# Patient Record
Sex: FEMALE | Race: WHITE | NOT HISPANIC OR LATINO | Employment: FULL TIME | URBAN - METROPOLITAN AREA
[De-identification: names, ages, dates, MRNs, and addresses within clinical notes are randomized per-mention and may not be internally consistent; named-entity substitution may affect disease eponyms.]

---

## 2017-01-16 ENCOUNTER — ALLSCRIPTS OFFICE VISIT (OUTPATIENT)
Dept: OTHER | Facility: OTHER | Age: 52
End: 2017-01-16

## 2017-01-16 DIAGNOSIS — E55.9 VITAMIN D DEFICIENCY: ICD-10-CM

## 2017-01-16 DIAGNOSIS — E03.9 HYPOTHYROIDISM: ICD-10-CM

## 2017-01-16 DIAGNOSIS — R73.03 PREDIABETES: ICD-10-CM

## 2017-05-16 ENCOUNTER — GENERIC CONVERSION - ENCOUNTER (OUTPATIENT)
Dept: OTHER | Facility: OTHER | Age: 52
End: 2017-05-16

## 2017-05-17 ENCOUNTER — TRANSCRIBE ORDERS (OUTPATIENT)
Dept: ADMINISTRATIVE | Facility: HOSPITAL | Age: 52
End: 2017-05-17

## 2017-05-17 DIAGNOSIS — M79.605 LEFT LEG PAIN: ICD-10-CM

## 2017-05-17 DIAGNOSIS — R09.89 OTHER DYSPNEA AND RESPIRATORY ABNORMALITY: Primary | ICD-10-CM

## 2017-05-17 DIAGNOSIS — M79.604 RIGHT LEG PAIN: ICD-10-CM

## 2017-05-17 DIAGNOSIS — R06.09 OTHER DYSPNEA AND RESPIRATORY ABNORMALITY: Primary | ICD-10-CM

## 2017-05-22 ENCOUNTER — GENERIC CONVERSION - ENCOUNTER (OUTPATIENT)
Dept: OTHER | Facility: OTHER | Age: 52
End: 2017-05-22

## 2017-07-06 ENCOUNTER — ALLSCRIPTS OFFICE VISIT (OUTPATIENT)
Dept: OTHER | Facility: OTHER | Age: 52
End: 2017-07-06

## 2017-07-06 ENCOUNTER — TRANSCRIBE ORDERS (OUTPATIENT)
Dept: ADMINISTRATIVE | Facility: HOSPITAL | Age: 52
End: 2017-07-06

## 2017-07-06 DIAGNOSIS — R53.83 FATIGUE, UNSPECIFIED TYPE: ICD-10-CM

## 2017-07-06 DIAGNOSIS — R06.02 SHORTNESS OF BREATH: Primary | ICD-10-CM

## 2017-07-18 ENCOUNTER — ALLSCRIPTS OFFICE VISIT (OUTPATIENT)
Dept: OTHER | Facility: OTHER | Age: 52
End: 2017-07-18

## 2017-07-19 ENCOUNTER — HOSPITAL ENCOUNTER (OUTPATIENT)
Dept: NON INVASIVE DIAGNOSTICS | Facility: CLINIC | Age: 52
Discharge: HOME | End: 2017-07-19
Payer: COMMERCIAL

## 2017-07-19 DIAGNOSIS — E03.9 HYPOTHYROIDISM: ICD-10-CM

## 2017-07-19 DIAGNOSIS — R53.83 FATIGUE, UNSPECIFIED TYPE: ICD-10-CM

## 2017-07-19 DIAGNOSIS — R06.02 SHORTNESS OF BREATH: ICD-10-CM

## 2017-07-19 PROCEDURE — 93350 STRESS TTE ONLY: CPT

## 2017-08-05 ENCOUNTER — APPOINTMENT (OUTPATIENT)
Dept: LAB | Facility: MEDICAL CENTER | Age: 52
End: 2017-08-05
Payer: COMMERCIAL

## 2017-08-05 ENCOUNTER — TRANSCRIBE ORDERS (OUTPATIENT)
Dept: ADMINISTRATIVE | Facility: HOSPITAL | Age: 52
End: 2017-08-05

## 2017-08-05 DIAGNOSIS — Z00.8 HEALTH EXAMINATION IN POPULATION SURVEY: Primary | ICD-10-CM

## 2017-08-05 DIAGNOSIS — Z00.8 HEALTH EXAMINATION IN POPULATION SURVEY: ICD-10-CM

## 2017-08-05 LAB
CHOLEST SERPL-MCNC: 207 MG/DL (ref 50–200)
EST. AVERAGE GLUCOSE BLD GHB EST-MCNC: 111 MG/DL
HBA1C MFR BLD: 5.5 % (ref 4.2–6.3)
HDLC SERPL-MCNC: 57 MG/DL (ref 40–60)
LDLC SERPL CALC-MCNC: 116 MG/DL (ref 0–100)
TRIGL SERPL-MCNC: 172 MG/DL

## 2017-08-05 PROCEDURE — 83036 HEMOGLOBIN GLYCOSYLATED A1C: CPT

## 2017-08-05 PROCEDURE — 80061 LIPID PANEL: CPT

## 2017-08-05 PROCEDURE — 36415 COLL VENOUS BLD VENIPUNCTURE: CPT

## 2017-12-13 PROCEDURE — G0145 SCR C/V CYTO,THINLAYER,RESCR: HCPCS | Performed by: OBSTETRICS & GYNECOLOGY

## 2017-12-13 PROCEDURE — 87624 HPV HI-RISK TYP POOLED RSLT: CPT | Performed by: OBSTETRICS & GYNECOLOGY

## 2017-12-18 ENCOUNTER — LAB REQUISITION (OUTPATIENT)
Dept: LAB | Facility: HOSPITAL | Age: 52
End: 2017-12-18
Payer: COMMERCIAL

## 2017-12-18 DIAGNOSIS — Z01.419 ENCOUNTER FOR GYNECOLOGICAL EXAMINATION WITHOUT ABNORMAL FINDING: ICD-10-CM

## 2017-12-19 LAB — HPV RRNA GENITAL QL NAA+PROBE: NORMAL

## 2017-12-21 LAB
LAB AP GYN PRIMARY INTERPRETATION: NORMAL
Lab: NORMAL

## 2018-01-13 VITALS
DIASTOLIC BLOOD PRESSURE: 62 MMHG | WEIGHT: 142.44 LBS | SYSTOLIC BLOOD PRESSURE: 100 MMHG | HEIGHT: 65 IN | BODY MASS INDEX: 23.73 KG/M2 | HEART RATE: 74 BPM

## 2018-01-14 VITALS
SYSTOLIC BLOOD PRESSURE: 118 MMHG | BODY MASS INDEX: 24.74 KG/M2 | WEIGHT: 148.5 LBS | HEART RATE: 66 BPM | DIASTOLIC BLOOD PRESSURE: 78 MMHG | HEIGHT: 65 IN

## 2018-01-14 VITALS
HEART RATE: 80 BPM | SYSTOLIC BLOOD PRESSURE: 100 MMHG | BODY MASS INDEX: 24.55 KG/M2 | WEIGHT: 147.38 LBS | HEIGHT: 65 IN | DIASTOLIC BLOOD PRESSURE: 78 MMHG

## 2018-01-18 NOTE — MISCELLANEOUS
Message  Return to work or school:   Roland Gerber is under my professional care   She was seen in my office on 2/2/16   She is able to return to work on  24/16            Signatures   Electronically signed by : Priyanka Rhodes, [facility]; Feb 2 2016 10:35AM EST                       (Author)

## 2018-01-18 NOTE — MISCELLANEOUS
Message  Return to work or school:   Raquel Olivier is under my professional care  She was seen in my office on 02/14/2016       Please excuse illness          Signatures   Electronically signed by : Pamela Bo [facility]; Feb 14 2016  4:37PM EST                       (Author)    Electronically signed by : Pamela Bo [facility]; Feb 15 2016 10:45AM EST

## 2018-03-23 DIAGNOSIS — E03.9 HYPOTHYROIDISM, UNSPECIFIED TYPE: Primary | ICD-10-CM

## 2018-03-23 RX ORDER — LEVOTHYROXINE SODIUM 0.03 MG/1
TABLET ORAL
Qty: 120 TABLET | Refills: 3 | Status: SHIPPED | OUTPATIENT
Start: 2018-03-23 | End: 2018-07-24 | Stop reason: SDUPTHER

## 2018-03-30 ENCOUNTER — OFFICE VISIT (OUTPATIENT)
Dept: OBGYN CLINIC | Facility: HOSPITAL | Age: 53
End: 2018-03-30
Payer: COMMERCIAL

## 2018-03-30 ENCOUNTER — HOSPITAL ENCOUNTER (OUTPATIENT)
Dept: RADIOLOGY | Facility: HOSPITAL | Age: 53
Discharge: HOME | End: 2018-03-30
Attending: ORTHOPAEDIC SURGERY
Payer: COMMERCIAL

## 2018-03-30 VITALS
SYSTOLIC BLOOD PRESSURE: 111 MMHG | HEART RATE: 80 BPM | BODY MASS INDEX: 26.33 KG/M2 | WEIGHT: 148.6 LBS | DIASTOLIC BLOOD PRESSURE: 75 MMHG | HEIGHT: 63 IN

## 2018-03-30 DIAGNOSIS — G56.03 CARPAL TUNNEL SYNDROME, BILATERAL: Primary | ICD-10-CM

## 2018-03-30 DIAGNOSIS — G56.03 CARPAL TUNNEL SYNDROME, BILATERAL: ICD-10-CM

## 2018-03-30 PROCEDURE — 73100 X-RAY EXAM OF WRIST: CPT

## 2018-03-30 PROCEDURE — 99204 OFFICE O/P NEW MOD 45 MIN: CPT | Performed by: ORTHOPAEDIC SURGERY

## 2018-03-30 PROCEDURE — 20526 THER INJECTION CARP TUNNEL: CPT | Performed by: ORTHOPAEDIC SURGERY

## 2018-03-30 RX ORDER — PSEUDOEPHEDRINE HCL 120 MG/1
120 TABLET, FILM COATED, EXTENDED RELEASE ORAL AS NEEDED
COMMUNITY
Start: 2013-04-11

## 2018-03-30 RX ORDER — BETAMETHASONE SODIUM PHOSPHATE AND BETAMETHASONE ACETATE 3; 3 MG/ML; MG/ML
6 INJECTION, SUSPENSION INTRA-ARTICULAR; INTRALESIONAL; INTRAMUSCULAR; SOFT TISSUE
Status: COMPLETED | OUTPATIENT
Start: 2018-03-30 | End: 2018-03-30

## 2018-03-30 RX ORDER — FLUTICASONE PROPIONATE 50 MCG
SPRAY, SUSPENSION (ML) NASAL AS NEEDED
COMMUNITY

## 2018-03-30 RX ORDER — LIDOCAINE HYDROCHLORIDE 10 MG/ML
0.5 INJECTION, SOLUTION INFILTRATION; PERINEURAL
Status: COMPLETED | OUTPATIENT
Start: 2018-03-30 | End: 2018-03-30

## 2018-03-30 RX ORDER — MULTIVIT WITH MINERALS/LUTEIN
1000 TABLET ORAL
COMMUNITY
Start: 2013-04-17

## 2018-03-30 RX ORDER — BUPIVACAINE HYDROCHLORIDE 2.5 MG/ML
1 INJECTION, SOLUTION INFILTRATION; PERINEURAL
Status: COMPLETED | OUTPATIENT
Start: 2018-03-30 | End: 2018-03-30

## 2018-03-30 RX ORDER — LEVOTHYROXINE SODIUM 0.03 MG/1
25 TABLET ORAL
COMMUNITY
End: 2018-05-22 | Stop reason: SDUPTHER

## 2018-03-30 RX ORDER — NORETHINDRONE ACETATE AND ETHINYL ESTRADIOL .5; 2.5 MG/1; UG/1
1 TABLET ORAL
Refills: 2 | COMMUNITY
Start: 2018-02-03 | End: 2019-07-17 | Stop reason: SDUPTHER

## 2018-03-30 RX ORDER — NIACIN 100 MG
100 TABLET ORAL
COMMUNITY

## 2018-03-30 RX ORDER — BUPROPION HYDROCHLORIDE 300 MG/1
300 TABLET ORAL
COMMUNITY
End: 2018-06-06

## 2018-03-30 RX ORDER — GUAIFENESIN 600 MG
600 TABLET, EXTENDED RELEASE 12 HR ORAL AS NEEDED
COMMUNITY
Start: 2013-04-11 | End: 2019-09-25 | Stop reason: ALTCHOICE

## 2018-03-30 RX ORDER — BUPROPION HYDROCHLORIDE 450 MG/1
450 TABLET, FILM COATED, EXTENDED RELEASE ORAL EVERY MORNING
COMMUNITY
Start: 2015-09-14 | End: 2018-07-13 | Stop reason: SDUPTHER

## 2018-03-30 RX ORDER — CETIRIZINE HYDROCHLORIDE 10 MG/1
10 TABLET ORAL AS NEEDED
COMMUNITY
Start: 2013-04-11

## 2018-03-30 RX ORDER — EPINEPHRINE 0.3 MG/.3ML
INJECTION INTRAMUSCULAR AS NEEDED
COMMUNITY
Start: 2015-07-02 | End: 2019-07-31 | Stop reason: ALTCHOICE

## 2018-03-30 RX ADMIN — BETAMETHASONE SODIUM PHOSPHATE AND BETAMETHASONE ACETATE 6 MG: 3; 3 INJECTION, SUSPENSION INTRA-ARTICULAR; INTRALESIONAL; INTRAMUSCULAR; SOFT TISSUE at 13:28

## 2018-03-30 RX ADMIN — LIDOCAINE HYDROCHLORIDE 0.5 ML: 10 INJECTION, SOLUTION INFILTRATION; PERINEURAL at 13:28

## 2018-03-30 RX ADMIN — BUPIVACAINE HYDROCHLORIDE 1 ML: 2.5 INJECTION, SOLUTION INFILTRATION; PERINEURAL at 13:28

## 2018-03-30 NOTE — PROGRESS NOTES
Assessment:  1  Carpal tunnel syndrome, bilateral  EMG 2 Limb Upper Extremity     Patient Active Problem List   Diagnosis    Carpal tunnel syndrome, bilateral           Plan       cortisone injections today  Nighttime splints   Vitamin-B complex   EMG is will be ordered            Subjective:     Patient ID:    Chief Complaint:Richa Medina 45 y o  female      HPI    Patient comes in today with regards to Bilateral hand but mostly on the right  The patient reports that the pain is in the  Volar aspect affecting the thumb index and middle finger and has been going on for  Approximately a year but has been getting worse  Patient is right-hand  The pain is rated at 0 at its best and 8 at its worst   The pain is described as  Burning numbness and tingling  It is worsened with  lifting, and is made better with  rest   The patient has taken  Tylenol wrist  brace for treatment  No injury or surgeries for these wrists      The following portions of the patient's history were reviewed and updated as appropriate: allergies, current medications, past family history, past social history, past surgical history and problem list         Social History     Social History    Marital status: /Civil Union     Spouse name: N/A    Number of children: N/A    Years of education: N/A     Occupational History    Not on file       Social History Main Topics    Smoking status: Never Smoker    Smokeless tobacco: Never Used    Alcohol use Yes    Drug use: No    Sexual activity: Not on file     Other Topics Concern    Not on file     Social History Narrative    No narrative on file     Past Medical History:   Diagnosis Date    Depression     Disease of thyroid gland      Past Surgical History:   Procedure Laterality Date    SEPTOPLASTY       Allergies   Allergen Reactions    Other      Current Outpatient Prescriptions on File Prior to Visit   Medication Sig Dispense Refill    levothyroxine 25 mcg tablet TAKE 1 TABLET ON MONDAY THROUGH FRIDAY AND 2 TABLETS ON SATURDAY AND SUNDAY 120 tablet 3     No current facility-administered medications on file prior to visit  Objective:    Review of Systems   Constitutional: Negative  HENT: Positive for hearing loss  Eyes: Negative  Respiratory: Negative  Cardiovascular: Negative  Gastrointestinal: Negative  Endocrine: Negative  Genitourinary: Negative  Musculoskeletal:        See HPI   Skin: Negative  Allergic/Immunologic: Negative  Neurological: Positive for numbness  Hematological: Negative  Psychiatric/Behavioral: Negative  Right Hand Exam     Tenderness   The patient is experiencing no tenderness  Range of Motion   The patient has normal right wrist ROM  Muscle Strength   Wrist Extension: 5/5   Wrist Flexion: 5/5   : 5/5     Tests   Phalens Sign: positive  Tinels Sign (Medial Nerve): negative  Finkelstein: negative    Other   Erythema: absent  Scars: absent  Sensation: normal  Pulse: present    Comments:   Dworkins test is negative on both hands      Left Hand Exam     Tenderness   The patient is experiencing no tenderness  Range of Motion   The patient has normal left wrist ROM  Muscle Strength   Wrist Extension: 5/5   Wrist Flexion: 5/5   :  5/5     Tests   Phalens Sign: positive  Tinels Sign (Medial Nerve): negative    Other   Erythema: absent  Scars: absent  Sensation: normal  Pulse: present            Physical Exam   Constitutional: She is oriented to person, place, and time  She appears well-developed  HENT:   Head: Normocephalic  Eyes: Pupils are equal, round, and reactive to light  Neck: Normal range of motion  Cardiovascular: Normal rate  Pulmonary/Chest: Effort normal    Abdominal: She exhibits no distension  Neurological: She is alert and oriented to person, place, and time  Skin: Skin is warm  Psychiatric: She has a normal mood and affect     Nursing note and vitals reviewed  Hand/upper extremity injection  Date/Time: 3/30/2018 1:28 PM  Consent given by: patient  Supporting Documentation  Indications: pain   Procedure Details  Condition:carpal tunnel syndrome Site: L carpal tunnel   Needle size: 25 G  Approach: volar  Medications administered: 1 mL bupivacaine 0 25 %; 0 5 mL lidocaine 1 %; 6 mg betamethasone acetate-betamethasone sodium phosphate 6 (3-3) mg/mL  Patient tolerance: patient tolerated the procedure well with no immediate complications       Hand/upper extremity injection  Date/Time: 3/30/2018 1:28 PM  Consent given by: patient  Supporting Documentation  Indications: pain   Procedure Details  Condition:carpal tunnel syndrome Site: R carpal tunnel   Needle size: 25 G  Medications administered: 1 mL bupivacaine 0 25 %; 0 5 mL lidocaine 1 %; 6 mg betamethasone acetate-betamethasone sodium phosphate 6 (3-3) mg/mL  Patient tolerance: patient tolerated the procedure well with no immediate complications                I have personally reviewed pertinent films in PACS  Portions of the record may have been created with voice recognition software   Occasional wrong word or "sound a like" substitutions may have occurred due to the inherent limitations of voice recognition software   Read the chart carefully and recognize, using context, where substitutions have occurred

## 2018-05-22 DIAGNOSIS — E03.9 HYPOTHYROIDISM, UNSPECIFIED TYPE: Primary | ICD-10-CM

## 2018-05-22 RX ORDER — LEVOTHYROXINE SODIUM 0.03 MG/1
TABLET ORAL
Qty: 120 TABLET | Refills: 2 | Status: SHIPPED | OUTPATIENT
Start: 2018-05-22 | End: 2018-06-06

## 2018-05-23 ENCOUNTER — HOSPITAL ENCOUNTER (OUTPATIENT)
Dept: NEUROLOGY | Facility: CLINIC | Age: 53
Discharge: HOME | End: 2018-05-23
Payer: COMMERCIAL

## 2018-05-23 DIAGNOSIS — G56.03 CARPAL TUNNEL SYNDROME, BILATERAL: ICD-10-CM

## 2018-05-23 PROCEDURE — 95912 NRV CNDJ TEST 11-12 STUDIES: CPT | Performed by: PSYCHIATRY & NEUROLOGY

## 2018-05-23 PROCEDURE — 95886 MUSC TEST DONE W/N TEST COMP: CPT | Performed by: PSYCHIATRY & NEUROLOGY

## 2018-06-01 ENCOUNTER — OFFICE VISIT (OUTPATIENT)
Dept: OBGYN CLINIC | Facility: HOSPITAL | Age: 53
End: 2018-06-01
Payer: COMMERCIAL

## 2018-06-01 VITALS
BODY MASS INDEX: 25.47 KG/M2 | WEIGHT: 143.8 LBS | HEART RATE: 76 BPM | DIASTOLIC BLOOD PRESSURE: 73 MMHG | SYSTOLIC BLOOD PRESSURE: 114 MMHG

## 2018-06-01 DIAGNOSIS — G56.01 CARPAL TUNNEL SYNDROME ON RIGHT: Primary | ICD-10-CM

## 2018-06-01 PROCEDURE — 99213 OFFICE O/P EST LOW 20 MIN: CPT | Performed by: ORTHOPAEDIC SURGERY

## 2018-06-01 NOTE — PROGRESS NOTES
Assessment:  1  Carpal tunnel syndrome on right  Case request operating room: [address]    Ambulatory referral to Family Practice    Case request operating room: [address]     Patient Active Problem List   Diagnosis    Carpal tunnel syndrome, bilateral           Plan       patient would like to proceed with carpal tunnel release            Subjective:     Patient ID:    Chief Complaint:Richa Medina 52 y o  female      HPI     patient comes in today with regards to bilateral wrists  We have treated her with cortisone injections she came back and was still having pain we sent her for an EMG she comes back to review those EMG is  The bilateral wrists have been bothering her significantly recently  Conservative care including vitamin-B as well as injections have not been to what she would have liked      The following portions of the patient's history were reviewed and updated as appropriate: allergies, current medications, past family history, past social history, past surgical history and problem list     All organ systems normal    Social History     Social History    Marital status: /Civil Union     Spouse name: N/A    Number of children: N/A    Years of education: N/A     Occupational History    Not on file       Social History Main Topics    Smoking status: Never Smoker    Smokeless tobacco: Never Used    Alcohol use Yes    Drug use: No    Sexual activity: Not on file     Other Topics Concern    Not on file     Social History Narrative    No narrative on file     Past Medical History:   Diagnosis Date    Depression     Disease of thyroid gland      Past Surgical History:   Procedure Laterality Date    SEPTOPLASTY       Allergies   Allergen Reactions    Other      Current Outpatient Prescriptions on File Prior to Visit   Medication Sig Dispense Refill    Ascorbic Acid (VITAMIN C) 1000 MG tablet Take 1,000 mg by mouth      aspirin 81 MG tablet Take 81 mg by mouth  buPROPion (WELLBUTRIN XL) 150 mg 24 hr tablet Take 450 mg by mouth      buPROPion (WELLBUTRIN XL) 300 mg 24 hr tablet Take 300 mg by mouth      Calcium 250 MG CAPS Take by mouth      cetirizine (ZyrTEC) 10 mg tablet Take 10 mg by mouth      EPINEPHrine (EPIPEN 2-JASON) 0 3 mg/0 3 mL SOAJ       fluticasone (FLONASE ALLERGY RELIEF) 50 mcg/act nasal spray into each nostril      guaiFENesin (MUCINEX) 600 mg 12 hr tablet Take 600 mg by mouth      levothyroxine 25 mcg tablet TAKE 1 TABLET ON MONDAY THROUGH FRIDAY AND 2 TABLETS ON SATURDAY AND SUNDAY 120 tablet 3    levothyroxine 25 mcg tablet TAKE 1 TABLET ON MONDAY THROUGH FRIDAY AND 2 TABLETS ON SATURDAY AND SUNDAY 120 tablet 2    MAGNESIUM PO Take 200 mg by mouth      MULTIPLE VITAMINS-CALCIUM PO Take by mouth      niacin 100 mg tablet Take by mouth      norethindrone-ethinyl estradiol (FEMHRT LOW DOSE) 0 5-2 5 MG-MCG per tablet Take 1 tablet by mouth daily  2    PARoxetine (PAXIL) 10 mg tablet TAKE 1 TABLET BY MOUTH DAILY      pseudoephedrine (SUDAFED) 120 MG 12 hr tablet Take 120 mg by mouth       No current facility-administered medications on file prior to visit  Objective:        Ortho Exam     see previous exam for further details    I have personally reviewed pertinent films in PACS and my interpretation is EMG show bilateral carpal tunnel mild on the left but moderate on the right       Portions of the record may have been created with voice recognition software   Occasional wrong word or "sound a like" substitutions may have occurred due to the inherent limitations of voice recognition software   Read the chart carefully and recognize, using context, where substitutions have occurred

## 2018-06-01 NOTE — H&P
Assessment:  1  Carpal tunnel syndrome, bilateral  EMG 2 Limb Upper Extremity          Patient Active Problem List   Diagnosis    Carpal tunnel syndrome, bilateral               Plan       cortisone injections today  Nighttime splints   Vitamin-B complex   EMG is will be ordered               Subjective:      Patient ID:     Chief Complaint:Richa Medina 45 y o  female        HPI     Patient comes in today with regards to Bilateral hand but mostly on the right  The patient reports that the pain is in the  Volar aspect affecting the thumb index and middle finger and has been going on for  Approximately a year but has been getting worse  Patient is right-hand  The pain is rated at 0 at its best and 8 at its worst   The pain is described as  Burning numbness and tingling  It is worsened with  lifting, and is made better with  rest   The patient has taken  Tylenol wrist  brace for treatment   No injury or surgeries for these wrists        The following portions of the patient's history were reviewed and updated as appropriate: allergies, current medications, past family history, past social history, past surgical history and problem list            Social History            Social History    Marital status: /Civil Union       Spouse name: N/A    Number of children: N/A    Years of education: N/A          Occupational History    Not on file            Social History Main Topics    Smoking status: Never Smoker    Smokeless tobacco: Never Used    Alcohol use Yes    Drug use: No    Sexual activity: Not on file      Other Topics Concern    Not on file          Social History Narrative    No narrative on file           Past Medical History:   Diagnosis Date    Depression      Disease of thyroid gland              Past Surgical History:   Procedure Laterality Date    SEPTOPLASTY               Allergies   Allergen Reactions    Other               Current Outpatient Prescriptions on File Prior to Visit   Medication Sig Dispense Refill    levothyroxine 25 mcg tablet TAKE 1 TABLET ON MONDAY THROUGH FRIDAY AND 2 TABLETS ON SATURDAY AND SUNDAY 120 tablet 3      No current facility-administered medications on file prior to visit                  Objective:     Review of Systems   Constitutional: Negative  HENT: Positive for hearing loss  Eyes: Negative  Respiratory: Negative  Cardiovascular: Negative  Gastrointestinal: Negative  Endocrine: Negative  Genitourinary: Negative  Musculoskeletal:        See HPI   Skin: Negative  Allergic/Immunologic: Negative  Neurological: Positive for numbness  Hematological: Negative  Psychiatric/Behavioral: Negative           Right Hand Exam      Tenderness   The patient is experiencing no tenderness             Range of Motion   The patient has normal right wrist ROM     Muscle Strength   Wrist Extension: 5/5   Wrist Flexion: 5/5   : 5/5      Tests   Phalens Sign: positive  Tinels Sign (Medial Nerve): negative  Finkelstein: negative     Other   Erythema: absent  Scars: absent  Sensation: normal  Pulse: present     Comments:   Dworkins test is negative on both hands        Left Hand Exam      Tenderness   The patient is experiencing no tenderness             Range of Motion   The patient has normal left wrist ROM     Muscle Strength   Wrist Extension: 5/5   Wrist Flexion: 5/5   :  5/5      Tests   Phalens Sign: positive  Tinels Sign (Medial Nerve): negative     Other   Erythema: absent  Scars: absent  Sensation: normal  Pulse: present              Physical Exam   Constitutional: She is oriented to person, place, and time  She appears well-developed  HENT:   Head: Normocephalic  Eyes: Pupils are equal, round, and reactive to light  Neck: Normal range of motion  Cardiovascular: Normal rate  Pulmonary/Chest: Effort normal    Abdominal: She exhibits no distension     Neurological: She is alert and oriented to person, place, and time  Skin: Skin is warm  Psychiatric: She has a normal mood and affect  Nursing note and vitals reviewed      EMG shows that there is moderate carpal tunnel on the right side  It shows that the motor is 4 48 milliseconds and the sensory is 4 27 milliseconds the left shows normal motor and a mild delay in sensory at 3 54

## 2018-06-06 NOTE — PRE-PROCEDURE INSTRUCTIONS
Pre-Surgery Instructions:   Medication Instructions    Ascorbic Acid (VITAMIN C) 1000 MG tablet Instructed patient per Anesthesia Guidelines   aspirin 81 MG tablet Instructed patient per Anesthesia Guidelines   buPROPion (FORFIVO XL) 450 MG 24 hr tablet Instructed patient per Anesthesia Guidelines   Calcium 250 MG CAPS Instructed patient per Anesthesia Guidelines   cetirizine (ZyrTEC) 10 mg tablet Instructed patient per Anesthesia Guidelines   EPINEPHrine (EPIPEN 2-JASON) 0 3 mg/0 3 mL SOAJ Instructed patient per Anesthesia Guidelines   fluticasone (FLONASE ALLERGY RELIEF) 50 mcg/act nasal spray Instructed patient per Anesthesia Guidelines   guaiFENesin (MUCINEX) 600 mg 12 hr tablet Instructed patient per Anesthesia Guidelines   levothyroxine 25 mcg tablet Instructed patient per Anesthesia Guidelines   MAGNESIUM PO Instructed patient per Anesthesia Guidelines   MULTIPLE VITAMINS-CALCIUM PO Instructed patient per Anesthesia Guidelines   niacin 100 mg tablet Instructed patient per Anesthesia Guidelines   norethindrone-ethinyl estradiol (FEMHRT LOW DOSE) 0 5-2 5 MG-MCG per tablet Instructed patient per Anesthesia Guidelines   PAROXETINE HCL PO Instructed patient per Anesthesia Guidelines   pseudoephedrine (SUDAFED) 120 MG 12 hr tablet Instructed patient per Anesthesia Guidelines      Pre op instructions reviewed-showering instructions reviewed-patient has hibiclens

## 2018-07-01 DIAGNOSIS — R73.03 PREDIABETES: ICD-10-CM

## 2018-07-01 DIAGNOSIS — E03.9 HYPOTHYROIDISM: ICD-10-CM

## 2018-07-02 ENCOUNTER — OFFICE VISIT (OUTPATIENT)
Dept: FAMILY MEDICINE CLINIC | Facility: CLINIC | Age: 53
End: 2018-07-02
Payer: COMMERCIAL

## 2018-07-02 VITALS
HEIGHT: 63 IN | BODY MASS INDEX: 25.52 KG/M2 | SYSTOLIC BLOOD PRESSURE: 112 MMHG | HEART RATE: 84 BPM | RESPIRATION RATE: 18 BRPM | WEIGHT: 144 LBS | DIASTOLIC BLOOD PRESSURE: 76 MMHG | OXYGEN SATURATION: 96 %

## 2018-07-02 DIAGNOSIS — E78.00 PRIMARY HYPERCHOLESTEROLEMIA: ICD-10-CM

## 2018-07-02 DIAGNOSIS — E03.9 HYPOTHYROIDISM, UNSPECIFIED TYPE: ICD-10-CM

## 2018-07-02 DIAGNOSIS — F32.A DEPRESSION, UNSPECIFIED DEPRESSION TYPE: ICD-10-CM

## 2018-07-02 DIAGNOSIS — Z11.59 NEED FOR HEPATITIS C SCREENING TEST: ICD-10-CM

## 2018-07-02 DIAGNOSIS — Z01.818 PREOP EXAMINATION: Primary | ICD-10-CM

## 2018-07-02 DIAGNOSIS — G56.01 CARPAL TUNNEL SYNDROME ON RIGHT: ICD-10-CM

## 2018-07-02 PROCEDURE — 99215 OFFICE O/P EST HI 40 MIN: CPT | Performed by: FAMILY MEDICINE

## 2018-07-02 NOTE — ASSESSMENT & PLAN NOTE
Depression symptoms are stable on paroxetine and Wellbutrin  She had been seeing Dr Elton Anderson for Psychiatry, but since her insurance changed, she will not be returning  She will ask him for records

## 2018-07-02 NOTE — ASSESSMENT & PLAN NOTE
Symptoms have been stable on current dose of Synthroid  She will be returning to Endocrine this summer  She would like to follow here after that

## 2018-07-02 NOTE — PROGRESS NOTES
Assessment/Plan:    I have given her clearance for her right sided carpal tunnel surgery next week barring any new symptoms between now and date of surgery  She had a completely negative cardiac workup 1 year ago when she was having some fatigue and weakness symptoms  Thankfully the symptoms are better now that she has been treating with a chiropractor  She is aware that she must stop over-the-counter meds and aspirin 1 week before surgery  Depression    Depression symptoms are stable on paroxetine and Wellbutrin  She had been seeing Dr Valdemar Cruz for Psychiatry, but since her insurance changed, she will not be returning  She will ask him for records  Hypothyroidism    Symptoms have been stable on current dose of Synthroid  She will be returning to Endocrine this summer  She would like to follow here after that  Primary hypercholesterolemia    Lipids have been stable on daily niacin  I have given her lab slip for fasting lipid profile later this summer  Diagnoses and all orders for this visit:    Preop examination    Carpal tunnel syndrome on right  -     Ambulatory referral to Family Practice    Need for hepatitis C screening test  -     Hepatitis C antibody; Future    Hypothyroidism, unspecified type    Primary hypercholesterolemia    Depression, unspecified depression type    Other orders  -      Mammography          Subjective:      Patient ID: Vicky Anand is a 52 y o  female  [address] is here to establish care and get preop clearance prior to having her right carpal tunnel repaired  This is scheduled for next week  She had been dealing with carpal tunnel symptoms for quite a while  She works in [employer] at [email or web address], and the wrist symptoms were exacerbated by lifting  This symptoms have been a little bit better over the summer   When she is not working  Her past medical history is positive for hypothyroidism, depression, and allergic rhinitis    Thyroid has been under excellent control with levothyroxine 25 mcg for 5 days of the week and 50 mcg for 2 days during the week  She has been getting allergy injections for the past 5 years  The depression has been under excellent control on paroxetine 50 mg daily  She has had a mild elevated cholesterol which is stable on niacin  She had normal hemoglobin A1c and other labs done August 2017  The only other surgical procedure with septoplasty approximately 2012  She denies any anesthesia problems  She also had Lasik surgery yrs ago    She denies any history of heart problems  She had a full workup about 1 year ago because she was having fatigue and leg weakness  Her cardiac workup was negative  Recently she has been going for chiropractic care in the symptoms are much better  She denies shortness of breath currently  She takes baby aspirin on a daily basis because she takes niacin and this deals with the hot flashes  She knows to stop these medications 1 week preop  The following portions of the patient's history were reviewed and updated as appropriate: allergies, current medications, past family history, past medical history, past social history, past surgical history and problem list     Review of Systems   Constitutional: Negative for activity change, chills, fatigue and fever  HENT: Negative for congestion, ear pain, sinus pressure and sore throat  Eyes: Negative for pain and visual disturbance  Respiratory: Negative for cough, chest tightness, shortness of breath and wheezing  Cardiovascular: Negative for chest pain, palpitations and leg swelling  Gastrointestinal: Negative for abdominal pain, blood in stool, constipation, diarrhea, nausea and vomiting  Endocrine: Negative for polydipsia and polyuria  Genitourinary: Negative for difficulty urinating, dysuria, frequency and urgency  Leakage with exercise or sneezing   Musculoskeletal: Positive for arthralgias   Negative for joint swelling and myalgias  Skin: Negative for rash  Neurological: Negative for dizziness, weakness, numbness and headaches  Hematological: Negative for adenopathy  Does not bruise/bleed easily  Psychiatric/Behavioral: Negative for dysphoric mood  The patient is not nervous/anxious  Objective:      /76   Pulse 84   Resp 18   Ht 5' 3" (1 6 m)   Wt 65 3 kg (144 lb)   SpO2 96%   BMI 25 51 kg/m²          Physical Exam   Constitutional: She is oriented to person, place, and time  She appears well-developed and well-nourished  No distress  HENT:   Head: Normocephalic and atraumatic  Right Ear: External ear normal    Left Ear: External ear normal    Nose: Nose normal    Mouth/Throat: Oropharynx is clear and moist    Eyes: Conjunctivae and EOM are normal  Pupils are equal, round, and reactive to light  No scleral icterus  Neck: Normal range of motion  Neck supple  No thyromegaly present  Carotid pulses 2+ bilaterally without bruit   Cardiovascular: Normal rate, regular rhythm and normal heart sounds  No murmur heard  Pulmonary/Chest: Effort normal and breath sounds normal  She has no wheezes  She has no rales  Abdominal: Soft  Bowel sounds are normal  She exhibits no mass  There is no tenderness  Musculoskeletal: She exhibits no tenderness or deformity  Lymphadenopathy:     She has no cervical adenopathy  Neurological: She is alert and oriented to person, place, and time  She has normal reflexes  No cranial nerve deficit  Skin: Skin is warm and dry  No rash noted  No erythema  Psychiatric: She has a normal mood and affect  Her behavior is normal    Nursing note and vitals reviewed

## 2018-07-02 NOTE — ASSESSMENT & PLAN NOTE
Lipids have been stable on daily niacin  I have given her lab slip for fasting lipid profile later this summer

## 2018-07-03 ENCOUNTER — APPOINTMENT (OUTPATIENT)
Dept: LAB | Facility: MEDICAL CENTER | Age: 53
End: 2018-07-03
Payer: COMMERCIAL

## 2018-07-03 DIAGNOSIS — R73.03 PREDIABETES: ICD-10-CM

## 2018-07-03 DIAGNOSIS — E03.9 HYPOTHYROIDISM: ICD-10-CM

## 2018-07-03 DIAGNOSIS — E78.00 PRIMARY HYPERCHOLESTEROLEMIA: ICD-10-CM

## 2018-07-03 DIAGNOSIS — Z11.59 NEED FOR HEPATITIS C SCREENING TEST: ICD-10-CM

## 2018-07-03 LAB
ALBUMIN SERPL BCP-MCNC: 3.9 G/DL (ref 3.5–5)
ALP SERPL-CCNC: 54 U/L (ref 46–116)
ALT SERPL W P-5'-P-CCNC: 24 U/L (ref 12–78)
ANION GAP SERPL CALCULATED.3IONS-SCNC: 7 MMOL/L (ref 4–13)
AST SERPL W P-5'-P-CCNC: 14 U/L (ref 5–45)
BILIRUB SERPL-MCNC: 0.45 MG/DL (ref 0.2–1)
BUN SERPL-MCNC: 8 MG/DL (ref 5–25)
CALCIUM SERPL-MCNC: 9.4 MG/DL (ref 8.3–10.1)
CHLORIDE SERPL-SCNC: 104 MMOL/L (ref 100–108)
CHOLEST SERPL-MCNC: 176 MG/DL (ref 50–200)
CO2 SERPL-SCNC: 25 MMOL/L (ref 21–32)
CREAT SERPL-MCNC: 0.87 MG/DL (ref 0.6–1.3)
EST. AVERAGE GLUCOSE BLD GHB EST-MCNC: 114 MG/DL
GFR SERPL CREATININE-BSD FRML MDRD: 75 ML/MIN/1.73SQ M
GLUCOSE P FAST SERPL-MCNC: 78 MG/DL (ref 65–99)
HBA1C MFR BLD: 5.6 % (ref 4.2–6.3)
HCV AB SER QL: NORMAL
HDLC SERPL-MCNC: 55 MG/DL (ref 40–60)
LDLC SERPL CALC-MCNC: 94 MG/DL (ref 0–100)
NONHDLC SERPL-MCNC: 121 MG/DL
POTASSIUM SERPL-SCNC: 3.9 MMOL/L (ref 3.5–5.3)
PROT SERPL-MCNC: 8 G/DL (ref 6.4–8.2)
SODIUM SERPL-SCNC: 136 MMOL/L (ref 136–145)
T4 FREE SERPL-MCNC: 0.88 NG/DL (ref 0.76–1.46)
TRIGL SERPL-MCNC: 137 MG/DL
TSH SERPL DL<=0.05 MIU/L-ACNC: 2.02 UIU/ML (ref 0.36–3.74)

## 2018-07-03 PROCEDURE — 84443 ASSAY THYROID STIM HORMONE: CPT

## 2018-07-03 PROCEDURE — 36415 COLL VENOUS BLD VENIPUNCTURE: CPT

## 2018-07-03 PROCEDURE — 80053 COMPREHEN METABOLIC PANEL: CPT

## 2018-07-03 PROCEDURE — 80061 LIPID PANEL: CPT

## 2018-07-03 PROCEDURE — 84439 ASSAY OF FREE THYROXINE: CPT

## 2018-07-03 PROCEDURE — 86803 HEPATITIS C AB TEST: CPT

## 2018-07-03 PROCEDURE — 83036 HEMOGLOBIN GLYCOSYLATED A1C: CPT

## 2018-07-10 ENCOUNTER — ANESTHESIA EVENT (OUTPATIENT)
Dept: PERIOP | Facility: HOSPITAL | Age: 53
End: 2018-07-10
Payer: COMMERCIAL

## 2018-07-10 ENCOUNTER — ANESTHESIA (OUTPATIENT)
Dept: PERIOP | Facility: HOSPITAL | Age: 53
End: 2018-07-10
Payer: COMMERCIAL

## 2018-07-10 ENCOUNTER — HOSPITAL ENCOUNTER (OUTPATIENT)
Facility: HOSPITAL | Age: 53
Setting detail: OUTPATIENT SURGERY
Discharge: HOME | End: 2018-07-10
Attending: ORTHOPAEDIC SURGERY | Admitting: ORTHOPAEDIC SURGERY
Payer: COMMERCIAL

## 2018-07-10 VITALS
SYSTOLIC BLOOD PRESSURE: 129 MMHG | BODY MASS INDEX: 25.34 KG/M2 | RESPIRATION RATE: 18 BRPM | HEART RATE: 62 BPM | DIASTOLIC BLOOD PRESSURE: 65 MMHG | WEIGHT: 143 LBS | TEMPERATURE: 97.6 F | OXYGEN SATURATION: 100 % | HEIGHT: 63 IN

## 2018-07-10 DIAGNOSIS — G56.03 CARPAL TUNNEL SYNDROME, BILATERAL: ICD-10-CM

## 2018-07-10 DIAGNOSIS — G56.01 CARPAL TUNNEL SYNDROME ON RIGHT: Primary | ICD-10-CM

## 2018-07-10 PROCEDURE — 64721 CARPAL TUNNEL SURGERY: CPT | Performed by: ORTHOPAEDIC SURGERY

## 2018-07-10 RX ORDER — OXYCODONE HYDROCHLORIDE 5 MG/1
10 TABLET ORAL EVERY 4 HOURS PRN
Status: DISCONTINUED | OUTPATIENT
Start: 2018-07-10 | End: 2018-07-10 | Stop reason: HOSPADM

## 2018-07-10 RX ORDER — OXYCODONE HYDROCHLORIDE AND ACETAMINOPHEN 5; 325 MG/1; MG/1
1 TABLET ORAL EVERY 6 HOURS PRN
Qty: 5 TABLET | Refills: 0 | Status: SHIPPED | OUTPATIENT
Start: 2018-07-10 | End: 2018-10-04

## 2018-07-10 RX ORDER — FENTANYL CITRATE 50 UG/ML
INJECTION, SOLUTION INTRAMUSCULAR; INTRAVENOUS AS NEEDED
Status: DISCONTINUED | OUTPATIENT
Start: 2018-07-10 | End: 2018-07-10 | Stop reason: SURG

## 2018-07-10 RX ORDER — BUPIVACAINE HYDROCHLORIDE 2.5 MG/ML
INJECTION, SOLUTION INFILTRATION; PERINEURAL AS NEEDED
Status: DISCONTINUED | OUTPATIENT
Start: 2018-07-10 | End: 2018-07-10 | Stop reason: HOSPADM

## 2018-07-10 RX ORDER — MIDAZOLAM HYDROCHLORIDE 1 MG/ML
INJECTION INTRAMUSCULAR; INTRAVENOUS AS NEEDED
Status: DISCONTINUED | OUTPATIENT
Start: 2018-07-10 | End: 2018-07-10 | Stop reason: SURG

## 2018-07-10 RX ORDER — SODIUM CHLORIDE 9 MG/ML
INJECTION, SOLUTION INTRAVENOUS CONTINUOUS PRN
Status: DISCONTINUED | OUTPATIENT
Start: 2018-07-10 | End: 2018-07-10 | Stop reason: SURG

## 2018-07-10 RX ORDER — OXYCODONE HYDROCHLORIDE 5 MG/1
5 TABLET ORAL EVERY 4 HOURS PRN
Status: DISCONTINUED | OUTPATIENT
Start: 2018-07-10 | End: 2018-07-10 | Stop reason: HOSPADM

## 2018-07-10 RX ORDER — SODIUM CHLORIDE 9 MG/ML
100 INJECTION, SOLUTION INTRAVENOUS CONTINUOUS
Status: DISCONTINUED | OUTPATIENT
Start: 2018-07-10 | End: 2018-07-10 | Stop reason: HOSPADM

## 2018-07-10 RX ORDER — ONDANSETRON 2 MG/ML
4 INJECTION INTRAMUSCULAR; INTRAVENOUS EVERY 6 HOURS PRN
Status: DISCONTINUED | OUTPATIENT
Start: 2018-07-10 | End: 2018-07-10 | Stop reason: HOSPADM

## 2018-07-10 RX ORDER — MORPHINE SULFATE 4 MG/ML
2 INJECTION, SOLUTION INTRAMUSCULAR; INTRAVENOUS EVERY 4 HOURS PRN
Status: DISCONTINUED | OUTPATIENT
Start: 2018-07-10 | End: 2018-07-10 | Stop reason: HOSPADM

## 2018-07-10 RX ORDER — LIDOCAINE HYDROCHLORIDE 10 MG/ML
INJECTION, SOLUTION INFILTRATION; PERINEURAL AS NEEDED
Status: DISCONTINUED | OUTPATIENT
Start: 2018-07-10 | End: 2018-07-10 | Stop reason: SURG

## 2018-07-10 RX ORDER — PROPOFOL 10 MG/ML
INJECTION, EMULSION INTRAVENOUS AS NEEDED
Status: DISCONTINUED | OUTPATIENT
Start: 2018-07-10 | End: 2018-07-10 | Stop reason: SURG

## 2018-07-10 RX ORDER — PROPOFOL 10 MG/ML
INJECTION, EMULSION INTRAVENOUS CONTINUOUS PRN
Status: DISCONTINUED | OUTPATIENT
Start: 2018-07-10 | End: 2018-07-10 | Stop reason: SURG

## 2018-07-10 RX ORDER — MAGNESIUM HYDROXIDE 1200 MG/15ML
LIQUID ORAL AS NEEDED
Status: DISCONTINUED | OUTPATIENT
Start: 2018-07-10 | End: 2018-07-10 | Stop reason: HOSPADM

## 2018-07-10 RX ORDER — ONDANSETRON 2 MG/ML
INJECTION INTRAMUSCULAR; INTRAVENOUS AS NEEDED
Status: DISCONTINUED | OUTPATIENT
Start: 2018-07-10 | End: 2018-07-10 | Stop reason: SURG

## 2018-07-10 RX ADMIN — PROPOFOL 50 MG: 10 INJECTION, EMULSION INTRAVENOUS at 08:08

## 2018-07-10 RX ADMIN — ONDANSETRON 4 MG: 2 INJECTION INTRAMUSCULAR; INTRAVENOUS at 08:08

## 2018-07-10 RX ADMIN — CEFAZOLIN SODIUM 1000 MG: 1 SOLUTION INTRAVENOUS at 08:04

## 2018-07-10 RX ADMIN — FENTANYL CITRATE 50 MCG: 50 INJECTION INTRAMUSCULAR; INTRAVENOUS at 08:08

## 2018-07-10 RX ADMIN — SODIUM CHLORIDE: 0.9 INJECTION, SOLUTION INTRAVENOUS at 07:21

## 2018-07-10 RX ADMIN — LIDOCAINE HYDROCHLORIDE 30 MG: 10 INJECTION, SOLUTION INFILTRATION; PERINEURAL at 08:08

## 2018-07-10 RX ADMIN — MIDAZOLAM HYDROCHLORIDE 2 MG: 1 INJECTION, SOLUTION INTRAMUSCULAR; INTRAVENOUS at 08:03

## 2018-07-10 RX ADMIN — PROPOFOL 100 MCG/KG/MIN: 10 INJECTION, EMULSION INTRAVENOUS at 08:08

## 2018-07-10 NOTE — ANESTHESIA PREPROCEDURE EVALUATION
Review of Systems/Medical History  Patient summary reviewed  Chart reviewed  No history of anesthetic complications     Cardiovascular  Hyperlipidemia,    Pulmonary       GI/Hepatic  Negative GI/hepatic ROS               Endo/Other  History of thyroid disease , hypothyroidism,      GYN  Negative gynecology ROS          Hematology  Negative hematology ROS      Musculoskeletal  Negative musculoskeletal ROS        Neurology      Comment: Right carpal tunnel syndrome Psychology   Depression ,              Physical Exam    Airway    Mallampati score: II  TM Distance: >3 FB  Neck ROM: full     Dental   No notable dental hx     Cardiovascular  Rhythm: regular, Rate: normal, Cardiovascular exam normal    Pulmonary  Pulmonary exam normal Breath sounds clear to auscultation,     Other Findings        Anesthesia Plan  ASA Score- 2     Anesthesia Type- IV sedation with anesthesia with ASA Monitors  Additional Monitors:   Airway Plan:         Plan Factors-    Induction- intravenous  Postoperative Plan- Plan for postoperative opioid use  Informed Consent- Anesthetic plan and risks discussed with patient and spouse  I personally reviewed this patient with the CRNA  Discussed and agreed on the Anesthesia Plan with the CRNA  Jp Schafer Recent labs personally reviewed:  No results found for: WBC, HGB, PLT  Lab Results   Component Value Date     07/03/2018    K 3 9 07/03/2018    BUN 8 07/03/2018    CREATININE 0 87 07/03/2018     Lab Results   Component Value Date    HGBA1C 5 6 07/03/2018       I, Radha Randle MD, have personally seen and evaluated the patient prior to anesthetic care  I have reviewed the pre-anesthetic record, and other medical records if appropriate to the anesthetic care  If a CRNA is involved in the case, I have reviewed the CRNA assessment, if present, and agree   Risks/benefits and alternatives discussed with patient including possible PONV, sore throat, and possibility of rare anesthetic and surgical emergencies

## 2018-07-10 NOTE — H&P (VIEW-ONLY)
Assessment/Plan:    I have given her clearance for her right sided carpal tunnel surgery next week barring any new symptoms between now and date of surgery  She had a completely negative cardiac workup 1 year ago when she was having some fatigue and weakness symptoms  Thankfully the symptoms are better now that she has been treating with a chiropractor  She is aware that she must stop over-the-counter meds and aspirin 1 week before surgery  Depression    Depression symptoms are stable on paroxetine and Wellbutrin  She had been seeing Dr Rodney Farrar for Psychiatry, but since her insurance changed, she will not be returning  She will ask him for records  Hypothyroidism    Symptoms have been stable on current dose of Synthroid  She will be returning to Endocrine this summer  She would like to follow here after that  Primary hypercholesterolemia    Lipids have been stable on daily niacin  I have given her lab slip for fasting lipid profile later this summer  Diagnoses and all orders for this visit:    Preop examination    Carpal tunnel syndrome on right  -     Ambulatory referral to Family Practice    Need for hepatitis C screening test  -     Hepatitis C antibody; Future    Hypothyroidism, unspecified type    Primary hypercholesterolemia    Depression, unspecified depression type    Other orders  -      Mammography          Subjective:      Patient ID: Jennifer Porter is a 52 y o  female  Ember Velazquez is here to establish care and get preop clearance prior to having her right carpal tunnel repaired  This is scheduled for next week  She had been dealing with carpal tunnel symptoms for quite a while  She works in [employer] at [employer], and the wrist symptoms were exacerbated by lifting  This symptoms have been a little bit better over the summer   When she is not working  Her past medical history is positive for hypothyroidism, depression, and allergic rhinitis    Thyroid has been under excellent control with levothyroxine 25 mcg for 5 days of the week and 50 mcg for 2 days during the week  She has been getting allergy injections for the past 5 years  The depression has been under excellent control on paroxetine 50 mg daily  She has had a mild elevated cholesterol which is stable on niacin  She had normal hemoglobin A1c and other labs done August 2017  The only other surgical procedure with septoplasty approximately 2012  She denies any anesthesia problems  She also had Lasik surgery yrs ago    She denies any history of heart problems  She had a full workup about 1 year ago because she was having fatigue and leg weakness  Her cardiac workup was negative  Recently she has been going for chiropractic care in the symptoms are much better  She denies shortness of breath currently  She takes baby aspirin on a daily basis because she takes niacin and this deals with the hot flashes  She knows to stop these medications 1 week preop  The following portions of the patient's history were reviewed and updated as appropriate: allergies, current medications, past family history, past medical history, past social history, past surgical history and problem list     Review of Systems   Constitutional: Negative for activity change, chills, fatigue and fever  HENT: Negative for congestion, ear pain, sinus pressure and sore throat  Eyes: Negative for pain and visual disturbance  Respiratory: Negative for cough, chest tightness, shortness of breath and wheezing  Cardiovascular: Negative for chest pain, palpitations and leg swelling  Gastrointestinal: Negative for abdominal pain, blood in stool, constipation, diarrhea, nausea and vomiting  Endocrine: Negative for polydipsia and polyuria  Genitourinary: Negative for difficulty urinating, dysuria, frequency and urgency  Leakage with exercise or sneezing   Musculoskeletal: Positive for arthralgias   Negative for joint swelling and myalgias  Skin: Negative for rash  Neurological: Negative for dizziness, weakness, numbness and headaches  Hematological: Negative for adenopathy  Does not bruise/bleed easily  Psychiatric/Behavioral: Negative for dysphoric mood  The patient is not nervous/anxious  Objective:      /76   Pulse 84   Resp 18   Ht 5' 3" (1 6 m)   Wt 65 3 kg (144 lb)   SpO2 96%   BMI 25 51 kg/m²          Physical Exam   Constitutional: She is oriented to person, place, and time  She appears well-developed and well-nourished  No distress  HENT:   Head: Normocephalic and atraumatic  Right Ear: External ear normal    Left Ear: External ear normal    Nose: Nose normal    Mouth/Throat: Oropharynx is clear and moist    Eyes: Conjunctivae and EOM are normal  Pupils are equal, round, and reactive to light  No scleral icterus  Neck: Normal range of motion  Neck supple  No thyromegaly present  Carotid pulses 2+ bilaterally without bruit   Cardiovascular: Normal rate, regular rhythm and normal heart sounds  No murmur heard  Pulmonary/Chest: Effort normal and breath sounds normal  She has no wheezes  She has no rales  Abdominal: Soft  Bowel sounds are normal  She exhibits no mass  There is no tenderness  Musculoskeletal: She exhibits no tenderness or deformity  Lymphadenopathy:     She has no cervical adenopathy  Neurological: She is alert and oriented to person, place, and time  She has normal reflexes  No cranial nerve deficit  Skin: Skin is warm and dry  No rash noted  No erythema  Psychiatric: She has a normal mood and affect  Her behavior is normal    Nursing note and vitals reviewed

## 2018-07-10 NOTE — ANESTHESIA POSTPROCEDURE EVALUATION
Post-Op Assessment Note      CV Status:  Stable    Mental Status:  Alert and awake    Hydration Status:  Euvolemic    PONV Controlled:  Controlled    Airway Patency:  Patent    Post Op Vitals Reviewed: Yes          Staff: CRNA       Comments: vss report rn          BP      Temp     Pulse     Resp      SpO2

## 2018-07-10 NOTE — OP NOTE
OPERATIVE REPORT  PATIENT NAME: Manuel Gonzalez    :  1965  MRN: [id number]  Pt Location: [facility] 01    SURGERY DATE: 7/10/2018    Surgeon(s) and Role:     DO Urszula Chin Primary    Preop Diagnosis:  Carpal tunnel syndrome on right [G56 01]    Post-Op Diagnosis Codes:     * Carpal tunnel syndrome on right [G56 01]    Procedure(s) (LRB):  CARPAL TUNNEL RELEASE (Right)    Specimen(s):  * No specimens in log *    Estimated Blood Loss:   Minimal    Drains:       Anesthesia Type:   IV Sedation with Anesthesia    Operative Indications:  Carpal tunnel syndrome on right [G56 ]      Operative Findings: Thickened Transverse carpal ligament    Complications:   None    Procedure and Technique:  Patient was seen and examined in the preoperative area  The right extremity was marked, the consent an H&P had been reviewed  The patient was brought back to the operative suite  The patient was intubated sedated  Tourniquet was applied to right upper extremity  The right upper extremity was prepped and draped in a sterile fashion  After proper timeout commenced and identified the right upper extremity as the operative site  Esmarch was utilized to exsanguinate the extremity, the tourniquet was turned up to 250 mmHg  Tissue was made in the Stover's cardinal lines  Bovie was used to electrocauterize any bleeding  We used Metzenbaum scissors to dissect down to the carpal tunnel ligament  We used a 10 blade to incise into the carpal tunnel ligament and used a [place] elevator to protect the median nerve while dissecting proximally and distally the carpal tunnel ligament, while releasing the carpal tunnel ligament  We confirmed proper release  We irrigated wound the tourniquet was taken down after four minutes  Bleeding was cauterized incision was closed with  3 O nylon      Xeroform was applied to the skin a cut 4 x 4 and a Tegaderm were applied to the hand the patient was taken back to PACU after anesthesia was reversed     I was present for the entire procedure     I was present for the entire procedure and A qualified resident physician was not available    Patient Disposition:  PACU     SIGNATURE: Berna Weller DO  DATE: July 10, 2018  TIME: 7:29 AM

## 2018-07-10 NOTE — DISCHARGE INSTRUCTIONS
Discharge Instructions:    Weight bearing:  Do not put direct pressure over the incision  Do not lift with this hand until postoperative appointment  Dressings:  Keep the dressings clean, dry, and intact for 3 days  May remove dressings after 3 days and shower  Please clean the incision with alcohol after showers until postoperative appointment  May apply band-aids as needed  DO NOT SOAK THE INCISION                              Pain:  You have been prescribed a narcotic  Please take this sparingly and only AS NEEDED for pain  Appt Instructions: If you do not have your appointment, please call the clinic at [phone] to f/u with Dr Alanis Castrejon in 2 weeks from date of surgery

## 2018-07-13 DIAGNOSIS — F32.A DEPRESSION, UNSPECIFIED DEPRESSION TYPE: Primary | ICD-10-CM

## 2018-07-13 RX ORDER — PAROXETINE HYDROCHLORIDE 12.5 MG/1
TABLET, FILM COATED, EXTENDED RELEASE ORAL
Qty: 360 TABLET | Refills: 1 | Status: SHIPPED | OUTPATIENT
Start: 2018-07-13 | End: 2019-01-21 | Stop reason: SDUPTHER

## 2018-07-13 RX ORDER — BUPROPION HYDROCHLORIDE 150 MG/1
TABLET ORAL
Qty: 270 TABLET | Refills: 1 | Status: SHIPPED | OUTPATIENT
Start: 2018-07-13 | End: 2019-01-21 | Stop reason: SDUPTHER

## 2018-07-13 NOTE — Clinical Note
I sent 90 day scripts for paroxetine and bupropion to [facility] mail order as she requested

## 2018-07-13 NOTE — PROGRESS NOTES
I sent 90 day scripts for paroxetine and bupropion to [facility] mail order as she requested

## 2018-07-20 ENCOUNTER — OFFICE VISIT (OUTPATIENT)
Dept: OBGYN CLINIC | Facility: HOSPITAL | Age: 53
End: 2018-07-20

## 2018-07-20 VITALS
HEART RATE: 81 BPM | WEIGHT: 146.6 LBS | SYSTOLIC BLOOD PRESSURE: 108 MMHG | BODY MASS INDEX: 25.97 KG/M2 | DIASTOLIC BLOOD PRESSURE: 70 MMHG

## 2018-07-20 DIAGNOSIS — G56.01 CARPAL TUNNEL SYNDROME ON RIGHT: Primary | ICD-10-CM

## 2018-07-20 PROCEDURE — 99024 POSTOP FOLLOW-UP VISIT: CPT | Performed by: ORTHOPAEDIC SURGERY

## 2018-07-20 NOTE — PROGRESS NOTES
Assessment:  No diagnosis found  Patient Active Problem List   Diagnosis    Carpal tunnel syndrome, bilateral    Carpal tunnel syndrome on right    Bunion    Depression    Hypothyroidism    Primary hypercholesterolemia           Plan      Follow-up in 2 weeks for postop follow-up            Subjective:     Patient ID:    Chief Complaint:Richa Medina 52 y o  female      HPI     patient comes in with regards to her right wrist   Patient reports that the symptoms have significantly improved she has some numbness at the tips the finger but the pain has subsided  Social History     Social History    Marital status: /Civil Union     Spouse name: N/A    Number of children: N/A    Years of education: N/A     Occupational History    Not on file       Social History Main Topics    Smoking status: Never Smoker    Smokeless tobacco: Never Used    Alcohol use Yes      Comment: occas    Drug use: No    Sexual activity: Not on file     Other Topics Concern    Not on file     Social History Narrative    No narrative on file     Past Medical History:   Diagnosis Date    Asthma     Depression     Disease of thyroid gland     Pneumonia     2017     Past Surgical History:   Procedure Laterality Date    LASIK      [place] REVISE MEDIAN N/CARPAL TUNNEL SURG Right 7/10/2018    Procedure: CARPAL TUNNEL RELEASE;  Surgeon: Denilson Curran DO;  [facility];  Service: Orthopedics    SEPTOPLASTY      SINUS SURGERY       No Known Allergies  Current Outpatient Prescriptions on File Prior to Visit   Medication Sig Dispense Refill    Ascorbic Acid (VITAMIN C) 1000 MG tablet Take 1,000 mg by mouth daily in the early morning        aspirin 81 MG tablet Take 81 mg by mouth daily in the early morning        buPROPion (WELLBUTRIN XL) 150 mg 24 hr tablet Take 3 tablets po daily 270 tablet 1    Calcium 250 MG CAPS Take by mouth daily with dinner        cetirizine (ZyrTEC) 10 mg tablet Take 10 mg by mouth as needed        EPINEPHrine (EPIPEN 2-JASON) 0 3 mg/0 3 mL SOAJ as needed        fluticasone (FLONASE ALLERGY RELIEF) 50 mcg/act nasal spray into each nostril as needed        guaiFENesin (MUCINEX) 600 mg 12 hr tablet Take 600 mg by mouth as needed        levothyroxine 25 mcg tablet TAKE 1 TABLET ON MONDAY THROUGH FRIDAY AND 2 TABLETS ON SATURDAY AND SUNDAY 120 tablet 3    MAGNESIUM PO Take 200 mg by mouth daily with dinner        MULTIPLE VITAMINS-CALCIUM PO Take 1 capsule by mouth daily in the early morning        niacin 100 mg tablet Take 100 mg by mouth daily with breakfast        norethindrone-ethinyl estradiol (FEMHRT LOW DOSE) 0 5-2 5 MG-MCG per tablet Take 1 tablet by mouth daily with dinner    2    oxyCODONE-acetaminophen (PERCOCET) 5-325 mg per tablet Take 1 tablet by mouth every 6 (six) hours as needed for severe pain for up to 10 doses Max Daily Amount: 4 tablets 5 tablet 0    PARoxetine (PAXIL-CR) 12 5 mg 24 hr tablet Take 4 tablets po daily 360 tablet 1    pseudoephedrine (SUDAFED) 120 MG 12 hr tablet Take 120 mg by mouth as needed         No current facility-administered medications on file prior to visit  Objective:    Review of Systems    Ortho Exam   sensation to light touch is intact in all fingers incision is clean dry intact and healing nicely    Physical Exam

## 2018-07-24 ENCOUNTER — OFFICE VISIT (OUTPATIENT)
Dept: ENDOCRINOLOGY | Facility: CLINIC | Age: 53
End: 2018-07-24
Payer: COMMERCIAL

## 2018-07-24 VITALS
SYSTOLIC BLOOD PRESSURE: 116 MMHG | HEIGHT: 60 IN | DIASTOLIC BLOOD PRESSURE: 80 MMHG | HEART RATE: 70 BPM | BODY MASS INDEX: 28.07 KG/M2 | WEIGHT: 143 LBS

## 2018-07-24 DIAGNOSIS — R73.03 PREDIABETES: ICD-10-CM

## 2018-07-24 DIAGNOSIS — E55.9 VITAMIN D DEFICIENCY: ICD-10-CM

## 2018-07-24 DIAGNOSIS — E03.9 HYPOTHYROIDISM, UNSPECIFIED TYPE: Primary | ICD-10-CM

## 2018-07-24 DIAGNOSIS — E78.00 PRIMARY HYPERCHOLESTEROLEMIA: ICD-10-CM

## 2018-07-24 PROCEDURE — 99214 OFFICE O/P EST MOD 30 MIN: CPT | Performed by: INTERNAL MEDICINE

## 2018-07-24 RX ORDER — LEVOTHYROXINE SODIUM 0.03 MG/1
TABLET ORAL
Qty: 120 TABLET | Refills: 3 | Status: SHIPPED | OUTPATIENT
Start: 2018-07-24 | End: 2019-01-21 | Stop reason: SDUPTHER

## 2018-07-24 NOTE — ASSESSMENT & PLAN NOTE
TSH at goal-continue levothyroxine at current dose-thyroid function test to be monitored every 6 months to a year

## 2018-07-24 NOTE — PROGRESS NOTES
Shabnam Kelley Stubits 52 y o  female MRN: [id number]    Encounter: [id number]      Assessment/Plan     Problem List Items Addressed This Visit     Hypothyroidism - Primary     TSH at goal-continue levothyroxine at current dose-thyroid function test to be monitored every 6 months to a year         Relevant Medications    levothyroxine 25 mcg tablet    Primary hypercholesterolemia     Improved-continue dietary and lifestyle modification         Prediabetes     Improved-continue to focus on dietary and lifestyle modifications and weight loss         Vitamin D deficiency     Continue supplementations-check vitamin-D 25 hydroxy next year             CC: pre Diabetes, hypothyroidism and vitamin-D deficiency    History of Present Illness     HPI: 53 y/o woman with history of pre diabetes, hypothyroidism and vitamin-D deficiency here for follow-up  For hypothyroidism she is currently on LT4 25 mcg  1 tab daily mon-Friday and 2 on sat and Sunday - takes regularly and properly   Takes MV/CALCIUM,iron later in the day   Generally feels well except for some fatigue  Weight fairly stable  Denies any constipation, cold intolerance or sleep disturbances     For histor of vitamin-D deficiency yvitamin D  -she is currently taking a MVI     Review of Systems   Constitutional: Positive for fatigue  Negative for unexpected weight change  Respiratory: Negative for shortness of breath  Cardiovascular: Negative for palpitations and leg swelling  Gastrointestinal: Negative for constipation, diarrhea, nausea and vomiting  Musculoskeletal: Negative for arthralgias, gait problem and myalgias  Psychiatric/Behavioral: Negative for sleep disturbance  All other systems reviewed and are negative        Historical Information   Past Medical History:   Diagnosis Date    Asthma     Depression     Disease of thyroid gland     Pneumonia     2017     Past Surgical History:   Procedure Laterality Date    LASIK      [place] REVISE MEDIAN N/CARPAL TUNNEL SURG Right 7/10/2018    Procedure: CARPAL TUNNEL RELEASE;  Surgeon: Guilford Nyhan, DO;  [facility];  Service: Orthopedics    SEPTOPLASTY      SINUS SURGERY       Social History   History   Alcohol Use    Yes     Comment: occas     History   Drug Use No     History   Smoking Status    Never Smoker   Smokeless Tobacco    Never Used     Family History:   Family History   Problem Relation Age of Onset    Hashimoto's thyroiditis Mother     Hashimoto's thyroiditis Sister     Diabetes Other     Asthma Family         with acute exacerbation, unspecified asthma severity       Meds/Allergies   Current Outpatient Prescriptions   Medication Sig Dispense Refill    Ascorbic Acid (VITAMIN C) 1000 MG tablet Take 1,000 mg by mouth daily in the early morning        aspirin 81 MG tablet Take 81 mg by mouth daily in the early morning        buPROPion (WELLBUTRIN XL) 150 mg 24 hr tablet Take 3 tablets po daily 270 tablet 1    Calcium 250 MG CAPS Take by mouth daily with dinner        cetirizine (ZyrTEC) 10 mg tablet Take 10 mg by mouth as needed        EPINEPHrine (EPIPEN 2-JASON) 0 3 mg/0 3 mL SOAJ as needed        fluticasone (FLONASE ALLERGY RELIEF) 50 mcg/act nasal spray into each nostril as needed        guaiFENesin (MUCINEX) 600 mg 12 hr tablet Take 600 mg by mouth as needed        levothyroxine 25 mcg tablet 1 tab mon-Friday and 2 on sat and Sunday 120 tablet 3    MAGNESIUM PO Take 200 mg by mouth daily with dinner        MULTIPLE VITAMINS-CALCIUM PO Take 1 capsule by mouth daily in the early morning        niacin 100 mg tablet Take 100 mg by mouth daily with breakfast        norethindrone-ethinyl estradiol (FEMHRT LOW DOSE) 0 5-2 5 MG-MCG per tablet Take 1 tablet by mouth daily with dinner    2    oxyCODONE-acetaminophen (PERCOCET) 5-325 mg per tablet Take 1 tablet by mouth every 6 (six) hours as needed for severe pain for up to 10 doses Max Daily Amount: 4 tablets 5 tablet 0    PARoxetine (PAXIL-CR) 12 5 mg 24 hr tablet Take 4 tablets po daily 360 tablet 1    pseudoephedrine (SUDAFED) 120 MG 12 hr tablet Take 120 mg by mouth as needed         No current facility-administered medications for this visit  No Known Allergies    Objective   Vitals: Blood pressure 116/80, pulse 70, height 5' (1 524 m), weight 64 9 kg (143 lb)  Physical Exam   Constitutional: She is oriented to person, place, and time  She appears well-developed and well-nourished  No distress  HENT:   Head: Normocephalic and atraumatic  Mouth/Throat: No oropharyngeal exudate  Eyes: Conjunctivae and EOM are normal  No scleral icterus  Neck: Normal range of motion  Neck supple  No thyromegaly present  Cardiovascular: Normal rate, regular rhythm and normal heart sounds  No murmur heard  Pulmonary/Chest: Effort normal and breath sounds normal  No respiratory distress  She has no wheezes  She has no rales  Abdominal: Soft  Bowel sounds are normal  She exhibits no distension  There is no tenderness  There is no rebound  Musculoskeletal: Normal range of motion  She exhibits no edema or deformity  Lymphadenopathy:     She has no cervical adenopathy  Neurological: She is alert and oriented to person, place, and time  Skin: Skin is warm and dry  No rash noted  No erythema  No pallor  Psychiatric: She has a normal mood and affect  Her behavior is normal  Judgment and thought content normal        The history was obtained from the review of the chart, patient      Lab Results:   Lab Results   Component Value Date/Time    Hemoglobin A1C 5 6 07/03/2018 09:34 AM    Hemoglobin A1C 5 5 08/05/2017 09:03 AM    BUN 8 07/03/2018 09:34 AM    Sodium 136 07/03/2018 09:34 AM    Potassium 3 9 07/03/2018 09:34 AM    Chloride 104 07/03/2018 09:34 AM    CO2 25 07/03/2018 09:34 AM    Creatinine 0 87 07/03/2018 09:34 AM    AST 14 07/03/2018 09:34 AM    ALT 24 07/03/2018 09:34 AM    Albumin 3 9 07/03/2018 09:34 AM Cholesterol 176 07/03/2018 09:34 AM    Cholesterol 207 (H) 08/05/2017 09:03 AM    HDL, Direct 55 07/03/2018 09:34 AM    HDL, Direct 57 08/05/2017 09:03 AM    Triglycerides 137 07/03/2018 09:34 AM    Triglycerides 172 (H) 08/05/2017 09:03 AM               Portions of the record may have been created with voice recognition software  Occasional wrong word or "sound a like" substitutions may have occurred due to the inherent limitations of voice recognition software  Read the chart carefully and recognize, using context, where substitutions have occurred

## 2018-08-03 ENCOUNTER — OFFICE VISIT (OUTPATIENT)
Dept: OBGYN CLINIC | Facility: HOSPITAL | Age: 53
End: 2018-08-03

## 2018-08-03 VITALS
DIASTOLIC BLOOD PRESSURE: 67 MMHG | HEART RATE: 88 BPM | SYSTOLIC BLOOD PRESSURE: 101 MMHG | WEIGHT: 143 LBS | HEIGHT: 60 IN | BODY MASS INDEX: 28.07 KG/M2

## 2018-08-03 DIAGNOSIS — G56.01 CARPAL TUNNEL SYNDROME ON RIGHT: Primary | ICD-10-CM

## 2018-08-03 DIAGNOSIS — Z47.89 AFTERCARE FOLLOWING SURGERY OF THE MUSCULOSKELETAL SYSTEM: ICD-10-CM

## 2018-08-03 PROCEDURE — 99024 POSTOP FOLLOW-UP VISIT: CPT | Performed by: ORTHOPAEDIC SURGERY

## 2018-08-03 NOTE — PROGRESS NOTES
Assessment:   Status post right open carpal tunnel release on 7/10/18, doing well, symptoms fully resolved    Plan:   Resume activities as tolerated and scar tissue massage    Follow Up:  PRN      CHIEF COMPLAINT:  Chief Complaint   Patient presents with    Right Knee - Follow-up         SUBJECTIVE:  Shanna Rubin is a 52y o  year old female who presents for follow up after Open Carpal Tunnel Release  right  Today patient has No Complaints  Has minimal tenderness in the right palm with a activity and  Weight-bearing, otherwise she reports that all her numbness and tingling has resolved        PHYSICAL EXAMINATION:  General: well developed and well nourished, alert, oriented times 3 and appears comfortable  Psychiatric: Normal    MUSCULOSKELETAL EXAMINATION:  Incision: healed  Range of Motion: full composite fist possible  Neurovascular status: Neuro intact, good cap refill  Activity Restrictions: No restrictions         STUDIES REVIEWED:  No Studies to review      PROCEDURES PERFORMED:  Procedures  No Procedures performed today

## 2018-08-07 ENCOUNTER — TRANSCRIBE ORDERS (OUTPATIENT)
Dept: ADMINISTRATIVE | Facility: HOSPITAL | Age: 53
End: 2018-08-07

## 2018-08-07 ENCOUNTER — APPOINTMENT (OUTPATIENT)
Dept: LAB | Facility: MEDICAL CENTER | Age: 53
End: 2018-08-07

## 2018-08-07 ENCOUNTER — TELEPHONE (OUTPATIENT)
Dept: FAMILY MEDICINE CLINIC | Facility: CLINIC | Age: 53
End: 2018-08-07

## 2018-08-07 DIAGNOSIS — Z00.8 HEALTH EXAMINATION IN POPULATION SURVEY: ICD-10-CM

## 2018-08-07 DIAGNOSIS — Z00.8 HEALTH EXAMINATION IN POPULATION SURVEY: Primary | ICD-10-CM

## 2018-08-07 LAB
CHOLEST SERPL-MCNC: 188 MG/DL (ref 50–200)
EST. AVERAGE GLUCOSE BLD GHB EST-MCNC: 108 MG/DL
HBA1C MFR BLD: 5.4 % (ref 4.2–6.3)
HDLC SERPL-MCNC: 57 MG/DL (ref 40–60)
LDLC SERPL CALC-MCNC: 85 MG/DL (ref 0–100)
NONHDLC SERPL-MCNC: 131 MG/DL
TRIGL SERPL-MCNC: 231 MG/DL

## 2018-08-07 PROCEDURE — 36415 COLL VENOUS BLD VENIPUNCTURE: CPT

## 2018-08-07 PROCEDURE — 80061 LIPID PANEL: CPT

## 2018-08-07 PROCEDURE — 83036 HEMOGLOBIN GLYCOSYLATED A1C: CPT

## 2018-10-04 ENCOUNTER — OFFICE VISIT (OUTPATIENT)
Dept: FAMILY MEDICINE CLINIC | Facility: CLINIC | Age: 53
End: 2018-10-04
Payer: COMMERCIAL

## 2018-10-04 VITALS
BODY MASS INDEX: 26.17 KG/M2 | DIASTOLIC BLOOD PRESSURE: 80 MMHG | WEIGHT: 142.2 LBS | SYSTOLIC BLOOD PRESSURE: 116 MMHG | HEART RATE: 82 BPM | OXYGEN SATURATION: 97 % | HEIGHT: 62 IN

## 2018-10-04 DIAGNOSIS — F32.A DEPRESSION, UNSPECIFIED DEPRESSION TYPE: Primary | ICD-10-CM

## 2018-10-04 DIAGNOSIS — E03.9 HYPOTHYROIDISM, UNSPECIFIED TYPE: ICD-10-CM

## 2018-10-04 DIAGNOSIS — D18.01 CHERRY HEMANGIOMA: ICD-10-CM

## 2018-10-04 PROBLEM — R73.03 PREDIABETES: Status: RESOLVED | Noted: 2018-07-24 | Resolved: 2018-10-04

## 2018-10-04 PROBLEM — G56.01 CARPAL TUNNEL SYNDROME ON RIGHT: Status: RESOLVED | Noted: 2018-06-01 | Resolved: 2018-10-04

## 2018-10-04 PROBLEM — G56.03 CARPAL TUNNEL SYNDROME, BILATERAL: Status: RESOLVED | Noted: 2018-03-30 | Resolved: 2018-10-04

## 2018-10-04 PROCEDURE — 99214 OFFICE O/P EST MOD 30 MIN: CPT | Performed by: FAMILY MEDICINE

## 2018-10-04 NOTE — ASSESSMENT & PLAN NOTE
Thyroid has been stable on 25 mcg of levothyroxine daily  She had recent TSH which was normal   We will maintain this same dose

## 2018-10-04 NOTE — PROGRESS NOTES
Assessment/Plan:    Depression    Symptoms are very well controlled on Paxil and Wellbutrin  She will continue with this regimen  Hypothyroidism    Thyroid has been stable on 25 mcg of levothyroxine daily  She had recent TSH which was normal   We will maintain this same dose  Diagnoses and all orders for this visit:    Depression, unspecified depression type    Hypothyroidism, unspecified type    Cherry hemangioma  Comments:    She has small skin lesion left forehead in is interested in removal   I will give her Dermatology referrals  Subjective:      Patient ID: Levar Guerrero is a 52 y o  female  She is here for follow-up  Fortunately her carpal tunnel surgery went very well  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  She denies any chest pain, shortness of breath or fatigue  Her depression symptoms have been very well controlled on current medications  She use to go to Dr Albert Cervantes until his practice closed  He had recently adjusted her medications and she feels like this is a good combination  The following portions of the patient's history were reviewed and updated as appropriate: allergies, current medications, past family history, past medical history, past social history, past surgical history and problem list     Review of Systems   Constitutional: Negative for activity change, chills, fatigue and fever  HENT: Negative for congestion, ear pain, sinus pressure and sore throat  Eyes: Negative for pain and visual disturbance  Respiratory: Negative for cough, chest tightness, shortness of breath and wheezing  Cardiovascular: Negative for chest pain, palpitations and leg swelling  Gastrointestinal: Negative for abdominal pain, blood in stool, constipation, diarrhea, nausea and vomiting  Endocrine: Negative for polydipsia and polyuria  Genitourinary: Negative for difficulty urinating, dysuria, frequency and urgency     Musculoskeletal: Negative for arthralgias, joint swelling and myalgias  Skin: Negative for rash  Neurological: Negative for dizziness, weakness, numbness and headaches  Hematological: Negative for adenopathy  Does not bruise/bleed easily  Psychiatric/Behavioral: Negative for dysphoric mood  The patient is not nervous/anxious  Objective:      /80   Pulse 82   Ht 5' 2" (1 575 m)   Wt 64 5 kg (142 lb 3 2 oz)   SpO2 97%   BMI 26 01 kg/m²          Physical Exam   Constitutional: She appears well-developed and well-nourished  Cardiovascular: Normal rate, regular rhythm and normal heart sounds  Pulmonary/Chest: Effort normal and breath sounds normal    Skin: Skin is warm and dry  Small cherry angioma left forehead  Psychiatric: She has a normal mood and affect  Her behavior is normal    Nursing note and vitals reviewed

## 2019-01-21 DIAGNOSIS — E03.9 HYPOTHYROIDISM, UNSPECIFIED TYPE: ICD-10-CM

## 2019-01-21 DIAGNOSIS — F32.A DEPRESSION, UNSPECIFIED DEPRESSION TYPE: ICD-10-CM

## 2019-01-22 RX ORDER — BUPROPION HYDROCHLORIDE 150 MG/1
TABLET ORAL
Qty: 270 TABLET | Refills: 0 | Status: SHIPPED | OUTPATIENT
Start: 2019-01-22 | End: 2019-04-29 | Stop reason: SDUPTHER

## 2019-01-22 RX ORDER — PAROXETINE HYDROCHLORIDE 12.5 MG/1
TABLET, FILM COATED, EXTENDED RELEASE ORAL
Qty: 360 TABLET | Refills: 0 | Status: SHIPPED | OUTPATIENT
Start: 2019-01-22 | End: 2019-04-29 | Stop reason: SDUPTHER

## 2019-01-22 RX ORDER — LEVOTHYROXINE SODIUM 0.03 MG/1
TABLET ORAL
Qty: 114 TABLET | Refills: 0 | Status: SHIPPED | OUTPATIENT
Start: 2019-01-22 | End: 2019-04-30 | Stop reason: SDUPTHER

## 2019-01-22 NOTE — TELEPHONE ENCOUNTER
From: [employer]  Sent: 1/21/2019 6:57 PM EST  Subject: Medication Renewal Request    Richa Medina would like a refill of the following medications:     levothyroxine 25 mcg tablet Patricia Marroquin MD]   Patient Comment: please refill    Preferred pharmacy: [id number]        Medication renewals requested in this message routed separately:     PARoxetine (PAXIL-CR) 12 5 mg 24 hr tablet Bola Walker MD]   Patient Comment: please refill     buPROPion (WELLBUTRIN XL) 150 mg 24 hr tablet Bola Walker MD]   Patient Comment: please refill

## 2019-03-27 ENCOUNTER — APPOINTMENT (OUTPATIENT)
Dept: LAB | Facility: HOSPITAL | Age: 54
End: 2019-03-27
Attending: SURGERY
Payer: COMMERCIAL

## 2019-03-27 ENCOUNTER — OFFICE VISIT (OUTPATIENT)
Dept: OBGYN CLINIC | Facility: HOSPITAL | Age: 54
End: 2019-03-27
Payer: COMMERCIAL

## 2019-03-27 VITALS
WEIGHT: 146 LBS | SYSTOLIC BLOOD PRESSURE: 119 MMHG | BODY MASS INDEX: 26.87 KG/M2 | DIASTOLIC BLOOD PRESSURE: 71 MMHG | HEART RATE: 98 BPM | HEIGHT: 62 IN

## 2019-03-27 DIAGNOSIS — G56.02 CARPAL TUNNEL SYNDROME ON LEFT: ICD-10-CM

## 2019-03-27 DIAGNOSIS — G56.02 CARPAL TUNNEL SYNDROME ON LEFT: Primary | ICD-10-CM

## 2019-03-27 LAB
ANION GAP SERPL CALCULATED.3IONS-SCNC: 5 MMOL/L (ref 4–13)
BUN SERPL-MCNC: 10 MG/DL (ref 5–25)
CALCIUM SERPL-MCNC: 9.2 MG/DL (ref 8.3–10.1)
CHLORIDE SERPL-SCNC: 105 MMOL/L (ref 100–108)
CO2 SERPL-SCNC: 27 MMOL/L (ref 21–32)
CREAT SERPL-MCNC: 0.95 MG/DL (ref 0.6–1.3)
GFR SERPL CREATININE-BSD FRML MDRD: 68 ML/MIN/1.73SQ M
GLUCOSE SERPL-MCNC: 106 MG/DL (ref 65–140)
POTASSIUM SERPL-SCNC: 3.7 MMOL/L (ref 3.5–5.3)
SODIUM SERPL-SCNC: 137 MMOL/L (ref 136–145)

## 2019-03-27 PROCEDURE — 99213 OFFICE O/P EST LOW 20 MIN: CPT | Performed by: SURGERY

## 2019-03-27 PROCEDURE — 80048 BASIC METABOLIC PNL TOTAL CA: CPT

## 2019-03-27 PROCEDURE — 36415 COLL VENOUS BLD VENIPUNCTURE: CPT

## 2019-03-27 NOTE — H&P
Wilfrid ROSAS  Attending, Orthopaedic Surgery  Hand, Wrist, and Elbow Surgery  [facility]      ORTHOPAEDIC HAND, WRIST, AND ELBOW OFFICE  VISIT       ASSESSMENT/PLAN:    Left carpal tunnel syndrome     Due to failed conservative treatments I feel a surgical carpal tunnel release is warranted  The patient has elected to proceed with surgery  She understands she will be out of work for 2 weeks with potentially weight restrictions after that time  A BMPwill be needed prior to surgery  If she is unable to take off work she may call to reschedule surgery  The patient verbalized understanding of exam findings and treatment plan  We engaged in the shared decision-making process and treatment options were discussed at length with the patient  Surgical and conservative management discussed today along with risks and benefits  There are no diagnoses linked to this encounter  Follow Up:  No follow-ups on file  To Do Next Visit:  Re-evaluation of current issue and Sutures out      General Discussions:  Carpal Tunnel Syndrome: The anatomy and physiology of carpal tunnel syndrome was discussed with the patient today  Increase pressure localized under the transverse carpal ligament can cause pain, numbness, tingling, or dysesthesias within the median nerve distribution as well as feelings of fatigue, clumsiness, or awkwardness  These symptoms typically occur at night and worse in the morning upon waking  Eventually, untreated carpal tunnel syndrome can result in weakness and permanent loss of muscle within the thenar compartment of the hand  Treatment options were discussed with the patient  Conservative treatment includes nocturnal resting splints to keep the nerve in a neutral position, ergonomic changes within the work or home environment, activity modification, and tendon gliding exercises  Vitamin B6 one tablet daily over the counter may helpful to reduce symptoms     Steroid injections within the carpal canal can help a majority of patients, however this is often self-limited in a majority of patients  Surgical intervention to divide the transverse carpal ligament typically results in a long-lasting relief of the patient's complaints, with the recurrence rate of less than 1%  Operative Discussions:  Open Carpal Tunnel Release: The anatomy and physiology of carpal tunnel syndrome was discussed with the patient today  Increase pressure localized under the transverse carpal ligament can cause pain, numbness, tingling, or dysesthesias within the median nerve distribution as well as feelings of fatigue, clumsiness, or awkwardness  These symptoms typically occur at night and worse in the morning upon waking  Eventually, untreated carpal tunnel syndrome can result in weakness and permanent loss of muscle within the thenar compartment of the hand  Treatment options were discussed with the patient  Conservative treatment includes nocturnal resting splints to keep the nerve in a neutral position, ergonomic changes within the work or home environment, activity modification, and tendon gliding exercises  Vitamin B6 one tablet daily over the counter may helpful to reduce symptoms  Steroid injections within the carpal canal can help a majority of patients, however this is often self-limited in a majority of patients  Surgical intervention to divide the transverse carpal ligament typically results in a long-lasting relief of the patient's complaints, with the recurrence rate of less than 1%  The patient has elected to undergo an open carpal tunnel release  The palmar incision technique was discussed in the office with the patient today     In the postoperative period, light activities are allowed immediately, driving is allowed when narcotic medication has stopped, and the bandages may be removed and incision may get wet after 2 days  Heavy activities (lifting more than approximately 10 pounds) will be allowed after follow up appointment in 1-2 weeks  While night symptoms (waking from sleep, pain, and discomfort in the hands) generally improves rapidly, the numbness and tingling as well as the strength will slowly improve over weeks to months depending on the chronicity and severity of the carpal tunnel syndrome  Pillar pain and scar discomfort were discussed with the patient which are self-limiting conditions  The risks of bleeding and infection from the surgery are less than 1%  Risk of recurrence is approximately 0 5%  The risks of nerve injury or nerve damage or damage to the blood vessels is approximately 1 in 1200  The patient has an understanding of the above mentioned discussion  The risks and benefits of the procedure were explained to the patient, which include, but are not limited to: Bleeding, infection, recurrence, pain, scar, damage to tendons, damage to nerves, and damage to blood vessels, failure to give desired results and complications related to anesthesia  These risks, along with alternative conservative treatment options, and postoperative protocols were voiced back and understood by the patient  All questions were answered to the patient's satisfaction  The patient agrees to comply with a standard postoperative protocol, and is willing to proceed  Education was provided via written and auditory forms  There were no barriers to learning  Written handouts regarding wound care, incision and scar care, and general preoperative information was provided to the patient  Prior to surgery, the patient may be requested to stop all anti-inflammatory medications  Prophylactic aspirin, Plavix, and Coumadin may be allowed to be continued    Medications including vitamin E , ginkgo, and fish oil are requested to be stopped approximately one week prior to surgery  Hypertensive medications and beta blockers, if taken, s      ____________________________________________________________________________________________________________________________________________      CHIEF COMPLAINT:  Chief Complaint   Patient presents with    Left Wrist - Pain       SUBJECTIVE:  Julius Foster is a 52y o  year old RHD female who presents with left hand pain and numbness and tingling  She reports she previously saw Dr Juliana Cantrell last year for bilateral numbness and tingling  She was diagnosed at that time with carpal tunnel syndrome  An EMG was ordered to confirm this  In July she did have a carpal tunnel release which was a great success  Due to location of offices she would like to be treated here for her left CTS  She reports this numbness and tingling has increased in the last few months  It does wake her from sleep  She has tried a steroid injection in the past which failed  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 asap or after the school year       Has had failed non operative treatment in the past       Pain/symptom timing:  Worse during the day when active  Pain/symptom context:  Worse with activites and work  Pain/symptom modifying factors:  Rest makes better, activities make worse  Pain/symptom associated signs/symptoms: none    Prior treatment   · NSAIDsNo   · Injections Yes   · Bracing/Orthotics No    Physical Therapy No     I have personally reviewed all the relevant PMH, PSH, SH, FH, Medications and allergies      PAST MEDICAL HISTORY:  Past Medical History:   Diagnosis Date    Asthma     Depression     Disease of thyroid gland     Osteopenia     Pneumonia     2017       PAST SURGICAL HISTORY:  Past Surgical History:   Procedure Laterality Date    LASIK      DC REVISE MEDIAN N/CARPAL TUNNEL SURG Right 7/10/2018    Procedure: CARPAL TUNNEL RELEASE;  Surgeon: Marine Adams DO;  Location: [facility];  Service: Orthopedics    SEPTOPLASTY      SINUS SURGERY         FAMILY HISTORY:  Family History   Problem Relation Age of Onset   Junior Hashimoto's thyroiditis Mother     Hashimoto's thyroiditis Sister     Diabetes Other     Asthma Family         with acute exacerbation, unspecified asthma severity       SOCIAL HISTORY:  Social History     Tobacco Use    Smoking status: Never Smoker    Smokeless tobacco: Never Used   Substance Use Topics    Alcohol use: Yes     Comment: occasionally drinks 6 times per year    Drug use: No       MEDICATIONS:    Current Outpatient Medications:     Ascorbic Acid (VITAMIN C) 1000 MG tablet, Take 1,000 mg by mouth daily in the early morning  , Disp: , Rfl:     aspirin 81 MG tablet, Take 81 mg by mouth daily in the early morning  , Disp: , Rfl:     buPROPion (WELLBUTRIN XL) 150 mg 24 hr tablet, Take 3 tablets po daily, Disp: 270 tablet, Rfl: 0    Calcium 250 MG CAPS, Take by mouth daily with dinner  , Disp: , Rfl:     cetirizine (ZyrTEC) 10 mg tablet, Take 10 mg by mouth as needed  , Disp: , Rfl:     EPINEPHrine (EPIPEN 2-JASON) 0 3 mg/0 3 mL SOAJ, as needed  , Disp: , Rfl:     fluticasone (FLONASE ALLERGY RELIEF) 50 mcg/act nasal spray, into each nostril as needed  , Disp: , Rfl:     guaiFENesin (MUCINEX) 600 mg 12 hr tablet, Take 600 mg by mouth as needed  , Disp: , Rfl:     levothyroxine 25 mcg tablet, 1 tab mon-Friday and 2 on sat and Sunday, Disp: 114 tablet, Rfl: 0    MAGNESIUM PO, Take 200 mg by mouth daily with dinner  , Disp: , Rfl:     MULTIPLE VITAMINS-CALCIUM PO, Take 1 capsule by mouth daily in the early morning  , Disp: , Rfl:     niacin 100 mg tablet, Take 100 mg by mouth daily with breakfast  , Disp: , Rfl:     norethindrone-ethinyl estradiol (FEMHRT LOW DOSE) 0 5-2 5 MG-MCG per tablet, Take 1 tablet by mouth daily with dinner  , Disp: , Rfl: 2    PARoxetine (PAXIL-CR) 12 5 mg 24 hr tablet, Take 4 tablets po daily, Disp: 360 tablet, Rfl: 0   pseudoephedrine (SUDAFED) 120 MG 12 hr tablet, Take 120 mg by mouth as needed  , Disp: , Rfl:     ALLERGIES:  No Known Allergies        REVIEW OF SYSTEMS:  Review of Systems   Constitutional: Negative for chills, fever and unexpected weight change  HENT: Negative for hearing loss, nosebleeds and sore throat  Eyes: Negative for pain, redness and visual disturbance  Respiratory: Negative for cough, shortness of breath and wheezing  Cardiovascular: Negative for chest pain, palpitations and leg swelling  Gastrointestinal: Negative for abdominal pain, nausea and vomiting  Endocrine: Negative for polydipsia and polyuria  Genitourinary: Negative for dyspareunia and hematuria  Musculoskeletal: Negative for arthralgias, joint swelling and myalgias  Skin: Negative for rash and wound  Neurological: Positive for numbness  Negative for dizziness and headaches  Psychiatric/Behavioral: Negative for decreased concentration and suicidal ideas  The patient is not nervous/anxious  VITALS:  Vitals:    03/27/19 1641   BP: 119/71   Pulse: 98       LABS:  HgA1c:   Lab Results   Component Value Date    HGBA1C 5 4 08/07/2018     BMP:   Lab Results   Component Value Date    CALCIUM 9 4 07/03/2018    K 3 9 07/03/2018    CO2 25 07/03/2018     07/03/2018    BUN 8 07/03/2018    CREATININE 0 87 07/03/2018       _____________________________________________________  PHYSICAL EXAMINATION:  General: well developed and well nourished, alert, oriented times 3 and appears comfortable  Psychiatric: Normal  HEENT: Normocephalic, Atraumatic Trachea Midline, No torticollis  Pulmonary: No audible wheezing or respiratory distress   Cardiovascular: No pitting edema, 2+ radial pulse   Skin: No masses, erythema, lacerations, fluctation, ulcerations  Neurovascular: Motor Intact to the Median, Ulnar, Radial Nerve and Pulses Intact  Musculoskeletal: Normal, except as noted in detailed exam and in HPI        MUSCULOSKELETAL EXAMINATION:    Left Carpal Tunnel Exam:  Negative thenar atrophy  Negative phalen's test  Positive carpal tunnel compression  Positive tinels over median nerve at the wrist   Opposition strength 5/5    Abduction strength 5/5           ___________________________________________________  STUDIES REVIEWED:  No new x-rays   2 upper extremity EMG :   Left with decreased as N/C potentials latency normal          PROCEDURES PERFORMED:  Procedures  No Procedures performed today    _____________________________________________________      Scribe Attestation    I,:   Leroy Jara MA am acting as a scribe while in the presence of the attending physician :        I,:   Leah Ortiz MD personally performed the services described in this documentation    as scribed in my presence :

## 2019-03-27 NOTE — PROGRESS NOTES
Marti ROSAS  Attending, Orthopaedic Surgery  Hand, Wrist, and Elbow Surgery  [facility]      ORTHOPAEDIC HAND, WRIST, AND ELBOW OFFICE  VISIT       ASSESSMENT/PLAN:    Left carpal tunnel syndrome     Due to failed conservative treatments I feel a surgical carpal tunnel release is warranted  The patient has elected to proceed with surgery  She understands she will be out of work for 2 weeks with potentially weight restrictions after that time  A BMPwill be needed prior to surgery  If she is unable to take off work she may call to reschedule surgery  The patient verbalized understanding of exam findings and treatment plan  We engaged in the shared decision-making process and treatment options were discussed at length with the patient  Surgical and conservative management discussed today along with risks and benefits  There are no diagnoses linked to this encounter  Follow Up:  No follow-ups on file  To Do Next Visit:  Re-evaluation of current issue and Sutures out      General Discussions:  Carpal Tunnel Syndrome: The anatomy and physiology of carpal tunnel syndrome was discussed with the patient today  Increase pressure localized under the transverse carpal ligament can cause pain, numbness, tingling, or dysesthesias within the median nerve distribution as well as feelings of fatigue, clumsiness, or awkwardness  These symptoms typically occur at night and worse in the morning upon waking  Eventually, untreated carpal tunnel syndrome can result in weakness and permanent loss of muscle within the thenar compartment of the hand  Treatment options were discussed with the patient  Conservative treatment includes nocturnal resting splints to keep the nerve in a neutral position, ergonomic changes within the work or home environment, activity modification, and tendon gliding exercises  Vitamin B6 one tablet daily over the counter may helpful to reduce symptoms     Steroid injections within the carpal canal can help a majority of patients, however this is often self-limited in a majority of patients  Surgical intervention to divide the transverse carpal ligament typically results in a long-lasting relief of the patient's complaints, with the recurrence rate of less than 1%  Operative Discussions:  Open Carpal Tunnel Release: The anatomy and physiology of carpal tunnel syndrome was discussed with the patient today  Increase pressure localized under the transverse carpal ligament can cause pain, numbness, tingling, or dysesthesias within the median nerve distribution as well as feelings of fatigue, clumsiness, or awkwardness  These symptoms typically occur at night and worse in the morning upon waking  Eventually, untreated carpal tunnel syndrome can result in weakness and permanent loss of muscle within the thenar compartment of the hand  Treatment options were discussed with the patient  Conservative treatment includes nocturnal resting splints to keep the nerve in a neutral position, ergonomic changes within the work or home environment, activity modification, and tendon gliding exercises  Vitamin B6 one tablet daily over the counter may helpful to reduce symptoms  Steroid injections within the carpal canal can help a majority of patients, however this is often self-limited in a majority of patients  Surgical intervention to divide the transverse carpal ligament typically results in a long-lasting relief of the patient's complaints, with the recurrence rate of less than 1%  The patient has elected to undergo an open carpal tunnel release  The palmar incision technique was discussed in the office with the patient today     In the postoperative period, light activities are allowed immediately, driving is allowed when narcotic medication has stopped, and the bandages may be removed and incision may get wet after 2 days  Heavy activities (lifting more than approximately 10 pounds) will be allowed after follow up appointment in 1-2 weeks  While night symptoms (waking from sleep, pain, and discomfort in the hands) generally improves rapidly, the numbness and tingling as well as the strength will slowly improve over weeks to months depending on the chronicity and severity of the carpal tunnel syndrome  Pillar pain and scar discomfort were discussed with the patient which are self-limiting conditions  The risks of bleeding and infection from the surgery are less than 1%  Risk of recurrence is approximately 0 5%  The risks of nerve injury or nerve damage or damage to the blood vessels is approximately 1 in 1200  The patient has an understanding of the above mentioned discussion  The risks and benefits of the procedure were explained to the patient, which include, but are not limited to: Bleeding, infection, recurrence, pain, scar, damage to tendons, damage to nerves, and damage to blood vessels, failure to give desired results and complications related to anesthesia  These risks, along with alternative conservative treatment options, and postoperative protocols were voiced back and understood by the patient  All questions were answered to the patient's satisfaction  The patient agrees to comply with a standard postoperative protocol, and is willing to proceed  Education was provided via written and auditory forms  There were no barriers to learning  Written handouts regarding wound care, incision and scar care, and general preoperative information was provided to the patient  Prior to surgery, the patient may be requested to stop all anti-inflammatory medications  Prophylactic aspirin, Plavix, and Coumadin may be allowed to be continued    Medications including vitamin E , ginkgo, and fish oil are requested to be stopped approximately one week prior to surgery  Hypertensive medications and beta blockers, if taken, s      ____________________________________________________________________________________________________________________________________________      CHIEF COMPLAINT:  Chief Complaint   Patient presents with    Left Wrist - Pain       SUBJECTIVE:  Carl Garcia is a 52y o  year old RHD female who presents with left hand pain and numbness and tingling  She reports she previously saw Dr Rama Mcginnis last year for bilateral numbness and tingling  She was diagnosed at that time with carpal tunnel syndrome  An EMG was ordered to confirm this  In July she did have a carpal tunnel release which was a great success  Due to location of offices she would like to be treated here for her left CTS  She reports this numbness and tingling has increased in the last few months  It does wake her from sleep  She has tried a steroid injection in the past which failed  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 asap or after the school year       Has had failed non operative treatment in the past       Pain/symptom timing:  Worse during the day when active  Pain/symptom context:  Worse with activites and work  Pain/symptom modifying factors:  Rest makes better, activities make worse  Pain/symptom associated signs/symptoms: none    Prior treatment   · NSAIDsNo   · Injections Yes   · Bracing/Orthotics No    Physical Therapy No     I have personally reviewed all the relevant PMH, PSH, SH, FH, Medications and allergies      PAST MEDICAL HISTORY:  Past Medical History:   Diagnosis Date    Asthma     Depression     Disease of thyroid gland     Osteopenia     Pneumonia     2017       PAST SURGICAL HISTORY:  Past Surgical History:   Procedure Laterality Date    LASIK      OR REVISE MEDIAN N/CARPAL TUNNEL SURG Right 7/10/2018    Procedure: CARPAL TUNNEL RELEASE;  Surgeon: Lex Herron DO;  Location: [facility];  Service: Orthopedics    SEPTOPLASTY      SINUS SURGERY         FAMILY HISTORY:  Family History   Problem Relation Age of Onset   Marlena Fox Hashimoto's thyroiditis Mother     Hashimoto's thyroiditis Sister     Diabetes Other     Asthma Family         with acute exacerbation, unspecified asthma severity       SOCIAL HISTORY:  Social History     Tobacco Use    Smoking status: Never Smoker    Smokeless tobacco: Never Used   Substance Use Topics    Alcohol use: Yes     Comment: occasionally drinks 6 times per year    Drug use: No       MEDICATIONS:    Current Outpatient Medications:     Ascorbic Acid (VITAMIN C) 1000 MG tablet, Take 1,000 mg by mouth daily in the early morning  , Disp: , Rfl:     aspirin 81 MG tablet, Take 81 mg by mouth daily in the early morning  , Disp: , Rfl:     buPROPion (WELLBUTRIN XL) 150 mg 24 hr tablet, Take 3 tablets po daily, Disp: 270 tablet, Rfl: 0    Calcium 250 MG CAPS, Take by mouth daily with dinner  , Disp: , Rfl:     cetirizine (ZyrTEC) 10 mg tablet, Take 10 mg by mouth as needed  , Disp: , Rfl:     EPINEPHrine (EPIPEN 2-JASON) 0 3 mg/0 3 mL SOAJ, as needed  , Disp: , Rfl:     fluticasone (FLONASE ALLERGY RELIEF) 50 mcg/act nasal spray, into each nostril as needed  , Disp: , Rfl:     guaiFENesin (MUCINEX) 600 mg 12 hr tablet, Take 600 mg by mouth as needed  , Disp: , Rfl:     levothyroxine 25 mcg tablet, 1 tab mon-Friday and 2 on sat and Sunday, Disp: 114 tablet, Rfl: 0    MAGNESIUM PO, Take 200 mg by mouth daily with dinner  , Disp: , Rfl:     MULTIPLE VITAMINS-CALCIUM PO, Take 1 capsule by mouth daily in the early morning  , Disp: , Rfl:     niacin 100 mg tablet, Take 100 mg by mouth daily with breakfast  , Disp: , Rfl:     norethindrone-ethinyl estradiol (FEMHRT LOW DOSE) 0 5-2 5 MG-MCG per tablet, Take 1 tablet by mouth daily with dinner  , Disp: , Rfl: 2    PARoxetine (PAXIL-CR) 12 5 mg 24 hr tablet, Take 4 tablets po daily, Disp: 360 tablet, Rfl: 0   pseudoephedrine (SUDAFED) 120 MG 12 hr tablet, Take 120 mg by mouth as needed  , Disp: , Rfl:     ALLERGIES:  No Known Allergies        REVIEW OF SYSTEMS:  Review of Systems   Constitutional: Negative for chills, fever and unexpected weight change  HENT: Negative for hearing loss, nosebleeds and sore throat  Eyes: Negative for pain, redness and visual disturbance  Respiratory: Negative for cough, shortness of breath and wheezing  Cardiovascular: Negative for chest pain, palpitations and leg swelling  Gastrointestinal: Negative for abdominal pain, nausea and vomiting  Endocrine: Negative for polydipsia and polyuria  Genitourinary: Negative for dyspareunia and hematuria  Musculoskeletal: Negative for arthralgias, joint swelling and myalgias  Skin: Negative for rash and wound  Neurological: Positive for numbness  Negative for dizziness and headaches  Psychiatric/Behavioral: Negative for decreased concentration and suicidal ideas  The patient is not nervous/anxious  VITALS:  Vitals:    03/27/19 1641   BP: 119/71   Pulse: 98       LABS:  HgA1c:   Lab Results   Component Value Date    HGBA1C 5 4 08/07/2018     BMP:   Lab Results   Component Value Date    CALCIUM 9 4 07/03/2018    K 3 9 07/03/2018    CO2 25 07/03/2018     07/03/2018    BUN 8 07/03/2018    CREATININE 0 87 07/03/2018       _____________________________________________________  PHYSICAL EXAMINATION:  General: well developed and well nourished, alert, oriented times 3 and appears comfortable  Psychiatric: Normal  HEENT: Normocephalic, Atraumatic Trachea Midline, No torticollis  Pulmonary: No audible wheezing or respiratory distress   Cardiovascular: No pitting edema, 2+ radial pulse   Skin: No masses, erythema, lacerations, fluctation, ulcerations  Neurovascular: Motor Intact to the Median, Ulnar, Radial Nerve and Pulses Intact  Musculoskeletal: Normal, except as noted in detailed exam and in HPI        MUSCULOSKELETAL EXAMINATION:    Left Carpal Tunnel Exam:  Negative thenar atrophy  Negative phalen's test  Positive carpal tunnel compression  Positive tinels over median nerve at the wrist   Opposition strength 5/5    Abduction strength 5/5           ___________________________________________________  STUDIES REVIEWED:  No new x-rays   2 upper extremity EMG :   Left with decreased as N/C potentials latency normal          PROCEDURES PERFORMED:  Procedures  No Procedures performed today    _____________________________________________________      Scribe Attestation    I,:   Sallie Montalvo MA am acting as a scribe while in the presence of the attending physician :        I,:   Rebeca Quintana MD personally performed the services described in this documentation    as scribed in my presence :

## 2019-04-18 DIAGNOSIS — G56.02 CARPAL TUNNEL SYNDROME ON LEFT: Primary | ICD-10-CM

## 2019-04-18 RX ORDER — HYDROCODONE BITARTRATE AND ACETAMINOPHEN 5; 325 MG/1; MG/1
1 TABLET ORAL EVERY 6 HOURS PRN
Qty: 5 TABLET | Refills: 0 | Status: SHIPPED | OUTPATIENT
Start: 2019-04-18 | End: 2019-05-01

## 2019-04-19 ENCOUNTER — HOSPITAL ENCOUNTER (OUTPATIENT)
Facility: HOSPITAL | Age: 54
Setting detail: OUTPATIENT SURGERY
Discharge: HOME | End: 2019-04-19
Attending: SURGERY | Admitting: SURGERY
Payer: COMMERCIAL

## 2019-04-19 ENCOUNTER — ANESTHESIA EVENT (OUTPATIENT)
Dept: PERIOP | Facility: HOSPITAL | Age: 54
End: 2019-04-19
Payer: COMMERCIAL

## 2019-04-19 ENCOUNTER — ANESTHESIA (OUTPATIENT)
Dept: PERIOP | Facility: HOSPITAL | Age: 54
End: 2019-04-19
Payer: COMMERCIAL

## 2019-04-19 VITALS
DIASTOLIC BLOOD PRESSURE: 54 MMHG | BODY MASS INDEX: 25.34 KG/M2 | OXYGEN SATURATION: 99 % | HEIGHT: 63 IN | WEIGHT: 143 LBS | TEMPERATURE: 98.2 F | HEART RATE: 72 BPM | SYSTOLIC BLOOD PRESSURE: 100 MMHG | RESPIRATION RATE: 18 BRPM

## 2019-04-19 LAB — EXT PREGNANCY TEST URINE: NEGATIVE

## 2019-04-19 PROCEDURE — 64721 CARPAL TUNNEL SURGERY: CPT | Performed by: SURGERY

## 2019-04-19 PROCEDURE — 64721 CARPAL TUNNEL SURGERY: CPT | Performed by: PHYSICIAN ASSISTANT

## 2019-04-19 PROCEDURE — 81025 URINE PREGNANCY TEST: CPT | Performed by: SURGERY

## 2019-04-19 RX ORDER — LIDOCAINE HYDROCHLORIDE 10 MG/ML
0.5 INJECTION, SOLUTION EPIDURAL; INFILTRATION; INTRACAUDAL; PERINEURAL ONCE AS NEEDED
Status: COMPLETED | OUTPATIENT
Start: 2019-04-19 | End: 2019-04-19

## 2019-04-19 RX ORDER — MAGNESIUM HYDROXIDE 1200 MG/15ML
LIQUID ORAL AS NEEDED
Status: DISCONTINUED | OUTPATIENT
Start: 2019-04-19 | End: 2019-04-19 | Stop reason: HOSPADM

## 2019-04-19 RX ORDER — PROPOFOL 10 MG/ML
INJECTION, EMULSION INTRAVENOUS CONTINUOUS PRN
Status: DISCONTINUED | OUTPATIENT
Start: 2019-04-19 | End: 2019-04-19 | Stop reason: SURG

## 2019-04-19 RX ORDER — SODIUM CHLORIDE, SODIUM LACTATE, POTASSIUM CHLORIDE, CALCIUM CHLORIDE 600; 310; 30; 20 MG/100ML; MG/100ML; MG/100ML; MG/100ML
20 INJECTION, SOLUTION INTRAVENOUS CONTINUOUS
Status: DISCONTINUED | OUTPATIENT
Start: 2019-04-19 | End: 2019-04-19 | Stop reason: HOSPADM

## 2019-04-19 RX ORDER — FENTANYL CITRATE 50 UG/ML
INJECTION, SOLUTION INTRAMUSCULAR; INTRAVENOUS AS NEEDED
Status: DISCONTINUED | OUTPATIENT
Start: 2019-04-19 | End: 2019-04-19 | Stop reason: SURG

## 2019-04-19 RX ORDER — ONDANSETRON 2 MG/ML
INJECTION INTRAMUSCULAR; INTRAVENOUS AS NEEDED
Status: DISCONTINUED | OUTPATIENT
Start: 2019-04-19 | End: 2019-04-19 | Stop reason: SURG

## 2019-04-19 RX ORDER — MIDAZOLAM HYDROCHLORIDE 1 MG/ML
INJECTION INTRAMUSCULAR; INTRAVENOUS AS NEEDED
Status: DISCONTINUED | OUTPATIENT
Start: 2019-04-19 | End: 2019-04-19 | Stop reason: SURG

## 2019-04-19 RX ORDER — PROPOFOL 10 MG/ML
INJECTION, EMULSION INTRAVENOUS AS NEEDED
Status: DISCONTINUED | OUTPATIENT
Start: 2019-04-19 | End: 2019-04-19 | Stop reason: SURG

## 2019-04-19 RX ADMIN — FENTANYL CITRATE 50 MCG: 50 INJECTION, SOLUTION INTRAMUSCULAR; INTRAVENOUS at 08:46

## 2019-04-19 RX ADMIN — ONDANSETRON 4 MG: 2 INJECTION INTRAMUSCULAR; INTRAVENOUS at 08:58

## 2019-04-19 RX ADMIN — MIDAZOLAM 2 MG: 1 INJECTION INTRAMUSCULAR; INTRAVENOUS at 08:44

## 2019-04-19 RX ADMIN — LIDOCAINE HYDROCHLORIDE 0.5 ML: 10 INJECTION, SOLUTION EPIDURAL; INFILTRATION; INTRACAUDAL; PERINEURAL at 08:28

## 2019-04-19 RX ADMIN — PROPOFOL 50 MG: 10 INJECTION, EMULSION INTRAVENOUS at 08:46

## 2019-04-19 RX ADMIN — PROPOFOL 50 MCG/KG/MIN: 10 INJECTION, EMULSION INTRAVENOUS at 08:46

## 2019-04-19 RX ADMIN — SODIUM CHLORIDE, SODIUM LACTATE, POTASSIUM CHLORIDE, AND CALCIUM CHLORIDE 20 ML/HR: .6; .31; .03; .02 INJECTION, SOLUTION INTRAVENOUS at 08:28

## 2019-04-24 ENCOUNTER — OFFICE VISIT (OUTPATIENT)
Dept: OBGYN CLINIC | Facility: HOSPITAL | Age: 54
End: 2019-04-24

## 2019-04-24 VITALS
HEIGHT: 63 IN | SYSTOLIC BLOOD PRESSURE: 112 MMHG | DIASTOLIC BLOOD PRESSURE: 77 MMHG | WEIGHT: 140 LBS | HEART RATE: 75 BPM | BODY MASS INDEX: 24.8 KG/M2

## 2019-04-24 DIAGNOSIS — G56.02 CARPAL TUNNEL SYNDROME ON LEFT: Primary | ICD-10-CM

## 2019-04-24 PROCEDURE — 99024 POSTOP FOLLOW-UP VISIT: CPT | Performed by: SURGERY

## 2019-04-29 DIAGNOSIS — E03.9 HYPOTHYROIDISM, UNSPECIFIED TYPE: ICD-10-CM

## 2019-04-29 DIAGNOSIS — F32.A DEPRESSION, UNSPECIFIED DEPRESSION TYPE: ICD-10-CM

## 2019-04-29 RX ORDER — LEVOTHYROXINE SODIUM 0.03 MG/1
TABLET ORAL
Qty: 114 TABLET | Refills: 0 | Status: CANCELLED | OUTPATIENT
Start: 2019-04-29

## 2019-04-29 RX ORDER — PAROXETINE HYDROCHLORIDE 12.5 MG/1
TABLET, FILM COATED, EXTENDED RELEASE ORAL
Qty: 120 TABLET | Refills: 3 | Status: SHIPPED | OUTPATIENT
Start: 2019-04-29 | End: 2019-07-17 | Stop reason: SDUPTHER

## 2019-04-29 RX ORDER — BUPROPION HYDROCHLORIDE 150 MG/1
TABLET ORAL
Qty: 270 TABLET | Refills: 0 | Status: SHIPPED | OUTPATIENT
Start: 2019-04-29 | End: 2019-07-17 | Stop reason: SDUPTHER

## 2019-04-30 DIAGNOSIS — E03.9 HYPOTHYROIDISM, UNSPECIFIED TYPE: ICD-10-CM

## 2019-04-30 RX ORDER — LEVOTHYROXINE SODIUM 0.03 MG/1
TABLET ORAL
Qty: 114 TABLET | Refills: 0 | Status: SHIPPED | OUTPATIENT
Start: 2019-04-30 | End: 2019-07-17 | Stop reason: SDUPTHER

## 2019-05-01 ENCOUNTER — OFFICE VISIT (OUTPATIENT)
Dept: OBGYN CLINIC | Facility: HOSPITAL | Age: 54
End: 2019-05-01

## 2019-05-01 VITALS
DIASTOLIC BLOOD PRESSURE: 68 MMHG | HEIGHT: 63 IN | SYSTOLIC BLOOD PRESSURE: 106 MMHG | WEIGHT: 140 LBS | HEART RATE: 78 BPM | BODY MASS INDEX: 24.8 KG/M2

## 2019-05-01 DIAGNOSIS — Z98.890 S/P CARPAL TUNNEL RELEASE: Primary | ICD-10-CM

## 2019-05-01 PROCEDURE — 99024 POSTOP FOLLOW-UP VISIT: CPT | Performed by: SURGERY

## 2019-07-17 DIAGNOSIS — F32.A DEPRESSION, UNSPECIFIED DEPRESSION TYPE: ICD-10-CM

## 2019-07-17 DIAGNOSIS — E03.9 HYPOTHYROIDISM, UNSPECIFIED TYPE: ICD-10-CM

## 2019-07-17 DIAGNOSIS — N95.1 MENOPAUSAL SYMPTOMS: Primary | ICD-10-CM

## 2019-07-17 RX ORDER — LEVOTHYROXINE SODIUM 0.03 MG/1
TABLET ORAL
Qty: 114 TABLET | Refills: 0 | Status: SHIPPED | OUTPATIENT
Start: 2019-07-17 | End: 2019-12-16 | Stop reason: SDUPTHER

## 2019-07-17 RX ORDER — NORETHINDRONE ACETATE AND ETHINYL ESTRADIOL .5; 2.5 MG/1; UG/1
1 TABLET ORAL
Qty: 84 TABLET | Refills: 0 | Status: SHIPPED | OUTPATIENT
Start: 2019-07-17 | End: 2019-07-31 | Stop reason: SDUPTHER

## 2019-07-17 RX ORDER — BUPROPION HYDROCHLORIDE 150 MG/1
TABLET ORAL
Qty: 270 TABLET | Refills: 0 | Status: SHIPPED | OUTPATIENT
Start: 2019-07-17 | End: 2019-11-04 | Stop reason: SDUPTHER

## 2019-07-17 RX ORDER — PAROXETINE HYDROCHLORIDE 12.5 MG/1
TABLET, FILM COATED, EXTENDED RELEASE ORAL
Qty: 360 TABLET | Refills: 0 | Status: SHIPPED | OUTPATIENT
Start: 2019-07-17 | End: 2019-11-04 | Stop reason: SDUPTHER

## 2019-07-17 NOTE — TELEPHONE ENCOUNTER
Notify pt that her prescription was sent to [facility], but absolutely no more refills will be authorized until she is seen in the office    She has not been here since 2017

## 2019-07-23 ENCOUNTER — TELEPHONE (OUTPATIENT)
Dept: GYNECOLOGY | Facility: CLINIC | Age: 54
End: 2019-07-23

## 2019-07-23 NOTE — TELEPHONE ENCOUNTER
lmom for pt that her mediction has been called into the pharmacy she will need to schedule appt for another refill

## 2019-07-31 ENCOUNTER — ANNUAL EXAM (OUTPATIENT)
Dept: GYNECOLOGY | Facility: CLINIC | Age: 54
End: 2019-07-31
Payer: COMMERCIAL

## 2019-07-31 VITALS
TEMPERATURE: 98.7 F | SYSTOLIC BLOOD PRESSURE: 104 MMHG | HEIGHT: 63 IN | HEART RATE: 80 BPM | RESPIRATION RATE: 17 BRPM | BODY MASS INDEX: 25.16 KG/M2 | DIASTOLIC BLOOD PRESSURE: 62 MMHG | WEIGHT: 142 LBS

## 2019-07-31 DIAGNOSIS — N95.1 MENOPAUSAL SYMPTOMS: ICD-10-CM

## 2019-07-31 DIAGNOSIS — Z01.419 ENCOUNTER FOR GYNECOLOGICAL EXAMINATION (GENERAL) (ROUTINE) WITHOUT ABNORMAL FINDINGS: Primary | ICD-10-CM

## 2019-07-31 DIAGNOSIS — Z12.39 BREAST CANCER SCREENING: ICD-10-CM

## 2019-07-31 DIAGNOSIS — N39.3 SUI (STRESS URINARY INCONTINENCE, FEMALE): ICD-10-CM

## 2019-07-31 DIAGNOSIS — Z12.11 SCREENING FOR COLON CANCER: ICD-10-CM

## 2019-07-31 PROCEDURE — 99396 PREV VISIT EST AGE 40-64: CPT | Performed by: OBSTETRICS & GYNECOLOGY

## 2019-07-31 RX ORDER — NORETHINDRONE ACETATE AND ETHINYL ESTRADIOL .5; 2.5 MG/1; UG/1
1 TABLET ORAL DAILY
Qty: 84 TABLET | Refills: 4 | Status: SHIPPED | OUTPATIENT
Start: 2019-07-31 | End: 2020-09-10 | Stop reason: SDUPTHER

## 2019-07-31 RX ORDER — NORETHINDRONE ACETATE AND ETHINYL ESTRADIOL .5; 2.5 MG/1; UG/1
1 TABLET ORAL DAILY
Qty: 84 TABLET | Refills: 4 | Status: SHIPPED | OUTPATIENT
Start: 2019-07-31 | End: 2019-07-31 | Stop reason: SDUPTHER

## 2019-07-31 NOTE — PROGRESS NOTES
Assessment/Plan:       Diagnoses and all orders for this visit:    Encounter for gynecological examination (general) (routine) without abnormal findings    Menopausal symptoms  -     Discontinue: norethindrone-ethinyl estradiol (FEMHRT LOW DOSE) 0 5-2 5 MG-MCG per tablet; Take 1 tablet by mouth daily  -     norethindrone-ethinyl estradiol (FEMHRT LOW DOSE) 0 5-2 5 MG-MCG per tablet; Take 1 tablet by mouth daily    Breast cancer screening  -     Mammo screening bilateral w 3d & cad; Future    ALDAIR (stress urinary incontinence, female)          Subjective:      Patient ID: Anay Cardona is a 62 y o  female  The patient is a 59-year-old who presents for an annual gyn exam   She has been taking hormone replacement therapy and would like to continue  The patient denies any vaginal bleeding, breast problems or dyspareunia  Patient states that she has had a cough for 2 weeks  She is experiencing stress urinary incontinence with this coughing  We discussed that she will need to see her primary care physician for evaluation and treatment of the cough  If the stress urinary incontinence persists, she would need to make an appointment with a urogynecologist   We did discuss pessaries, but she is not interested  The patient had a normal Pap in HPV in December of 2017  She will be due for a follow-up Pap no earlier than December 2021  The patient had heterogeneous densities on her last mammography  She is aware she will need a 3D study this year  The following portions of the patient's history were reviewed and updated as appropriate: allergies, current medications, past family history, past medical history, past social history, past surgical history and problem list     Review of Systems   Constitutional: Negative  Respiratory: Negative for shortness of breath  Cardiovascular: Negative for chest pain  Gastrointestinal: Negative  Genitourinary: Negative  Skin: Negative      Psychiatric/Behavioral: Negative for agitation, behavioral problems and confusion  Objective:      /62 (BP Location: Left arm, Patient Position: Sitting, Cuff Size: Standard)   Pulse 80   Temp 98 7 °F (37 1 °C) (Tympanic)   Resp 17   Ht 5' 3 25" (1 607 m)   Wt 64 4 kg (142 lb)   BMI 24 96 kg/m²          Physical Exam   Constitutional: She appears well-developed and well-nourished  No distress  HENT:   Head: Normocephalic  Pulmonary/Chest: Effort normal  No respiratory distress  Right breast exhibits no inverted nipple, no mass, no nipple discharge, no skin change and no tenderness  Left breast exhibits no inverted nipple, no mass, no nipple discharge, no skin change and no tenderness  Breasts are symmetrical    Abdominal: She exhibits no distension and no mass  There is no tenderness  There is no rebound and no guarding  Genitourinary: Rectum normal and uterus normal  Pelvic exam was performed with patient supine  No labial fusion  There is no rash, tenderness, lesion or injury on the right labia  There is no rash, tenderness, lesion or injury on the left labia  Uterus is not deviated, not enlarged, not fixed and not tender  Cervix exhibits no motion tenderness, no discharge and no friability  Right adnexum displays no mass, no tenderness and no fullness  Left adnexum displays no mass, no tenderness and no fullness  No erythema, tenderness or bleeding in the vagina  No foreign body in the vagina  No signs of injury around the vagina  No vaginal discharge found  Lymphadenopathy:        Right axillary: No pectoral and no lateral adenopathy present  Left axillary: No pectoral and no lateral adenopathy present  Skin: Skin is warm, dry and intact  She is not diaphoretic  No cyanosis  Nails show no clubbing  Psychiatric: She has a normal mood and affect   Her speech is normal and behavior is normal

## 2019-08-01 ENCOUNTER — OFFICE VISIT (OUTPATIENT)
Dept: FAMILY MEDICINE CLINIC | Facility: CLINIC | Age: 54
End: 2019-08-01
Payer: COMMERCIAL

## 2019-08-01 VITALS
WEIGHT: 141 LBS | TEMPERATURE: 98.4 F | BODY MASS INDEX: 24.07 KG/M2 | DIASTOLIC BLOOD PRESSURE: 60 MMHG | OXYGEN SATURATION: 98 % | HEART RATE: 100 BPM | HEIGHT: 64 IN | SYSTOLIC BLOOD PRESSURE: 100 MMHG

## 2019-08-01 DIAGNOSIS — E78.00 PRIMARY HYPERCHOLESTEROLEMIA: ICD-10-CM

## 2019-08-01 DIAGNOSIS — F32.A DEPRESSION, UNSPECIFIED DEPRESSION TYPE: ICD-10-CM

## 2019-08-01 DIAGNOSIS — J06.9 VIRAL UPPER RESPIRATORY TRACT INFECTION: ICD-10-CM

## 2019-08-01 DIAGNOSIS — E03.9 HYPOTHYROIDISM, UNSPECIFIED TYPE: ICD-10-CM

## 2019-08-01 DIAGNOSIS — Z12.11 SCREENING FOR COLON CANCER: ICD-10-CM

## 2019-08-01 DIAGNOSIS — Z00.00 ANNUAL PHYSICAL EXAM: ICD-10-CM

## 2019-08-01 DIAGNOSIS — Z00.00 ENCOUNTER FOR HEALTH MAINTENANCE EXAMINATION IN ADULT: Primary | ICD-10-CM

## 2019-08-01 PROCEDURE — 99396 PREV VISIT EST AGE 40-64: CPT | Performed by: FAMILY MEDICINE

## 2019-08-01 RX ORDER — BENZONATATE 200 MG/1
200 CAPSULE ORAL 3 TIMES DAILY PRN
Qty: 30 CAPSULE | Refills: 0 | Status: SHIPPED | OUTPATIENT
Start: 2019-08-01 | End: 2019-09-25 | Stop reason: ALTCHOICE

## 2019-08-01 NOTE — PROGRESS NOTES
ADULT ANNUAL PHYSICAL  [facility] PRIMARY CARE    NAME: Ed Medina  AGE: 62 y o  SEX: female  : 1965     DATE: 2019     Assessment and Plan:     Problem List Items Addressed This Visit        Endocrine    Hypothyroidism     She has felt stable from a thyroid standpoint  Will recheck TSH with free T4 reflex  Relevant Orders    TSH, 3rd generation with Free T4 reflex       Other    Depression     She has been asymptomatic on her paroxetine and bupropion  Primary hypercholesterolemia     She has hx of mild elevation , managed with niacin  Will recheck profile         Relevant Orders    Lipid panel    Comprehensive metabolic panel      Other Visit Diagnoses     Encounter for health maintenance examination in adult    -  Primary    Viral upper respiratory tract infection        Relevant Medications    benzonatate (TESSALON) 200 MG capsule    Screening for colon cancer        Relevant Orders    Occult Blood, Fecal Immunochemical          Immunizations and preventive care screenings were discussed with patient today  Appropriate education was printed on patient's after visit summary  Counseling:  · BMI Counseling: Body mass index is 23 9 kg/m²  Discussed the patient's BMI with her  The BMI is normal    Return in about 6 months (around 2020) for Annual physical, Next scheduled follow up  Chief Complaint:     Chief Complaint   Patient presents with    Annual Exam      History of Present Illness:     Adult Annual Physical   Patient here for a comprehensive physical exam  The patient reports problems - cough for past week  Started with a cold which got better but cough persists  Urinary incont with cough  Productive of  clear mucus  Diet and Physical Activity  · Diet/Nutrition: well balanced diet, limited junk food, adequate fiber intake and adequate whole grain intake  · Exercise: walking        Depression Screening  PHQ-9 Depression Screening    PHQ-9:    Frequency of the following problems over the past two weeks:       Little interest or pleasure in doing things:  0 - not at all  Feeling down, depressed, or hopeless:  0 - not at all  PHQ-2 Score:  0       General Health  · Sleep: sleeps well and gets 7-8 hours of sleep on average  · Hearing: decreased - bilateral   · Vision: no vision problems and goes for regular eye exams  · Dental: regular dental visits and brushes teeth twice daily      /GYN Health  · Patient is: postmenopausal  · Last menstrual period: unknown  · Contraceptive method: none  Review of Systems:     Review of Systems   Constitutional: Positive for fatigue  Negative for activity change, chills and fever  HENT: Positive for congestion  Negative for ear pain, sinus pressure and sore throat  Eyes: Negative for pain and visual disturbance  Respiratory: Positive for cough  Negative for chest tightness, shortness of breath and wheezing  Cardiovascular: Negative for chest pain, palpitations and leg swelling  Gastrointestinal: Negative for abdominal pain, blood in stool, constipation, diarrhea, nausea and vomiting  Endocrine: Negative for polydipsia and polyuria  Genitourinary: Negative for difficulty urinating, dysuria, frequency and urgency  Stress incontinence with cough   Musculoskeletal: Negative for arthralgias, joint swelling and myalgias  Skin: Negative for rash  Neurological: Negative for dizziness, weakness, numbness and headaches  Hematological: Negative for adenopathy  Does not bruise/bleed easily  Psychiatric/Behavioral: Negative for dysphoric mood  The patient is not nervous/anxious         Past Medical History:     Past Medical History:   Diagnosis Date    Asthma     Depression     Disease of thyroid gland     Osteopenia     Pneumonia     2017      Past Surgical History:     Past Surgical History:   Procedure Laterality Date    LASIK      MT REVISE MEDIAN N/CARPAL TUNNEL SURG Right 7/10/2018    Procedure: CARPAL TUNNEL RELEASE;  Surgeon: Maeve Hutchison DO;  [facility];  Service: Orthopedics    NV REVISE MEDIAN N/CARPAL TUNNEL SURG Left 4/19/2019    Procedure: RELEASE CARPAL TUNNEL;  Surgeon: Rosa Reyes MD;  [facility];  Service: Orthopedics    SEPTOPLASTY      SINUS SURGERY        Social History:     Social History     Socioeconomic History    Marital status: /Civil Union     Spouse name: None    Number of children: None    Years of education: None    Highest education level: None   Occupational History    None   Social Needs    Financial resource strain: None    Food insecurity:     Worry: None     Inability: None    Transportation needs:     Medical: None     Non-medical: None   Tobacco Use    Smoking status: Never Smoker    Smokeless tobacco: Never Used   Substance and Sexual Activity    Alcohol use: Yes     Frequency: Monthly or less     Comment: occasionally drinks 6 times per year    Drug use: No    Sexual activity: Yes   Lifestyle    Physical activity:     Days per week: None     Minutes per session: None    Stress: None   Relationships    Social connections:     Talks on phone: None     Gets together: None     Attends Muslim service: None     Active member of club or organization: None     Attends meetings of clubs or organizations: None     Relationship status: None    Intimate partner violence:     Fear of current or ex partner: None     Emotionally abused: None     Physically abused: None     Forced sexual activity: None   Other Topics Concern    None   Social History Narrative    None      Family History:     Family History   Problem Relation Age of Onset    Hashimoto's thyroiditis Mother     Hashimoto's thyroiditis Sister     Diabetes Other     Depression Maternal Grandmother       Current Medications:     Current Outpatient Medications   Medication Sig Dispense Refill    Ascorbic Acid (VITAMIN C) 1000 MG tablet Take 1,000 mg by mouth daily in the early morning        aspirin 81 MG tablet Take 81 mg by mouth daily in the early morning        buPROPion (WELLBUTRIN XL) 150 mg 24 hr tablet TAKE 3 TABLETS BY MOUTH EVERY  tablet 0    Calcium 250 MG CAPS Take by mouth daily with dinner        cetirizine (ZyrTEC) 10 mg tablet Take 10 mg by mouth as needed        Cholecalciferol (VITAMIN D3 PO) Take by mouth      fluticasone (FLONASE ALLERGY RELIEF) 50 mcg/act nasal spray into each nostril as needed        guaiFENesin (MUCINEX) 600 mg 12 hr tablet Take 600 mg by mouth as needed        levothyroxine 25 mcg tablet TAKE ONE TABLET BY MOUTH EVERY DAY ON MONDAY-FRIDAY AND 2 TABLETS BY MOUTH EVERY DAY ON SAT/SUN  114 tablet 0    MAGNESIUM PO Take 200 mg by mouth daily with dinner        MULTIPLE VITAMINS-CALCIUM PO Take 1 capsule by mouth daily in the early morning        niacin 100 mg tablet Take 100 mg by mouth daily with breakfast        norethindrone-ethinyl estradiol (FEMHRT LOW DOSE) 0 5-2 5 MG-MCG per tablet Take 1 tablet by mouth daily 84 tablet 4    PARoxetine (PAXIL-CR) 12 5 mg 24 hr tablet TAKE 4 TABLETS BY MOUTH EVERY  tablet 0    pseudoephedrine (SUDAFED) 120 MG 12 hr tablet Take 120 mg by mouth as needed        benzonatate (TESSALON) 200 MG capsule Take 1 capsule (200 mg total) by mouth 3 (three) times a day as needed for cough 30 capsule 0     No current facility-administered medications for this visit  Allergies:     No Known Allergies   Physical Exam:     /60 (BP Location: Left arm, Patient Position: Sitting, Cuff Size: Standard)   Pulse 100   Temp 98 4 °F (36 9 °C) (Tympanic)   Ht 5' 4 4" (1 636 m)   Wt 64 kg (141 lb)   SpO2 98%   BMI 23 90 kg/m²     Physical Exam   Constitutional: She is oriented to person, place, and time  She appears well-developed and well-nourished  No distress  HENT:   Head: Normocephalic and atraumatic     Right Ear: External ear normal    Left Ear: External ear normal    Nose: Nose normal    Mouth/Throat: Oropharynx is clear and moist    Eyes: Pupils are equal, round, and reactive to light  Conjunctivae and EOM are normal  No scleral icterus  Neck: Normal range of motion  Neck supple  No thyromegaly present  Cardiovascular: Normal rate, regular rhythm, normal heart sounds and intact distal pulses  No murmur heard  Pulmonary/Chest: Effort normal and breath sounds normal  She has no wheezes  She has no rales  Occasional cough   Abdominal: Soft  Bowel sounds are normal  She exhibits no mass  There is no tenderness  Musculoskeletal: She exhibits no tenderness or deformity  Lymphadenopathy:     She has no cervical adenopathy  Neurological: She is alert and oriented to person, place, and time  She has normal reflexes  No cranial nerve deficit  Skin: Skin is warm and dry  Capillary refill takes less than 2 seconds  No rash noted  No erythema  Psychiatric: She has a normal mood and affect  Her behavior is normal    Nursing note and vitals reviewed        MD Magali Harris [id number]

## 2019-08-01 NOTE — PATIENT INSTRUCTIONS

## 2019-08-07 ENCOUNTER — APPOINTMENT (OUTPATIENT)
Dept: LAB | Facility: MEDICAL CENTER | Age: 54
End: 2019-08-07
Attending: FAMILY MEDICINE
Payer: COMMERCIAL

## 2019-08-07 DIAGNOSIS — E78.00 PRIMARY HYPERCHOLESTEROLEMIA: ICD-10-CM

## 2019-08-07 DIAGNOSIS — Z12.11 SCREENING FOR COLON CANCER: ICD-10-CM

## 2019-08-07 DIAGNOSIS — E03.9 HYPOTHYROIDISM, UNSPECIFIED TYPE: ICD-10-CM

## 2019-08-07 LAB
ALBUMIN SERPL BCP-MCNC: 3.8 G/DL (ref 3.5–5)
ALP SERPL-CCNC: 90 U/L (ref 46–116)
ALT SERPL W P-5'-P-CCNC: 32 U/L (ref 12–78)
ANION GAP SERPL CALCULATED.3IONS-SCNC: 4 MMOL/L (ref 4–13)
AST SERPL W P-5'-P-CCNC: 17 U/L (ref 5–45)
BILIRUB SERPL-MCNC: 0.33 MG/DL (ref 0.2–1)
BUN SERPL-MCNC: 7 MG/DL (ref 5–25)
CALCIUM SERPL-MCNC: 9.2 MG/DL (ref 8.3–10.1)
CHLORIDE SERPL-SCNC: 102 MMOL/L (ref 100–108)
CHOLEST SERPL-MCNC: 167 MG/DL (ref 50–200)
CO2 SERPL-SCNC: 29 MMOL/L (ref 21–32)
CREAT SERPL-MCNC: 0.88 MG/DL (ref 0.6–1.3)
GFR SERPL CREATININE-BSD FRML MDRD: 74 ML/MIN/1.73SQ M
GLUCOSE P FAST SERPL-MCNC: 81 MG/DL (ref 65–99)
HDLC SERPL-MCNC: 52 MG/DL (ref 40–60)
HEMOCCULT STL QL IA: NEGATIVE
LDLC SERPL CALC-MCNC: 87 MG/DL (ref 0–100)
NONHDLC SERPL-MCNC: 115 MG/DL
POTASSIUM SERPL-SCNC: 3.8 MMOL/L (ref 3.5–5.3)
PROT SERPL-MCNC: 8.3 G/DL (ref 6.4–8.2)
SODIUM SERPL-SCNC: 135 MMOL/L (ref 136–145)
TRIGL SERPL-MCNC: 138 MG/DL
TSH SERPL DL<=0.05 MIU/L-ACNC: 2.56 UIU/ML (ref 0.36–3.74)

## 2019-08-07 PROCEDURE — G0328 FECAL BLOOD SCRN IMMUNOASSAY: HCPCS

## 2019-08-07 PROCEDURE — 36415 COLL VENOUS BLD VENIPUNCTURE: CPT

## 2019-08-07 PROCEDURE — 84443 ASSAY THYROID STIM HORMONE: CPT

## 2019-08-07 PROCEDURE — 80053 COMPREHEN METABOLIC PANEL: CPT

## 2019-08-07 PROCEDURE — 80061 LIPID PANEL: CPT

## 2019-09-24 ENCOUNTER — HOSPITAL ENCOUNTER (OUTPATIENT)
Dept: MAMMOGRAPHY | Facility: MEDICAL CENTER | Age: 54
Discharge: HOME | End: 2019-09-24
Payer: COMMERCIAL

## 2019-09-24 VITALS — WEIGHT: 140 LBS | BODY MASS INDEX: 22.5 KG/M2 | HEIGHT: 66 IN

## 2019-09-24 DIAGNOSIS — Z12.39 BREAST CANCER SCREENING: ICD-10-CM

## 2019-09-24 PROCEDURE — 77063 BREAST TOMOSYNTHESIS BI: CPT

## 2019-09-24 PROCEDURE — 77067 SCR MAMMO BI INCL CAD: CPT

## 2019-09-25 ENCOUNTER — OFFICE VISIT (OUTPATIENT)
Dept: FAMILY MEDICINE CLINIC | Facility: CLINIC | Age: 54
End: 2019-09-25
Payer: COMMERCIAL

## 2019-09-25 VITALS
OXYGEN SATURATION: 97 % | BODY MASS INDEX: 22.89 KG/M2 | WEIGHT: 141.8 LBS | TEMPERATURE: 97.8 F | SYSTOLIC BLOOD PRESSURE: 108 MMHG | HEART RATE: 87 BPM | DIASTOLIC BLOOD PRESSURE: 70 MMHG

## 2019-09-25 DIAGNOSIS — L71.0 PERIORAL DERMATITIS: Primary | ICD-10-CM

## 2019-09-25 PROCEDURE — 99213 OFFICE O/P EST LOW 20 MIN: CPT | Performed by: FAMILY MEDICINE

## 2019-09-25 RX ORDER — DOXYCYCLINE HYCLATE 100 MG/1
100 CAPSULE ORAL EVERY 12 HOURS SCHEDULED
Qty: 28 CAPSULE | Refills: 0 | Status: SHIPPED | OUTPATIENT
Start: 2019-09-25 | End: 2019-10-09 | Stop reason: ALTCHOICE

## 2019-09-25 NOTE — PROGRESS NOTES
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  I have given her prescription for doxycycline 100 mg to take b i d  For the next 2 weeks  I also gave her Dermatology referral   Would consider recheck in a couple weeks     Diagnoses and all orders for this visit:    Perioral dermatitis  -     doxycycline hyclate (VIBRAMYCIN) 100 mg capsule; Take 1 capsule (100 mg total) by mouth every 12 (twelve) hours for 14 days  -     Ambulatory referral to Dermatology; Future          Subjective:      Patient ID: Glen Hernandez is a 62 y o  female  She is here with facial rash which has been intermittent over the past 3 weeks or so  She denies any change in medications or lifestyle  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  She thought at 1st the rash was stress related because of going back to work  She works in [employer] but does not work around   or dish   She denies fever or chills  She denies respiratory illness recently  No seasonal allergy symptoms as she was desensitized with allergy injections for several years  She tried hydrocortisone 1% cream without any relief  Rash is itchy but she tries not to scratch  The following portions of the patient's history were reviewed and updated as appropriate: allergies, current medications, past family history, past medical history, past social history, past surgical history and problem list     Review of Systems   Constitutional: Negative for activity change, appetite change, chills and fever  HENT: Negative for congestion  Respiratory: Negative for cough and chest tightness  Cardiovascular: Negative for chest pain and palpitations  Gastrointestinal: Negative for abdominal pain, diarrhea and nausea  Skin: Positive for rash     Neurological: Negative for dizziness and headaches  Objective:      /70 (BP Location: Left arm, Patient Position: Sitting, Cuff Size: Standard)   Pulse 87   Temp 97 8 °F (36 6 °C) (Tympanic)   Wt 64 3 kg (141 lb 12 8 oz)   SpO2 97%   BMI 22 89 kg/m²          Physical Exam   Constitutional: She appears well-developed and well-nourished  Neck: Normal range of motion  Neck supple  Cardiovascular: Normal rate and regular rhythm  Skin: Skin is warm and dry  There is erythematous maculopapular eruption around the mouth and extending up the nasal labial folds  Nursing note and vitals reviewed

## 2019-10-09 ENCOUNTER — OFFICE VISIT (OUTPATIENT)
Dept: FAMILY MEDICINE CLINIC | Facility: CLINIC | Age: 54
End: 2019-10-09
Payer: COMMERCIAL

## 2019-10-09 VITALS
OXYGEN SATURATION: 98 % | TEMPERATURE: 98.7 F | WEIGHT: 142 LBS | BODY MASS INDEX: 22.92 KG/M2 | SYSTOLIC BLOOD PRESSURE: 120 MMHG | DIASTOLIC BLOOD PRESSURE: 80 MMHG | HEART RATE: 84 BPM

## 2019-10-09 DIAGNOSIS — E03.9 HYPOTHYROIDISM, UNSPECIFIED TYPE: ICD-10-CM

## 2019-10-09 DIAGNOSIS — L30.9 DERMATITIS: Primary | ICD-10-CM

## 2019-10-09 PROCEDURE — 99213 OFFICE O/P EST LOW 20 MIN: CPT | Performed by: FAMILY MEDICINE

## 2019-10-09 NOTE — ASSESSMENT & PLAN NOTE
Pao-oral dermatitis essentially resolved on the doxycycline  I recommended low-dose OTC hydrocortisone cream 1% to be applied as needed for itching  She has recently developed itching and dry skin of her arms which she thinks may be due to under active thyroid  We will be rechecking her thyroid labs  I recommended daily moisturizer such as Lubriderm, Brittney lotion or Aveeno  She should apply immediately after bathing  Also urged not to use Super hot water and not to do a lot of tell drying  Also recommended use of ice packs as needed for itching

## 2019-10-09 NOTE — ASSESSMENT & PLAN NOTE
She had normal TSH August 7th, but she is concerned that she needs more thyroid medication because of her itchy skin symptoms  I have given her a lab slip to check TSH and free T4 in November

## 2019-10-09 NOTE — PROGRESS NOTES
Assessment/Plan:    Hypothyroidism  She had normal TSH August 7th, but she is concerned that she needs more thyroid medication because of her itchy skin symptoms  I have given her a lab slip to check TSH and free T4 in November  Dermatitis  Pao-oral dermatitis essentially resolved on the doxycycline  I recommended low-dose OTC hydrocortisone cream 1% to be applied as needed for itching  She has recently developed itching and dry skin of her arms which she thinks may be due to under active thyroid  We will be rechecking her thyroid labs  I recommended daily moisturizer such as Lubriderm, Brittney lotion or Aveeno  She should apply immediately after bathing  Also urged not to use Super hot water and not to do a lot of tell drying  Also recommended use of ice packs as needed for itching  Diagnoses and all orders for this visit:    Dermatitis    Hypothyroidism, unspecified type  -     TSH, 3rd generation; Future  -     T4, free; Future          Subjective:      Patient ID: Ana Meek is a 62 y o  female  She is here for follow-up of perioral dermatitis  She just finished a 2 week course of doxycycline and rash has improved a lot  Rash and itching have nearly subsided  The following portions of the patient's history were reviewed and updated as appropriate: allergies, current medications, past family history, past medical history, past social history, past surgical history and problem list     Review of Systems   Constitutional: Negative for chills and fever  Skin: Positive for rash  Objective:      /80 (BP Location: Left arm, Patient Position: Sitting, Cuff Size: Standard)   Pulse 84   Temp 98 7 °F (37 1 °C) (Tympanic)   Wt 64 4 kg (142 lb)   SpO2 98%   BMI 22 92 kg/m²          Physical Exam   Constitutional: She appears well-developed and well-nourished  Skin: Skin is warm and dry  Very faint erythema noted around the mouth and below the nares    There is erythema over both forearms and marks of excoriation  Nursing note and vitals reviewed

## 2019-11-04 DIAGNOSIS — F32.A DEPRESSION, UNSPECIFIED DEPRESSION TYPE: ICD-10-CM

## 2019-11-04 RX ORDER — BUPROPION HYDROCHLORIDE 150 MG/1
TABLET ORAL
Qty: 270 TABLET | Refills: 0 | Status: SHIPPED | OUTPATIENT
Start: 2019-11-04 | End: 2020-02-03

## 2019-11-04 RX ORDER — PAROXETINE HYDROCHLORIDE 12.5 MG/1
TABLET, FILM COATED, EXTENDED RELEASE ORAL
Qty: 360 TABLET | Refills: 0 | Status: SHIPPED | OUTPATIENT
Start: 2019-11-04 | End: 2020-01-06 | Stop reason: SDUPTHER

## 2019-11-11 ENCOUNTER — APPOINTMENT (OUTPATIENT)
Dept: LAB | Facility: MEDICAL CENTER | Age: 54
End: 2019-11-11
Attending: FAMILY MEDICINE
Payer: COMMERCIAL

## 2019-11-11 DIAGNOSIS — E03.9 HYPOTHYROIDISM, UNSPECIFIED TYPE: ICD-10-CM

## 2019-11-11 LAB
T4 FREE SERPL-MCNC: 0.88 NG/DL (ref 0.76–1.46)
TSH SERPL DL<=0.05 MIU/L-ACNC: 1.71 UIU/ML (ref 0.36–3.74)

## 2019-11-11 PROCEDURE — 36415 COLL VENOUS BLD VENIPUNCTURE: CPT

## 2019-11-11 PROCEDURE — 84439 ASSAY OF FREE THYROXINE: CPT

## 2019-11-11 PROCEDURE — 84443 ASSAY THYROID STIM HORMONE: CPT

## 2019-12-09 DIAGNOSIS — E03.9 HYPOTHYROIDISM, UNSPECIFIED TYPE: ICD-10-CM

## 2019-12-10 RX ORDER — LEVOTHYROXINE SODIUM 0.03 MG/1
TABLET ORAL
Qty: 114 TABLET | Refills: 0 | OUTPATIENT
Start: 2019-12-10

## 2019-12-16 DIAGNOSIS — E03.9 HYPOTHYROIDISM, UNSPECIFIED TYPE: ICD-10-CM

## 2019-12-16 RX ORDER — LEVOTHYROXINE SODIUM 0.03 MG/1
TABLET ORAL
Qty: 114 TABLET | Refills: 0 | Status: SHIPPED | OUTPATIENT
Start: 2019-12-16 | End: 2020-02-20

## 2019-12-16 NOTE — TELEPHONE ENCOUNTER
----- Message from [facility]  Stubits sent at 12/15/2019  5:58 PM EST -----  Regarding: Prescription Question  Contact: [phone]  [facility] a request for rx refill on Levothyrixine?   ALISON Medina

## 2020-01-05 DIAGNOSIS — F32.A DEPRESSION, UNSPECIFIED DEPRESSION TYPE: ICD-10-CM

## 2020-01-06 RX ORDER — PAROXETINE HYDROCHLORIDE 12.5 MG/1
TABLET, FILM COATED, EXTENDED RELEASE ORAL
Qty: 360 TABLET | Refills: 0 | Status: SHIPPED | OUTPATIENT
Start: 2020-01-06 | End: 2020-04-03

## 2020-02-02 DIAGNOSIS — F32.A DEPRESSION, UNSPECIFIED DEPRESSION TYPE: ICD-10-CM

## 2020-02-03 RX ORDER — BUPROPION HYDROCHLORIDE 150 MG/1
TABLET ORAL
Qty: 270 TABLET | Refills: 0 | Status: SHIPPED | OUTPATIENT
Start: 2020-02-03 | End: 2020-05-11

## 2020-02-20 DIAGNOSIS — E03.9 HYPOTHYROIDISM, UNSPECIFIED TYPE: ICD-10-CM

## 2020-02-20 RX ORDER — LEVOTHYROXINE SODIUM 0.03 MG/1
TABLET ORAL
Qty: 114 TABLET | Refills: 0 | Status: SHIPPED | OUTPATIENT
Start: 2020-02-20 | End: 2020-05-19

## 2020-03-20 ENCOUNTER — PATIENT MESSAGE (OUTPATIENT)
Dept: FAMILY MEDICINE CLINIC | Facility: CLINIC | Age: 55
End: 2020-03-20

## 2020-04-03 DIAGNOSIS — F32.A DEPRESSION, UNSPECIFIED DEPRESSION TYPE: ICD-10-CM

## 2020-04-03 RX ORDER — PAROXETINE HYDROCHLORIDE 12.5 MG/1
TABLET, FILM COATED, EXTENDED RELEASE ORAL
Qty: 360 TABLET | Refills: 0 | Status: SHIPPED | OUTPATIENT
Start: 2020-04-03 | End: 2020-07-13

## 2020-05-10 DIAGNOSIS — F32.A DEPRESSION, UNSPECIFIED DEPRESSION TYPE: ICD-10-CM

## 2020-05-11 RX ORDER — BUPROPION HYDROCHLORIDE 150 MG/1
TABLET ORAL
Qty: 270 TABLET | Refills: 0 | Status: SHIPPED | OUTPATIENT
Start: 2020-05-11 | End: 2020-05-26

## 2020-05-18 DIAGNOSIS — E03.9 HYPOTHYROIDISM, UNSPECIFIED TYPE: ICD-10-CM

## 2020-05-19 RX ORDER — LEVOTHYROXINE SODIUM 0.03 MG/1
TABLET ORAL
Qty: 114 TABLET | Refills: 0 | Status: SHIPPED | OUTPATIENT
Start: 2020-05-19 | End: 2020-05-26

## 2020-05-24 DIAGNOSIS — E03.9 HYPOTHYROIDISM, UNSPECIFIED TYPE: ICD-10-CM

## 2020-05-24 DIAGNOSIS — F32.A DEPRESSION, UNSPECIFIED DEPRESSION TYPE: ICD-10-CM

## 2020-05-26 RX ORDER — BUPROPION HYDROCHLORIDE 150 MG/1
TABLET ORAL
Qty: 270 TABLET | Refills: 0 | Status: SHIPPED | OUTPATIENT
Start: 2020-05-26 | End: 2020-08-11 | Stop reason: SDUPTHER

## 2020-05-26 RX ORDER — LEVOTHYROXINE SODIUM 0.03 MG/1
TABLET ORAL
Qty: 114 TABLET | Refills: 0 | Status: SHIPPED | OUTPATIENT
Start: 2020-05-26 | End: 2020-10-22

## 2020-07-12 DIAGNOSIS — F32.A DEPRESSION, UNSPECIFIED DEPRESSION TYPE: ICD-10-CM

## 2020-07-13 RX ORDER — PAROXETINE HYDROCHLORIDE 12.5 MG/1
TABLET, FILM COATED, EXTENDED RELEASE ORAL
Qty: 360 TABLET | Refills: 0 | Status: SHIPPED | OUTPATIENT
Start: 2020-07-13 | End: 2020-10-22

## 2020-07-31 NOTE — PROGRESS NOTES
Assessment/Plan:    Benign findings on routine gyn exam  Recommended monthly SBE, annual CBE and annual screening mammo  ASCCP guidelines reviewed and pap with cotesting collected today  With family hx, patient is aware that colonoscopy is recommended but patient refuses  Script completed for Cologuard and explained  The patient denies STI risk factors and declines testing at this time  Reviewed diet/activity recommendations Calcium 6590-4394 mg and Vit D 600-1000 IU daily  Discussed postmenopausal considerations and symptoms to report  Kegel exercises as instructed  RTO in one year for routine annual gyn exam or sooner PRN  Diagnoses and all orders for this visit:    Encounter for gynecological examination (general) (routine) without abnormal findings    Screening mammogram, encounter for  -     Mammo screening bilateral w 3d & cad; Future        Subjective:      Patient ID: Pedro Ayala is a 60 y o  female  This patient presents for routine annual gyn exam  Former patient of Dr Cherylene Fish, last seen 7/31/19  She is on HRT with [facility]  She denies acute gyn complaints  She denies  bleeding or spotting, VM sx, pelvic pain, dyspareunia, breast concerns, abnormal discharge, bowel/bladder dysfunction, depression/anx  , sexually active and is monogamous  Denies STI concerns  No hx of STIs  Pap/HPV up to date and normal, 2017, due today  Mammography up to date and eugenie, 9/24/19  Colonoscopy not done to date and patient refuses, but is agreeable to Cologuard although she has a maternal uncle with a hx of colon cancer  The following portions of the patient's history were reviewed and updated as appropriate: allergies, current medications, past family history, past medical history, past social history, past surgical history and problem list     Review of Systems   Constitutional: Negative  Respiratory: Negative  Cardiovascular: Negative  Gastrointestinal: Negative  Endocrine: Negative  Genitourinary: Negative for dyspareunia, dysuria, frequency, pelvic pain, urgency, vaginal bleeding, vaginal discharge and vaginal pain  Musculoskeletal: Negative  Skin: Negative  Neurological: Negative  Psychiatric/Behavioral: Negative  Objective:      /58 (BP Location: Left arm)   Pulse 90   Ht 5' 4"   Wt 65 4 kg (144 lb 3 2 oz)   BMI 24 75 kg/m²          Physical Exam   Constitutional: She is oriented to person, place, and time  She appears well-developed  She is cooperative  HENT:   Head: Normocephalic and atraumatic  Neck: Normal range of motion  Neck supple  No thyroid mass and no thyromegaly present  Cardiovascular: Normal rate, regular rhythm and normal heart sounds  Pulmonary/Chest: Effort normal and breath sounds normal  Right breast exhibits no inverted nipple, no mass, no nipple discharge, no skin change and no tenderness  Left breast exhibits no inverted nipple, no mass, no nipple discharge, no skin change and no tenderness  Breasts are symmetrical    Abdominal: Soft  Normal appearance and bowel sounds are normal  There is no abdominal tenderness  Hernia confirmed negative in the left inguinal area and confirmed negative in the right inguinal area  Genitourinary:    Vulva and vagina normal    Rectum:      No external hemorrhoid  Pelvic exam was performed with patient supine  There is no rash, tenderness, lesion or injury on the right labia  There is no rash, tenderness, lesion or injury on the left labia  Uterus is not deviated, not enlarged, not fixed and not tender  Cervix exhibits no motion tenderness, no lesion, no discharge and no friability  Right adnexum displays no mass, no tenderness and no fullness  Left adnexum displays no mass, no tenderness and no fullness  No vaginal discharge, erythema, tenderness or bleeding  No erythema, tenderness or bleeding in the vagina  No foreign body in the vagina        No signs of injury or lesions in the vagina  Genitourinary Comments: Urethra normal without lesions  No bladder tenderness     Musculoskeletal: Normal range of motion  Lymphadenopathy: No inguinal adenopathy noted on the right or left side  Neurological: She is alert and oriented to person, place, and time  Skin: Skin is warm and dry  Psychiatric: Her speech is normal and behavior is normal    Nursing note and vitals reviewed

## 2020-08-03 ENCOUNTER — ANNUAL EXAM (OUTPATIENT)
Dept: GYNECOLOGY | Facility: CLINIC | Age: 55
End: 2020-08-03
Payer: COMMERCIAL

## 2020-08-03 VITALS
DIASTOLIC BLOOD PRESSURE: 58 MMHG | SYSTOLIC BLOOD PRESSURE: 108 MMHG | HEART RATE: 90 BPM | WEIGHT: 144.2 LBS | HEIGHT: 64 IN | BODY MASS INDEX: 24.62 KG/M2

## 2020-08-03 DIAGNOSIS — Z12.31 SCREENING MAMMOGRAM, ENCOUNTER FOR: ICD-10-CM

## 2020-08-03 DIAGNOSIS — Z12.4 ENCOUNTER FOR PAPANICOLAOU SMEAR FOR CERVICAL CANCER SCREENING: ICD-10-CM

## 2020-08-03 DIAGNOSIS — Z01.419 ENCOUNTER FOR GYNECOLOGICAL EXAMINATION (GENERAL) (ROUTINE) WITHOUT ABNORMAL FINDINGS: Primary | ICD-10-CM

## 2020-08-03 PROCEDURE — 87624 HPV HI-RISK TYP POOLED RSLT: CPT | Performed by: OBSTETRICS & GYNECOLOGY

## 2020-08-03 PROCEDURE — G0145 SCR C/V CYTO,THINLAYER,RESCR: HCPCS | Performed by: OBSTETRICS & GYNECOLOGY

## 2020-08-03 PROCEDURE — 99396 PREV VISIT EST AGE 40-64: CPT | Performed by: OBSTETRICS & GYNECOLOGY

## 2020-08-05 LAB
HPV HR 12 DNA CVX QL NAA+PROBE: NEGATIVE
HPV16 DNA CVX QL NAA+PROBE: NEGATIVE
HPV18 DNA CVX QL NAA+PROBE: NEGATIVE

## 2020-08-06 ENCOUNTER — OFFICE VISIT (OUTPATIENT)
Dept: FAMILY MEDICINE CLINIC | Facility: CLINIC | Age: 55
End: 2020-08-06
Payer: COMMERCIAL

## 2020-08-06 VITALS
BODY MASS INDEX: 24.59 KG/M2 | RESPIRATION RATE: 16 BRPM | OXYGEN SATURATION: 98 % | TEMPERATURE: 72.3 F | DIASTOLIC BLOOD PRESSURE: 82 MMHG | SYSTOLIC BLOOD PRESSURE: 124 MMHG | WEIGHT: 144 LBS | HEART RATE: 94 BPM | HEIGHT: 64 IN

## 2020-08-06 DIAGNOSIS — E03.9 HYPOTHYROIDISM, UNSPECIFIED TYPE: ICD-10-CM

## 2020-08-06 DIAGNOSIS — F32.A DEPRESSION, UNSPECIFIED DEPRESSION TYPE: ICD-10-CM

## 2020-08-06 DIAGNOSIS — Z00.00 ANNUAL PHYSICAL EXAM: Primary | ICD-10-CM

## 2020-08-06 DIAGNOSIS — E78.00 PRIMARY HYPERCHOLESTEROLEMIA: ICD-10-CM

## 2020-08-06 DIAGNOSIS — D22.9 NEVUS: ICD-10-CM

## 2020-08-06 DIAGNOSIS — Z12.11 COLON CANCER SCREENING: ICD-10-CM

## 2020-08-06 DIAGNOSIS — R23.8 EASY BRUISING: ICD-10-CM

## 2020-08-06 DIAGNOSIS — M25.511 RIGHT SHOULDER PAIN, UNSPECIFIED CHRONICITY: ICD-10-CM

## 2020-08-06 PROBLEM — R23.3 EASY BRUISING: Status: ACTIVE | Noted: 2020-08-06

## 2020-08-06 PROCEDURE — 99396 PREV VISIT EST AGE 40-64: CPT | Performed by: FAMILY MEDICINE

## 2020-08-06 NOTE — ASSESSMENT & PLAN NOTE
She notes easy bleeding and easy bruising ever since she started Niacin and ASA  I recommended discontinuing both as she likely does not need the low-dose niacin for her cholesterol  Will check lipid profile in the near future as well as CBC

## 2020-08-06 NOTE — ASSESSMENT & PLAN NOTE
She has nevus left elbow region which seems to be stable over the past couple years  I reassured her it appears benign  She may have Derm referral as needed

## 2020-08-06 NOTE — PROGRESS NOTES
ADULT ANNUAL [facility]    NAME: Ivory Koch  AGE: 60 y o  SEX: female  : 1965     DATE: 2020     Assessment and Plan:     Problem List Items Addressed This Visit        Endocrine    Hypothyroidism       She has been stable on levothyroxine 25 mcg daily  Will recheck thyroid functions with next labs  Other    Depression     She has been stable on Wellbutrin  She will continue current regimen  Nevus       She has nevus left elbow region which seems to be stable over the past couple years  I reassured her it appears benign  She may have Derm referral as needed  Right shoulder pain       Gave PT referral            Other Visit Diagnoses     Annual physical exam    -  Primary    Colon cancer screening        Relevant Orders    Cologuard          Immunizations and preventive care screenings were discussed with patient today  Appropriate education was printed on patient's after visit summary  Counseling:  · Exercise: the importance of regular exercise/physical activity was discussed  Recommend exercise 3-5 times per week for at least 30 minutes  Return in about 1 year (around 2021) for Annual physical      Chief Complaint:     Chief Complaint   Patient presents with    Annual Exam      History of Present Illness:     Adult Annual Physical   Patient here for a comprehensive physical exam  The patient reports problems - right shoulder pain past 4 months, brown skin lesion left elbow  Diet and Physical Activity  · Diet/Nutrition: well balanced diet, limited junk food and consuming 3-5 servings of fruits/vegetables daily  · Exercise: walking and 5-7 times a week on average        Depression Screening  PHQ-9 Depression Screening    PHQ-9:    Frequency of the following problems over the past two weeks:       Little interest or pleasure in doing things:  0 - not at all  Feeling down, depressed, or hopeless:  0 - not at all  PHQ-2 Score:  0       General Health  · Sleep: sleeps well and gets 7-8 hours of sleep on average  · Hearing: decreased - bilateral   · Vision: no vision problems, most recent eye exam >1 year ago and wears glasses just for driving  · Dental: regular dental visits, brushes teeth twice daily and flosses teeth occasionally  /GYN Health  · Patient is: postmenopausal  · Last menstrual period: 2015  · Contraceptive method: none  Review of Systems:     Review of Systems   Constitutional: Negative for activity change, chills, fatigue and fever  HENT: Negative for congestion, ear pain, sinus pressure and sore throat  Eyes: Negative for pain and visual disturbance  Respiratory: Negative for cough, chest tightness, shortness of breath and wheezing  Cardiovascular: Negative for chest pain, palpitations and leg swelling  Gastrointestinal: Negative for abdominal pain, blood in stool, constipation, diarrhea, nausea and vomiting  Endocrine: Negative for polydipsia and polyuria  Genitourinary: Negative for difficulty urinating, dysuria, frequency and urgency  Musculoskeletal: Negative for arthralgias, joint swelling and myalgias  Skin: Negative for rash  Neurological: Positive for headaches  Negative for dizziness, weakness and numbness  Hematological: Negative for adenopathy  Bruises/bleeds easily  Due to aspirin which she takes with niacin   Psychiatric/Behavioral: Negative for dysphoric mood  The patient is not nervous/anxious         Past Medical History:     Past Medical History:   Diagnosis Date    Asthma     Depression     Disease of thyroid gland     Osteopenia     Pneumonia     2017      Past Surgical History:     Past Surgical History:   Procedure Laterality Date    LASIK      RI REVISE MEDIAN N/CARPAL TUNNEL SURG Right 7/10/2018    Procedure: CARPAL TUNNEL RELEASE;  Surgeon: Inna Avalos DO;  [facility];  Service: Orthopedics    [place] REVISE MEDIAN N/CARPAL TUNNEL SURG Left 4/19/2019    Procedure: RELEASE CARPAL TUNNEL;  Surgeon: Donald Reyes MD;  [facility];  Service: Orthopedics    SEPTOPLASTY      SINUS SURGERY        Social History:     E-Cigarette/Vaping    E-Cigarette Use Never User      E-Cigarette/Vaping Substances    Nicotine No     THC No     CBD No     Flavoring No     Other No     Unknown No      Social History     Socioeconomic History    Marital status: /Civil Union     Spouse name: None    Number of children: None    Years of education: None    Highest education level: None   Occupational History    None   Social Needs    Financial resource strain: None    Food insecurity     Worry: None     Inability: None    Transportation needs     Medical: None     Non-medical: None   Tobacco Use    Smoking status: Never Smoker    Smokeless tobacco: Never Used   Substance and Sexual Activity    Alcohol use: Yes     Frequency: Monthly or less     Comment: occasionally drinks 6 times per year    Drug use: No    Sexual activity: Yes     Birth control/protection: OCP   Lifestyle    Physical activity     Days per week: None     Minutes per session: None    Stress: None   Relationships    Social connections     Talks on phone: None     Gets together: None     Attends [employer] service: None     Active member of club or organization: None     Attends meetings of clubs or organizations: None     Relationship status: None    Intimate partner violence     Fear of current or ex partner: None     Emotionally abused: None     Physically abused: None     Forced sexual activity: None   Other Topics Concern    None   Social History Narrative    None      Family History:     Family History   Problem Relation Age of Onset    Hashimoto's thyroiditis Mother     Hashimoto's thyroiditis Sister     Depression Maternal Grandmother     Diabetes Maternal Grandmother     No Known Problems Father  No Known Problems Maternal Grandfather     No Known Problems Paternal Grandmother     No Known Problems Paternal Grandfather     No Known Problems Sister     No Known Problems Sister       Current Medications:     Current Outpatient Medications   Medication Sig Dispense Refill    Ascorbic Acid (VITAMIN C) 1000 MG tablet Take 1,000 mg by mouth daily in the early morning        aspirin 81 MG tablet Take 81 mg by mouth daily in the early morning        buPROPion (WELLBUTRIN XL) 150 mg 24 hr tablet TAKE 3 TABLETS BY MOUTH EVERY  tablet 0    Calcium 250 MG CAPS Take by mouth daily with dinner        cetirizine (ZyrTEC) 10 mg tablet Take 10 mg by mouth as needed        Cholecalciferol (VITAMIN D3 PO) Take by mouth      fluticasone (FLONASE ALLERGY RELIEF) 50 mcg/act nasal spray into each nostril as needed        levothyroxine 25 mcg tablet TAKE ONE TABLET BY MOUTH EVERY DAY ON MONDAY-FRIDAY AND 2 TABLETS BY MOUTH EVERY DAY ON SAT/SUN  114 tablet 0    MAGNESIUM PO Take 200 mg by mouth daily with dinner        MULTIPLE VITAMINS-CALCIUM PO Take 1 capsule by mouth daily in the early morning        niacin 100 mg tablet Take 100 mg by mouth daily with breakfast        norethindrone-ethinyl estradiol (FEMHRT LOW DOSE) 0 5-2 5 MG-MCG per tablet Take 1 tablet by mouth daily 84 tablet 4    PARoxetine (PAXIL-CR) 12 5 mg 24 hr tablet TAKE 4 TABLETS BY MOUTH EVERY  tablet 0    pseudoephedrine (SUDAFED) 120 MG 12 hr tablet Take 120 mg by mouth as needed         No current facility-administered medications for this visit  Allergies:      Allergies   Allergen Reactions    Pollen Extract       Physical Exam:     /82 (BP Location: Left arm, Patient Position: Sitting, Cuff Size: Adult)   Pulse 94   Temp (!) 72 3 °F (22 4 °C) (Temporal)   Resp 16   Ht 5' 4" (1 626 m)   Wt 65 3 kg (144 lb)   SpO2 98%   Breastfeeding No   BMI 24 72 kg/m²     Physical Exam  Vitals signs and nursing note reviewed  Constitutional:       Appearance: Normal appearance  She is well-developed  HENT:      Head: Normocephalic and atraumatic  Right Ear: Tympanic membrane and external ear normal       Left Ear: Tympanic membrane and external ear normal       Mouth/Throat:      Mouth: Mucous membranes are moist    Eyes:      Extraocular Movements: Extraocular movements intact  Pupils: Pupils are equal, round, and reactive to light  Neck:      Musculoskeletal: Normal range of motion and neck supple  Thyroid: No thyromegaly  Cardiovascular:      Rate and Rhythm: Normal rate and regular rhythm  Heart sounds: Normal heart sounds  No murmur  Pulmonary:      Effort: Pulmonary effort is normal  No respiratory distress  Breath sounds: Normal breath sounds  Abdominal:      General: Bowel sounds are normal  There is no distension  Palpations: Abdomen is soft  There is no mass  Musculoskeletal: Normal range of motion  Lymphadenopathy:      Cervical: No cervical adenopathy  Skin:     General: Skin is warm and dry  Findings: No erythema or rash  Comments: Nevus left elbow   Neurological:      Mental Status: She is alert and oriented to person, place, and time  Cranial Nerves: No cranial nerve deficit  Sensory: No sensory deficit        Coordination: Coordination normal       Deep Tendon Reflexes: Reflexes normal    Psychiatric:         Behavior: Behavior normal           MD Mag[facility] Hough [id number]

## 2020-08-09 ENCOUNTER — PATIENT MESSAGE (OUTPATIENT)
Dept: FAMILY MEDICINE CLINIC | Facility: CLINIC | Age: 55
End: 2020-08-09

## 2020-08-09 DIAGNOSIS — F32.A DEPRESSION, UNSPECIFIED DEPRESSION TYPE: ICD-10-CM

## 2020-08-10 DIAGNOSIS — F32.A DEPRESSION, UNSPECIFIED DEPRESSION TYPE: ICD-10-CM

## 2020-08-10 LAB
LAB AP GYN PRIMARY INTERPRETATION: NORMAL
Lab: NORMAL

## 2020-08-10 NOTE — TELEPHONE ENCOUNTER
From: Jessica Montelongo  To: Paul Srinivasan MD  Sent: 8/9/2020 7:07 PM EDT  Subject: Non-Urgent Medical Question    I had an appt with Dr Diogo Nesbitt last Thurs at 1pm  I talked to the nurse about an rx that needed to be refilled but she didn't see it in my info  I checked my containers and found it:  I need a refill on bupropion because my last order was for 270 pills but the pharmacy crossed that off and wrote 113   I am short some pills, the rx # is [id number]  Thanks, Babita Dowling

## 2020-08-11 RX ORDER — BUPROPION HYDROCHLORIDE 150 MG/1
450 TABLET ORAL DAILY
Qty: 270 TABLET | Refills: 3 | Status: SHIPPED | OUTPATIENT
Start: 2020-08-11 | End: 2021-04-12 | Stop reason: SDUPTHER

## 2020-09-08 ENCOUNTER — TELEPHONE (OUTPATIENT)
Dept: FAMILY MEDICINE CLINIC | Facility: CLINIC | Age: 55
End: 2020-09-08

## 2020-09-10 DIAGNOSIS — N95.1 MENOPAUSAL SYMPTOMS: ICD-10-CM

## 2020-09-10 RX ORDER — NORETHINDRONE ACETATE AND ETHINYL ESTRADIOL .5; 2.5 MG/1; UG/1
1 TABLET ORAL DAILY
Qty: 84 TABLET | Refills: 4 | Status: SHIPPED | OUTPATIENT
Start: 2020-09-10 | End: 2021-08-06 | Stop reason: SDUPTHER

## 2020-10-20 DIAGNOSIS — F32.A DEPRESSION, UNSPECIFIED DEPRESSION TYPE: ICD-10-CM

## 2020-10-20 DIAGNOSIS — E03.9 HYPOTHYROIDISM, UNSPECIFIED TYPE: ICD-10-CM

## 2020-10-22 RX ORDER — LEVOTHYROXINE SODIUM 0.03 MG/1
TABLET ORAL
Qty: 114 TABLET | Refills: 1 | Status: SHIPPED | OUTPATIENT
Start: 2020-10-22 | End: 2021-04-12

## 2020-10-22 RX ORDER — PAROXETINE HYDROCHLORIDE 12.5 MG/1
TABLET, FILM COATED, EXTENDED RELEASE ORAL
Qty: 360 TABLET | Refills: 1 | Status: SHIPPED | OUTPATIENT
Start: 2020-10-22 | End: 2021-04-12

## 2020-11-30 ENCOUNTER — LAB (OUTPATIENT)
Dept: LAB | Facility: CLINIC | Age: 55
End: 2020-11-30
Payer: COMMERCIAL

## 2020-11-30 DIAGNOSIS — E03.9 HYPOTHYROIDISM, UNSPECIFIED TYPE: ICD-10-CM

## 2020-11-30 DIAGNOSIS — D72.19 EOSINOPHILIC LEUKOCYTOSIS, UNSPECIFIED TYPE: Primary | ICD-10-CM

## 2020-11-30 DIAGNOSIS — E78.00 PRIMARY HYPERCHOLESTEROLEMIA: ICD-10-CM

## 2020-11-30 DIAGNOSIS — R23.8 EASY BRUISING: ICD-10-CM

## 2020-11-30 LAB
ALBUMIN SERPL BCP-MCNC: 3.8 G/DL (ref 3.5–5)
ALP SERPL-CCNC: 63 U/L (ref 46–116)
ALT SERPL W P-5'-P-CCNC: 29 U/L (ref 12–78)
ANION GAP SERPL CALCULATED.3IONS-SCNC: 7 MMOL/L (ref 4–13)
AST SERPL W P-5'-P-CCNC: 15 U/L (ref 5–45)
BASOPHILS # BLD AUTO: 0.03 THOUSANDS/ΜL (ref 0–0.1)
BASOPHILS NFR BLD AUTO: 1 % (ref 0–1)
BILIRUB SERPL-MCNC: 0.47 MG/DL (ref 0.2–1)
BUN SERPL-MCNC: 10 MG/DL (ref 5–25)
CALCIUM SERPL-MCNC: 9.6 MG/DL (ref 8.3–10.1)
CHLORIDE SERPL-SCNC: 105 MMOL/L (ref 100–108)
CHOLEST SERPL-MCNC: 195 MG/DL (ref 50–200)
CO2 SERPL-SCNC: 25 MMOL/L (ref 21–32)
CREAT SERPL-MCNC: 0.87 MG/DL (ref 0.6–1.3)
EOSINOPHIL # BLD AUTO: 0.48 THOUSAND/ΜL (ref 0–0.61)
EOSINOPHIL NFR BLD AUTO: 10 % (ref 0–6)
ERYTHROCYTE [DISTWIDTH] IN BLOOD BY AUTOMATED COUNT: 12.7 % (ref 11.6–15.1)
GFR SERPL CREATININE-BSD FRML MDRD: 74 ML/MIN/1.73SQ M
GLUCOSE P FAST SERPL-MCNC: 82 MG/DL (ref 65–99)
HCT VFR BLD AUTO: 40.4 % (ref 34.8–46.1)
HDLC SERPL-MCNC: 56 MG/DL
HGB BLD-MCNC: 13 G/DL (ref 11.5–15.4)
IMM GRANULOCYTES # BLD AUTO: 0.01 THOUSAND/UL (ref 0–0.2)
IMM GRANULOCYTES NFR BLD AUTO: 0 % (ref 0–2)
LDLC SERPL CALC-MCNC: 99 MG/DL (ref 0–100)
LYMPHOCYTES # BLD AUTO: 1.53 THOUSANDS/ΜL (ref 0.6–4.47)
LYMPHOCYTES NFR BLD AUTO: 30 % (ref 14–44)
MCH RBC QN AUTO: 29.3 PG (ref 26.8–34.3)
MCHC RBC AUTO-ENTMCNC: 32.2 G/DL (ref 31.4–37.4)
MCV RBC AUTO: 91 FL (ref 82–98)
MONOCYTES # BLD AUTO: 0.42 THOUSAND/ΜL (ref 0.17–1.22)
MONOCYTES NFR BLD AUTO: 8 % (ref 4–12)
NEUTROPHILS # BLD AUTO: 2.59 THOUSANDS/ΜL (ref 1.85–7.62)
NEUTS SEG NFR BLD AUTO: 51 % (ref 43–75)
NONHDLC SERPL-MCNC: 139 MG/DL
NRBC BLD AUTO-RTO: 0 /100 WBCS
PLATELET # BLD AUTO: 323 THOUSANDS/UL (ref 149–390)
PMV BLD AUTO: 9.5 FL (ref 8.9–12.7)
POTASSIUM SERPL-SCNC: 4 MMOL/L (ref 3.5–5.3)
PROT SERPL-MCNC: 7.7 G/DL (ref 6.4–8.2)
RBC # BLD AUTO: 4.44 MILLION/UL (ref 3.81–5.12)
SODIUM SERPL-SCNC: 137 MMOL/L (ref 136–145)
TRIGL SERPL-MCNC: 200 MG/DL
TSH SERPL DL<=0.05 MIU/L-ACNC: 2.24 UIU/ML (ref 0.36–3.74)
WBC # BLD AUTO: 5.06 THOUSAND/UL (ref 4.31–10.16)

## 2020-11-30 PROCEDURE — 80053 COMPREHEN METABOLIC PANEL: CPT

## 2020-11-30 PROCEDURE — 80061 LIPID PANEL: CPT

## 2020-11-30 PROCEDURE — 36415 COLL VENOUS BLD VENIPUNCTURE: CPT

## 2020-11-30 PROCEDURE — 84443 ASSAY THYROID STIM HORMONE: CPT

## 2020-11-30 PROCEDURE — 85025 COMPLETE CBC W/AUTO DIFF WBC: CPT

## 2021-02-09 ENCOUNTER — TELEMEDICINE (OUTPATIENT)
Dept: FAMILY MEDICINE CLINIC | Facility: CLINIC | Age: 56
End: 2021-02-09
Payer: COMMERCIAL

## 2021-02-09 VITALS — BODY MASS INDEX: 23.9 KG/M2 | HEIGHT: 64 IN | WEIGHT: 140 LBS

## 2021-02-09 DIAGNOSIS — L71.9 ROSACEA: Primary | ICD-10-CM

## 2021-02-09 PROCEDURE — 99213 OFFICE O/P EST LOW 20 MIN: CPT | Performed by: FAMILY MEDICINE

## 2021-02-09 RX ORDER — MOMETASONE FUROATE 1 MG/G
CREAM TOPICAL DAILY
Qty: 45 G | Refills: 0 | Status: SHIPPED | OUTPATIENT
Start: 2021-02-09 | End: 2022-08-08 | Stop reason: ALTCHOICE

## 2021-02-09 RX ORDER — DOXYCYCLINE HYCLATE 100 MG/1
100 CAPSULE ORAL EVERY 12 HOURS SCHEDULED
Qty: 20 CAPSULE | Refills: 0 | Status: SHIPPED | OUTPATIENT
Start: 2021-02-09 | End: 2021-02-19

## 2021-02-09 NOTE — ASSESSMENT & PLAN NOTE
It appears that she is having rosacea verses allergic dermatitis  I have given her prescription for doxycycline to take 100 mg b i d  for 10 days along with application of Elocon cream once per day  Also rec cool compresses  She should contact me if symptoms are not improving

## 2021-02-09 NOTE — PROGRESS NOTES
Virtual Regular Visit      Assessment/Plan:    Problem List Items Addressed This Visit        Musculoskeletal and Integument    Rosacea - Primary     It appears that she is having rosacea verses allergic dermatitis  I have given her prescription for doxycycline to take 100 mg b i d  for 10 days along with application of Elocon cream once per day  Also rec cool compresses  She should contact me if symptoms are not improving  Reason for visit is   Chief Complaint   Patient presents with    Face rednesses     X 1 month , use Hydrocortisone , do not help ,start with he reyes  Encounter provider Nguyễn Jung MD    Provider located at [address] [place] [address] Dr Leon [id number]      Recent Visits  No visits were found meeting these conditions  Showing recent visits within past 7 days and meeting all other requirements     Today's Visits  Date Type Provider Dept   02/09/21 Owen Begum MD [place] [id number] [facility]   Showing today's visits and meeting all other requirements     Future Appointments  No visits were found meeting these conditions  Showing future appointments within next 150 days and meeting all other requirements        The patient was identified by name and date of birth  Rodri Hooks was informed that this is a telemedicine visit and that the visit is being conducted through [employer] and patient was informed that this is not a secure, HIPAA-compliant platform  She agrees to proceed     My office door was closed  No one else was in the room  She acknowledged consent and understanding of privacy and security of the video platform  The patient has agreed to participate and understands they can discontinue the visit at any time  Patient is aware this is a billable service  Subjective  Rodri Hooks is a 60 y o  female    She is having virtual visit to discuss facial rash and itching  It started up around eyes and is spreading down face and into neck  Very itchy and swollen beneath her eyes  She is using OTC cortisone cream and aloe without help  Sx for about 1 month    She had perioral derm fall 2019 which responded to doxycycline       Past Medical History:   Diagnosis Date    Asthma     Depression     Disease of thyroid gland     Osteopenia     Pneumonia     2017       Past Surgical History:   Procedure Laterality Date    LASIK      MA REVISE MEDIAN N/CARPAL TUNNEL SURG Right 7/10/2018    Procedure: CARPAL TUNNEL RELEASE;  Surgeon: Shemar Suarez DO;  [facility];  Service: Orthopedics    MA REVISE MEDIAN N/CARPAL TUNNEL SURG Left 4/19/2019    Procedure: RELEASE CARPAL TUNNEL;  Surgeon: Lorenza Roberts MD;  [facility];  Service: Orthopedics    SEPTOPLASTY      SINUS SURGERY         Current Outpatient Medications   Medication Sig Dispense Refill    aspirin 81 MG tablet Take 81 mg by mouth daily in the early morning        buPROPion (WELLBUTRIN XL) 150 mg 24 hr tablet Take 3 tablets (450 mg total) by mouth daily 270 tablet 3    Calcium 250 MG CAPS Take by mouth daily with dinner        Cholecalciferol (VITAMIN D3 PO) Take by mouth      levothyroxine 25 mcg tablet TAKE ONE TABLET BY MOUTH EVERY DAY MONDAY THRU FRIDAY AND TAKE 2 TABLETS EVERY DAY ON SAT & SUN   114 tablet 1    MAGNESIUM PO Take 200 mg by mouth daily with dinner        MULTIPLE VITAMINS-CALCIUM PO Take 1 capsule by mouth daily in the early morning        niacin 100 mg tablet Take 100 mg by mouth daily with breakfast        norethindrone-ethinyl estradiol (FEMHRT LOW DOSE) 0 5-2 5 MG-MCG per tablet Take 1 tablet by mouth daily 84 tablet 4    PARoxetine (PAXIL-CR) 12 5 mg 24 hr tablet TAKE 4 TABLETS BY MOUTH EVERY  tablet 1    Ascorbic Acid (VITAMIN C) 1000 MG tablet Take 1,000 mg by mouth daily in the early morning        cetirizine (ZyrTEC) 10 mg tablet Take 10 mg by mouth as needed        fluticasone (FLONASE ALLERGY RELIEF) 50 mcg/act nasal spray into each nostril as needed        pseudoephedrine (SUDAFED) 120 MG 12 hr tablet Take 120 mg by mouth as needed         No current facility-administered medications for this visit  Allergies   Allergen Reactions    Pollen Extract        Review of Systems   Constitutional: Negative for chills, fatigue and fever  HENT: Negative for congestion  Respiratory: Negative for cough  Skin: Positive for rash  Video Exam    Vitals:    02/09/21 1645   Weight: 63 5 kg (140 lb)   Height: 5' 4" (1 626 m)       Physical Exam  Constitutional:       Appearance: Normal appearance  HENT:      Head: Normocephalic and atraumatic  Skin:     Findings: Erythema and rash present  Comments: There is erythematous swollen appearance beneath the eyes and around the mouth  Neurological:      Mental Status: She is alert  Psychiatric:         Mood and Affect: Mood normal          Behavior: Behavior normal           I spent 14 minutes directly with the patient during this visit      VIRTUAL VISIT DISCLAIMER    Richa Medina acknowledges that she has consented to an online visit or consultation  She understands that the online visit is based solely on information provided by her, and that, in the absence of a face-to-face physical evaluation by the physician, the diagnosis she receives is both limited and provisional in terms of accuracy and completeness  This is not intended to replace a full medical face-to-face evaluation by the physician  Richa Medina understands and accepts these terms

## 2021-04-11 DIAGNOSIS — F32.A DEPRESSION, UNSPECIFIED DEPRESSION TYPE: ICD-10-CM

## 2021-04-11 DIAGNOSIS — E03.9 HYPOTHYROIDISM, UNSPECIFIED TYPE: ICD-10-CM

## 2021-04-12 DIAGNOSIS — E03.9 HYPOTHYROIDISM, UNSPECIFIED TYPE: ICD-10-CM

## 2021-04-12 DIAGNOSIS — F32.A DEPRESSION, UNSPECIFIED DEPRESSION TYPE: ICD-10-CM

## 2021-04-12 RX ORDER — PAROXETINE HYDROCHLORIDE 12.5 MG/1
50 TABLET, FILM COATED, EXTENDED RELEASE ORAL DAILY
Qty: 360 TABLET | Refills: 1 | Status: SHIPPED | OUTPATIENT
Start: 2021-04-12 | End: 2021-08-10 | Stop reason: DRUGHIGH

## 2021-04-12 RX ORDER — LEVOTHYROXINE SODIUM 0.03 MG/1
TABLET ORAL
Qty: 114 TABLET | Refills: 0 | Status: SHIPPED | OUTPATIENT
Start: 2021-04-12 | End: 2021-08-10 | Stop reason: SDUPTHER

## 2021-04-12 RX ORDER — LEVOTHYROXINE SODIUM 0.03 MG/1
TABLET ORAL
Qty: 114 TABLET | Refills: 0 | OUTPATIENT
Start: 2021-04-12

## 2021-04-12 RX ORDER — LEVOTHYROXINE SODIUM 0.03 MG/1
TABLET ORAL
Qty: 114 TABLET | Refills: 1 | Status: SHIPPED | OUTPATIENT
Start: 2021-04-12 | End: 2021-08-10 | Stop reason: SDUPTHER

## 2021-04-12 RX ORDER — BUPROPION HYDROCHLORIDE 150 MG/1
450 TABLET ORAL DAILY
Qty: 270 TABLET | Refills: 3 | Status: SHIPPED | OUTPATIENT
Start: 2021-04-12 | End: 2021-08-10 | Stop reason: SDUPTHER

## 2021-04-12 RX ORDER — PAROXETINE HYDROCHLORIDE 12.5 MG/1
TABLET, FILM COATED, EXTENDED RELEASE ORAL
Qty: 360 TABLET | Refills: 0 | Status: SHIPPED | OUTPATIENT
Start: 2021-04-12 | End: 2021-08-10 | Stop reason: SDUPTHER

## 2021-08-06 ENCOUNTER — ANNUAL EXAM (OUTPATIENT)
Dept: GYNECOLOGY | Facility: CLINIC | Age: 56
End: 2021-08-06
Payer: COMMERCIAL

## 2021-08-06 VITALS
SYSTOLIC BLOOD PRESSURE: 108 MMHG | WEIGHT: 140 LBS | DIASTOLIC BLOOD PRESSURE: 74 MMHG | HEIGHT: 64 IN | BODY MASS INDEX: 23.9 KG/M2 | HEART RATE: 85 BPM

## 2021-08-06 DIAGNOSIS — Z12.31 SCREENING MAMMOGRAM, ENCOUNTER FOR: ICD-10-CM

## 2021-08-06 DIAGNOSIS — Z01.411 ENCOUNTER FOR GYNECOLOGICAL EXAMINATION (GENERAL) (ROUTINE) WITH ABNORMAL FINDINGS: Primary | ICD-10-CM

## 2021-08-06 DIAGNOSIS — B37.9 YEAST INFECTION: ICD-10-CM

## 2021-08-06 DIAGNOSIS — N95.1 MENOPAUSAL SYMPTOMS: ICD-10-CM

## 2021-08-06 PROCEDURE — 99396 PREV VISIT EST AGE 40-64: CPT | Performed by: OBSTETRICS & GYNECOLOGY

## 2021-08-06 RX ORDER — NORETHINDRONE ACETATE AND ETHINYL ESTRADIOL .5; 2.5 MG/1; UG/1
1 TABLET ORAL DAILY
Qty: 84 TABLET | Refills: 4 | Status: SHIPPED | OUTPATIENT
Start: 2021-08-06 | End: 2022-08-08 | Stop reason: ALTCHOICE

## 2021-08-06 RX ORDER — FLUCONAZOLE 150 MG/1
150 TABLET ORAL ONCE
Qty: 1 TABLET | Refills: 0 | Status: SHIPPED | OUTPATIENT
Start: 2021-08-06 | End: 2021-08-06

## 2021-08-06 NOTE — PROGRESS NOTES
Assessment/Plan:    [facility] research discussed at length  Patient will continue [place]  Recommended monthly SBE, annual CBE and annual screening mammo  ASCCP guidelines reviewed and pap with cotesting noted to be up to date; this low risk patient was advised she meets criteria to d/c pap screening at age 70  No pap collected today  Colon screening noted to be up to date  The patient denies STI risk factors and declines testing at this time  Reviewed diet/activity recommendations Calcium 4022-1174 mg and Vit D 600-1000 IU daily  Discussed postmenopausal considerations and symptoms to report  Kegel exercises as instructed  RTO in one year for routine annual gyn exam or sooner PRN  Diagnoses and all orders for this visit:    Encounter for gynecological examination (general) (routine) with abnormal findings    Menopausal symptoms  -     norethindrone-ethinyl estradiol (FEMHRT LOW DOSE) 0 5-2 5 MG-MCG per tablet; Take 1 tablet by mouth daily    Screening mammogram, encounter for  -     Mammo screening bilateral w 3d & cad; Future    Yeast infection  -     fluconazole (DIFLUCAN) 150 mg tablet; Take 1 tablet (150 mg total) by mouth once for 1 dose        Subjective:      Patient ID: Fatemeh Oseguera is a 60 y o  female  This patient presents for routine annual gyn exam   She is on [place] and is doing well with PM sx well controlled  She denies  bleeding or spotting, VM sx, pelvic pain, dyspareunia, breast concerns, abnormal discharge, bowel/bladder dysfunction, depression/anx  , sexually active and is monogamous  Denies STI concerns  No hx of STIs  Pap/HPV up to date and normal, 8/3/20  Mammography normal, 2019  Cologuard negative, 9/10/20  Has a maternal uncle with colon cancer, declined colonoscopy          The following portions of the patient's history were reviewed and updated as appropriate: allergies, current medications, past family history, past medical history, past social history, past surgical history and problem list     Review of Systems   Constitutional: Negative  Respiratory: Negative  Cardiovascular: Negative  Gastrointestinal: Negative  Endocrine: Negative  Genitourinary: Negative for dyspareunia, dysuria, frequency, pelvic pain, urgency, vaginal bleeding, vaginal discharge and vaginal pain  Musculoskeletal: Negative  Skin: Negative  Neurological: Negative  Psychiatric/Behavioral: Negative  Objective:      /74   Pulse 85   Ht 5' 3 5" (1 613 m)   Wt 63 5 kg (140 lb)   BMI 24 41 kg/m²          Physical Exam  Vitals and nursing note reviewed  Exam conducted with a chaperone present  Constitutional:       Appearance: Normal appearance  She is well-developed  HENT:      Head: Normocephalic and atraumatic  Neck:      Thyroid: No thyroid mass or thyromegaly  Cardiovascular:      Rate and Rhythm: Normal rate and regular rhythm  Heart sounds: Normal heart sounds  Pulmonary:      Effort: Pulmonary effort is normal       Breath sounds: Normal breath sounds  Chest:      Breasts: Breasts are symmetrical          Right: No inverted nipple, mass, nipple discharge, skin change or tenderness  Left: No inverted nipple, mass, nipple discharge, skin change or tenderness  Abdominal:      General: Bowel sounds are normal       Palpations: Abdomen is soft  Tenderness: There is no abdominal tenderness  Hernia: There is no hernia in the left inguinal area or right inguinal area  Genitourinary:     General: Normal vulva  Exam position: Supine  Pubic Area: No rash  Labia:         Right: No rash, tenderness, lesion or injury  Left: No rash, tenderness, lesion or injury  Urethra: No prolapse, urethral pain, urethral swelling or urethral lesion  Vagina: No signs of injury and foreign body  Vaginal discharge (thick white curd like discharge, asymptomatic) present   No erythema, tenderness, bleeding, lesions or prolapsed vaginal walls  Cervix: No cervical motion tenderness, discharge, friability, lesion, erythema, cervical bleeding or eversion  Uterus: Not deviated, not enlarged, not fixed, not tender and no uterine prolapse  Adnexa:         Right: No mass, tenderness or fullness  Left: No mass, tenderness or fullness  Rectum: No external hemorrhoid  Comments: Urethra normal without lesions  No bladder tenderness  Musculoskeletal:         General: Normal range of motion  Cervical back: Normal range of motion and neck supple  Lymphadenopathy:      Lower Body: No right inguinal adenopathy  No left inguinal adenopathy  Skin:     General: Skin is warm and dry  Neurological:      Mental Status: She is alert and oriented to person, place, and time  Psychiatric:         Speech: Speech normal          Behavior: Behavior normal  Behavior is cooperative

## 2021-08-10 ENCOUNTER — OFFICE VISIT (OUTPATIENT)
Dept: FAMILY MEDICINE CLINIC | Facility: CLINIC | Age: 56
End: 2021-08-10
Payer: COMMERCIAL

## 2021-08-10 VITALS
HEIGHT: 64 IN | RESPIRATION RATE: 12 BRPM | HEART RATE: 86 BPM | DIASTOLIC BLOOD PRESSURE: 86 MMHG | BODY MASS INDEX: 24.34 KG/M2 | OXYGEN SATURATION: 99 % | SYSTOLIC BLOOD PRESSURE: 124 MMHG | WEIGHT: 142.6 LBS

## 2021-08-10 DIAGNOSIS — E03.9 HYPOTHYROIDISM, UNSPECIFIED TYPE: ICD-10-CM

## 2021-08-10 DIAGNOSIS — F32.A DEPRESSION, UNSPECIFIED DEPRESSION TYPE: ICD-10-CM

## 2021-08-10 DIAGNOSIS — E55.9 VITAMIN D DEFICIENCY: ICD-10-CM

## 2021-08-10 DIAGNOSIS — E78.00 PRIMARY HYPERCHOLESTEROLEMIA: ICD-10-CM

## 2021-08-10 DIAGNOSIS — Z00.00 ANNUAL PHYSICAL EXAM: Primary | ICD-10-CM

## 2021-08-10 PROBLEM — G56.02 CARPAL TUNNEL SYNDROME ON LEFT: Status: RESOLVED | Noted: 2019-03-27 | Resolved: 2021-08-10

## 2021-08-10 PROCEDURE — 99396 PREV VISIT EST AGE 40-64: CPT | Performed by: FAMILY MEDICINE

## 2021-08-10 RX ORDER — BUPROPION HYDROCHLORIDE 150 MG/1
450 TABLET ORAL DAILY
Qty: 270 TABLET | Refills: 3 | Status: SHIPPED | OUTPATIENT
Start: 2021-08-10

## 2021-08-10 RX ORDER — PAROXETINE HYDROCHLORIDE 25 MG/1
TABLET, FILM COATED, EXTENDED RELEASE ORAL
Qty: 180 TABLET | Refills: 3 | Status: SHIPPED | OUTPATIENT
Start: 2021-08-10

## 2021-08-10 RX ORDER — LEVOTHYROXINE SODIUM 0.03 MG/1
TABLET ORAL
Qty: 114 TABLET | Refills: 1 | Status: SHIPPED | OUTPATIENT
Start: 2021-08-10 | End: 2022-07-19 | Stop reason: SDUPTHER

## 2021-08-10 NOTE — PATIENT INSTRUCTIONS

## 2021-08-10 NOTE — PROGRESS NOTES
ADULT ANNUAL [facility]    NAME: Latha Dunbar  AGE: 60 y o  SEX: female  : 1965     DATE: 8/10/2021     She refuses all vaccines in spite of discussion on vaccine benefits     Assessment and Plan:     Problem List Items Addressed This Visit        Endocrine    Hypothyroidism     She has been stable on levothyroxine 25 mcg daily except for 50 mcg 2 days per week  She will be due for thyroid function testing in November  Other    Depression     She is under excellent control  She will continue paroxetine and bupropion  Other Visit Diagnoses     Annual physical exam    -  Primary          Immunizations and preventive care screenings were discussed with patient today  Appropriate education was printed on patient's after visit summary  Counseling:  Dental Health: discussed importance of regular tooth brushing, flossing, and dental visits  · Exercise: the importance of regular exercise/physical activity was discussed  Recommend exercise 3-5 times per week for at least 30 minutes  No follow-ups on file  Chief Complaint:     Chief Complaint   Patient presents with    Physical Exam      History of Present Illness:     Adult Annual Physical   Patient here for a comprehensive physical exam  The patient reports no problems  Diet and Physical Activity  · Diet/Nutrition: well balanced diet, consuming 3-5 servings of fruits/vegetables daily and adequate fiber intake  · Exercise: walking, 5-7 times a week on average and 30-60 minutes on average        Depression Screening  PHQ-9 Depression Screening    PHQ-9:   Frequency of the following problems over the past two weeks:      Little interest or pleasure in doing things: 0 - not at all  Feeling down, depressed, or hopeless: 0 - not at all  Trouble falling or staying asleep, or sleeping too much: 0 - not at all  Feeling tired or having little energy: 0 - not at all  Poor appetite or overeatin - not at all  Feeling bad about yourself - or that you are a failure or have let yourself or your family down: 0 - not at all  Trouble concentrating on things, such as reading the newspaper or watching television: 0 - not at all  Moving or speaking so slowly that other people could have noticed  Or the opposite - being so fidgety or restless that you have been moving around a lot more than usual: 0 - not at all  Thoughts that you would be better off dead, or of hurting yourself in some way: 0 - not at all  PHQ-2 Score: 0  PHQ-9 Score: 0       General Health  · Sleep: sleeps well and gets more than 8 hours of sleep on average  · Hearing: requires use of hearing aids  · Vision: no vision problems and most recent eye exam >1 year ago  · Dental: regular dental visits, brushes teeth twice daily and flosses regularly  /GYN Health  · Patient is: perimenopausal  · Last menstrual period:5 yrs ago  · Contraceptive method: BCPs for hormones  Review of Systems:     Review of Systems   Constitutional: Negative for activity change, chills, fatigue and fever  HENT: Negative for congestion, ear pain, sinus pressure and sore throat  Eyes: Negative for pain and visual disturbance  Respiratory: Negative for cough, chest tightness, shortness of breath and wheezing  Cardiovascular: Negative for chest pain, palpitations and leg swelling  Gastrointestinal: Negative for abdominal pain, blood in stool, constipation, diarrhea, nausea and vomiting  Endocrine: Negative for polydipsia and polyuria  Genitourinary: Negative for difficulty urinating, dysuria, frequency and urgency  Musculoskeletal: Negative for arthralgias, joint swelling and myalgias  Skin: Negative for rash  Neurological: Negative for dizziness, weakness, numbness and headaches  Hematological: Negative for adenopathy  Does not bruise/bleed easily     Psychiatric/Behavioral: Negative for dysphoric mood  The patient is not nervous/anxious  Past Medical History:     Past Medical History:   Diagnosis Date    Asthma     Depression     Disease of thyroid gland     Osteopenia     Pneumonia     2017      Past Surgical History:     Past Surgical History:   Procedure Laterality Date    LASIK      [place] REVISE MEDIAN N/CARPAL TUNNEL SURG Right 7/10/2018    Procedure: CARPAL TUNNEL RELEASE;  Surgeon: Radha Norris DO;  [facility];  Service: Orthopedics    [place] REVISE MEDIAN N/CARPAL TUNNEL SURG Left 4/19/2019    Procedure: RELEASE CARPAL TUNNEL;  Surgeon: Bobbi Neal MD;  [facility];  Service: Orthopedics    SEPTOPLASTY      SINUS SURGERY        Social History:     Social History     Socioeconomic History    Marital status: /Civil Union     Spouse name: Not on file    Number of children: Not on file    Years of education: Not on file    Highest education level: Not on file   Occupational History    Not on file   Tobacco Use    Smoking status: Never Smoker    Smokeless tobacco: Never Used   Vaping Use    Vaping Use: Never used   Substance and Sexual Activity    Alcohol use: Yes     Comment: occasionally drinks 6 times per year    Drug use: No    Sexual activity: Yes     Birth control/protection: OCP   Other Topics Concern    Not on file   Social History Narrative    Not on file     Social Determinants of Health     Financial Resource Strain:     Difficulty of Paying Living Expenses:    Food Insecurity:     Worried About Running Out of Food in the Last Year:     Ran Out of Food in the Last Year:    Transportation Needs:     Lack of Transportation (Medical):      Lack of Transportation (Non-Medical):    Physical Activity:     Days of Exercise per Week:     Minutes of Exercise per Session:    Stress:     Feeling of Stress :    Social Connections:     Frequency of Communication with Friends and Family:     Frequency of Social Gatherings with Friends and Family:     Attends Buddhism Services:     Active Member of Clubs or Organizations:     Attends Club or Organization Meetings:     Marital Status:    Intimate Partner Violence:     Fear of Current or Ex-Partner:     Emotionally Abused:     Physically Abused:     Sexually Abused:       Family History:     Family History   Problem Relation Age of Onset    Hashimoto's thyroiditis Mother     Hashimoto's thyroiditis Sister     Depression Maternal Grandmother     Diabetes Maternal Grandmother     No Known Problems Father     No Known Problems Maternal Grandfather     No Known Problems Paternal Grandmother     No Known Problems Paternal Grandfather     No Known Problems Sister     No Known Problems Sister       Current Medications:     Current Outpatient Medications   Medication Sig Dispense Refill    Ascorbic Acid (VITAMIN C) 1000 MG tablet Take 1,000 mg by mouth daily in the early morning        aspirin 81 MG tablet Take 81 mg by mouth daily in the early morning        buPROPion (WELLBUTRIN XL) 150 mg 24 hr tablet Take 3 tablets (450 mg total) by mouth daily 270 tablet 3    Calcium 250 MG CAPS Take by mouth daily with dinner        cetirizine (ZyrTEC) 10 mg tablet Take 10 mg by mouth as needed        Cholecalciferol (VITAMIN D3 PO) Take by mouth      fluticasone (FLONASE ALLERGY RELIEF) 50 mcg/act nasal spray into each nostril as needed        levothyroxine 25 mcg tablet TAKE ONE TABLET BY MOUTH EVERY DAY MONDAY THRU FRIDAY AND TAKE 2 TABLETS EVERY DAY ON SAT & SUN   114 tablet 1    MAGNESIUM PO Take 200 mg by mouth daily with dinner        mometasone (ELOCON) 0 1 % cream Apply topically daily 45 g 0    MULTIPLE VITAMINS-CALCIUM PO Take 1 capsule by mouth daily in the early morning        niacin 100 mg tablet Take 100 mg by mouth daily with breakfast        norethindrone-ethinyl estradiol (FEMHRT LOW DOSE) 0 5-2 5 MG-MCG per tablet Take 1 tablet by mouth daily 84 tablet 4    pseudoephedrine (SUDAFED) 120 MG 12 hr tablet Take 120 mg by mouth as needed         No current facility-administered medications for this visit  Allergies: Allergies   Allergen Reactions    Pollen Extract       Physical Exam:     /86 (BP Location: Right arm, Patient Position: Sitting, Cuff Size: Standard)   Pulse 86   Resp 12   Ht 5' 3 5" (1 613 m)   Wt 64 7 kg (142 lb 9 6 oz)   SpO2 99%   BMI 24 86 kg/m²     Physical Exam  Vitals and nursing note reviewed  Constitutional:       Appearance: Normal appearance  She is well-developed  HENT:      Head: Normocephalic and atraumatic  Right Ear: Tympanic membrane, ear canal and external ear normal       Left Ear: Tympanic membrane, ear canal and external ear normal       Mouth/Throat:      Mouth: Mucous membranes are moist    Eyes:      Pupils: Pupils are equal, round, and reactive to light  Neck:      Thyroid: No thyromegaly  Cardiovascular:      Rate and Rhythm: Normal rate and regular rhythm  Heart sounds: Normal heart sounds  No murmur heard  Pulmonary:      Effort: Pulmonary effort is normal  No respiratory distress  Breath sounds: Normal breath sounds  Abdominal:      General: Bowel sounds are normal  There is no distension  Palpations: Abdomen is soft  There is no mass  Tenderness: There is no right CVA tenderness or left CVA tenderness  Musculoskeletal:         General: Normal range of motion  Cervical back: Normal range of motion and neck supple  Lymphadenopathy:      Cervical: No cervical adenopathy  Skin:     General: Skin is warm and dry  Findings: No erythema or rash  Neurological:      General: No focal deficit present  Mental Status: She is alert and oriented to person, place, and time  Cranial Nerves: No cranial nerve deficit        Deep Tendon Reflexes: Reflexes normal    Psychiatric:         Mood and Affect: Mood normal          Behavior: Behavior normal  MD Magali Cabrales [id number]

## 2021-08-10 NOTE — ASSESSMENT & PLAN NOTE
She has been stable on levothyroxine 25 mcg daily except for 50 mcg 2 days per week  She will be due for thyroid function testing in November

## 2021-08-19 ENCOUNTER — DOCUMENTATION (OUTPATIENT)
Dept: GYNECOLOGY | Facility: CLINIC | Age: 56
End: 2021-08-19

## 2021-08-19 NOTE — PROGRESS NOTES
authorization started for her FEMhRT  Did not have any records of prior meds but tried it anyway   Faxed   Codi Turcios

## 2021-09-01 ENCOUNTER — DOCUMENTATION (OUTPATIENT)
Dept: GYNECOLOGY | Facility: CLINIC | Age: 56
End: 2021-09-01

## 2021-09-01 NOTE — PROGRESS NOTES
[facility] they filled her FEMHRT as generic ot oicked up 3 months supply so no auth was done

## 2021-09-13 ENCOUNTER — APPOINTMENT (OUTPATIENT)
Dept: LAB | Facility: CLINIC | Age: 56
End: 2021-09-13

## 2021-09-13 DIAGNOSIS — Z00.8 HEALTH EXAMINATION IN POPULATION SURVEY: ICD-10-CM

## 2021-09-13 LAB
CHOLEST SERPL-MCNC: 198 MG/DL (ref 50–200)
EST. AVERAGE GLUCOSE BLD GHB EST-MCNC: 108 MG/DL
HBA1C MFR BLD: 5.4 %
HDLC SERPL-MCNC: 65 MG/DL
LDLC SERPL CALC-MCNC: 118 MG/DL (ref 0–100)
NONHDLC SERPL-MCNC: 133 MG/DL
TRIGL SERPL-MCNC: 77 MG/DL

## 2021-09-13 PROCEDURE — 83036 HEMOGLOBIN GLYCOSYLATED A1C: CPT

## 2021-09-13 PROCEDURE — 80061 LIPID PANEL: CPT

## 2021-09-13 PROCEDURE — 36415 COLL VENOUS BLD VENIPUNCTURE: CPT

## 2021-11-16 ENCOUNTER — APPOINTMENT (OUTPATIENT)
Dept: LAB | Facility: CLINIC | Age: 56
End: 2021-11-16
Payer: COMMERCIAL

## 2021-11-16 DIAGNOSIS — E03.9 HYPOTHYROIDISM, UNSPECIFIED TYPE: ICD-10-CM

## 2021-11-16 DIAGNOSIS — E55.9 VITAMIN D DEFICIENCY: ICD-10-CM

## 2021-11-16 DIAGNOSIS — E78.00 PRIMARY HYPERCHOLESTEROLEMIA: ICD-10-CM

## 2021-11-16 DIAGNOSIS — F32.A DEPRESSION, UNSPECIFIED DEPRESSION TYPE: ICD-10-CM

## 2021-11-16 LAB
25(OH)D3 SERPL-MCNC: 81 NG/ML (ref 30–100)
ALBUMIN SERPL BCP-MCNC: 3.5 G/DL (ref 3.5–5)
ALP SERPL-CCNC: 86 U/L (ref 46–116)
ALT SERPL W P-5'-P-CCNC: 34 U/L (ref 12–78)
ANION GAP SERPL CALCULATED.3IONS-SCNC: 6 MMOL/L (ref 4–13)
AST SERPL W P-5'-P-CCNC: 25 U/L (ref 5–45)
BASOPHILS # BLD AUTO: 0.02 THOUSANDS/ΜL (ref 0–0.1)
BASOPHILS NFR BLD AUTO: 0 % (ref 0–1)
BILIRUB SERPL-MCNC: 0.27 MG/DL (ref 0.2–1)
BUN SERPL-MCNC: 10 MG/DL (ref 5–25)
CALCIUM SERPL-MCNC: 9.3 MG/DL (ref 8.3–10.1)
CHLORIDE SERPL-SCNC: 105 MMOL/L (ref 100–108)
CHOLEST SERPL-MCNC: 179 MG/DL (ref 50–200)
CO2 SERPL-SCNC: 24 MMOL/L (ref 21–32)
CREAT SERPL-MCNC: 0.9 MG/DL (ref 0.6–1.3)
EOSINOPHIL # BLD AUTO: 0.57 THOUSAND/ΜL (ref 0–0.61)
EOSINOPHIL NFR BLD AUTO: 8 % (ref 0–6)
ERYTHROCYTE [DISTWIDTH] IN BLOOD BY AUTOMATED COUNT: 12.6 % (ref 11.6–15.1)
GFR SERPL CREATININE-BSD FRML MDRD: 71 ML/MIN/1.73SQ M
GLUCOSE P FAST SERPL-MCNC: 77 MG/DL (ref 65–99)
HCT VFR BLD AUTO: 37.1 % (ref 34.8–46.1)
HDLC SERPL-MCNC: 56 MG/DL
HGB BLD-MCNC: 12.1 G/DL (ref 11.5–15.4)
IMM GRANULOCYTES # BLD AUTO: 0.03 THOUSAND/UL (ref 0–0.2)
IMM GRANULOCYTES NFR BLD AUTO: 0 % (ref 0–2)
LDLC SERPL CALC-MCNC: 105 MG/DL (ref 0–100)
LYMPHOCYTES # BLD AUTO: 1.95 THOUSANDS/ΜL (ref 0.6–4.47)
LYMPHOCYTES NFR BLD AUTO: 27 % (ref 14–44)
MCH RBC QN AUTO: 29.2 PG (ref 26.8–34.3)
MCHC RBC AUTO-ENTMCNC: 32.6 G/DL (ref 31.4–37.4)
MCV RBC AUTO: 89 FL (ref 82–98)
MONOCYTES # BLD AUTO: 0.55 THOUSAND/ΜL (ref 0.17–1.22)
MONOCYTES NFR BLD AUTO: 8 % (ref 4–12)
NEUTROPHILS # BLD AUTO: 4.05 THOUSANDS/ΜL (ref 1.85–7.62)
NEUTS SEG NFR BLD AUTO: 57 % (ref 43–75)
NONHDLC SERPL-MCNC: 123 MG/DL
NRBC BLD AUTO-RTO: 0 /100 WBCS
PLATELET # BLD AUTO: 329 THOUSANDS/UL (ref 149–390)
PMV BLD AUTO: 9.4 FL (ref 8.9–12.7)
POTASSIUM SERPL-SCNC: 4.1 MMOL/L (ref 3.5–5.3)
PROT SERPL-MCNC: 8 G/DL (ref 6.4–8.2)
RBC # BLD AUTO: 4.15 MILLION/UL (ref 3.81–5.12)
SODIUM SERPL-SCNC: 135 MMOL/L (ref 136–145)
TRIGL SERPL-MCNC: 91 MG/DL
TSH SERPL DL<=0.05 MIU/L-ACNC: 2.16 UIU/ML (ref 0.36–3.74)
WBC # BLD AUTO: 7.17 THOUSAND/UL (ref 4.31–10.16)

## 2021-11-16 PROCEDURE — 82306 VITAMIN D 25 HYDROXY: CPT

## 2021-11-16 PROCEDURE — 80061 LIPID PANEL: CPT

## 2021-11-16 PROCEDURE — 80053 COMPREHEN METABOLIC PANEL: CPT

## 2021-11-16 PROCEDURE — 84443 ASSAY THYROID STIM HORMONE: CPT

## 2021-11-16 PROCEDURE — 36415 COLL VENOUS BLD VENIPUNCTURE: CPT

## 2021-11-16 PROCEDURE — 85025 COMPLETE CBC W/AUTO DIFF WBC: CPT

## 2022-01-24 ENCOUNTER — OFFICE VISIT (OUTPATIENT)
Dept: URGENT CARE | Facility: CLINIC | Age: 57
End: 2022-01-24

## 2022-01-24 VITALS
RESPIRATION RATE: 18 BRPM | DIASTOLIC BLOOD PRESSURE: 69 MMHG | TEMPERATURE: 97.9 F | OXYGEN SATURATION: 99 % | SYSTOLIC BLOOD PRESSURE: 124 MMHG | HEART RATE: 94 BPM
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
## 2022-01-24 PROCEDURE — G0382 LEV 3 HOSP TYPE B ED VISIT: HCPCS | Performed by: NURSE PRACTITIONER

## 2022-01-24 PROCEDURE — S9083 URGENT CARE CENTER GLOBAL: HCPCS | Performed by: NURSE PRACTITIONER

## 2022-01-24 RX ORDER — AMOXICILLIN AND CLAVULANATE POTASSIUM 875; 125 MG/1; MG/1
1 TABLET, FILM COATED ORAL EVERY 12 HOURS SCHEDULED
Qty: 14 TABLET | Refills: 0 | Status: SHIPPED | OUTPATIENT
Start: 2022-01-24 | End: 2022-01-31

## 2022-01-24 NOTE — PATIENT INSTRUCTIONS
Animal Bite   WHAT YOU NEED TO KNOW:   Animal bite injuries range from shallow cuts to deep, life-threatening wounds  An animal can cut or puncture the skin when it bites  Your skin may be torn from your body  Your skin may swell or bruise even if the bite does not break the skin  Animal bites occur more often on the hands, arms, legs, and face  Bites from dogs and cats are the most common injuries  DISCHARGE INSTRUCTIONS:   Return to the emergency department if:   · You have a fever  · Your wound is red, swollen, and draining pus  · You see red streaks on the skin around the wound  · You can no longer move the bitten area  · Your heartbeat and breathing are much faster than usual     · You feel dizzy and confused  Contact your healthcare provider if:   · Your pain does not get better, even after you take pain medicine  · You have nightmares or flashbacks about the animal bite  · You have questions or concerns about your condition or care  Medicines: You may need any of the following:  · Antibiotics  prevent or treat a bacterial infection  · Prescription pain medicine  may be given  Ask how to take this medicine safely  · A tetanus vaccine  may be needed to prevent tetanus  Tetanus is a life-threatening bacterial infection that affects the nerves and muscles  The bacteria can be spread through animal bites  · A rabies vaccine  may be needed to prevent rabies  Rabies is a life-threatening viral infection  The virus can be spread through animal bites  · Take your medicine as directed  Contact your healthcare provider if you think your medicine is not helping or if you have side effects  Tell him of her if you are allergic to any medicine  Keep a list of the medicines, vitamins, and herbs you take  Include the amounts, and when and why you take them  Bring the list or the pill bottles to follow-up visits  Carry your medicine list with you in case of an emergency      Follow up with your healthcare provider in 1 to 2 days: You may need to return to have your stitches removed  Write down your questions so you remember to ask them during your visits  Self-care:   · Apply antibiotic ointment as directed  This helps prevent infection in minor skin wounds  It is available without a doctor's order  · Keep the wound clean and covered  Wash the wound every day with soap and water or germ-killing cleanser  Ask your healthcare provider about the kinds of bandages to use  · Apply ice on your wound  Ice helps decrease swelling and pain  Ice may also help prevent tissue damage  Use an ice pack, or put crushed ice in a plastic bag  Cover it with a towel and place it on your wound for 15 to 20 minutes every hour or as directed  · Elevate the wound area  Raise your wound above the level of your heart as often as you can  This will help decrease swelling and pain  Prop your wound on pillows or blankets to keep it elevated comfortably  Prevent another animal bite:   · Learn to recognize the signs of a scared or angry pet  Avoid quick, sudden movements  · Do not step between animals that are fighting  · Do not leave a pet alone with a young child  · Do not disturb an animal while it eats, sleeps, or cares for its young  · Do not approach an animal you do not know, especially one that is tied up or caged  · Stay away from animals that seem sick or act strangely  · Do not feed or capture wild animals  © Copyright [employer] 2021 Information is for End User's use only and may not be sold, redistributed or otherwise used for commercial purposes  All illustrations and images included in CareNotes® are the copyrighted property of [employer]  or [facility] Martha Ramirez   The above information is an  only  It is not intended as medical advice for individual conditions or treatments   Talk to your doctor, nurse or pharmacist before following any medical regimen to see if it is safe and effective for you

## 2022-01-24 NOTE — PROGRESS NOTES
[address] [facility]        NAME: Misty Montes is a 60 y o  female  : 1965    MRN: [id number]  DATE: 2022  TIME: 4:06 PM    Assessment and Plan   Cat bite, initial encounter Tatiana Augustine  1  Cat bite, initial encounter  amoxicillin-clavulanate (AUGMENTIN) 875-125 mg per tablet     [facility] paperwork completed  Start course of augmentin  F/u with pcp in 3-5 days  Patient Instructions     Follow up with PCP in 3-5 days  Proceed to  ER if symptoms worsen  Chief Complaint     Chief Complaint   Patient presents with   [address] [place] [address]     Her cat bit her right wrist this am   Area is painful and swollen         History of Present Illness   Richa Medina presents to the clinic c/o    Animal Bite (Her cat bit her right wrist this am   Area is painful and swollen)  Was petting her cat and may have petted her too long due to her biting  Has done this before  Cleaned area well - went to work - increase swelling and pain  Cat is utd with vaccines   She is utd with td      Review of Systems   Review of Systems   All other systems reviewed and are negative  Current Medications     Long-Term Medications   Medication Sig Dispense Refill    Ascorbic Acid (VITAMIN C) 1000 MG tablet Take 1,000 mg by mouth daily in the early morning        aspirin 81 MG tablet Take 81 mg by mouth daily in the early morning        buPROPion (WELLBUTRIN XL) 150 mg 24 hr tablet Take 3 tablets (450 mg total) by mouth daily 270 tablet 3    Calcium 250 MG CAPS Take by mouth daily with dinner        cetirizine (ZyrTEC) 10 mg tablet Take 10 mg by mouth as needed        fluticasone (FLONASE ALLERGY RELIEF) 50 mcg/act nasal spray into each nostril as needed        levothyroxine 25 mcg tablet TAKE ONE TABLET BY MOUTH EVERY DAY MONDAY THRU FRIDAY AND TAKE 2 TABLETS EVERY DAY ON SAT & SUN   114 tablet 1    mometasone (ELOCON) 0 1 % cream Apply topically daily 45 g 0    niacin 100 mg tablet Take 100 mg by mouth daily with breakfast        norethindrone-ethinyl estradiol (FEMHRT LOW DOSE) 0 5-2 5 MG-MCG per tablet Take 1 tablet by mouth daily 84 tablet 4    PARoxetine (PAXIL-CR) 25 mg 24 hr tablet Take 2 po daily 180 tablet 3    pseudoephedrine (SUDAFED) 120 MG 12 hr tablet Take 120 mg by mouth as needed           Current Allergies     Allergies as of 01/24/2022 - Reviewed 08/10/2021   Allergen Reaction Noted    Pollen extract  03/03/2020            The following portions of the patient's history were reviewed and updated as appropriate: allergies, current medications, past family history, past medical history, past social history, past surgical history and problem list     Objective   /69   Pulse 94   Temp 97 9 °F (36 6 °C) (Tympanic)   Resp 18   SpO2 99%        Physical Exam     Physical Exam  Vitals and nursing note reviewed  Constitutional:       Appearance: She is well-developed  HENT:      Head: Normocephalic and atraumatic  Eyes:      General: Lids are normal       Conjunctiva/sclera: Conjunctivae normal    Cardiovascular:      Rate and Rhythm: Normal rate and regular rhythm  Heart sounds: Normal heart sounds, S1 normal and S2 normal    Pulmonary:      Effort: Pulmonary effort is normal       Breath sounds: Normal breath sounds  Skin:     General: Skin is warm and dry  Findings: Signs of injury present  Neurological:      Mental Status: She is alert and oriented to person, place, and time  Psychiatric:         Speech: Speech normal          Behavior: Behavior normal  Behavior is cooperative  Thought Content:  Thought content normal          Judgment: Judgment normal

## 2022-04-12 ENCOUNTER — OFFICE VISIT (OUTPATIENT)
Dept: FAMILY MEDICINE CLINIC | Facility: CLINIC | Age: 57
End: 2022-04-12
Payer: COMMERCIAL

## 2022-04-12 VITALS
OXYGEN SATURATION: 97 % | DIASTOLIC BLOOD PRESSURE: 80 MMHG | RESPIRATION RATE: 12 BRPM | SYSTOLIC BLOOD PRESSURE: 130 MMHG | HEIGHT: 64 IN | HEART RATE: 88 BPM | BODY MASS INDEX: 23.97 KG/M2 | WEIGHT: 140.4 LBS

## 2022-04-12 DIAGNOSIS — F32.A DEPRESSION, UNSPECIFIED DEPRESSION TYPE: ICD-10-CM

## 2022-04-12 DIAGNOSIS — E03.9 HYPOTHYROIDISM, UNSPECIFIED TYPE: ICD-10-CM

## 2022-04-12 DIAGNOSIS — M25.562 LEFT KNEE PAIN, UNSPECIFIED CHRONICITY: ICD-10-CM

## 2022-04-12 DIAGNOSIS — M77.01 MEDIAL EPICONDYLITIS OF ELBOW, RIGHT: Primary | ICD-10-CM

## 2022-04-12 PROCEDURE — 99214 OFFICE O/P EST MOD 30 MIN: CPT | Performed by: FAMILY MEDICINE

## 2022-04-12 NOTE — ASSESSMENT & PLAN NOTE
Gave reassurance that this is tendinitis not arthritis  Even though her problem is "golfer's elbow" not "tennis elbow" rec trial of tennis elbow band  Also recommended ice packs and avoidance of aggravating activities

## 2022-04-12 NOTE — ASSESSMENT & PLAN NOTE
She is very stable on current doses levothyroxine  She will be due for repeat thyroid function tests in the fall

## 2022-04-12 NOTE — PROGRESS NOTES
Assessment/Plan:    Medial epicondylitis of elbow, right    Gave reassurance that this is tendinitis not arthritis  Even though her problem is "golfer's elbow" not "tennis elbow" rec trial of tennis elbow band  Also recommended ice packs and avoidance of aggravating activities  Left knee pain    She has medial left knee pain after she sits cross-legged or after kneeling on the floor playing with her grandchildren  I recommended avoidance of these positions as much as possible  Also recommended doing stretching exercises for the knees and quad sets for strengthening  Hypothyroidism    She is very stable on current doses levothyroxine  She will be due for repeat thyroid function tests in the fall  Depression    She is very stable on the paroxetine and bupropion  She will continue current regimen  Diagnoses and all orders for this visit:    Medial epicondylitis of elbow, right  -     Tennis elbow strap    Left knee pain, unspecified chronicity    Hypothyroidism, unspecified type    Depression, unspecified depression type          Subjective:      Patient ID: Анна Neff is a 59 y o  female  She is here for f/u  She feels stable from depression standpoint  She has been stable on the paroxetine 50 mg daily along with bupropion 450 mg daily  She also feels stable on her current dose of thyroid medication which is 25 mcg 5 days per week and 50 mcg on the weekend  She notes pain inner aspect of left knee  Which has been going on for the past 6 months  The pain always occurs after she gets up from being seated either with crossed legs or kneeling on the floor  She will have pain after she stands up and she needs to limp for several minutes before the pain goes away  She also has pain medial right elbow  She works in  and does a lot of lifting  She is right-hand dominant      She does note improvement in the symptoms after the weekend when she has been off of work       The following portions of the patient's history were reviewed and updated as appropriate: allergies, current medications, past family history, past medical history, past social history, past surgical history and problem list     Review of Systems   Constitutional: Negative for activity change, chills, fatigue and fever  HENT: Negative for congestion, ear pain, sinus pressure and sore throat  Eyes: Negative for pain and visual disturbance  Respiratory: Negative for cough, chest tightness, shortness of breath and wheezing  Cardiovascular: Negative for chest pain, palpitations and leg swelling  Gastrointestinal: Negative for abdominal pain, blood in stool, constipation, diarrhea, nausea and vomiting  Endocrine: Negative for polydipsia and polyuria  Genitourinary: Negative for difficulty urinating, dysuria, frequency and urgency  Musculoskeletal: Positive for arthralgias  Negative for joint swelling and myalgias  Skin: Negative for rash  Neurological: Negative for dizziness, weakness, numbness and headaches  Hematological: Negative for adenopathy  Does not bruise/bleed easily  Psychiatric/Behavioral: Negative for dysphoric mood  The patient is not nervous/anxious  Objective:      /80 (BP Location: Left arm, Patient Position: Sitting, Cuff Size: Standard)   Pulse 88   Resp 12   Ht 5' 3 5" (1 613 m)   Wt 63 7 kg (140 lb 6 4 oz)   SpO2 97%   BMI 24 48 kg/m²          Physical Exam  Vitals and nursing note reviewed  Constitutional:       Appearance: Normal appearance  HENT:      Head: Normocephalic and atraumatic  Mouth/Throat:      Mouth: Mucous membranes are moist    Cardiovascular:      Pulses: Normal pulses  Heart sounds: Normal heart sounds  Pulmonary:      Effort: Pulmonary effort is normal       Breath sounds: Normal breath sounds  Musculoskeletal:      Right lower leg: No edema  Left lower leg: No edema        Comments: Left elbow is without swelling or deformity  There is mild tenderness over the medial epicondyle  There is full range of motion without pain  Left knee is without tenderness or swelling  There is full range of motion without pain  There is no ligament laxity  Lymphadenopathy:      Cervical: No cervical adenopathy  Neurological:      Mental Status: She is alert

## 2022-04-12 NOTE — ASSESSMENT & PLAN NOTE
She has medial left knee pain after she sits cross-legged or after kneeling on the floor playing with her grandchildren  I recommended avoidance of these positions as much as possible  Also recommended doing stretching exercises for the knees and quad sets for strengthening

## 2022-07-19 DIAGNOSIS — E03.9 HYPOTHYROIDISM, UNSPECIFIED TYPE: ICD-10-CM

## 2022-07-21 RX ORDER — LEVOTHYROXINE SODIUM 0.03 MG/1
TABLET ORAL
Qty: 114 TABLET | Refills: 1 | Status: SHIPPED | OUTPATIENT
Start: 2022-07-21

## 2022-08-05 NOTE — PROGRESS NOTES
Assessment/Plan:    Recommended monthly SBE, annual CBE and the importance of annual screening mammo to avoid an undiagnosed breast cancer reviewed  ASCCP guidelines reviewed and pap with cotesting noted to be up to date; this low risk patient was advised she meets criteria to d/c pap screening at age 70  No pap done  Colon screening noted to be up to date  The patient denies STI risk factors and declines testing at this time  Reviewed diet/activity recommendations Calcium 8917-9035 mg and Vit D 600-1000 IU daily  Discussed postmenopausal considerations and symptoms to report  Kegel exercises as instructed  RTO in one year for routine annual gyn exam or sooner PRN  Diagnoses and all orders for this visit:    Encounter for gynecological examination (general) (routine) without abnormal findings    Screening mammogram for breast cancer  -     Mammo screening bilateral w 3d & cad; Future        Subjective:      Patient ID: Demetra Johnson is a 59 y o  female  This patient presents for routine annual gyn exam   She stopped [facility] and is doing ok  She denies  bleeding or spotting, pelvic pain, dyspareunia, breast concerns, abnormal discharge, bowel/bladder dysfunction, depression/anx  , sexually active and is monogamous  Denies STI concerns  No hx of STIs  Pap/HPV up to date and normal, 8/3/20  Mammography normal, 2019  Cologuard negative, 9/10/20  Has a maternal uncle with colon cancer, declined colonoscopy  The following portions of the patient's history were reviewed and updated as appropriate: allergies, current medications, past family history, past medical history, past social history, past surgical history and problem list     Review of Systems   Constitutional: Negative  Respiratory: Negative  Cardiovascular: Negative  Gastrointestinal: Negative  Endocrine: Negative      Genitourinary: Negative for dyspareunia, dysuria, frequency, pelvic pain, urgency, vaginal bleeding, vaginal discharge and vaginal pain  Musculoskeletal: Negative  Skin: Negative  Neurological: Negative  Psychiatric/Behavioral: Negative  Objective:      /60   Pulse 89   Ht 5' 3 5" (1 613 m)   Wt 62 1 kg (137 lb)   BMI 23 89 kg/m²          Physical Exam  Vitals and nursing note reviewed  Exam conducted with a chaperone present  Constitutional:       Appearance: Normal appearance  She is well-developed  HENT:      Head: Normocephalic and atraumatic  Neck:      Thyroid: No thyroid mass or thyromegaly  Cardiovascular:      Rate and Rhythm: Normal rate and regular rhythm  Heart sounds: Normal heart sounds  Pulmonary:      Effort: Pulmonary effort is normal       Breath sounds: Normal breath sounds  Chest:   Breasts: Breasts are symmetrical       Right: No inverted nipple, mass, nipple discharge, skin change or tenderness  Left: No inverted nipple, mass, nipple discharge, skin change or tenderness  Abdominal:      General: Bowel sounds are normal       Palpations: Abdomen is soft  Tenderness: There is no abdominal tenderness  Hernia: There is no hernia in the left inguinal area or right inguinal area  Genitourinary:     General: Normal vulva  Exam position: Supine  Pubic Area: No rash  Labia:         Right: No rash, tenderness, lesion or injury  Left: No rash, tenderness, lesion or injury  Urethra: No prolapse, urethral pain, urethral swelling or urethral lesion  Vagina: Normal  No signs of injury and foreign body  No vaginal discharge, erythema, tenderness, bleeding, lesions or prolapsed vaginal walls  Cervix: No cervical motion tenderness, discharge, friability, lesion, erythema, cervical bleeding or eversion  Uterus: Not deviated, not enlarged, not fixed, not tender and no uterine prolapse  Adnexa:         Right: No mass, tenderness or fullness            Left: No mass, tenderness or fullness  Rectum: No external hemorrhoid  Comments: Urethra normal without lesions  No bladder tenderness  Musculoskeletal:         General: Normal range of motion  Cervical back: Normal range of motion and neck supple  Lymphadenopathy:      Lower Body: No right inguinal adenopathy  No left inguinal adenopathy  Skin:     General: Skin is warm and dry  Neurological:      Mental Status: She is alert and oriented to person, place, and time  Psychiatric:         Speech: Speech normal          Behavior: Behavior normal  Behavior is cooperative

## 2022-08-08 ENCOUNTER — ANNUAL EXAM (OUTPATIENT)
Dept: GYNECOLOGY | Facility: CLINIC | Age: 57
End: 2022-08-08
Payer: COMMERCIAL

## 2022-08-08 VITALS
WEIGHT: 137 LBS | HEIGHT: 64 IN | SYSTOLIC BLOOD PRESSURE: 116 MMHG | BODY MASS INDEX: 23.39 KG/M2 | DIASTOLIC BLOOD PRESSURE: 60 MMHG | HEART RATE: 89 BPM

## 2022-08-08 DIAGNOSIS — Z01.419 ENCOUNTER FOR GYNECOLOGICAL EXAMINATION (GENERAL) (ROUTINE) WITHOUT ABNORMAL FINDINGS: Primary | ICD-10-CM

## 2022-08-08 DIAGNOSIS — Z12.31 SCREENING MAMMOGRAM FOR BREAST CANCER: ICD-10-CM

## 2022-08-08 PROCEDURE — S0612 ANNUAL GYNECOLOGICAL EXAMINA: HCPCS | Performed by: OBSTETRICS & GYNECOLOGY

## 2022-08-12 ENCOUNTER — OFFICE VISIT (OUTPATIENT)
Dept: FAMILY MEDICINE CLINIC | Facility: CLINIC | Age: 57
End: 2022-08-12
Payer: COMMERCIAL

## 2022-08-12 VITALS
SYSTOLIC BLOOD PRESSURE: 116 MMHG | WEIGHT: 136.6 LBS | DIASTOLIC BLOOD PRESSURE: 60 MMHG | OXYGEN SATURATION: 100 % | BODY MASS INDEX: 24.2 KG/M2 | HEIGHT: 63 IN | HEART RATE: 79 BPM | RESPIRATION RATE: 12 BRPM

## 2022-08-12 DIAGNOSIS — E78.00 PRIMARY HYPERCHOLESTEROLEMIA: ICD-10-CM

## 2022-08-12 DIAGNOSIS — Z13.1 DIABETES MELLITUS SCREENING: ICD-10-CM

## 2022-08-12 DIAGNOSIS — Z00.00 ANNUAL PHYSICAL EXAM: Primary | ICD-10-CM

## 2022-08-12 DIAGNOSIS — E55.9 VITAMIN D DEFICIENCY: ICD-10-CM

## 2022-08-12 DIAGNOSIS — Z12.83 SKIN CANCER SCREENING: ICD-10-CM

## 2022-08-12 DIAGNOSIS — F32.A DEPRESSION, UNSPECIFIED DEPRESSION TYPE: ICD-10-CM

## 2022-08-12 DIAGNOSIS — E03.9 HYPOTHYROIDISM, UNSPECIFIED TYPE: ICD-10-CM

## 2022-08-12 PROCEDURE — 99396 PREV VISIT EST AGE 40-64: CPT | Performed by: PHYSICIAN ASSISTANT

## 2022-08-12 RX ORDER — PAROXETINE HYDROCHLORIDE HEMIHYDRATE 25 MG/1
TABLET, FILM COATED, EXTENDED RELEASE ORAL
Qty: 180 TABLET | Refills: 3 | Status: SHIPPED | OUTPATIENT
Start: 2022-08-12

## 2022-08-12 RX ORDER — BUPROPION HYDROCHLORIDE 150 MG/1
450 TABLET ORAL DAILY
Qty: 270 TABLET | Refills: 3 | Status: SHIPPED | OUTPATIENT
Start: 2022-08-12

## 2022-08-12 NOTE — PROGRESS NOTES
ADULT ANNUAL [facility]    NAME: Jazmin Mays  AGE: 59 y o  SEX: female  : 1965     DATE: 2022     Assessment and Plan:     Problem List Items Addressed This Visit        Endocrine    Hypothyroidism     Currently on levothyroxine 25 mcg Monday through Friday and 50 mcg on weekends  Recheck TSH in November  Relevant Orders    TSH, 3rd generation with Free T4 reflex (Completed)       Other    Depression     Stable/well controlled  Continue Paxil CR 50 mg daily and Wellbutrin  mg daily  Will continue to monitor  Depression Screening Follow-up Plan: Patient's depression screening was positive with a PHQ-2 score of   Their PHQ-9 score was 0  Patient assessed for underlying major depression  They have no active suicidal ideations  Brief counseling provided and recommend additional follow-up/re-evaluation next office visit  Relevant Medications    buPROPion (WELLBUTRIN XL) 150 mg 24 hr tablet    PARoxetine (PAXIL-CR) 25 mg 24 hr tablet    Other Relevant Orders    CBC and differential (Completed)    TSH, 3rd generation with Free T4 reflex (Completed)    Primary hypercholesterolemia     Currently on niacin  Recheck lipid panel in November  Relevant Orders    Lipid Panel with Direct LDL reflex    Vitamin D deficiency     Currently on vitamin-D 4000 units daily  Recheck level in November           Relevant Orders    Vitamin D 25 hydroxy (Completed)      Other Visit Diagnoses     Annual physical exam    -  Primary    Relevant Orders    CBC and differential (Completed)    Comprehensive metabolic panel (Completed)    Hemoglobin A1C    Lipid Panel with Direct LDL reflex    Vitamin D 25 hydroxy (Completed)    TSH, 3rd generation with Free T4 reflex (Completed)    Skin cancer screening        Relevant Orders    Ambulatory Referral to Dermatology    Diabetes mellitus screening        Relevant Orders Comprehensive metabolic panel (Completed)    Hemoglobin A1C          Immunizations and preventive care screenings were discussed with patient today  Appropriate education was printed on patient's after visit summary  Counseling:  · Exercise: the importance of regular exercise/physical activity was discussed  Recommend exercise 3-5 times per week for at least 30 minutes  Return in about 1 year (around 2023) for Annual physical      Chief Complaint:     Chief Complaint   Patient presents with    Follow-up      History of Present Illness:     Adult Annual Physical   Patient here for a comprehensive physical exam  The patient reports problems - As below  The patient continues with levothyroxine  25 mcg M-F and 50 mcg Sat/Sun and feels that thyroid control has been stable  Last TSH was 2 160 on 2021  The patient reports that recently anxiety/depression level has been stable  Daily medication includes Paxil CR 50 mg daily and Wellbutrin  mg daily     The patient denies SI/HI  Today's PHQ9 score was 0  The patient has a history of vitamin-D deficiency and is currently taking 4000 units  Last vitamin-D level was 81 in 2021  The patient continues with niacin daily  Last LDL was 105 in 2021  Depression Screening  PHQ-2/9 Depression Screening    Little interest or pleasure in doing things: 0 - not at all  Feeling down, depressed, or hopeless: 0 - not at all  Trouble falling or staying asleep, or sleeping too much: 0 - not at all  Feeling tired or having little energy: 0 - not at all  Poor appetite or overeatin - not at all  Feeling bad about yourself - or that you are a failure or have let yourself or your family down: 0 - not at all  Trouble concentrating on things, such as reading the newspaper or watching television: 0 - not at all  Moving or speaking so slowly that other people could have noticed   Or the opposite - being so fidgety or restless that you have been moving around a lot more than usual: 0 - not at all  Thoughts that you would be better off dead, or of hurting yourself in some way: 0 - not at all  PHQ-9 Score: 0   PHQ-9 Interpretation: No or Minimal depression        General Health  · Sleep: sleeps well and averages 8-10 hours a night  · Hearing: requires use of hearing aids  · Vision: goes for regular eye exams and wears glasses  · Dental: regular dental visits  /GYN Health  · Patient is: postmenopausal       Review of Systems:     Review of Systems   Constitutional: Negative for chills and fever  HENT: Negative for rhinorrhea and sore throat  Eyes: Negative for visual disturbance  Respiratory: Negative for cough, shortness of breath and wheezing  Cardiovascular: Negative for chest pain, palpitations and leg swelling  Gastrointestinal: Negative for abdominal pain, blood in stool, constipation, diarrhea, nausea and vomiting  Endocrine: Negative for polydipsia and polyuria  Genitourinary: Negative for dysuria and frequency  Musculoskeletal: Negative for arthralgias and myalgias  Skin: Negative for rash  Neurological: Negative for dizziness, syncope and headaches  Hematological: Does not bruise/bleed easily  Psychiatric/Behavioral: Negative for dysphoric mood  The patient is not nervous/anxious         Past Medical History:     Past Medical History:   Diagnosis Date    Asthma     Depression     Disease of thyroid gland     Osteopenia     Pneumonia     2017      Past Surgical History:     Past Surgical History:   Procedure Laterality Date    LASIK      MI REVISE MEDIAN N/CARPAL TUNNEL SURG Right 7/10/2018    Procedure: CARPAL TUNNEL RELEASE;  Surgeon: Ivania Andres DO;  [facility];  Service: Orthopedics    MI REVISE MEDIAN N/CARPAL TUNNEL SURG Left 4/19/2019    Procedure: RELEASE CARPAL TUNNEL;  Surgeon: Jovanna Estrada MD;  [facility];  Service: Orthopedics    SEPTOPLASTY      SINUS SURGERY Social History:     Social History     Socioeconomic History    Marital status: /Civil Union     Spouse name: None    Number of children: None    Years of education: None    Highest education level: None   Occupational History    None   Tobacco Use    Smoking status: Never Smoker    Smokeless tobacco: Never Used   Vaping Use    Vaping Use: Never used   Substance and Sexual Activity    Alcohol use: Yes     Comment: occasionally drinks 6 times per year    Drug use: Never    Sexual activity: Yes     Birth control/protection: OCP   Other Topics Concern    None   Social History Narrative    None     Social Determinants of Health     Financial Resource Strain: Not on file   Food Insecurity: Not on file   Transportation Needs: Not on file   Physical Activity: Not on file   Stress: Not on file   Social Connections: Not on file   Intimate Partner Violence: Not on file   Housing Stability: Not on file      Family History:     Family History   Problem Relation Age of Onset    Hashimoto's thyroiditis Mother     Crohn's disease Father     Hashimoto's thyroiditis Sister     No Known Problems Sister     No Known Problems Sister     Depression Maternal Grandmother     Diabetes Maternal Grandmother     No Known Problems Maternal Grandfather     No Known Problems Paternal Grandmother     No Known Problems Paternal Grandfather       Current Medications:     Current Outpatient Medications   Medication Sig Dispense Refill    buPROPion (WELLBUTRIN XL) 150 mg 24 hr tablet Take 3 tablets (450 mg total) by mouth daily 270 tablet 3    Calcium 250 MG CAPS Take by mouth daily with dinner        cetirizine (ZyrTEC) 10 mg tablet Take 10 mg by mouth as needed        Cholecalciferol (VITAMIN D3 PO) Take by mouth      fluticasone (FLONASE) 50 mcg/act nasal spray into each nostril as needed        levothyroxine 25 mcg tablet TAKE ONE TABLET BY MOUTH EVERY DAY MONDAY THRU FRIDAY AND TAKE 2 TABLETS EVERY DAY ON SAT & SUN  114 tablet 1    MAGNESIUM PO Take 200 mg by mouth daily with dinner        MULTIPLE VITAMINS-CALCIUM PO Take 1 capsule by mouth daily in the early morning        niacin 100 mg tablet Take 100 mg by mouth daily with breakfast        PARoxetine (PAXIL-CR) 25 mg 24 hr tablet Take 2 po daily 180 tablet 3    pseudoephedrine (SUDAFED) 120 MG 12 hr tablet Take 120 mg by mouth as needed         No current facility-administered medications for this visit  Allergies: Allergies   Allergen Reactions    Pollen Extract       Physical Exam:     /60 (BP Location: Left arm, Patient Position: Sitting, Cuff Size: Standard)   Pulse 79   Resp 12   Ht 5' 3" (1 6 m)   Wt 62 kg (136 lb 9 6 oz)   SpO2 100%   BMI 24 20 kg/m²     Physical Exam  Vitals reviewed  Constitutional:       General: She is not in acute distress  Appearance: Normal appearance  She is well-developed and normal weight  She is not ill-appearing  HENT:      Head: Normocephalic and atraumatic  Right Ear: External ear normal       Left Ear: External ear normal       Nose: Nose normal       Mouth/Throat:      Pharynx: No oropharyngeal exudate  Eyes:      Conjunctiva/sclera: Conjunctivae normal       Pupils: Pupils are equal, round, and reactive to light  Neck:      Thyroid: No thyromegaly  Cardiovascular:      Rate and Rhythm: Normal rate and regular rhythm  Pulses: Normal pulses  Heart sounds: Normal heart sounds  No murmur heard  Pulmonary:      Effort: Pulmonary effort is normal       Breath sounds: Normal breath sounds  No wheezing, rhonchi or rales  Abdominal:      General: Bowel sounds are normal  There is no distension  Palpations: Abdomen is soft  There is no mass  Tenderness: There is no abdominal tenderness  Musculoskeletal:      Cervical back: Normal range of motion and neck supple  Right lower leg: No edema  Left lower leg: No edema     Lymphadenopathy:      Cervical: No cervical adenopathy  Skin:     General: Skin is warm and dry  Findings: No rash  Neurological:      Mental Status: She is alert  Sensory: No sensory deficit  Comments: 5/5 strength in UE and LE   Psychiatric:         Mood and Affect: Mood normal          Behavior: Behavior normal          Thought Content:  Thought content normal          Judgment: Judgment normal           Michael Nicole PA-C  [facility]

## 2022-08-12 NOTE — PATIENT INSTRUCTIONS

## 2022-08-13 ENCOUNTER — APPOINTMENT (OUTPATIENT)
Dept: LAB | Facility: CLINIC | Age: 57
End: 2022-08-13

## 2022-08-13 DIAGNOSIS — Z00.8 HEALTH EXAMINATION IN POPULATION SURVEY: ICD-10-CM

## 2022-08-13 DIAGNOSIS — Z13.1 DIABETES MELLITUS SCREENING: ICD-10-CM

## 2022-08-13 DIAGNOSIS — Z00.00 ANNUAL PHYSICAL EXAM: ICD-10-CM

## 2022-08-13 DIAGNOSIS — E55.9 VITAMIN D DEFICIENCY: ICD-10-CM

## 2022-08-13 DIAGNOSIS — F32.A DEPRESSION, UNSPECIFIED DEPRESSION TYPE: ICD-10-CM

## 2022-08-13 DIAGNOSIS — E03.9 HYPOTHYROIDISM, UNSPECIFIED TYPE: ICD-10-CM

## 2022-08-13 LAB
25(OH)D3 SERPL-MCNC: 53.6 NG/ML (ref 30–100)
ALBUMIN SERPL BCP-MCNC: 3.9 G/DL (ref 3.5–5)
ALP SERPL-CCNC: 68 U/L (ref 46–116)
ALT SERPL W P-5'-P-CCNC: 40 U/L (ref 12–78)
ANION GAP SERPL CALCULATED.3IONS-SCNC: 4 MMOL/L (ref 4–13)
AST SERPL W P-5'-P-CCNC: 24 U/L (ref 5–45)
BASOPHILS # BLD AUTO: 0.02 THOUSANDS/ΜL (ref 0–0.1)
BASOPHILS NFR BLD AUTO: 1 % (ref 0–1)
BILIRUB SERPL-MCNC: 0.45 MG/DL (ref 0.2–1)
BUN SERPL-MCNC: 9 MG/DL (ref 5–25)
CALCIUM SERPL-MCNC: 9.1 MG/DL (ref 8.3–10.1)
CHLORIDE SERPL-SCNC: 108 MMOL/L (ref 96–108)
CHOLEST SERPL-MCNC: 205 MG/DL
CO2 SERPL-SCNC: 26 MMOL/L (ref 21–32)
CREAT SERPL-MCNC: 0.88 MG/DL (ref 0.6–1.3)
EOSINOPHIL # BLD AUTO: 0.51 THOUSAND/ΜL (ref 0–0.61)
EOSINOPHIL NFR BLD AUTO: 12 % (ref 0–6)
ERYTHROCYTE [DISTWIDTH] IN BLOOD BY AUTOMATED COUNT: 13 % (ref 11.6–15.1)
EST. AVERAGE GLUCOSE BLD GHB EST-MCNC: 114 MG/DL
GFR SERPL CREATININE-BSD FRML MDRD: 72 ML/MIN/1.73SQ M
GLUCOSE P FAST SERPL-MCNC: 95 MG/DL (ref 65–99)
HBA1C MFR BLD: 5.6 %
HCT VFR BLD AUTO: 39.9 % (ref 34.8–46.1)
HDLC SERPL-MCNC: 63 MG/DL
HGB BLD-MCNC: 12.9 G/DL (ref 11.5–15.4)
IMM GRANULOCYTES # BLD AUTO: 0.01 THOUSAND/UL (ref 0–0.2)
IMM GRANULOCYTES NFR BLD AUTO: 0 % (ref 0–2)
LDLC SERPL CALC-MCNC: 107 MG/DL (ref 0–100)
LYMPHOCYTES # BLD AUTO: 1.49 THOUSANDS/ΜL (ref 0.6–4.47)
LYMPHOCYTES NFR BLD AUTO: 36 % (ref 14–44)
MCH RBC QN AUTO: 29.5 PG (ref 26.8–34.3)
MCHC RBC AUTO-ENTMCNC: 32.3 G/DL (ref 31.4–37.4)
MCV RBC AUTO: 91 FL (ref 82–98)
MONOCYTES # BLD AUTO: 0.39 THOUSAND/ΜL (ref 0.17–1.22)
MONOCYTES NFR BLD AUTO: 9 % (ref 4–12)
NEUTROPHILS # BLD AUTO: 1.71 THOUSANDS/ΜL (ref 1.85–7.62)
NEUTS SEG NFR BLD AUTO: 42 % (ref 43–75)
NONHDLC SERPL-MCNC: 142 MG/DL
NRBC BLD AUTO-RTO: 0 /100 WBCS
PLATELET # BLD AUTO: 273 THOUSANDS/UL (ref 149–390)
PMV BLD AUTO: 10.4 FL (ref 8.9–12.7)
POTASSIUM SERPL-SCNC: 4.2 MMOL/L (ref 3.5–5.3)
PROT SERPL-MCNC: 7.6 G/DL (ref 6.4–8.4)
RBC # BLD AUTO: 4.37 MILLION/UL (ref 3.81–5.12)
SODIUM SERPL-SCNC: 138 MMOL/L (ref 135–147)
TRIGL SERPL-MCNC: 176 MG/DL
TSH SERPL DL<=0.05 MIU/L-ACNC: 1.7 UIU/ML (ref 0.45–4.5)
WBC # BLD AUTO: 4.13 THOUSAND/UL (ref 4.31–10.16)

## 2022-08-13 PROCEDURE — 83036 HEMOGLOBIN GLYCOSYLATED A1C: CPT

## 2022-08-13 PROCEDURE — 85025 COMPLETE CBC W/AUTO DIFF WBC: CPT

## 2022-08-13 PROCEDURE — 84443 ASSAY THYROID STIM HORMONE: CPT

## 2022-08-13 PROCEDURE — 80061 LIPID PANEL: CPT

## 2022-08-13 PROCEDURE — 82306 VITAMIN D 25 HYDROXY: CPT

## 2022-08-13 PROCEDURE — 36415 COLL VENOUS BLD VENIPUNCTURE: CPT

## 2022-08-13 PROCEDURE — 80053 COMPREHEN METABOLIC PANEL: CPT

## 2022-08-14 NOTE — ASSESSMENT & PLAN NOTE
Stable/well controlled  Continue Paxil CR 50 mg daily and Wellbutrin  mg daily  Will continue to monitor  Depression Screening Follow-up Plan: Patient's depression screening was positive with a PHQ-2 score of   Their PHQ-9 score was 0  Patient assessed for underlying major depression  They have no active suicidal ideations  Brief counseling provided and recommend additional follow-up/re-evaluation next office visit

## 2022-08-14 NOTE — ASSESSMENT & PLAN NOTE
Currently on levothyroxine 25 mcg Monday through Friday and 50 mcg on weekends  Recheck TSH in November

## 2022-08-24 ENCOUNTER — DOCUMENTATION (OUTPATIENT)
Dept: GYNECOLOGY | Facility: CLINIC | Age: 57
End: 2022-08-24

## 2022-09-17 ENCOUNTER — AMB VIDEO VISIT (OUTPATIENT)
Dept: OTHER | Facility: HOSPITAL | Age: 57
End: 2022-09-17
Payer: COMMERCIAL

## 2022-09-17 DIAGNOSIS — S61.451A CAT BITE OF RIGHT HAND, INITIAL ENCOUNTER: Primary | ICD-10-CM

## 2022-09-17 DIAGNOSIS — L03.113 CELLULITIS OF RIGHT HAND EXCLUDING FINGERS AND THUMB: ICD-10-CM
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
## 2022-09-17 PROBLEM — E55.9 VITAMIN D DEFICIENCY: Status: RESOLVED | Noted: 2018-07-24 | Resolved: 2022-09-17

## 2022-09-17 PROBLEM — L30.9 DERMATITIS: Status: RESOLVED | Noted: 2019-10-09 | Resolved: 2022-09-17

## 2022-09-17 PROBLEM — M25.511 RIGHT SHOULDER PAIN: Status: RESOLVED | Noted: 2020-08-06 | Resolved: 2022-09-17

## 2022-09-17 PROBLEM — L71.9 ROSACEA: Status: RESOLVED | Noted: 2021-02-09 | Resolved: 2022-09-17

## 2022-09-17 PROCEDURE — 99212 OFFICE O/P EST SF 10 MIN: CPT | Performed by: PHYSICIAN ASSISTANT

## 2022-09-17 RX ORDER — AMOXICILLIN AND CLAVULANATE POTASSIUM 875; 125 MG/1; MG/1
1 TABLET, FILM COATED ORAL 2 TIMES DAILY
Qty: 20 TABLET | Refills: 0 | Status: SHIPPED | OUTPATIENT
Start: 2022-09-17 | End: 2022-09-27

## 2022-09-17 NOTE — CARE ANYWHERE EVISITS
Visit Summary for Halie Medrano - Gender: Female - Date of Birth: [id number]  Date: [id number] - Duration: 10 minutes  Patient: Halie Medrano  Provider: Tory Kamara PA-C    Patient Contact Information  Address  [address]; [address]  [phone]    Visit Topics    Triage Questions   What is your current physical address in the event of a medical emergency? Answer []  Are you allergic to any medications? Answer []  Are you now or could you be pregnant? Answer []  Do you have any immune system compromise or chronic lung   disease? Answer []  Do you have any vulnerable family members in the home (infant, pregnant, cancer, elderly)? Answer []     Conversation Transcripts  [0A][0A] [Notification] You are connected with Tory Kamara PA-C, Urgent [facility] is located in [place]  [0A][Notification] CHARLIE Mata has shared health history  [facility]  [0A]    Diagnosis  Open bite of right hand, initial encounter  Cellulitis of right upper limb    Procedures  Value: [id number] Code: CPT-4 UNLISTED E&M SERVICE    Medications Prescribed    No prescriptions ordered    Electronically signed by: Aurora Ram(NPI [id number])

## 2022-09-17 NOTE — PATIENT INSTRUCTIONS
Warm soaks 3 times daily  Take antibiotics until completed  If redness spreading after 2 days, fevers, red streaking or increased pain or decreased range of motion go to ER for evaluation  Schedule a follow-up appointment with your primary care physician for recheck in 2-3 days  If you cannot see your PCP, you can schedule a follow up appointment at a [address] [facility]   Go to the emergency department if you develop any new or worsening symptoms including redness spreading after 2 days, fevers, red streaking or increased pain or decreased range of motion, or anything else that is concerning     [phone] ([phone])  Schedule or Reschedule Outpatient Testing - Option 2  Billing - Option 3  General Info - Option 4  [address]
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
## 2022-10-06 PROCEDURE — 88305 TISSUE EXAM BY PATHOLOGIST: CPT | Performed by: PATHOLOGY

## 2022-10-07 ENCOUNTER — LAB REQUISITION (OUTPATIENT)
Dept: LAB | Facility: HOSPITAL | Age: 57
End: 2022-10-07
Payer: COMMERCIAL

## 2022-10-07 DIAGNOSIS — D48.5 NEOPLASM OF UNCERTAIN BEHAVIOR OF SKIN: ICD-10-CM

## 2022-10-21 PROCEDURE — 88305 TISSUE EXAM BY PATHOLOGIST: CPT | Performed by: PATHOLOGY

## 2022-11-27 DIAGNOSIS — E03.9 HYPOTHYROIDISM, UNSPECIFIED TYPE: ICD-10-CM

## 2022-11-27 RX ORDER — LEVOTHYROXINE SODIUM 0.03 MG/1
TABLET ORAL
Qty: 114 TABLET | Refills: 0 | Status: SHIPPED | OUTPATIENT
Start: 2022-11-27

## 2022-11-29 DIAGNOSIS — E03.9 HYPOTHYROIDISM, UNSPECIFIED TYPE: ICD-10-CM

## 2022-11-29 RX ORDER — LEVOTHYROXINE SODIUM 0.03 MG/1
TABLET ORAL
Qty: 114 TABLET | Refills: 0 | OUTPATIENT
Start: 2022-11-29

## 2023-03-01 DIAGNOSIS — E03.9 HYPOTHYROIDISM, UNSPECIFIED TYPE: ICD-10-CM

## 2023-03-01 RX ORDER — LEVOTHYROXINE SODIUM 0.03 MG/1
TABLET ORAL
Qty: 114 TABLET | Refills: 0 | Status: SHIPPED | OUTPATIENT
Start: 2023-03-01

## 2023-03-15 DIAGNOSIS — L71.9 ROSACEA: Primary | ICD-10-CM

## 2023-03-16 DIAGNOSIS — L71.9 ROSACEA: ICD-10-CM

## 2023-04-24 NOTE — ASSESSMENT & PLAN NOTE
Improved-continue to focus on dietary and lifestyle modifications and weight loss Bilobed Flap Text: The defect edges were debeveled with a #15 scalpel blade.  Given the location of the defect and the proximity to free margins a bilobe flap was deemed most appropriate.  Using a sterile surgical marker, an appropriate bilobe flap drawn around the defect.    The area thus outlined was incised deep to adipose tissue with a #15 scalpel blade.  The skin margins were undermined to an appropriate distance in all directions utilizing iris scissors.

## 2023-06-04 DIAGNOSIS — E03.9 HYPOTHYROIDISM, UNSPECIFIED TYPE: ICD-10-CM

## 2023-06-04 RX ORDER — LEVOTHYROXINE SODIUM 0.03 MG/1
TABLET ORAL
Qty: 114 TABLET | Refills: 0 | Status: SHIPPED | OUTPATIENT
Start: 2023-06-04

## 2023-08-04 ENCOUNTER — HOSPITAL ENCOUNTER (OUTPATIENT)
Dept: MAMMOGRAPHY | Facility: MEDICAL CENTER | Age: 58
Discharge: HOME | End: 2023-08-04
Payer: COMMERCIAL

## 2023-08-04 VITALS — WEIGHT: 136 LBS | BODY MASS INDEX: 24.1 KG/M2 | HEIGHT: 63 IN

## 2023-08-04 DIAGNOSIS — Z12.31 SCREENING MAMMOGRAM FOR BREAST CANCER: ICD-10-CM

## 2023-08-04 PROCEDURE — 77067 SCR MAMMO BI INCL CAD: CPT

## 2023-08-04 PROCEDURE — 77063 BREAST TOMOSYNTHESIS BI: CPT

## 2023-08-11 NOTE — PROGRESS NOTES
Assessment/Plan:    Has diagnostic imaging scheduled. ASCCP guidelines reviewed and pap with cotesting noted to be up to date; this low risk patient was advised she meets criteria to d/c pap screening at age 70. Pap done. DEXA script given with instruction to check ins co for coverage and cologuard ordered. Reviewed diet/activity recommendations Calcium 1200 mg and Vit D 600-1000 IU daily. Discussed postmenopausal considerations and symptoms to report. Kegel exercises as instructed. RTO in one year for routine annual gyn exam or sooner PRN. Diagnoses and all orders for this visit:    Encounter for gynecological examination (general) (routine) without abnormal findings    Colon cancer screening  -     Cologuard    Menopause  -     DXA bone density spine hip and pelvis; Future    Osteoporosis screening  -     DXA bone density spine hip and pelvis; Future        Subjective:      Patient ID: Jonathon Christianson is a 59 y.o. female. This patient presents for routine annual gyn exam.  She denies  bleeding or spotting, pelvic pain, dyspareunia, breast concerns, abnormal discharge, bowel/bladder dysfunction, depression/anx. , sexually active and is monogamous. Pap/HPV up to date and normal, 8/3/20. Mammography 8/4/23, right asymmetry. Diagnostic scheduled. Cologuard negative, 9/10/20. Has a maternal uncle with colon cancer, declined colonoscopy. The following portions of the patient's history were reviewed and updated as appropriate: allergies, current medications, past family history, past medical history, past social history, past surgical history and problem list.    Review of Systems   Constitutional: Negative. Respiratory: Negative. Cardiovascular: Negative. Gastrointestinal: Negative. Endocrine: Negative. Genitourinary: Negative for dyspareunia, dysuria, frequency, pelvic pain, urgency, vaginal bleeding, vaginal discharge and vaginal pain. Musculoskeletal: Negative. Skin: Negative. Neurological: Negative. Psychiatric/Behavioral: Negative. Objective:      /64   Pulse 71   Ht 5' 3.25" (1.607 m)   Wt 62.1 kg (136 lb 12.8 oz)   BMI 24.04 kg/m²          Physical Exam  Vitals and nursing note reviewed. Exam conducted with a chaperone present. Constitutional:       Appearance: Normal appearance. She is well-developed. HENT:      Head: Normocephalic and atraumatic. Neck:      Thyroid: No thyroid mass or thyromegaly. Cardiovascular:      Rate and Rhythm: Normal rate and regular rhythm. Heart sounds: Normal heart sounds. Pulmonary:      Effort: Pulmonary effort is normal.      Breath sounds: Normal breath sounds. Chest:   Breasts:     Breasts are symmetrical.      Right: No inverted nipple, mass, nipple discharge, skin change or tenderness. Left: No inverted nipple, mass, nipple discharge, skin change or tenderness. Abdominal:      General: Bowel sounds are normal.      Palpations: Abdomen is soft. Tenderness: There is no abdominal tenderness. Hernia: There is no hernia in the left inguinal area or right inguinal area. Genitourinary:     General: Normal vulva. Exam position: Supine. Pubic Area: No rash. Labia:         Right: No rash, tenderness, lesion or injury. Left: No rash, tenderness, lesion or injury. Urethra: No prolapse, urethral pain, urethral swelling or urethral lesion. Vagina: Normal. No signs of injury and foreign body. No vaginal discharge, erythema, tenderness, bleeding, lesions or prolapsed vaginal walls. Cervix: No cervical motion tenderness, discharge, friability, lesion, erythema, cervical bleeding or eversion. Uterus: Not deviated, not enlarged, not fixed, not tender and no uterine prolapse. Adnexa:         Right: No mass, tenderness or fullness. Left: No mass, tenderness or fullness. Rectum: No external hemorrhoid.       Comments: Urethra normal without lesions  No bladder tenderness  Musculoskeletal:         General: Normal range of motion. Cervical back: Normal range of motion and neck supple. Lymphadenopathy:      Lower Body: No right inguinal adenopathy. No left inguinal adenopathy. Skin:     General: Skin is warm and dry. Neurological:      Mental Status: She is alert and oriented to person, place, and time. Psychiatric:         Speech: Speech normal.         Behavior: Behavior normal. Behavior is cooperative.

## 2023-08-14 ENCOUNTER — ANNUAL EXAM (OUTPATIENT)
Dept: GYNECOLOGY | Facility: CLINIC | Age: 58
End: 2023-08-14
Payer: COMMERCIAL

## 2023-08-14 ENCOUNTER — APPOINTMENT (OUTPATIENT)
Dept: LAB | Facility: MEDICAL CENTER | Age: 58
End: 2023-08-14

## 2023-08-14 VITALS
DIASTOLIC BLOOD PRESSURE: 64 MMHG | HEIGHT: 63 IN | WEIGHT: 136.8 LBS | BODY MASS INDEX: 24.24 KG/M2 | SYSTOLIC BLOOD PRESSURE: 104 MMHG | HEART RATE: 71 BPM

## 2023-08-14 DIAGNOSIS — Z00.8 HEALTH EXAMINATION IN POPULATION SURVEYS: ICD-10-CM

## 2023-08-14 DIAGNOSIS — Z13.820 OSTEOPOROSIS SCREENING: ICD-10-CM

## 2023-08-14 DIAGNOSIS — Z78.0 MENOPAUSE: ICD-10-CM

## 2023-08-14 DIAGNOSIS — Z01.419 ENCOUNTER FOR GYNECOLOGICAL EXAMINATION (GENERAL) (ROUTINE) WITHOUT ABNORMAL FINDINGS: Primary | ICD-10-CM

## 2023-08-14 DIAGNOSIS — Z12.11 COLON CANCER SCREENING: ICD-10-CM

## 2023-08-14 LAB
CHOLEST SERPL-MCNC: 189 MG/DL
EST. AVERAGE GLUCOSE BLD GHB EST-MCNC: 126 MG/DL
HBA1C MFR BLD: 6 %
HDLC SERPL-MCNC: 62 MG/DL
LDLC SERPL CALC-MCNC: 77 MG/DL (ref 0–100)
NONHDLC SERPL-MCNC: 127 MG/DL
TRIGL SERPL-MCNC: 249 MG/DL

## 2023-08-14 PROCEDURE — 80061 LIPID PANEL: CPT

## 2023-08-14 PROCEDURE — G0476 HPV COMBO ASSAY CA SCREEN: HCPCS | Performed by: OBSTETRICS & GYNECOLOGY

## 2023-08-14 PROCEDURE — S0612 ANNUAL GYNECOLOGICAL EXAMINA: HCPCS | Performed by: OBSTETRICS & GYNECOLOGY

## 2023-08-14 PROCEDURE — G0145 SCR C/V CYTO,THINLAYER,RESCR: HCPCS | Performed by: OBSTETRICS & GYNECOLOGY

## 2023-08-14 PROCEDURE — 36415 COLL VENOUS BLD VENIPUNCTURE: CPT

## 2023-08-14 PROCEDURE — 83036 HEMOGLOBIN GLYCOSYLATED A1C: CPT

## 2023-08-16 ENCOUNTER — OFFICE VISIT (OUTPATIENT)
Dept: URGENT CARE | Facility: CLINIC | Age: 58
End: 2023-08-16
Payer: COMMERCIAL

## 2023-08-16 VITALS
HEART RATE: 78 BPM | SYSTOLIC BLOOD PRESSURE: 114 MMHG | OXYGEN SATURATION: 98 % | RESPIRATION RATE: 20 BRPM | TEMPERATURE: 96.8 F | DIASTOLIC BLOOD PRESSURE: 68 MMHG
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
## 2023-08-16 PROCEDURE — 99213 OFFICE O/P EST LOW 20 MIN: CPT

## 2023-08-16 RX ORDER — CIPROFLOXACIN 500 MG/1
500 TABLET, FILM COATED ORAL EVERY 12 HOURS SCHEDULED
Qty: 20 TABLET | Refills: 0 | Status: SHIPPED | OUTPATIENT
Start: 2023-08-16 | End: 2023-08-26

## 2023-08-16 NOTE — PROGRESS NOTES
[facility]        NAME: Sunni Yo is a 59 y.o. female  : 1965    MRN: [id number]  DATE: 2023  TIME: 7:49 PM    Assessment and Plan   Cat bite, initial encounter Anson Parikh  1. Cat bite, initial encounter  ciprofloxacin (CIPRO) 500 mg tablet        Right hand cat bite. TDAP UTD. No fevers. Discomfort at puncture site. No bleeding, drainage. Rabies UTD- family cat    Patient Instructions       Follow up with PCP as needed    Chief Complaint     Chief Complaint   Patient presents with   • Animal Bite     Patient's cat bit her right hand. Tdap          History of Present Illness       Right hand cat bite. TDAP UTD. No fevers. Discomfort at puncture site. No bleeding, drainage. Rabies UTD- family cat      Review of Systems   Review of Systems   Constitutional: Negative for activity change and appetite change. Musculoskeletal: Positive for myalgias. Skin: Positive for wound.          Current Medications       Current Outpatient Medications:   •  ciprofloxacin (CIPRO) 500 mg tablet, Take 1 tablet (500 mg total) by mouth every 12 (twelve) hours for 10 days, Disp: 20 tablet, Rfl: 0  •  metroNIDAZOLE (METROCREAM) 0.75 % cream, Apply topically 2 (two) times a day, Disp: 45 g, Rfl: 0  •  buPROPion (WELLBUTRIN XL) 150 mg 24 hr tablet, Take 3 tablets (450 mg total) by mouth daily, Disp: 270 tablet, Rfl: 3  •  Calcium 250 MG CAPS, Take by mouth daily with dinner  , Disp: , Rfl:   •  cetirizine (ZyrTEC) 10 mg tablet, Take 10 mg by mouth as needed   (Patient not taking: Reported on 2023), Disp: , Rfl:   •  Cholecalciferol (VITAMIN D3 PO), Take by mouth, Disp: , Rfl:   •  fluticasone (FLONASE) 50 mcg/act nasal spray, into each nostril as needed  , Disp: , Rfl:   •  levothyroxine 25 mcg tablet, TAKE ONE TABLET BY MOUTH EVERY DAY MONDAY THRU FRIDAY AND TAKE 2 TABLETS EVERY DAY ON SAT & SUN., Disp: 114 tablet, Rfl: 0  •  MAGNESIUM PO, Take 200 mg by mouth daily with dinner  , Disp: , Rfl: •  MULTIPLE VITAMINS-CALCIUM PO, Take 1 capsule by mouth daily in the early morning  , Disp: , Rfl:   •  PARoxetine (PAXIL-CR) 25 mg 24 hr tablet, Take 2 po daily, Disp: 180 tablet, Rfl: 3  •  pseudoephedrine (SUDAFED) 120 MG 12 hr tablet, Take 120 mg by mouth as needed   (Patient not taking: Reported on 8/14/2023), Disp: , Rfl:     Current Allergies     Allergies as of 08/16/2023 - Reviewed 08/16/2023   Allergen Reaction Noted   • Pollen extract  03/03/2020            The following portions of the patient's history were reviewed and updated as appropriate: allergies, current medications, past family history, past medical history, past social history, past surgical history and problem list.     Past Medical History:   Diagnosis Date   • Asthma    • Depression    • Disease of thyroid gland    • Osteopenia    • Pneumonia     2017   • Rosacea 2/9/2021   • Vitamin D deficiency 7/24/2018       Past Surgical History:   Procedure Laterality Date   • LASIK     • [place] NEUROPLASTY &/TRANSPOS MEDIAN NRV CARPAL Winda Lava Right 7/10/2018    Procedure: CARPAL TUNNEL RELEASE;  Surgeon: Libra Monaco DO;  [facility];  Service: Orthopedics   • [place] NEUROPLASTY &/TRANSPOS MEDIAN NRV CARPAL Winda Lava Left 4/19/2019    Procedure: RELEASE CARPAL TUNNEL;  Surgeon: Padmini Khan MD;  [facility];  Service: Orthopedics   • SEPTOPLASTY     • SINUS SURGERY         Family History   Problem Relation Age of Onset   • Hashimoto's thyroiditis Mother    • Cancer Mother         melanoma   • Thyroid disease Mother    • Crohn's disease Father    • Cancer Father         melanoma   • Hashimoto's thyroiditis Sister    • No Known Problems Sister    • No Known Problems Sister    • Depression Maternal Grandmother    • Diabetes Maternal Grandmother    • No Known Problems Maternal Grandfather    • No Known Problems Paternal Grandmother    • No Known Problems Paternal Grandfather          Medications have been verified.         Objective   BP 114/68   Pulse 78   Temp (!) 96.8 °F (36 °C) (Tympanic)   Resp 20   SpO2 98%   No LMP recorded. Patient is postmenopausal.       Physical Exam     Physical Exam  Vitals reviewed. Constitutional:       Appearance: Normal appearance. Musculoskeletal:         General: Swelling, tenderness and signs of injury present. No deformity. Skin:     Findings: Erythema present. Neurological:      Mental Status: She is alert.

## 2023-08-18 ENCOUNTER — OFFICE VISIT (OUTPATIENT)
Dept: FAMILY MEDICINE CLINIC | Facility: CLINIC | Age: 58
End: 2023-08-18
Payer: COMMERCIAL

## 2023-08-18 VITALS
HEART RATE: 82 BPM | OXYGEN SATURATION: 98 % | WEIGHT: 137.6 LBS | TEMPERATURE: 97.8 F | BODY MASS INDEX: 23.49 KG/M2 | HEIGHT: 64 IN | SYSTOLIC BLOOD PRESSURE: 120 MMHG | DIASTOLIC BLOOD PRESSURE: 64 MMHG | RESPIRATION RATE: 12 BRPM

## 2023-08-18 DIAGNOSIS — Z12.12 SCREENING FOR COLORECTAL CANCER: ICD-10-CM

## 2023-08-18 DIAGNOSIS — Z00.00 ANNUAL PHYSICAL EXAM: Primary | ICD-10-CM

## 2023-08-18 DIAGNOSIS — R73.03 PREDIABETES: ICD-10-CM

## 2023-08-18 DIAGNOSIS — E55.9 VITAMIN D DEFICIENCY: ICD-10-CM

## 2023-08-18 DIAGNOSIS — F32.A DEPRESSION, UNSPECIFIED DEPRESSION TYPE: ICD-10-CM

## 2023-08-18 DIAGNOSIS — E03.9 HYPOTHYROIDISM, UNSPECIFIED TYPE: ICD-10-CM

## 2023-08-18 DIAGNOSIS — Z12.11 SCREENING FOR COLORECTAL CANCER: ICD-10-CM

## 2023-08-18 PROBLEM — M25.562 LEFT KNEE PAIN: Status: RESOLVED | Noted: 2022-04-12 | Resolved: 2023-08-18

## 2023-08-18 PROBLEM — D22.9 NEVUS: Status: RESOLVED | Noted: 2020-08-06 | Resolved: 2023-08-18

## 2023-08-18 LAB
LAB AP GYN PRIMARY INTERPRETATION: NORMAL
Lab: NORMAL

## 2023-08-18 PROCEDURE — 99396 PREV VISIT EST AGE 40-64: CPT | Performed by: PHYSICIAN ASSISTANT

## 2023-08-18 RX ORDER — PAROXETINE HYDROCHLORIDE HEMIHYDRATE 25 MG/1
TABLET, FILM COATED, EXTENDED RELEASE ORAL
Qty: 180 TABLET | Refills: 3 | Status: SHIPPED | OUTPATIENT
Start: 2023-08-18

## 2023-08-18 RX ORDER — NIACIN 100 MG
100 TABLET ORAL
COMMUNITY

## 2023-08-18 NOTE — ASSESSMENT & PLAN NOTE
Controlled. Continue on Paxil CR 50 mg daily (without Wellbutrin). Will continue to monitor. Depression Screening Follow-up Plan: Patient's depression screening was positive with a PHQ-2 score of . Their PHQ-9 score was 0. Patient assessed for underlying major depression. They have no active suicidal ideations. Brief counseling provided and recommend additional follow-up/re-evaluation next office visit.

## 2023-08-18 NOTE — PROGRESS NOTES
ADULT ANNUAL [address] [facility]IMARY [facility]ARE    NAME: Yann Cowart  AGE: 59 y.o. SEX: female  : 1965     DATE: 2023     Assessment and Plan:     Problem List Items Addressed This Visit        Endocrine    Hypothyroidism     Currently on levothyroxine 25 mcg Monday through Friday and 50 mcg on weekend day. Recheck with labs as ordered today. Relevant Orders    CBC and differential    TSH, 3rd generation with Free T4 reflex       Other    Depression     Controlled. Continue on Paxil CR 50 mg daily (without Wellbutrin). Will continue to monitor. Depression Screening Follow-up Plan: Patient's depression screening was positive with a PHQ-2 score of . Their PHQ-9 score was 0. Patient assessed for underlying major depression. They have no active suicidal ideations. Brief counseling provided and recommend additional follow-up/re-evaluation next office visit. Relevant Medications    PARoxetine (PAXIL-CR) 25 mg 24 hr tablet    Other Relevant Orders    CBC and differential    Prediabetes     Reviewed recent labs showed A1c of 6.0%. She was encouraged to limit carb intake and recheck A1c in 6 months. Relevant Orders    CBC and differential    Comprehensive metabolic panel    Hemoglobin A1C    Vitamin D deficiency     Currently on 4000 units daily. Recheck with labs as ordered. Relevant Orders    Vitamin D 25 hydroxy   Other Visit Diagnoses     Annual physical exam    -  Primary    Screening for colorectal cancer        Relevant Orders    Cologuard          Immunizations and preventive care screenings were discussed with patient today. Appropriate education was printed on patient's after visit summary. Counseling:  · Exercise: the importance of regular exercise/physical activity was discussed. Recommend exercise 3-5 times per week for at least 30 minutes.           Return in about 1 year (around 2024) for Annual physical.     Chief Complaint:     Chief Complaint   Patient presents with   • Physical Exam      History of Present Illness:     Adult Annual Physical   Patient here for a comprehensive physical exam. The patient reports problems - as below. Hypothyroidism-on levothyroxine 25 mcg Monday through Friday and 50 mcg on weekend days-last TSH was 1.700 a year ago     Depression-on Paxil CR 50 mg daily but has been off Wellbutrin  mg daily for 4-5 months without trouble    Hypercholesterolemia - last LDL was 77 a few days ago    Vitamin D deficiency-on 4000 units daily - level last year was 53.6    Prediabetes-recent labs a few days ago showed an elevated A1c of 6.0%       Diet and Physical Activity  · Diet/Nutrition: well balanced diet. 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 Depression Screening  PHQ-2/9 Depression Screening    Little interest or pleasure in doing things: 0 - not at all  Feeling down, depressed, or hopeless: 0 - not at all  Trouble falling or staying asleep, or sleeping too much: 0 - not at all  Feeling tired or having little energy: 0 - not at all  Poor appetite or overeatin - not at all  Feeling bad about yourself - or that you are a failure or have let yourself or your family down: 0 - not at all  Trouble concentrating on things, such as reading the newspaper or watching television: 0 - not at all  Moving or speaking so slowly that other people could have noticed. Or the opposite - being so fidgety or restless that you have been moving around a lot more than usual: 0 - not at all  Thoughts that you would be better off dead, or of hurting yourself in some way: 0 - not at all  PHQ-9 Score: 0   PHQ-9 Interpretation: No or Minimal depression        General Health  · Sleep: sleeps well. · Hearing: requires use of hearing aids. · Vision: goes for regular eye exams and wears glasses. · Dental: regular dental visits.        /GYN Health  · Patient is: postmenopausal       Review of Systems:     Review of Systems   Constitutional: Negative for chills and fever. HENT: Positive for congestion and postnasal drip. Negative for rhinorrhea and sore throat. Eyes: Negative for visual disturbance. Respiratory: Negative for cough, shortness of breath and wheezing. Cardiovascular: Negative for chest pain, palpitations and leg swelling. Gastrointestinal: Negative for abdominal pain, constipation, diarrhea, nausea and vomiting. Endocrine: Negative for polydipsia and polyuria. Genitourinary: Negative for dysuria and frequency. Musculoskeletal: Negative for arthralgias and myalgias. Skin: Negative for rash. Neurological: Negative for dizziness, syncope and headaches. Hematological: Bruises/bleeds easily. Psychiatric/Behavioral: Negative for dysphoric mood. The patient is not nervous/anxious.        Past Medical History:     Past Medical History:   Diagnosis Date   • Asthma    • Depression    • Disease of thyroid gland    • Left knee pain 4/12/2022   • Osteopenia    • Pneumonia     2017   • Rosacea 2/9/2021   • Vitamin D deficiency 7/24/2018      Past Surgical History:     Past Surgical History:   Procedure Laterality Date   • LASIK     • [place] NEUROPLASTY &/TRANSPOS MEDIAN NRV CARPAL NALLELY Right 7/10/2018    Procedure: CARPAL TUNNEL RELEASE;  Surgeon: Jimy Calix DO;  [facility];  Service: Orthopedics   • [place] NEUROPLASTY &/TRANSPOS MEDIAN NRV CARPAL Rashad Keen Left 4/19/2019    Procedure: RELEASE CARPAL TUNNEL;  Surgeon: Sloan Hollis MD;  [facility];  Service: Orthopedics   • SEPTOPLASTY     • SINUS SURGERY        Social History:     Social History     Socioeconomic History   • Marital status: /Civil Union     Spouse name: None   • Number of children: None   • Years of education: None   • Highest education level: None   Occupational History   • None   Tobacco Use   • Smoking status: Never   • Smokeless tobacco: Never   Vaping Use   • Vaping Use: Never used   Substance and Sexual Activity   • Alcohol use: Yes     Comment: rarely   • Drug use: Never   • Sexual activity: Yes     Partners: Male     Birth control/protection: Male Sterilization, Post-menopausal   Other Topics Concern   • None   Social History Narrative   • None     Social Determinants of Health     Financial Resource Strain: Not on file   Food Insecurity: Not on file   Transportation Needs: Not on file   Physical Activity: Not on file   Stress: Not on file   Social Connections: Not on file   Intimate Partner Violence: Not on file   Housing Stability: Not on file      Family History:     Family History   Problem Relation Age of Onset   • Hashimoto's thyroiditis Mother    • Cancer Mother         melanoma   • Thyroid disease Mother    • Crohn's disease Father    • Cancer Father         melanoma   • Hashimoto's thyroiditis Sister    • No Known Problems Sister    • No Known Problems Sister    • Depression Maternal Grandmother    • Diabetes Maternal Grandmother    • No Known Problems Maternal Grandfather    • No Known Problems Paternal Grandmother    • No Known Problems Paternal Grandfather       Current Medications:     Current Outpatient Medications   Medication Sig Dispense Refill   • Calcium 250 MG CAPS Take by mouth daily with dinner       • cetirizine (ZyrTEC) 10 mg tablet Take 10 mg by mouth as needed     • Cholecalciferol (VITAMIN D3 PO) Take by mouth     • ciprofloxacin (CIPRO) 500 mg tablet Take 1 tablet (500 mg total) by mouth every 12 (twelve) hours for 10 days 20 tablet 0   • levothyroxine 25 mcg tablet TAKE ONE TABLET BY MOUTH EVERY DAY MONDAY THRU FRIDAY AND TAKE 2 TABLETS EVERY DAY ON SAT & SUN.  114 tablet 0   • MAGNESIUM PO Take 200 mg by mouth daily with dinner       • Medium Chain Triglycerides (MCT OIL PO) Take by mouth     • MULTIPLE VITAMINS-CALCIUM PO Take 1 capsule by mouth daily in the early morning       • niacin 100 mg tablet Take 100 mg by mouth daily with breakfast     • PARoxetine (PAXIL-CR) 25 mg 24 hr tablet Take 2 po daily 180 tablet 3   • pseudoephedrine (SUDAFED) 120 MG 12 hr tablet Take 120 mg by mouth as needed       No current facility-administered medications for this visit. Allergies: Allergies   Allergen Reactions   • Pollen Extract       Physical Exam:     /64 (BP Location: Left arm, Patient Position: Sitting, Cuff Size: Standard)   Pulse 82   Temp 97.8 °F (36.6 °C) (Temporal)   Resp 12   Ht 5' 3.75" (1.619 m)   Wt 62.4 kg (137 lb 9.6 oz)   SpO2 98%   BMI 23.80 kg/m²     Physical Exam  Vitals reviewed. Constitutional:       General: She is not in acute distress. Appearance: Normal appearance. She is well-developed and normal weight. She is not ill-appearing. HENT:      Head: Normocephalic and atraumatic. Right Ear: Tympanic membrane, ear canal and external ear normal. There is no impacted cerumen. Left Ear: Tympanic membrane, ear canal and external ear normal. There is no impacted cerumen. Mouth/Throat:      Mouth: Mucous membranes are moist.      Pharynx: No oropharyngeal exudate. Eyes:      Conjunctiva/sclera: Conjunctivae normal.      Pupils: Pupils are equal, round, and reactive to light. Neck:      Thyroid: No thyromegaly. Vascular: No carotid bruit. Cardiovascular:      Rate and Rhythm: Normal rate and regular rhythm. Pulses: Normal pulses. Heart sounds: Normal heart sounds. No murmur heard. Pulmonary:      Effort: Pulmonary effort is normal.      Breath sounds: Normal breath sounds. No wheezing, rhonchi or rales. Abdominal:      General: Bowel sounds are normal. There is no distension. Palpations: Abdomen is soft. There is no mass. Tenderness: There is no abdominal tenderness. Musculoskeletal:      Cervical back: Normal range of motion and neck supple. Right lower leg: No edema. Left lower leg: No edema.    Lymphadenopathy: Cervical: No cervical adenopathy. Skin:     General: Skin is warm and dry. Findings: No rash. Neurological:      Mental Status: She is alert. Sensory: No sensory deficit. Motor: No weakness. Comments: 5/5 strength in UE and LE   Psychiatric:         Mood and Affect: Mood normal.         Behavior: Behavior normal.         Thought Content:  Thought content normal.         Judgment: Judgment normal.          Severa Schultz, PA-C  [facility] PRIMARY [facility]

## 2023-08-18 NOTE — ASSESSMENT & PLAN NOTE
Reviewed recent labs showed A1c of 6.0%. She was encouraged to limit carb intake and recheck A1c in 6 months.

## 2023-08-18 NOTE — ASSESSMENT & PLAN NOTE
Currently on levothyroxine 25 mcg Monday through Friday and 50 mcg on weekend day. Recheck with labs as ordered today.

## 2023-08-28 ENCOUNTER — TELEPHONE (OUTPATIENT)
Dept: GYNECOLOGY | Facility: CLINIC | Age: 58
End: 2023-08-28

## 2023-09-24 DIAGNOSIS — E03.9 HYPOTHYROIDISM, UNSPECIFIED TYPE: ICD-10-CM

## 2023-09-25 NOTE — TELEPHONE ENCOUNTER
Please remind patient to update her labs as previously ordered. Her last thyroid test was over a year ago.

## 2023-09-26 RX ORDER — LEVOTHYROXINE SODIUM 0.03 MG/1
TABLET ORAL
Qty: 114 TABLET | Refills: 0 | Status: SHIPPED | OUTPATIENT
Start: 2023-09-26

## 2023-09-28 ENCOUNTER — APPOINTMENT (OUTPATIENT)
Dept: LAB | Facility: MEDICAL CENTER | Age: 58
End: 2023-09-28
Payer: COMMERCIAL

## 2023-09-28 DIAGNOSIS — E03.9 MYXEDEMA HEART DISEASE: ICD-10-CM

## 2023-09-28 DIAGNOSIS — I51.9 MYXEDEMA HEART DISEASE: ICD-10-CM

## 2023-09-28 DIAGNOSIS — F32.5 MAJOR DEPRESSIVE DISORDER WITH SINGLE EPISODE, IN REMISSION (HCC): ICD-10-CM

## 2023-09-28 DIAGNOSIS — E78.00 PURE HYPERCHOLESTEROLEMIA: ICD-10-CM

## 2023-09-28 DIAGNOSIS — E55.9 AVITAMINOSIS D: ICD-10-CM

## 2023-09-28 LAB
25(OH)D3 SERPL-MCNC: 61.2 NG/ML (ref 30–100)
ALBUMIN SERPL BCP-MCNC: 4.7 G/DL (ref 3.5–5)
ALP SERPL-CCNC: 69 U/L (ref 34–104)
ALT SERPL W P-5'-P-CCNC: 21 U/L (ref 7–52)
ANION GAP SERPL CALCULATED.3IONS-SCNC: 7 MMOL/L
AST SERPL W P-5'-P-CCNC: 22 U/L (ref 13–39)
BASOPHILS # BLD AUTO: 0.01 THOUSANDS/ÂΜL (ref 0–0.1)
BASOPHILS NFR BLD AUTO: 0 % (ref 0–1)
BILIRUB SERPL-MCNC: 0.55 MG/DL (ref 0.2–1)
BUN SERPL-MCNC: 11 MG/DL (ref 5–25)
CALCIUM SERPL-MCNC: 9.9 MG/DL (ref 8.4–10.2)
CHLORIDE SERPL-SCNC: 102 MMOL/L (ref 96–108)
CHOLEST SERPL-MCNC: 200 MG/DL
CO2 SERPL-SCNC: 27 MMOL/L (ref 21–32)
CREAT SERPL-MCNC: 0.65 MG/DL (ref 0.6–1.3)
EOSINOPHIL # BLD AUTO: 0.46 THOUSAND/ÂΜL (ref 0–0.61)
EOSINOPHIL NFR BLD AUTO: 9 % (ref 0–6)
ERYTHROCYTE [DISTWIDTH] IN BLOOD BY AUTOMATED COUNT: 12.5 % (ref 11.6–15.1)
GFR SERPL CREATININE-BSD FRML MDRD: 96 ML/MIN/1.73SQ M
GLUCOSE P FAST SERPL-MCNC: 86 MG/DL (ref 65–99)
HCT VFR BLD AUTO: 39.5 % (ref 34.8–46.1)
HDLC SERPL-MCNC: 64 MG/DL
HGB BLD-MCNC: 13.4 G/DL (ref 11.5–15.4)
IMM GRANULOCYTES # BLD AUTO: 0.01 THOUSAND/UL (ref 0–0.2)
IMM GRANULOCYTES NFR BLD AUTO: 0 % (ref 0–2)
LDLC SERPL CALC-MCNC: 117 MG/DL (ref 0–100)
LYMPHOCYTES # BLD AUTO: 1.87 THOUSANDS/ÂΜL (ref 0.6–4.47)
LYMPHOCYTES NFR BLD AUTO: 37 % (ref 14–44)
MCH RBC QN AUTO: 30 PG (ref 26.8–34.3)
MCHC RBC AUTO-ENTMCNC: 33.9 G/DL (ref 31.4–37.4)
MCV RBC AUTO: 89 FL (ref 82–98)
MONOCYTES # BLD AUTO: 0.43 THOUSAND/ÂΜL (ref 0.17–1.22)
MONOCYTES NFR BLD AUTO: 9 % (ref 4–12)
NEUTROPHILS # BLD AUTO: 2.28 THOUSANDS/ÂΜL (ref 1.85–7.62)
NEUTS SEG NFR BLD AUTO: 45 % (ref 43–75)
NONHDLC SERPL-MCNC: 136 MG/DL
NRBC BLD AUTO-RTO: 0 /100 WBCS
PLATELET # BLD AUTO: 241 THOUSANDS/UL (ref 149–390)
PMV BLD AUTO: 11.1 FL (ref 8.9–12.7)
POTASSIUM SERPL-SCNC: 4.6 MMOL/L (ref 3.5–5.3)
PROT SERPL-MCNC: 8.1 G/DL (ref 6.4–8.4)
RBC # BLD AUTO: 4.46 MILLION/UL (ref 3.81–5.12)
SODIUM SERPL-SCNC: 136 MMOL/L (ref 135–147)
TRIGL SERPL-MCNC: 97 MG/DL
TSH SERPL DL<=0.05 MIU/L-ACNC: 3.54 UIU/ML (ref 0.45–4.5)
WBC # BLD AUTO: 5.06 THOUSAND/UL (ref 4.31–10.16)

## 2023-09-28 PROCEDURE — 85025 COMPLETE CBC W/AUTO DIFF WBC: CPT

## 2023-09-28 PROCEDURE — 80061 LIPID PANEL: CPT

## 2023-09-28 PROCEDURE — 36415 COLL VENOUS BLD VENIPUNCTURE: CPT

## 2023-09-28 PROCEDURE — 80053 COMPREHEN METABOLIC PANEL: CPT

## 2023-09-28 PROCEDURE — 82306 VITAMIN D 25 HYDROXY: CPT

## 2023-09-28 PROCEDURE — 84443 ASSAY THYROID STIM HORMONE: CPT

## 2023-10-13 LAB — COLOGUARD RESULT REPORTABLE: NEGATIVE

## 2023-10-31 ENCOUNTER — HOSPITAL ENCOUNTER (OUTPATIENT)
Dept: MAMMOGRAPHY | Facility: CLINIC | Age: 58
Discharge: HOME | End: 2023-10-31
Payer: COMMERCIAL

## 2023-10-31 ENCOUNTER — HOSPITAL ENCOUNTER (OUTPATIENT)
Dept: ULTRASOUND IMAGING | Facility: CLINIC | Age: 58
Discharge: HOME | End: 2023-10-31
Payer: COMMERCIAL

## 2023-10-31 VITALS — BODY MASS INDEX: 24.27 KG/M2 | WEIGHT: 137 LBS | HEIGHT: 63 IN

## 2023-10-31 DIAGNOSIS — R92.8 ABNORMAL SCREENING MAMMOGRAM: ICD-10-CM

## 2023-10-31 PROCEDURE — 76642 ULTRASOUND BREAST LIMITED: CPT

## 2023-10-31 PROCEDURE — 77066 DX MAMMO INCL CAD BI: CPT

## 2023-10-31 RX ADMIN — IOHEXOL 94 ML: 350 INJECTION, SOLUTION INTRAVENOUS at 11:15

## 2023-10-31 NOTE — PROGRESS NOTES
Met with patient and Dr. Jaci Valadez  regarding recommendation for;    __X___ RIGHT ______LEFT      ___X__Ultrasound guided x2  ______Stereotactic breast biopsy. __X___Verbalized understanding.       Blood thinners:  No: __X___ Yes: ______ What:                 Biopsy teaching sheet given:  Yes: ___X___ No: ________    Pt given contact information and adv to call with any questions/needs    Patient advised to arrive at 10:30 for a 11:00 appointment

## 2023-11-01 ENCOUNTER — HOSPITAL ENCOUNTER (OUTPATIENT)
Dept: MAMMOGRAPHY | Facility: CLINIC | Age: 58
Discharge: HOME | End: 2023-11-01
Payer: COMMERCIAL

## 2023-11-01 ENCOUNTER — HOSPITAL ENCOUNTER (OUTPATIENT)
Dept: ULTRASOUND IMAGING | Facility: CLINIC | Age: 58
Discharge: HOME | End: 2023-11-01
Payer: COMMERCIAL

## 2023-11-01 VITALS — HEART RATE: 79 BPM | DIASTOLIC BLOOD PRESSURE: 68 MMHG | SYSTOLIC BLOOD PRESSURE: 116 MMHG

## 2023-11-01 DIAGNOSIS — R92.8 ABNORMAL SCREENING MAMMOGRAM: ICD-10-CM

## 2023-11-01 PROCEDURE — 88305 TISSUE EXAM BY PATHOLOGIST: CPT | Performed by: PATHOLOGY

## 2023-11-01 PROCEDURE — 88360 TUMOR IMMUNOHISTOCHEM/MANUAL: CPT | Performed by: PATHOLOGY

## 2023-11-01 PROCEDURE — 88374 M/PHMTRC ALYS ISHQUANT/SEMIQ: CPT | Performed by: PATHOLOGY

## 2023-11-01 PROCEDURE — 19084 BX BREAST ADD LESION US IMAG: CPT

## 2023-11-01 PROCEDURE — 88342 IMHCHEM/IMCYTCHM 1ST ANTB: CPT | Performed by: PATHOLOGY

## 2023-11-01 PROCEDURE — 19083 BX BREAST 1ST LESION US IMAG: CPT

## 2023-11-01 PROCEDURE — A4648 IMPLANTABLE TISSUE MARKER: HCPCS

## 2023-11-01 PROCEDURE — 88341 IMHCHEM/IMCYTCHM EA ADD ANTB: CPT | Performed by: PATHOLOGY

## 2023-11-01 RX ORDER — LIDOCAINE HYDROCHLORIDE 10 MG/ML
5 INJECTION, SOLUTION EPIDURAL; INFILTRATION; INTRACAUDAL; PERINEURAL ONCE
Status: COMPLETED | OUTPATIENT
Start: 2023-11-01 | End: 2023-11-01

## 2023-11-01 RX ADMIN — LIDOCAINE HYDROCHLORIDE 5 ML: 10 INJECTION, SOLUTION EPIDURAL; INFILTRATION; INTRACAUDAL; PERINEURAL at 10:48

## 2023-11-01 RX ADMIN — LIDOCAINE HYDROCHLORIDE 5 ML: 10 INJECTION, SOLUTION EPIDURAL; INFILTRATION; INTRACAUDAL; PERINEURAL at 10:55

## 2023-11-01 NOTE — PROGRESS NOTES
Ice pack given:    __x___yes _____no        Discharge instructions given to patient:    __x___yes _____no    Discharged via:    __x___ambulatory    _____wheelchair    _____stretcher    Stable on discharge:    ___x__yes ____no

## 2023-11-01 NOTE — PROGRESS NOTES
Procedure type:    ___x__ultrasound guided _____stereotactic    Breast:    _____Left ___x__Right    Location: Right breast 1200 8cmfn    Needle: 12G    # of passes: 5    Clip: Thais     Performed by: Dr. Jeniffer Jhaveri held for 5 minutes by: Eldon Rodriguez RN    Steri Strips:    __x___yes _____no    Band aid:    ___x__yes_____no    Tape and guaze:    _____yes ___x__no    Tolerated procedure:    __x___yes _____no

## 2023-11-01 NOTE — PROGRESS NOTES
Procedure type:    __x___ultrasound guided _____stereotactic    Breast:    _____Left ___x__Right    Location: Right breast 700 5cmfn    Needle: 12G    # of passes: 4    Clip: Thais     Performed by: Dr. Mala Gillespie held for 5 minutes by: Devang Dixon RN    Steri Strips:    __x___yes _____no    Band aid:    _____yes_____no    Tape and guaze:    _____yes __x___no    Tolerated procedure:    __x___yes _____no

## 2023-11-02 NOTE — PROGRESS NOTES
Post procedure call completed    Bleeding: _____yes __X___no (Pt denies)    Pain: _____yes ___X___no (Pt denies)    Redness/Swelling: ______yes ___X___no (Pt denies)    Band aid removed: _____yes ___X__no (discussed removing when she showers)    Steri-Strips intact: ___X___yes _____no (discussed with patient to remove steri strips on Monday if they have not come off on their own)    Pt with no questions at this time, adv will call when results available, adv to call with any questions or concerns, has name/# for contact

## 2023-11-03 ENCOUNTER — TELEPHONE (OUTPATIENT)
Dept: MAMMOGRAPHY | Facility: CLINIC | Age: 58
End: 2023-11-03

## 2023-11-03 ENCOUNTER — TELEPHONE (OUTPATIENT)
Dept: HEMATOLOGY ONCOLOGY | Facility: CLINIC | Age: 58
End: 2023-11-03

## 2023-11-03 PROCEDURE — 88341 IMHCHEM/IMCYTCHM EA ADD ANTB: CPT | Performed by: PATHOLOGY

## 2023-11-03 PROCEDURE — 88305 TISSUE EXAM BY PATHOLOGIST: CPT | Performed by: PATHOLOGY

## 2023-11-03 PROCEDURE — 88342 IMHCHEM/IMCYTCHM 1ST ANTB: CPT | Performed by: PATHOLOGY

## 2023-11-03 NOTE — TELEPHONE ENCOUNTER
Call placed to patient after patient given breast biopsy results from radiologist, support provided, questions answered. Advised next step is to see a surgeon. Options discussed and patient chose Dr Nida Dumas. Informed patient I would send a referral to the [facility] and someone will be reaching out from there with an appt day/time. I also discussed the radiologist recommendation to have a breast MRI. I informed patient I will notify her provider Gretta Franz to place an order and to notify patient so she can make an appt. Patient given the number to central scheduling. Patient has my name/number for any further needs.

## 2023-11-03 NOTE — TELEPHONE ENCOUNTER
I called Yuniel Mccarthy in response to a referral that was received for patient to establish care with Surgical Oncology. Outreach was made to schedule a consultation. I left a voicemail explaining the reason for my call and advised patient to call [facility] at [phone].   Another attempt will be made to contact patient.    [facility] Surgical Oncology Referral     Diagnosis: Invasive breast carcinoma of no special type (ductal NST/invasive ductal carcinoma)      Is this diagnosis cancer (Y/N): Y     Biopsy Date: 11/1/23     Does the patient have another biopsy pending: N  If so, when:     Preferred provider: Dr Jb Peñaloza     Preferred location: [facility]     Any requests for dates/times: first available appt please     Any additional information:      Please advise when appointment is made YES

## 2023-11-06 ENCOUNTER — TELEPHONE (OUTPATIENT)
Dept: HEMATOLOGY ONCOLOGY | Facility: CLINIC | Age: 58
End: 2023-11-06

## 2023-11-06 NOTE — TELEPHONE ENCOUNTER
I called Andrez Neff in response to a referral that was received for patient to establish care with Surgical Oncology. Outreach was made to schedule a consultation. I left a voicemail explaining the reason for my call and advised patient to call [place] at [phone].   Another attempt will be made to contact patient.     [facility])  [facility] Surgical Oncology Referral     Diagnosis: Invasive breast carcinoma of no special type (ductal NST/invasive ductal carcinoma)      Is this diagnosis cancer (Y/N): Y     Biopsy Date: 11/1/23     Does the patient have another biopsy pending: N  If so, when:     Preferred provider: Dr Edwin Mai     Preferred location: [facility]     Any requests for dates/times: first available appt please     Any additional information:      Please advise when appointment is made YES

## 2023-11-06 NOTE — TELEPHONE ENCOUNTER
Appointment Schedule   Who are you speaking with? Patient   If it is not the patient, are they listed on an active communication consent form? N/A   Which provider is the appointment scheduled with? Dr. Misbah Platt   At which location is the appointment scheduled for? [place]   When is the appointment scheduled? Please list date and time 11/13/23 @ 9    Only wants [facility]   What is the reason for this appointment? RBC- Invasive breast carcinoma of no special type (ductal NST/invasive ductal carcinoma)   Did patient voice understanding of the details of this appointment? Yes   Was the no show policy reviewed with patient?  Yes

## 2023-11-07 ENCOUNTER — TELEPHONE (OUTPATIENT)
Dept: GYNECOLOGY | Facility: CLINIC | Age: 58
End: 2023-11-07

## 2023-11-07 NOTE — TELEPHONE ENCOUNTER
I left message on pt's voice mail. I sent Dr. Chyna Srinivasan a message to determine if she would like me to order the MRI or if she would like to after she sees the pt on 11/13/23.

## 2023-11-07 NOTE — TELEPHONE ENCOUNTER
Patient returned call. Read her your message regarding MRI and Dr Matilda Crockett. She said she was told to return your call. Please call her at your convenience.

## 2023-11-08 ENCOUNTER — TELEPHONE (OUTPATIENT)
Dept: GYNECOLOGY | Facility: CLINIC | Age: 58
End: 2023-11-08

## 2023-11-08 DIAGNOSIS — Z17.0 MALIGNANT NEOPLASM OF LOWER-OUTER QUADRANT OF RIGHT BREAST OF FEMALE, ESTROGEN RECEPTOR POSITIVE: Primary | ICD-10-CM

## 2023-11-08 DIAGNOSIS — C50.511 MALIGNANT NEOPLASM OF LOWER-OUTER QUADRANT OF RIGHT BREAST OF FEMALE, ESTROGEN RECEPTOR POSITIVE: Primary | ICD-10-CM

## 2023-11-08 NOTE — TELEPHONE ENCOUNTER
LM on VM to [facility] - pls inform pt that I placed the order for breast MRI. She can schedule and then call our office with location, date and time so we can do the prior auth.

## 2023-11-09 ENCOUNTER — TELEPHONE (OUTPATIENT)
Dept: GYNECOLOGY | Facility: CLINIC | Age: 58
End: 2023-11-09

## 2023-11-09 NOTE — TELEPHONE ENCOUNTER
Patient called and states she is scheduled for breast MRI on 12/31/23 at 2 pm  at [facility]. Please start prior auth for her.

## 2023-11-13 ENCOUNTER — PATIENT OUTREACH (OUTPATIENT)
Dept: HEMATOLOGY ONCOLOGY | Facility: CLINIC | Age: 58
End: 2023-11-13

## 2023-11-13 ENCOUNTER — DOCUMENTATION (OUTPATIENT)
Dept: OTHER | Facility: HOSPITAL | Age: 58
End: 2023-11-13

## 2023-11-13 ENCOUNTER — CONSULT (OUTPATIENT)
Dept: SURGICAL ONCOLOGY | Facility: CLINIC | Age: 58
End: 2023-11-13
Payer: COMMERCIAL

## 2023-11-13 VITALS
HEART RATE: 77 BPM | DIASTOLIC BLOOD PRESSURE: 68 MMHG | BODY MASS INDEX: 24.27 KG/M2 | HEIGHT: 63 IN | OXYGEN SATURATION: 96 % | TEMPERATURE: 97.6 F | WEIGHT: 137 LBS | SYSTOLIC BLOOD PRESSURE: 122 MMHG

## 2023-11-13 DIAGNOSIS — C50.811 MALIGNANT NEOPLASM OF OVERLAPPING SITES OF RIGHT BREAST IN FEMALE, ESTROGEN RECEPTOR POSITIVE: Primary | ICD-10-CM

## 2023-11-13 DIAGNOSIS — Z17.0 MALIGNANT NEOPLASM OF OVERLAPPING SITES OF RIGHT BREAST IN FEMALE, ESTROGEN RECEPTOR POSITIVE: Primary | ICD-10-CM

## 2023-11-13 PROCEDURE — 99245 OFF/OP CONSLTJ NEW/EST HI 55: CPT | Performed by: SURGERY

## 2023-11-13 NOTE — PROGRESS NOTES
Pt was identified as being eligible for the [employer] 2021-05 study. Pt was seen by Dr. Misbah Platt at the [facility] on 11/13/2023 for her breast cancer. CRC met with pt to discuss the trial, including the risks, benefits, and alternatives. Pt had the opportunity to ask questions, all of which were answered, and wanted to think about the study further at home. The pt was provided with a copy of the consent form explaining the study, along with my business card should she have any questions and becomes interested in participating prior to the start of their treatment.

## 2023-11-13 NOTE — PROGRESS NOTES
Breast Consultation-Surgical Oncology     [facility]  [facility] Elsiemichael Noonan  [address] [id number]    Name:  Letty Cole  YOB: 1965  MRN:  [id number]    Assessment/Plan   Diagnoses and all orders for this visit:    Malignant neoplasm of overlapping sites of right breast in female, estrogen receptor positive           HPI: Letty Cole is a 59y.o. year old female who presents with newly diagnosed carcinoma of the right breast.  She states that she was asymptomatic referable to the breast.  She denies any family history of breast cancer. She reports melanoma in both her mother and father. She also reports colon cancer in a maternal uncle. She was already offered genetic testing but has declined. Surgical treatment to date consisted of not applicable.     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 breast in female, estrogen receptor positive    2023 -  Cancer Staged    Staging form: Breast, AJCC 8th Edition  - Clinical stage from 2023: Stage IA (cT1c, cN0, cM0, G1, ER+, MI+, HER2-) - Signed by Alondra Serrano MD on 2023  Stage prefix: Initial diagnosis  Method of lymph node assessment: Clinical  Histologic grading system: 3 grade system       2023 Initial Diagnosis    Malignant neoplasm of overlapping sites of right breast in female, estrogen receptor positive          Pertinent reproductive history:  Age at menarche:    OB History          2    Para   2    Term   2            AB        Living             SAB        IAB        Ectopic        Multiple        Live Births   2           Obstetric Comments   Age at menarche 13  Age at first pregnancy 30  Age at menopause unk  Hx of HRT use X 7 years                 Problem List:   Patient Active Problem List   Diagnosis    Bunion    Depression    Hypothyroidism    Prediabetes    Vitamin D deficiency    Easy bruising    Medial epicondylitis of elbow, right    Malignant neoplasm of overlapping sites of right breast in female, estrogen receptor positive      Past Medical History:   Diagnosis Date    Asthma     Depression     Disease of thyroid gland     Left knee pain 04/12/2022    Osteopenia     Pneumonia     2017    Rosacea 02/09/2021    Vitamin D deficiency 07/24/2018     Past Surgical History:   Procedure Laterality Date    BREAST BIOPSY Right 11/01/2023    X 2 sites    LASIK      MT NEUROPLASTY &/TRANSPOS MEDIAN NRV CARPAL TUNNE Right 07/10/2018    Procedure: CARPAL TUNNEL RELEASE;  Surgeon: Neyda Ponce DO;  Location: [facility];  Service: Orthopedics    MT NEUROPLASTY &/TRANSPOS MEDIAN NRV CARPAL Rosita Heck Left 04/19/2019    Procedure: RELEASE CARPAL TUNNEL;  Surgeon: Smitha Munguia MD;  Location: [facility];  Service: Orthopedics    SEPTOPLASTY      SINUS SURGERY      US GUIDANCE BREAST BIOPSY RIGHT EACH ADDITIONAL Right 11/01/2023    US GUIDED BREAST BIOPSY RIGHT COMPLETE Right 11/01/2023     Family History   Problem Relation Age of Onset    Hashimoto's thyroiditis Mother     Thyroid disease Mother     Melanoma Mother         52's    Melanoma Father         52's    Crohn's disease Father     Hashimoto's thyroiditis Sister     No Known Problems Sister     No Known Problems Sister     Colon cancer Maternal Uncle         52's    Depression Maternal Grandmother     Diabetes Maternal Grandmother     No Known Problems Maternal Grandfather     No Known Problems Paternal Grandmother     No Known Problems Paternal Grandfather      Social History     Socioeconomic History    Marital status: /Civil Union     Spouse name: Not on file    Number of children: Not on file    Years of education: Not on file    Highest education level: Not on file   Occupational History    Not on file   Tobacco Use    Smoking status: Never    Smokeless tobacco: Never   Vaping Use    Vaping Use: Never used   Substance and Sexual Activity Alcohol use: Yes     Comment: rarely    Drug use: Never    Sexual activity: Yes     Partners: Male     Birth control/protection: Male Sterilization, Post-menopausal   Other Topics Concern    Not on file   Social History Narrative    Not on file     Social Determinants of Health     Financial Resource Strain: Not on file   Food Insecurity: Not on file   Transportation Needs: Not on file   Physical Activity: Not on file   Stress: Not on file   Social Connections: Not on file   Intimate Partner Violence: Not on file   Housing Stability: Not on file     Current Outpatient Medications   Medication Sig Dispense Refill    Calcium 250 MG CAPS Take by mouth daily with dinner        cetirizine (ZyrTEC) 10 mg tablet Take 10 mg by mouth as needed      Cholecalciferol (VITAMIN D3 PO) Take by mouth      levothyroxine 25 mcg tablet TAKE ONE TABLET BY MOUTH EVERY DAY MONDAY THRU FRIDAY AND TAKE 2 TABLETS EVERY DAY ON SAT & SUN. 114 tablet 0    MAGNESIUM PO Take 200 mg by mouth daily with dinner        Medium Chain Triglycerides (MCT OIL PO) Take by mouth      MULTIPLE VITAMINS-CALCIUM PO Take 1 capsule by mouth daily in the early morning        niacin 100 mg tablet Take 100 mg by mouth daily with breakfast      PARoxetine (PAXIL-CR) 25 mg 24 hr tablet Take 2 po daily 180 tablet 3    pseudoephedrine (SUDAFED) 120 MG 12 hr tablet Take 120 mg by mouth as needed       No current facility-administered medications for this visit. Allergies   Allergen Reactions    Pollen Extract          The following portions of the patient's history were reviewed and updated as appropriate: allergies, current medications, past family history, past medical history, past social history, past surgical history, and problem list.    Review of Systems:  Review of Systems   Constitutional:  Positive for fatigue. Negative for appetite change, fever and unexpected weight change. HENT:  Positive for postnasal drip and rhinorrhea.  Negative for trouble swallowing. Eyes: Negative. Respiratory:  Positive for cough (allergies). Negative for shortness of breath. Cardiovascular: Negative. Negative for chest pain. Gastrointestinal: Negative. Negative for abdominal pain, nausea and vomiting. Endocrine: Negative. Genitourinary: Negative. Negative for dysuria. Musculoskeletal: Negative. Negative for arthralgias and myalgias. Skin: Negative. Allergic/Immunologic: Positive for environmental allergies. Neurological: Negative. Negative for headaches. Hematological: Negative. Negative for adenopathy. Does not bruise/bleed easily. Psychiatric/Behavioral: Negative. Physical Exam:  Physical Exam  Constitutional:       General: She is not in acute distress. Appearance: Normal appearance. She is well-developed. HENT:      Head: Normocephalic and atraumatic. Cardiovascular:      Heart sounds: Normal heart sounds. Pulmonary:      Breath sounds: Normal breath sounds. Chest:   Breasts:     Right: Mass and skin change (resolving ecchymosis) present. No swelling, bleeding, inverted nipple, nipple discharge or tenderness. Left: No swelling, bleeding, inverted nipple, mass, nipple discharge, skin change or tenderness. Abdominal:      Palpations: Abdomen is soft. Musculoskeletal:      Right lower leg: No edema. Left lower leg: No edema. Lymphadenopathy:      Upper Body:      Right upper body: No supraclavicular, axillary or pectoral adenopathy. Left upper body: No supraclavicular, axillary or pectoral adenopathy. Neurological:      Mental Status: She is alert and oriented to person, place, and time.    Psychiatric:         Mood and Affect: Mood normal.         Laboratory:  11/1/2023 core biopsy right breast 7:00 5 cm from the nipple and right breast 12:00 8 cm from the nipple    Pathology revealed: invasive ductal carcinoma    Histologic grade: low grade     Angiolymphatic invasion:  absent    Tumor node status: Negative    Hormone receptor status: ER/RI positive Block a 100/90 and block B100/35; HER2 was 2+ on block a and FISH negative although the report says this was done on block B; HER2 on block B was 1+      Imagin2023 bilateral 3D screening mammogram is extremely dense breast tissue, asymmetry noted upper right breast at 12:00, left side was benign      10/31/2023 bilateral contrast-enhanced mammogram and ultrasound there is an irregular mass right breast 12:00 with a 15 mm correlate on ultrasound posterior, irregular mass right breast 7:00 with a 12 mm correlate on ultrasound posterior; additional smaller areas of enhancement and distortion upper outer right breast with no sonographic correlate and a few small foci of enhancement in the medial breast on cc view with no sonographic correlate, benign appearing axillary nodes; left side showed a few small foci of enhancement in the upper outer quadrant with no sonographic correlate  2 site biopsy was recommended right breast 12:00 and right breast 7:00 as well as bilateral MRI given the additional areas of enhancement not seen on ultrasound as well as evaluation of the relationship of these tumors to the underlying pectoralis muscle    2023 postbiopsy mammogram is concordant showing multicentric disease, Thais reflectors are in place, additional areas of concern were seen on the contrast-enhanced mammogram bilateral, biopsy-proven malignancies are posterior in location, breast MRI is recommended to evaluate the other areas of enhancement as well as any possible pectoralis involvement        Discussion/Summary: 43-year-old female who presents with abnormal imaging status post 2 site biopsy in the right breast at 12:00 and 7:00. She therefore has multi centric disease. I discussed these findings with her as well as the additional concerns as noted above on the imaging. She is scheduled for an MRI for 2023.   I will reach out to my coordinator to try to move this appointment up. I also reached out to pathology to confirm the HER2 status based on the [facility] analysis. He states that this was indeed done on block a rather than block B. I will look for an updated report to reflect that. I reviewed the multimodality treatment of breast cancer with her to include surgery, radiation and medical therapy. Given the multicentric disease, she is not a candidate for breast conservation. I recommended a mastectomy with sentinel node biopsy and possible axillary node dissection pending intraoperative evaluation of the sentinel node. I informed her that there may be additional areas on the right side but I do not need any additional biopsies to make recommendations as a mastectomy is already indicated. On exam, these masses do not appear to be fixed to the muscle but we will get more information on the MRI. I informed her that we could potentially see other areas on the left side for which additional biopsies would be recommended. The patient is very much in shock over having a breast cancer diagnosis. This is based on her lifestyle choices and lack of any family history of breast cancer. She does not seem to be accepting of a mastectomy at this point. I did offer a consultation with plastic surgery which she has declined. I also discussed the role of genetic testing with her and she has again declined. I briefly discussed medical management with her. She states that she tends to be more natural and may opt for alternative choices. I told her I support complementary care but not alternative care. She will discuss this with her . We will attempt to expedite the MRI and call her with these results. Further recommendations will be made at that time.

## 2023-11-13 NOTE — LETTER
November 13, 2023     Bonnie Ramirez, [address]    Patient: Leona Rasheed   YOB: 1965   Date of Visit: 11/13/2023       Dear Dr. Ar Esposito: Thank you for referring Sarah Pagan to me for evaluation. Below are my notes for this consultation. If you have questions, please do not hesitate to call me. I look forward to following your patient along with you.          Sincerely,        Sheliah Primrose, MD        CC: No Recipients
: Yes

## 2023-11-14 ENCOUNTER — DOCUMENTATION (OUTPATIENT)
Dept: OBGYN CLINIC | Facility: CLINIC | Age: 58
End: 2023-11-14

## 2023-11-17 ENCOUNTER — PATIENT OUTREACH (OUTPATIENT)
Dept: HEMATOLOGY ONCOLOGY | Facility: CLINIC | Age: 58
End: 2023-11-17

## 2023-11-17 DIAGNOSIS — Z17.0 MALIGNANT NEOPLASM OF OVERLAPPING SITES OF RIGHT BREAST IN FEMALE, ESTROGEN RECEPTOR POSITIVE: Primary | ICD-10-CM

## 2023-11-17 DIAGNOSIS — C50.811 MALIGNANT NEOPLASM OF OVERLAPPING SITES OF RIGHT BREAST IN FEMALE, ESTROGEN RECEPTOR POSITIVE: Primary | ICD-10-CM

## 2023-11-17 NOTE — PROGRESS NOTES
Breast Oncology Nurse Navigator    Called patient for initial outreach from nurse navigator. Left voicemail message with contact information. Requested a call back. Referral placed for oncology social worker. Patient returned my phone call this afternoon. Introduced myself and my role. Patient states she is still processing breast cancer diagnosis. She knows Dr Luiz Perez recommended a mastectomy but states he does not want one. She is doing research on alternatives. Patient states she is a "natural type" that eats healthy and organic. We discussed options following mastectomy. Patient's  works for [email or web address]. We spoke about the [employer] in detail, with different options for prosthetics, bras and camisoles. We also discussed mastectomy swim suit options which are offered online as well as at [address] [place] [address]. We also spoke about plastic surgery options, including tissue expander and implant, as well as aesthetic flap closures. Patient states she will continue to consider the options available while she waits for her breast MRI next week. Confirmed that appointment. Advised her that we will get her back to see Dr Luiz Perez after the breast MRI and that we can also help to coordinate a plastic surgery consultation, if needed. Patient lives with her supportive . She has two children. Has support from her family, including her sisters. Denies issues with transportation. Referral already placed to oncology social worker. Cancer family history includes: Mother and father both had melanoma. Maternal uncle had colon cancer. Patient had declined genetic testing and still declines today. Discussed the [address] and some of the programs and services offered, including support groups and in person and virtual programs. Also spoke about [employer] and some of the programs and services they have to offer.     [email or web address] message sent with our team's contact information. Patient has my contact information and knows she can reach out with questions. General assessment completed.   Spoke for 24 minutes

## 2023-11-20 ENCOUNTER — PATIENT OUTREACH (OUTPATIENT)
Dept: CASE MANAGEMENT | Facility: HOSPITAL | Age: 58
End: 2023-11-20

## 2023-11-20 NOTE — PROGRESS NOTES
OSW received referral for patient. Patient met with Dr. Avni Leach on 11/13/23. Dr. Avni Leach recommending mastectomy. Patient prefers more alternative treatments and does not want mastectomy. Patient will discuss with her family. LSW will outreach for assessment and DT.

## 2023-11-21 ENCOUNTER — HOSPITAL ENCOUNTER (OUTPATIENT)
Dept: RADIOLOGY | Facility: HOSPITAL | Age: 58
Discharge: HOME | End: 2023-11-21
Payer: COMMERCIAL

## 2023-11-21 DIAGNOSIS — C50.511 MALIGNANT NEOPLASM OF LOWER-OUTER QUADRANT OF RIGHT BREAST OF FEMALE, ESTROGEN RECEPTOR POSITIVE: ICD-10-CM

## 2023-11-21 DIAGNOSIS — Z17.0 MALIGNANT NEOPLASM OF LOWER-OUTER QUADRANT OF RIGHT BREAST OF FEMALE, ESTROGEN RECEPTOR POSITIVE: ICD-10-CM

## 2023-11-21 PROCEDURE — A9585 GADOBUTROL INJECTION: HCPCS | Performed by: OBSTETRICS & GYNECOLOGY

## 2023-11-21 PROCEDURE — G1004 CDSM NDSC: HCPCS

## 2023-11-21 PROCEDURE — C8937 CAD BREAST MRI: HCPCS

## 2023-11-21 PROCEDURE — C8908 MRI W/O FOL W/CONT, BREAST,: HCPCS

## 2023-11-21 RX ORDER — GADOBUTROL 604.72 MG/ML
6 INJECTION INTRAVENOUS
Status: COMPLETED | OUTPATIENT
Start: 2023-11-21 | End: 2023-11-21

## 2023-11-21 RX ADMIN — GADOBUTROL 6 ML: 604.72 INJECTION INTRAVENOUS at 17:14

## 2023-11-27 ENCOUNTER — PATIENT OUTREACH (OUTPATIENT)
Dept: CASE MANAGEMENT | Facility: HOSPITAL | Age: 58
End: 2023-11-27

## 2023-11-27 ENCOUNTER — PATIENT OUTREACH (OUTPATIENT)
Dept: HEMATOLOGY ONCOLOGY | Facility: CLINIC | Age: 58
End: 2023-11-27

## 2023-11-27 NOTE — PROGRESS NOTES
Breast Oncology Nurse Navigator    Patient sent the following message via [employer]:    "Rogelio Sharma, let me know if you are not the person that I ask this question to please. So I’m assuming that I will need surgery in the future is it possible for me right now to donate my own blood is blood is needed? Thank you."      Message forwarded to patient's surgical team.  Responded to patient and let her know I will reply when I have a response to her question.

## 2023-11-28 ENCOUNTER — PATIENT OUTREACH (OUTPATIENT)
Dept: CASE MANAGEMENT | Facility: HOSPITAL | Age: 58
End: 2023-11-28

## 2023-11-28 ENCOUNTER — DOCUMENTATION (OUTPATIENT)
Dept: HEMATOLOGY ONCOLOGY | Facility: CLINIC | Age: 58
End: 2023-11-28

## 2023-11-28 NOTE — PROGRESS NOTES
In-basket message received from Callie Bagley RN to add patient to the breast [facility] on 12/4/2023. Chart reviewed and prep completed.

## 2023-11-29 ENCOUNTER — PATIENT OUTREACH (OUTPATIENT)
Dept: HEMATOLOGY ONCOLOGY | Facility: CLINIC | Age: 58
End: 2023-11-29

## 2023-11-29 ENCOUNTER — PATIENT MESSAGE (OUTPATIENT)
Dept: SURGICAL ONCOLOGY | Facility: CLINIC | Age: 58
End: 2023-11-29

## 2023-11-29 NOTE — PROGRESS NOTES
Breast Oncology Nurse Navigator    Called patient to follow up on a MyChart she sent two days ago after receiving a response from the surgeon. Left a voicemail message on the home phone number. Then called patient on her mobile phone. Left an additional message. Will send MyChart message to respond.

## 2023-11-30 ENCOUNTER — OFFICE VISIT (OUTPATIENT)
Dept: URGENT CARE | Facility: CLINIC | Age: 58
End: 2023-11-30
Payer: COMMERCIAL

## 2023-11-30 ENCOUNTER — TELEPHONE (OUTPATIENT)
Dept: SURGICAL ONCOLOGY | Facility: CLINIC | Age: 58
End: 2023-11-30

## 2023-11-30 VITALS
HEART RATE: 72 BPM | RESPIRATION RATE: 18 BRPM | TEMPERATURE: 97.2 F | DIASTOLIC BLOOD PRESSURE: 62 MMHG | OXYGEN SATURATION: 97 % | SYSTOLIC BLOOD PRESSURE: 118 MMHG

## 2023-11-30 DIAGNOSIS — R92.8 ABNORMAL MRI, BREAST: Primary | ICD-10-CM

## 2023-11-30 DIAGNOSIS — B96.89 BACTERIAL URI: Primary | ICD-10-CM

## 2023-11-30 DIAGNOSIS — J06.9 BACTERIAL URI: Primary | ICD-10-CM

## 2023-11-30 PROCEDURE — 99213 OFFICE O/P EST LOW 20 MIN: CPT | Performed by: NURSE PRACTITIONER

## 2023-11-30 RX ORDER — AMOXICILLIN AND CLAVULANATE POTASSIUM 875; 125 MG/1; MG/1
1 TABLET, FILM COATED ORAL EVERY 12 HOURS SCHEDULED
Qty: 14 TABLET | Refills: 0 | Status: SHIPPED | OUTPATIENT
Start: 2023-11-30 | End: 2023-12-07

## 2023-11-30 NOTE — TELEPHONE ENCOUNTER
Spoke with Vivian Gaucher and informed her of Dr Vincent Chan recommendations for a left breast MRI guided breast biopsy and a CT scan of the chest. She understands and she has scheduled  her CT scan already for 12/05/23. Called Central Scheduling to set up MRI guided breast biopsy. Notified Juliann in the RBC also.

## 2023-11-30 NOTE — TELEPHONE ENCOUNTER
As per Dr Jaswinder Lo, attempted calling pt to review Dr Gwendolyn Ricardo recommendations for a left breast MRI biopsy and a Chest CT. No answer, left her a message to return call to office.

## 2023-11-30 NOTE — PROGRESS NOTES
[facility]        NAME: Donovan Cano is a 59 y.o. female  : 1965    MRN: [id number]  DATE: 2023  TIME: 2:59 PM    Assessment and Plan   Bacterial URI [J06.9, B96.89]  1. Bacterial URI  amoxicillin-clavulanate (AUGMENTIN) 875-125 mg per tablet        Will start course of Augmentin for bacterial URI. Continue OTC medications for symptom management antibiotics completed. Patient in agreement with plan. Patient Instructions     Follow up with PCP in 3-5 days. Proceed to  ER if symptoms worsen. Chief Complaint     Chief Complaint   Patient presents with   • Cold Like Symptoms     Patient with cough and congestion for 1 week that she states the congestion is worse today and she was struggling to work         History of Present Illness   Richa Medina presents to the clinic c/o    Cold Like Symptoms (Patient with cough and congestion for 1 week that she states the congestion is worse today and she was struggling to work)    Cough  This is a new problem. The current episode started in the past 7 days. The problem has been unchanged. The problem occurs constantly. The cough is Productive of sputum. Associated symptoms include ear congestion, headaches, nasal congestion, postnasal drip and rhinorrhea. Pertinent negatives include no chest pain, chills, ear pain, fever, heartburn, hemoptysis, myalgias, rash, sore throat, shortness of breath, sweats, weight loss or wheezing. The symptoms are aggravated by lying down. Review of Systems   Review of Systems   Constitutional:  Negative for chills, fever and weight loss. HENT:  Positive for postnasal drip and rhinorrhea. Negative for ear pain and sore throat. Respiratory:  Positive for cough. Negative for hemoptysis, shortness of breath and wheezing. Cardiovascular:  Negative for chest pain. Gastrointestinal:  Negative for heartburn. Musculoskeletal:  Negative for myalgias. Skin:  Negative for rash.    Neurological: Positive for headaches. All other systems reviewed and are negative. Current Medications     Long-Term Medications   Medication Sig Dispense Refill   • Calcium 250 MG CAPS Take by mouth daily with dinner       • cetirizine (ZyrTEC) 10 mg tablet Take 10 mg by mouth as needed     • levothyroxine 25 mcg tablet TAKE ONE TABLET BY MOUTH EVERY DAY MONDAY THRU FRIDAY AND TAKE 2 TABLETS EVERY DAY ON SAT & SUN. 114 tablet 0   • PARoxetine (PAXIL-CR) 25 mg 24 hr tablet Take 2 po daily 180 tablet 3   • pseudoephedrine (SUDAFED) 120 MG 12 hr tablet Take 120 mg by mouth as needed         Current Allergies     Allergies as of 11/30/2023 - Reviewed 11/30/2023   Allergen Reaction Noted   • Pollen extract  03/03/2020            The following portions of the patient's history were reviewed and updated as appropriate: allergies, current medications, past family history, past medical history, past social history, past surgical history and problem list.    Objective   /62   Pulse 72   Temp (!) 97.2 °F (36.2 °C) (Tympanic)   Resp 18   SpO2 97%        Physical Exam     Physical Exam  Vitals and nursing note reviewed. Constitutional:       Appearance: Normal appearance. She is well-developed. HENT:      Head: Normocephalic and atraumatic. Right Ear: Hearing, tympanic membrane, ear canal and external ear normal.      Left Ear: Hearing, tympanic membrane, ear canal and external ear normal.      Nose: Mucosal edema and congestion present. Right Sinus: No maxillary sinus tenderness or frontal sinus tenderness. Left Sinus: No maxillary sinus tenderness or frontal sinus tenderness. Mouth/Throat:      Lips: Pink. Mouth: Mucous membranes are moist.      Pharynx: Oropharynx is clear. Eyes:      General: Lids are normal.      Conjunctiva/sclera: Conjunctivae normal.      Pupils: Pupils are equal, round, and reactive to light. Cardiovascular:      Rate and Rhythm: Normal rate and regular rhythm. Heart sounds: Normal heart sounds, S1 normal and S2 normal.   Pulmonary:      Effort: Pulmonary effort is normal.      Breath sounds: Normal breath sounds. Abdominal:      General: Abdomen is flat. Bowel sounds are normal.      Palpations: Abdomen is soft. Musculoskeletal:         General: Normal range of motion. Cervical back: Full passive range of motion without pain, normal range of motion and neck supple. Skin:     General: Skin is warm and dry. Neurological:      General: No focal deficit present. Mental Status: She is alert and oriented to person, place, and time. Psychiatric:         Mood and Affect: Mood normal.         Speech: Speech normal.         Behavior: Behavior normal. Behavior is cooperative. Thought Content:  Thought content normal.         Judgment: Judgment normal.

## 2023-11-30 NOTE — TELEPHONE ENCOUNTER
----- Message from Heladio Jin MD sent at 11/30/2023  1:33 PM EST -----  Regarding: FW: MRI  results   Contact: [phone]  She needs an MRI biopsy of the left breast, same area seen on [facility]. She also needs a CT of the chest, see report; both are ordered. When I saw her she didn't sound like she was going to pursue standard care therapy. Is she now agreeable to this?    ----- Message -----  From: Fausto Angelucci, RN  Sent: 11/29/2023   4:04 PM EST  To: Heladio Jin MD  Subject: FW: MRI  results                                 Following your review let me know what you are recommending. Thanks  ----- Message -----  From: Akhil Mcclendon  Sent: 11/29/2023   1:11 PM EST  To: Surgical Oncology Clinical  Subject: MRI  results                                     That’s fine thanks!

## 2023-12-03 ENCOUNTER — DOCUMENTATION (OUTPATIENT)
Dept: OTHER | Facility: HOSPITAL | Age: 58
End: 2023-12-03

## 2023-12-04 ENCOUNTER — DOCUMENTATION (OUTPATIENT)
Dept: HEMATOLOGY ONCOLOGY | Facility: CLINIC | Age: 58
End: 2023-12-04

## 2023-12-04 NOTE — PROGRESS NOTES
BREAST CANCER MULTIDISCIPLINARY CASE REVIEW    DATE:  12/4/23      PRESENTING DOCTOR: Dr. Forrest Witt      DIAGNOSIS: Right breast invasive breast carcinoma of no special type (ductal NST/invasive ductal carcinoma) multicentric disease with DCIS, 7:00 5CMFN tumor-grade 2, % positive, MN 90% positive, HER-2 2+, negative by FISH, 12:00 8 CMFN tumor- grade 2, % positive, MN 35% positive, HER-2 1+ negative        Italia Tapia is a 59 y.o. female who was presented at the Breast Multidisciplinary Case Conference today to discuss her newly diagnosed right breast multicentric breast cancer, review the breast MRI and discuss treatment plan. She was recently diagnosed after an abnormal screening mammogram that led to further diagnostics. GENETICS:  Patient declined    IMAGING REVIEWED:   11/21/23-MRI breast bilateral w and wo contrast w cad      PATHOLOGY REVIEWED:  11/1/23-tissue exam-right breast ultrasound guided biopsies      PHYSICIAN RECOMMENDED PLAN:  Recommend right breast mastectomy  Recommend left breast biopsy based upon breast MRI findings  If left breast biopsy shows malignancy, recommend genetic testing and consider left breast lumpectomy      FOLLOW UP APPOINTMENTS:  12/5/23-CT chest wo contrast    Patient to see Dr. Caridad Lou once breast biopsy performed and resulted        Team agreed to plan. The final treatment plan will be left to the discretion of the patient and the treating physician. DISCLAIMERS:  TO THE TREATING PHYSICIAN:  This conference is a meeting of clinicians from various specialty areas who evaluate and discuss patients for whom a multidisciplinary treatment approach is being considered. Please note that the above opinion was a consensus of the conference attendees and is intended only to assist in quality care of the discussed patient.   The responsibility for follow up on the input given during the conference, along with any final decisions regarding plan of care, is that of the patient and the patient's provider. Accordingly, appointments have only been recommended based on this information and have NOT been scheduled unless otherwise noted. TO THE PATIENT:  This summary is a brief record of major aspects of your cancer treatment. You may choose to share a copy with any of your doctors or nurses. However, this is not a detailed or comprehensive record of your care.       NCCN guidelines were readily available for review at this discussion

## 2023-12-05 ENCOUNTER — HOSPITAL ENCOUNTER (OUTPATIENT)
Dept: RADIOLOGY | Facility: HOSPITAL | Age: 58
Discharge: HOME | End: 2023-12-05
Payer: COMMERCIAL

## 2023-12-05 DIAGNOSIS — R92.8 ABNORMAL MRI, BREAST: ICD-10-CM

## 2023-12-05 PROCEDURE — G1004 CDSM NDSC: HCPCS

## 2023-12-05 PROCEDURE — 71250 CT THORAX DX C-: CPT

## 2023-12-07 ENCOUNTER — PATIENT OUTREACH (OUTPATIENT)
Dept: CASE MANAGEMENT | Facility: HOSPITAL | Age: 58
End: 2023-12-07

## 2023-12-07 NOTE — PROGRESS NOTES
Biopsychosocial and Barriers Assessment    Cancer Diagnosis: Breast   Home/Cell Phone: [phone]  Emergency Contact: [facility]- spouse  Marital Status:    Interpretation concerns, speaks another language, preferred language: English   Cultural concerns: no  Ability to read or write: yes     Caregiver/Support: family is very supportive   Children: has children local  Child/Elder care: no    Housing: house  Home Setup: more than 1 level   Lives With: spouse  Daily Living Activities: independent   Durable Medical Equipment: none  Ambulation: independent     Preferred Pharmacy: [facility]  High co-pays with insurance: has insurance   High co-pays with medication coverage: has insurance   No medication coverage:  has coverage     Primary Care Provider: Ronaldo Evans PA-C  Hx of Home Health Care: no  Hx of Short term rehab:  no  Mental Health Hx:  no   Substance Abuse Hx: no  Employment: works full time in the [employer] Status/Location: no  Ability to pay bills: yes  POA/LW/AD: has living will and POA. Will consider bringing into appointment to be scanned into chart   Transportation Plan/Concerns: no difficulty       What do you know about your Cancer Diagnosis    What has your doctor told you about your cancer diagnosis:  Breast cancer   What has your doctor told you about your cancer treatment:  Will obtain MRI with biopsy and then meet with Dr. Hever Bañuelos to determine plan. What specific concerns do you have about your diagnosis and treatment:  None at this time  Have you been made aware of any hair loss associated with treatment:    Additional Comments:  OSW received return call to patient. Assessment and DT completed. Patient self-rated DT 0/10. Patient reported that she has good family support. Patient reported that she has 3 puppies and a cat. 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 Patient denies physical, social, emotional, practical or spiritual concerns. Patient will reach out to LSW if needed.

## 2023-12-08 ENCOUNTER — HOSPITAL ENCOUNTER (OUTPATIENT)
Dept: RADIOLOGY | Facility: HOSPITAL | Age: 58
Discharge: HOME | End: 2023-12-08
Attending: SURGERY
Payer: COMMERCIAL

## 2023-12-08 DIAGNOSIS — R92.8 ABNORMAL MRI, BREAST: ICD-10-CM

## 2023-12-08 PROCEDURE — A9585 GADOBUTROL INJECTION: HCPCS | Performed by: SURGERY

## 2023-12-08 PROCEDURE — A4648 IMPLANTABLE TISSUE MARKER: HCPCS

## 2023-12-08 PROCEDURE — 19085 BX BREAST 1ST LESION MR IMAG: CPT

## 2023-12-08 PROCEDURE — 88341 IMHCHEM/IMCYTCHM EA ADD ANTB: CPT | Performed by: PATHOLOGY

## 2023-12-08 PROCEDURE — 88360 TUMOR IMMUNOHISTOCHEM/MANUAL: CPT | Performed by: PATHOLOGY

## 2023-12-08 PROCEDURE — 88342 IMHCHEM/IMCYTCHM 1ST ANTB: CPT | Performed by: PATHOLOGY

## 2023-12-08 PROCEDURE — 88305 TISSUE EXAM BY PATHOLOGIST: CPT | Performed by: PATHOLOGY

## 2023-12-08 RX ORDER — GADOBUTROL 604.72 MG/ML
6 INJECTION INTRAVENOUS
Status: COMPLETED | OUTPATIENT
Start: 2023-12-08 | End: 2023-12-08

## 2023-12-08 RX ORDER — LIDOCAINE HYDROCHLORIDE AND EPINEPHRINE BITARTRATE 20; .01 MG/ML; MG/ML
20 INJECTION, SOLUTION SUBCUTANEOUS ONCE
Status: COMPLETED | OUTPATIENT
Start: 2023-12-08 | End: 2023-12-08

## 2023-12-08 RX ORDER — LIDOCAINE HYDROCHLORIDE 10 MG/ML
5 INJECTION, SOLUTION EPIDURAL; INFILTRATION; INTRACAUDAL; PERINEURAL ONCE
Status: COMPLETED | OUTPATIENT
Start: 2023-12-08 | End: 2023-12-08

## 2023-12-08 RX ADMIN — GADOBUTROL 6 ML: 604.72 INJECTION INTRAVENOUS at 10:02

## 2023-12-08 RX ADMIN — LIDOCAINE HYDROCHLORIDE,EPINEPHRINE BITARTRATE 20 ML: 20; .01 INJECTION, SOLUTION INFILTRATION; PERINEURAL at 10:13

## 2023-12-08 RX ADMIN — LIDOCAINE HYDROCHLORIDE 5 ML: 10 INJECTION, SOLUTION EPIDURAL; INFILTRATION; INTRACAUDAL; PERINEURAL at 10:13

## 2023-12-08 NOTE — NURSING NOTE
Patient left breast biopsy tolerated well, no complaints verbalized. Per MD Cords patient does have hematoma, after holding pressure to site; steri-strips applied and band aide. Post procedure discharge instructions and ice packs given to patient with verbal communication of understanding. Patient left MRI suite ambulating with no complaints.

## 2023-12-11 NOTE — PROGRESS NOTES
Post procedure call completed    Bleeding: _____yes ___x__no    Pain: _____yes ___x___no    Redness/Swelling: ______yes ___x___no    Band aid removed: __x___yes _____no    Steri-Strips intact: ___x___yes _____no - remove at day 5

## 2023-12-12 ENCOUNTER — TELEPHONE (OUTPATIENT)
Dept: HEMATOLOGY ONCOLOGY | Facility: CLINIC | Age: 58
End: 2023-12-12

## 2023-12-12 ENCOUNTER — TELEPHONE (OUTPATIENT)
Dept: SURGICAL ONCOLOGY | Facility: CLINIC | Age: 58
End: 2023-12-12

## 2023-12-12 DIAGNOSIS — R93.89 ABNORMAL CT OF THE CHEST: Primary | ICD-10-CM

## 2023-12-12 PROCEDURE — 88342 IMHCHEM/IMCYTCHM 1ST ANTB: CPT | Performed by: PATHOLOGY

## 2023-12-12 PROCEDURE — 88305 TISSUE EXAM BY PATHOLOGIST: CPT | Performed by: PATHOLOGY

## 2023-12-12 PROCEDURE — 88341 IMHCHEM/IMCYTCHM EA ADD ANTB: CPT | Performed by: PATHOLOGY

## 2023-12-12 NOTE — TELEPHONE ENCOUNTER
Called the patient to review her recent CT results and recommendations from Dr. Luiz Perez but there was no answer. A message was left requesting a call back with a direct phone number provided.

## 2023-12-13 NOTE — TELEPHONE ENCOUNTER
Called the patient after missing a phone call from her earlier. Explained that Dr. Hever Bañuelos reviewed her CT yesterday and recommended a pulmonary consult for some abnormal findings in her lungs. The patient had previously seen the recommendation in her [facility] account and was agreeable to this. She was instructed to contact the office on Friday morning if she had not heard from anyone regarding a consult with the pulmonary team. The patient had also seen her positive breast biopsy results on [facility]. She is aware that she has a left breast cancer. Reviewed this with her and explained that it appears similar to the cancer that she has in her right breast - invasive and grade 1 - although her hormone receptors are still pending at this time. Also discussed that it appears unifocal on the MRI and only measures 8 mm; all of which the patient had already read/seen on [facility]. Explained that Dr. Hever Bañuelos strongly recommends genetic testing at this point since the patient now has bilateral breast cancer. The patient is not interested in pursing this and stated that she doesn't "think it's genetic" and instead thinks her breast cancer is "related to the hormone replacement therapy" since no one in her family has cancer and she leads a healthy lifestyle. Informed the patient that Dr. Hever Bañuelos would likely discuss this again with her at her pre-op appointment but no referral would be placed now per the patient's request. The patient is aware that Dr. Hever Bañuelos is waiting for the pulmonary consult prior to any surgery planning. She denies any questions at this time.

## 2023-12-14 ENCOUNTER — TELEPHONE (OUTPATIENT)
Dept: HEMATOLOGY ONCOLOGY | Facility: CLINIC | Age: 58
End: 2023-12-14

## 2023-12-14 ENCOUNTER — TELEPHONE (OUTPATIENT)
Dept: SURGICAL ONCOLOGY | Facility: CLINIC | Age: 58
End: 2023-12-14

## 2023-12-14 NOTE — TELEPHONE ENCOUNTER
Appointment Confirmation   Who are you speaking with? Patient   If it is not the patient, are they listed on an active communication consent form? Yes   Which provider is the appointment scheduled with? Dr. Tiana Pearl   When is the appointment scheduled? Please list date and time 12/27 8:30am   At which location is the appointment scheduled to take place? [facility]   Did caller verbalize understanding of appointment details?  Yes

## 2023-12-14 NOTE — TELEPHONE ENCOUNTER
Per Dr Michael Spear patient has been called and message left offering a pre op appointment with her in the [facility] office on 12/27/23 at 8:30 am. Requested she call office to confirm appointment.

## 2023-12-15 ENCOUNTER — TELEPHONE (OUTPATIENT)
Dept: PULMONOLOGY | Facility: CLINIC | Age: 58
End: 2023-12-15

## 2023-12-18 DIAGNOSIS — E03.9 HYPOTHYROIDISM, UNSPECIFIED TYPE: ICD-10-CM

## 2023-12-18 RX ORDER — LEVOTHYROXINE SODIUM 0.03 MG/1
TABLET ORAL
Qty: 114 TABLET | Refills: 0 | Status: SHIPPED | OUTPATIENT
Start: 2023-12-18

## 2023-12-20 ENCOUNTER — TELEPHONE (OUTPATIENT)
Dept: SURGICAL ONCOLOGY | Facility: CLINIC | Age: 58
End: 2023-12-20

## 2023-12-20 NOTE — TELEPHONE ENCOUNTER
Spoke with Richa regarding her pulmonary consult. She has been unable to schedule this as of the present time. She shared she had received a message from the pulmonary office and attempted returning the call but was unable to speak with anyone and no one has returned her call. Attempted calling [facility] pulmonology office, no answer on scheduling line or hospital/office line. Left a detailed message and requetsed they contact Richa to assist in scheduling consult.

## 2023-12-26 ENCOUNTER — PREP FOR PROCEDURE (OUTPATIENT)
Dept: PULMONOLOGY | Facility: CLINIC | Age: 58
End: 2023-12-26

## 2023-12-26 ENCOUNTER — CONSULT (OUTPATIENT)
Dept: PULMONOLOGY | Facility: CLINIC | Age: 58
End: 2023-12-26
Payer: COMMERCIAL

## 2023-12-26 ENCOUNTER — TELEPHONE (OUTPATIENT)
Dept: PULMONOLOGY | Facility: CLINIC | Age: 58
End: 2023-12-26

## 2023-12-26 ENCOUNTER — APPOINTMENT (OUTPATIENT)
Dept: LAB | Facility: MEDICAL CENTER | Age: 58
End: 2023-12-26
Payer: COMMERCIAL

## 2023-12-26 VITALS
OXYGEN SATURATION: 94 % | DIASTOLIC BLOOD PRESSURE: 72 MMHG | WEIGHT: 131 LBS | SYSTOLIC BLOOD PRESSURE: 118 MMHG | BODY MASS INDEX: 23.21 KG/M2 | HEIGHT: 63 IN | TEMPERATURE: 98.8 F | HEART RATE: 83 BPM

## 2023-12-26 DIAGNOSIS — R93.89 ABNORMAL CT OF THE CHEST: Primary | ICD-10-CM

## 2023-12-26 DIAGNOSIS — C50.911 BILATERAL MALIGNANT NEOPLASM OF BREAST IN FEMALE, UNSPECIFIED ESTROGEN RECEPTOR STATUS, UNSPECIFIED SITE OF BREAST (HCC): ICD-10-CM

## 2023-12-26 DIAGNOSIS — C50.912 BILATERAL MALIGNANT NEOPLASM OF BREAST IN FEMALE, UNSPECIFIED ESTROGEN RECEPTOR STATUS, UNSPECIFIED SITE OF BREAST (HCC): ICD-10-CM

## 2023-12-26 DIAGNOSIS — R93.89 ABNORMAL CT OF THE CHEST: ICD-10-CM

## 2023-12-26 LAB — ANA SER QL IA: NEGATIVE

## 2023-12-26 PROCEDURE — 36415 COLL VENOUS BLD VENIPUNCTURE: CPT

## 2023-12-26 PROCEDURE — 86003 ALLG SPEC IGE CRUDE XTRC EA: CPT

## 2023-12-26 PROCEDURE — 83520 IMMUNOASSAY QUANT NOS NONAB: CPT

## 2023-12-26 PROCEDURE — 99244 OFF/OP CNSLTJ NEW/EST MOD 40: CPT | Performed by: INTERNAL MEDICINE

## 2023-12-26 PROCEDURE — 82785 ASSAY OF IGE: CPT

## 2023-12-26 PROCEDURE — 86037 ANCA TITER EACH ANTIBODY: CPT

## 2023-12-26 PROCEDURE — 86038 ANTINUCLEAR ANTIBODIES: CPT

## 2023-12-26 NOTE — TELEPHONE ENCOUNTER
Dr. BIRD will be performing a Bronch on Richa Medina on 12/26/2023     LOCATION: \[facility]\""    ANTICOAGULATION INSTRUCTIONS: NONE    OTHER MEDICATION INSTRUCTIONS: NONE    LABS: (CBC/PT/PTT in last 90 days): CBC 09/28/2023 // NO recent PT/PTT  (If no, labs ordered / to be done at least one day prior to procedure)    LAST OFFICE NOTE/H&P within 30 days of procedure: 12/26/2023    INSTRUCTIONS GIVEN: Called spoke with pt, advised of procedure date and location. Pt aware she will need a  and is aware to be NPO after midnight the night prior. Pt aware she will receive a call the day before with time of arrival.     Yamel Crandall

## 2023-12-26 NOTE — PROGRESS NOTES
Pulmonary Consultation   Richa Medina 58 y.o. female MRN: [id number]  12/26/2023      Assessment:    1. Abnormal CT of the chest  Unclear etiology however she endorses a sudden cough while gardening. This supports an infectious/inflammatory etiology. She does have a history of persistent/mild eosinophilia therefore the differential would include aspergillus, MAC infections. Differential would also include but less likely malignancy at this time  Will check aspergillus specific IgE, IgE, ANCA and DUSTIN  Will plan for bronchoscopy with BAL  If this is all negative then would recommend PET/CT (if not already planned) and possibly navigation bronchoscopy if no improvement on repeat imaging  -     Ambulatory Referral to Pulmonology  -     Aspergillus fumagatus IgE; Future  -     IgE; Future  -     Aspergillus niger IgE; Future  -     ANCA Screen With MPO and PR3 With Reflex To ANCA Titer; Future  -     DUSTIN Screen w/ Reflex to Titer/Pattern; Future    2. 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, ND negative  Continue follow up with oncology    Plan:    Diagnoses and all orders for this visit:    Abnormal CT of the chest  -     Ambulatory Referral to Pulmonology  -     Aspergillus fumagatus IgE; Future  -     IgE; Future  -     Aspergillus niger IgE; Future  -     ANCA Screen With MPO and PR3 With Reflex To ANCA Titer; Future  -     DUSTIN Screen w/ Reflex to Titer/Pattern; Future  -     Cancel: AFB Culture and Stain; Future  -     Cancel: AFB Culture with Stain; Future    Bilateral malignant neoplasm of breast in female, unspecified estrogen receptor status, unspecified site of breast (HCC)        History of Present Illness   HPI:  Richa Medina is a 58 y.o. female who presents for evaluation of an  abnormal CT chest. She reports that in November while working in the yard, she inhaled something that caused her to have a sudden cough and shortness of breath. The cough is dry and at times productive. She has shortness of breath with exertion. She denies any rhinorrhea, fevers, or chills. She denies having symptoms prior to this episode. She is a lifetime nonsmoker, denies drugs or alcohol abuse. 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 She denies any family history of lung disease.     In November she was also diagnosed with bilateral breast cancer (different types based on path). She is following up with oncology.     Review of Systems   Constitutional:  Negative for chills and fever.   HENT:  Negative for congestion, postnasal drip and rhinorrhea.    Eyes:  Negative for itching.   Respiratory:  Positive for cough and shortness of breath. Negative for wheezing and stridor.    Cardiovascular:  Negative for chest pain, palpitations and leg swelling.   Gastrointestinal:  Negative for abdominal distention, abdominal pain, nausea and vomiting.   Genitourinary:  Negative for dysuria and flank pain.   Musculoskeletal:  Negative for arthralgias and myalgias.   Skin:  Negative for color change.   Neurological:  Negative for dizziness, light-headedness and headaches.   Psychiatric/Behavioral: Negative.         Historical Information   Past Medical History:   Diagnosis Date    Asthma     Depression     Disease of thyroid gland     Left knee pain 04/12/2022    Osteopenia     Pneumonia     2017    Rosacea 02/09/2021    Vitamin D deficiency 07/24/2018     Past Surgical History:   Procedure Laterality Date    BREAST BIOPSY Right 11/01/2023    X 2 sites    LASIK      MRI BREAST BIOPSY LEFT (ALL INCLUSIVE) Left 12/8/2023    AK NEUROPLASTY &/TRANSPOS MEDIAN NRV CARPAL TUNNE Right 07/10/2018    Procedure: CARPAL TUNNEL RELEASE;  Surgeon: Maicol Taylor DO;  Location: [facility];  Service: Orthopedics    AK NEUROPLASTY  &/TRANSPOS MEDIAN NRV CARPAL TUNNE Left 04/19/2019    Procedure: RELEASE CARPAL TUNNEL;  Surgeon: Peterson Alejandro MD;  Location: [facility];  Service: Orthopedics    SEPTOPLASTY      SINUS SURGERY      US GUIDANCE BREAST BIOPSY RIGHT EACH ADDITIONAL Right 11/01/2023    US GUIDED BREAST BIOPSY RIGHT COMPLETE Right 11/01/2023     Family History   Problem Relation Age of Onset    Hashimoto's thyroiditis Mother     Thyroid disease Mother     Melanoma Mother         50's    Melanoma Father         50's    Crohn's disease Father     Hashimoto's thyroiditis Sister     No Known Problems Sister     No Known Problems Sister     Colon cancer Maternal Uncle         50's    Depression Maternal Grandmother     Diabetes Maternal Grandmother     No Known Problems Maternal Grandfather     No Known Problems Paternal Grandmother     No Known Problems Paternal Grandfather        Occupational History: works in a cafeteria in a school     Social History: lifelong nonsmoker, denies drugs or alcohol abuse    Meds/Allergies     Current Outpatient Medications:     Calcium 250 MG CAPS, Take by mouth daily with dinner  , Disp: , Rfl:     cetirizine (ZyrTEC) 10 mg tablet, Take 10 mg by mouth as needed, Disp: , Rfl:     Cholecalciferol (VITAMIN D3 PO), Take by mouth, Disp: , Rfl:     levothyroxine 25 mcg tablet, TAKE ONE TABLET BY MOUTH EVERY DAY MONDAY THRU FRIDAY AND TAKE 2 TABLETS EVERY DAY ON SAT & SUN., Disp: 114 tablet, Rfl: 0    MAGNESIUM PO, Take 200 mg by mouth daily with dinner  , Disp: , Rfl:     Medium Chain Triglycerides (MCT OIL PO), Take by mouth, Disp: , Rfl:     MULTIPLE VITAMINS-CALCIUM PO, Take 1 capsule by mouth daily in the early morning  , Disp: , Rfl:     niacin 100 mg tablet, Take 100 mg by mouth daily with breakfast, Disp: , Rfl:     PARoxetine (PAXIL-CR) 25 mg 24 hr tablet, Take 2 po daily, Disp: 180 tablet, Rfl: 3    pseudoephedrine (SUDAFED) 120 MG 12 hr tablet, Take 120 mg by mouth as needed, Disp: , Rfl:  "  Allergies   Allergen Reactions    Pollen Extract        Vitals: Blood pressure 118/72, pulse 83, temperature 98.8 °F (37.1 °C), temperature source Tympanic, height 5' 3\" (1.6 m), weight 59.4 kg (131 lb), SpO2 94%, not currently breastfeeding., Body mass index is 23.21 kg/m². Oxygen Therapy  SpO2: 94 %  Oxygen Therapy: None (Room air)    Physical Exam  Physical Exam  Constitutional:       General: She is not in acute distress.     Appearance: She is not diaphoretic.   HENT:      Head: Normocephalic and atraumatic.      Nose: Nose normal.      Mouth/Throat:      Pharynx: No oropharyngeal exudate.   Eyes:      General: No scleral icterus.     Conjunctiva/sclera: Conjunctivae normal.      Pupils: Pupils are equal, round, and reactive to light.   Neck:      Thyroid: No thyromegaly.      Vascular: No JVD.      Trachea: No tracheal deviation.   Cardiovascular:      Rate and Rhythm: Normal rate and regular rhythm.      Heart sounds: Normal heart sounds. No murmur heard.     No friction rub. No gallop.   Pulmonary:      Effort: Pulmonary effort is normal. No respiratory distress.      Breath sounds: Normal breath sounds. No stridor. No wheezing or rales.   Abdominal:      General: Bowel sounds are normal. There is no distension.      Palpations: Abdomen is soft.      Tenderness: There is no abdominal tenderness. There is no guarding or rebound.   Musculoskeletal:         General: No deformity. Normal range of motion.      Cervical back: Normal range of motion and neck supple.   Lymphadenopathy:      Cervical: No cervical adenopathy.   Skin:     General: Skin is warm.      Findings: No erythema or rash.   Neurological:      Mental Status: She is alert and oriented to person, place, and time.      Cranial Nerves: No cranial nerve deficit.      Sensory: No sensory deficit.         Labs: I have personally reviewed pertinent lab results.  Lab Results   Component Value Date    WBC 5.06 09/28/2023    HGB 13.4 09/28/2023    HCT " "39.5 09/28/2023    MCV 89 09/28/2023     09/28/2023     Lab Results   Component Value Date    CALCIUM 9.9 09/28/2023    K 4.6 09/28/2023    CO2 27 09/28/2023     09/28/2023    BUN 11 09/28/2023    CREATININE 0.65 09/28/2023     No results found for: \"IGE\"  Lab Results   Component Value Date    ALT 21 09/28/2023    AST 22 09/28/2023    ALKPHOS 69 09/28/2023       Imaging and other studies: I have personally reviewed pertinent reports.   and I have personally reviewed pertinent films in PACS  CT chest w/o contrast- scattered bilateral pulmonary consolidations    Pulmonary function testing: none on file    EKG, Pathology, and Other Studies: I have personally reviewed pertinent reports.   and I have personally reviewed pertinent films in PACS    Lev Lockhart MD  Pulmonary and Critical Care   [facility]  "

## 2023-12-26 NOTE — LETTER
December 26, 2023     Sisi Dominguez MD  [address] [facility] [address] [facility] [id number]    Patient: Richa Medina   YOB: 1965   Date of Visit: 12/26/2023       Dear Dr. Dominguez:    Thank you for referring Richa Medina to me for evaluation. Below are the relevant portions of my assessment and plan of care.         If you have questions, please do not hesitate to call me. I look forward to following Richa along with you.         Sincerely,        Lev Lockhart MD        CC: No Recipients

## 2023-12-26 NOTE — H&P (VIEW-ONLY)
Pulmonary Consultation   Richa Medina 58 y.o. female MRN: [id number]  12/26/2023      Assessment:    1. Abnormal CT of the chest  Unclear etiology however she endorses a sudden cough while gardening. This supports an infectious/inflammatory etiology. She does have a history of persistent/mild eosinophilia therefore the differential would include aspergillus, MAC infections. Differential would also include but less likely malignancy at this time  Will check aspergillus specific IgE, IgE, ANCA and DUSTIN  Will plan for bronchoscopy with BAL  If this is all negative then would recommend PET/CT (if not already planned) and possibly navigation bronchoscopy if no improvement on repeat imaging  -     Ambulatory Referral to Pulmonology  -     Aspergillus fumagatus IgE; Future  -     IgE; Future  -     Aspergillus niger IgE; Future  -     ANCA Screen With MPO and PR3 With Reflex To ANCA Titer; Future  -     DUSTIN Screen w/ Reflex to Titer/Pattern; Future    2. 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  Continue follow up with oncology    Plan:    Diagnoses and all orders for this visit:    Abnormal CT of the chest  -     Ambulatory Referral to Pulmonology  -     Aspergillus fumagatus IgE; Future  -     IgE; Future  -     Aspergillus niger IgE; Future  -     ANCA Screen With MPO and PR3 With Reflex To ANCA Titer; Future  -     DUSTIN Screen w/ Reflex to Titer/Pattern; Future  -     Cancel: AFB Culture and Stain; Future  -     Cancel: AFB Culture with Stain; Future    Bilateral malignant neoplasm of breast in female, unspecified estrogen receptor status, unspecified site of breast (HCC)        History of Present Illness   HPI:  Richa Medina is a 58 y.o. female who presents for evaluation of an  abnormal CT chest. She reports that in November while working in the yard, she inhaled something that caused her to have a sudden cough and shortness of breath. The cough is dry and at times productive. She has shortness of breath with exertion. She denies any rhinorrhea, fevers, or chills. She denies having symptoms prior to this episode. She is a lifetime nonsmoker, denies drugs or alcohol abuse. 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 She denies any family history of lung disease.     In November she was also diagnosed with bilateral breast cancer (different types based on path). She is following up with oncology.     Review of Systems   Constitutional:  Negative for chills and fever.   HENT:  Negative for congestion, postnasal drip and rhinorrhea.    Eyes:  Negative for itching.   Respiratory:  Positive for cough and shortness of breath. Negative for wheezing and stridor.    Cardiovascular:  Negative for chest pain, palpitations and leg swelling.   Gastrointestinal:  Negative for abdominal distention, abdominal pain, nausea and vomiting.   Genitourinary:  Negative for dysuria and flank pain.   Musculoskeletal:  Negative for arthralgias and myalgias.   Skin:  Negative for color change.   Neurological:  Negative for dizziness, light-headedness and headaches.   Psychiatric/Behavioral: Negative.         Historical Information   Past Medical History:   Diagnosis Date    Asthma     Depression     Disease of thyroid gland     Left knee pain 04/12/2022    Osteopenia     Pneumonia     2017    Rosacea 02/09/2021    Vitamin D deficiency 07/24/2018     Past Surgical History:   Procedure Laterality Date    BREAST BIOPSY Right 11/01/2023    X 2 sites    LASIK      MRI BREAST BIOPSY LEFT (ALL INCLUSIVE) Left 12/8/2023    [place] NEUROPLASTY &/TRANSPOS MEDIAN NRV CARPAL TUNNE Right 07/10/2018    Procedure: CARPAL TUNNEL RELEASE;  Surgeon: Maicol Taylor DO;  [facility];  Service: Orthopedics    [place] NEUROPLASTY  &/TRANSPOS MEDIAN NRV CARPAL TUNNE Left 04/19/2019    Procedure: RELEASE CARPAL TUNNEL;  Surgeon: Peterson Alejandro MD;  Location: [facility];  Service: Orthopedics    SEPTOPLASTY      SINUS SURGERY      US GUIDANCE BREAST BIOPSY RIGHT EACH ADDITIONAL Right 11/01/2023    US GUIDED BREAST BIOPSY RIGHT COMPLETE Right 11/01/2023     Family History   Problem Relation Age of Onset    Hashimoto's thyroiditis Mother     Thyroid disease Mother     Melanoma Mother         50's    Melanoma Father         50's    Crohn's disease Father     Hashimoto's thyroiditis Sister     No Known Problems Sister     No Known Problems Sister     Colon cancer Maternal Uncle         50's    Depression Maternal Grandmother     Diabetes Maternal Grandmother     No Known Problems Maternal Grandfather     No Known Problems Paternal Grandmother     No Known Problems Paternal Grandfather        Occupational History: works in a cafeteria in a school     Social History: lifelong nonsmoker, denies drugs or alcohol abuse    Meds/Allergies     Current Outpatient Medications:     Calcium 250 MG CAPS, Take by mouth daily with dinner  , Disp: , Rfl:     cetirizine (ZyrTEC) 10 mg tablet, Take 10 mg by mouth as needed, Disp: , Rfl:     Cholecalciferol (VITAMIN D3 PO), Take by mouth, Disp: , Rfl:     levothyroxine 25 mcg tablet, TAKE ONE TABLET BY MOUTH EVERY DAY MONDAY THRU FRIDAY AND TAKE 2 TABLETS EVERY DAY ON SAT & SUN., Disp: 114 tablet, Rfl: 0    MAGNESIUM PO, Take 200 mg by mouth daily with dinner  , Disp: , Rfl:     Medium Chain Triglycerides (MCT OIL PO), Take by mouth, Disp: , Rfl:     MULTIPLE VITAMINS-CALCIUM PO, Take 1 capsule by mouth daily in the early morning  , Disp: , Rfl:     niacin 100 mg tablet, Take 100 mg by mouth daily with breakfast, Disp: , Rfl:     PARoxetine (PAXIL-CR) 25 mg 24 hr tablet, Take 2 po daily, Disp: 180 tablet, Rfl: 3    pseudoephedrine (SUDAFED) 120 MG 12 hr tablet, Take 120 mg by mouth as needed, Disp: , Rfl:  "  Allergies   Allergen Reactions    Pollen Extract        Vitals: Blood pressure 118/72, pulse 83, temperature 98.8 °F (37.1 °C), temperature source Tympanic, height 5' 3\" (1.6 m), weight 59.4 kg (131 lb), SpO2 94%, not currently breastfeeding., Body mass index is 23.21 kg/m². Oxygen Therapy  SpO2: 94 %  Oxygen Therapy: None (Room air)    Physical Exam  Physical Exam  Constitutional:       General: She is not in acute distress.     Appearance: She is not diaphoretic.   HENT:      Head: Normocephalic and atraumatic.      Nose: Nose normal.      Mouth/Throat:      Pharynx: No oropharyngeal exudate.   Eyes:      General: No scleral icterus.     Conjunctiva/sclera: Conjunctivae normal.      Pupils: Pupils are equal, round, and reactive to light.   Neck:      Thyroid: No thyromegaly.      Vascular: No JVD.      Trachea: No tracheal deviation.   Cardiovascular:      Rate and Rhythm: Normal rate and regular rhythm.      Heart sounds: Normal heart sounds. No murmur heard.     No friction rub. No gallop.   Pulmonary:      Effort: Pulmonary effort is normal. No respiratory distress.      Breath sounds: Normal breath sounds. No stridor. No wheezing or rales.   Abdominal:      General: Bowel sounds are normal. There is no distension.      Palpations: Abdomen is soft.      Tenderness: There is no abdominal tenderness. There is no guarding or rebound.   Musculoskeletal:         General: No deformity. Normal range of motion.      Cervical back: Normal range of motion and neck supple.   Lymphadenopathy:      Cervical: No cervical adenopathy.   Skin:     General: Skin is warm.      Findings: No erythema or rash.   Neurological:      Mental Status: She is alert and oriented to person, place, and time.      Cranial Nerves: No cranial nerve deficit.      Sensory: No sensory deficit.         Labs: I have personally reviewed pertinent lab results.  Lab Results   Component Value Date    WBC 5.06 09/28/2023    HGB 13.4 09/28/2023    HCT " "39.5 09/28/2023    MCV 89 09/28/2023     09/28/2023     Lab Results   Component Value Date    CALCIUM 9.9 09/28/2023    K 4.6 09/28/2023    CO2 27 09/28/2023     09/28/2023    BUN 11 09/28/2023    CREATININE 0.65 09/28/2023     No results found for: \"IGE\"  Lab Results   Component Value Date    ALT 21 09/28/2023    AST 22 09/28/2023    ALKPHOS 69 09/28/2023       Imaging and other studies: I have personally reviewed pertinent reports.   and I have personally reviewed pertinent films in PACS  CT chest w/o contrast- scattered bilateral pulmonary consolidations    Pulmonary function testing: none on file    EKG, Pathology, and Other Studies: I have personally reviewed pertinent reports.   and I have personally reviewed pertinent films in PACS    Lev Lockhart MD  Pulmonary and Critical Care   [facility]  "

## 2023-12-27 ENCOUNTER — DOCUMENTATION (OUTPATIENT)
Dept: HEMATOLOGY ONCOLOGY | Facility: CLINIC | Age: 58
End: 2023-12-27

## 2023-12-27 ENCOUNTER — OFFICE VISIT (OUTPATIENT)
Dept: SURGICAL ONCOLOGY | Facility: CLINIC | Age: 58
End: 2023-12-27
Payer: COMMERCIAL

## 2023-12-27 ENCOUNTER — PATIENT OUTREACH (OUTPATIENT)
Dept: HEMATOLOGY ONCOLOGY | Facility: CLINIC | Age: 58
End: 2023-12-27

## 2023-12-27 VITALS
TEMPERATURE: 97.7 F | SYSTOLIC BLOOD PRESSURE: 122 MMHG | WEIGHT: 132.6 LBS | HEART RATE: 76 BPM | HEIGHT: 63 IN | OXYGEN SATURATION: 95 % | DIASTOLIC BLOOD PRESSURE: 72 MMHG | BODY MASS INDEX: 23.5 KG/M2

## 2023-12-27 DIAGNOSIS — C50.412 MALIGNANT NEOPLASM OF UPPER-OUTER QUADRANT OF LEFT BREAST IN FEMALE, ESTROGEN RECEPTOR POSITIVE: ICD-10-CM

## 2023-12-27 DIAGNOSIS — Z17.0 MALIGNANT NEOPLASM OF OVERLAPPING SITES OF RIGHT BREAST IN FEMALE, ESTROGEN RECEPTOR POSITIVE: Primary | ICD-10-CM

## 2023-12-27 DIAGNOSIS — C50.811 MALIGNANT NEOPLASM OF OVERLAPPING SITES OF RIGHT BREAST IN FEMALE, ESTROGEN RECEPTOR POSITIVE: Primary | ICD-10-CM

## 2023-12-27 DIAGNOSIS — R93.89 ABNORMAL CT OF THE CHEST: ICD-10-CM

## 2023-12-27 DIAGNOSIS — Z17.0 MALIGNANT NEOPLASM OF UPPER-OUTER QUADRANT OF LEFT BREAST IN FEMALE, ESTROGEN RECEPTOR POSITIVE: ICD-10-CM

## 2023-12-27 PROBLEM — R92.8 ABNORMAL MRI, BREAST: Status: RESOLVED | Noted: 2023-11-30 | Resolved: 2023-12-27

## 2023-12-27 LAB
A FUMIGATUS IGE QN: 0.25 KUA/I
TOTAL IGE SMQN RAST: 1178 KU/L (ref 0–113)

## 2023-12-27 PROCEDURE — 99214 OFFICE O/P EST MOD 30 MIN: CPT | Performed by: SURGERY

## 2023-12-27 NOTE — PROGRESS NOTES
In-basket message received from Dr. Dominguez to add patient to the breast [facility] on 1/8/2024. Chart reviewed and prep completed.

## 2023-12-27 NOTE — PROGRESS NOTES
Met with patient with pre -op consult per Dr Dominguez patient is to follow up with PULM prior to surgery.Requested patient get scheduled with Hem onc while waiting for PULM. Scheduled patient with Dr Yung  for 1/23/24 at 11:20 am in [place]. My Chart message sent with details fo appointment.

## 2023-12-27 NOTE — PROGRESS NOTES
Surgical Oncology Follow Up       [address]  [facility] [place]  [address]  [facility] [id number]    Richa Medina  1965  [id number]  [address]  [facility] [place]  [address]  [facility] [address]    Chief Complaint   Patient presents with    Pre-op Exam       Assessment/Plan   Diagnoses and all orders for this visit:    Malignant neoplasm of overlapping sites of right breast in female, estrogen receptor positive   -     Ambulatory referral to Hematology / Oncology; Future    Malignant neoplasm of upper-outer quadrant of left breast in female, estrogen receptor positive   -     Ambulatory referral to Hematology / Oncology; Future    Abnormal CT of the chest        Advance Care Planning/Advance Directives:  Discussed disease status, cancer treatment plans and/or cancer treatment goals with the patient.     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 breast in female, estrogen receptor positive    11/1/2023 -  Cancer Staged    Staging form: Breast, AJCC 8th Edition  - Clinical stage from 11/1/2023: Stage IA (cT1c, cN0, cM0, G1, ER+, PA+, HER2-) - Signed by Sisi Dominguez MD on 11/13/2023  Stage prefix: Initial diagnosis  Method of lymph node assessment: Clinical  Histologic grading system: 3 grade system       11/13/2023 Initial Diagnosis    Malignant neoplasm of overlapping sites of right breast in female, estrogen receptor positive      Malignant neoplasm of upper-outer quadrant of left breast in female, estrogen receptor positive    12/8/2023 -  Cancer Staged    Staging form: Breast, AJCC 8th Edition  - Clinical stage from 12/8/2023: Stage IA (cT1b, cN0, cM0, G1, ER+, PA-, HER2-) - Signed by Sisi Dominguez MD on 12/27/2023  Stage prefix: Initial diagnosis  Method of lymph node assessment: Clinical  Histologic grading system: 3 grade system       12/27/2023 Initial  Diagnosis    Malignant neoplasm of upper-outer quadrant of left breast in female, estrogen receptor positive          History of Present Illness: Patient is here today for a follow-up secondary to now bilateral breast carcinoma  -Interval History: MRI biopsy of the left breast, CAT scan of the chest, pulmonary consult    Review of Systems:  Review of Systems   Constitutional: Negative.  Negative for appetite change, fever and unexpected weight change.   HENT: Negative.  Negative for trouble swallowing.    Eyes: Negative.    Respiratory:  Positive for cough. Negative for shortness of breath.    Cardiovascular: Negative.  Negative for chest pain.   Gastrointestinal: Negative.  Negative for abdominal pain, nausea and vomiting.   Endocrine: Negative.    Genitourinary: Negative.  Negative for dysuria.   Musculoskeletal: Negative.  Negative for arthralgias and myalgias.   Skin: Negative.    Allergic/Immunologic: Negative.    Neurological: Negative.  Negative for headaches.   Hematological: Negative.  Negative for adenopathy. Does not bruise/bleed easily.   Psychiatric/Behavioral: Negative.         Patient Active Problem List   Diagnosis    Bunion    Depression    Hypothyroidism    Prediabetes    Vitamin D deficiency    Easy bruising    Medial epicondylitis of elbow, right    Malignant neoplasm of overlapping sites of right breast in female, estrogen receptor positive     Abnormal CT of the chest    Malignant neoplasm of upper-outer quadrant of left breast in female, estrogen receptor positive      Past Medical History:   Diagnosis Date    Asthma     Depression     Disease of thyroid gland     Left knee pain 04/12/2022    Osteopenia     Pneumonia     2017    Rosacea 02/09/2021    Vitamin D deficiency 07/24/2018     Past Surgical History:   Procedure Laterality Date    BREAST BIOPSY Right 11/01/2023    X 2 sites    LASIK      MRI BREAST BIOPSY LEFT (ALL INCLUSIVE) Left 12/8/2023    [place] NEUROPLASTY &/TRANSPOS MEDIAN NRV  CARPAL SHYE Right 07/10/2018    Procedure: CARPAL TUNNEL RELEASE;  Surgeon: Maicol Taylor DO;  [facility];  Service: Orthopedics    [place] NEUROPLASTY &/TRANSPOS MEDIAN NRV CARPAL SHYE Left 04/19/2019    Procedure: RELEASE CARPAL TUNNEL;  Surgeon: Peterson Alejandro MD;  [facility];  Service: Orthopedics    SEPTOPLASTY      SINUS SURGERY      US GUIDANCE BREAST BIOPSY RIGHT EACH ADDITIONAL Right 11/01/2023    US GUIDED BREAST BIOPSY RIGHT COMPLETE Right 11/01/2023     Family History   Problem Relation Age of Onset    Hashimoto's thyroiditis Mother     Thyroid disease Mother     Melanoma Mother         50's    Melanoma Father         50's    Crohn's disease Father     Hashimoto's thyroiditis Sister     No Known Problems Sister     No Known Problems Sister     Colon cancer Maternal Uncle         50's    Depression Maternal Grandmother     Diabetes Maternal Grandmother     No Known Problems Maternal Grandfather     No Known Problems Paternal Grandmother     No Known Problems Paternal Grandfather      Social History     Socioeconomic History    Marital status: /Civil Union     Spouse name: Not on file    Number of children: Not on file    Years of education: Not on file    Highest education level: Not on file   Occupational History    Not on file   Tobacco Use    Smoking status: Never    Smokeless tobacco: Never   Vaping Use    Vaping status: Never Used   Substance and Sexual Activity    Alcohol use: Yes     Comment: rarely    Drug use: Never    Sexual activity: Yes     Partners: Male     Birth control/protection: Male Sterilization, Post-menopausal   Other Topics Concern    Not on file   Social History Narrative    Not on file     Social Determinants of Health     Financial Resource Strain: Not on file   Food Insecurity: Not on file   Transportation Needs: Not on file   Physical Activity: Not on file   Stress: Not on file   Social Connections: Not on file   Intimate Partner Violence: Not on  file   Housing Stability: Not on file       Current Outpatient Medications:     Calcium 250 MG CAPS, Take by mouth daily with dinner  , Disp: , Rfl:     cetirizine (ZyrTEC) 10 mg tablet, Take 10 mg by mouth as needed, Disp: , Rfl:     Cholecalciferol (VITAMIN D3 PO), Take by mouth, Disp: , Rfl:     levothyroxine 25 mcg tablet, TAKE ONE TABLET BY MOUTH EVERY DAY MONDAY THRU FRIDAY AND TAKE 2 TABLETS EVERY DAY ON SAT & SUN., Disp: 114 tablet, Rfl: 0    MAGNESIUM PO, Take 200 mg by mouth daily with dinner  , Disp: , Rfl:     Medium Chain Triglycerides (MCT OIL PO), Take by mouth, Disp: , Rfl:     MULTIPLE VITAMINS-CALCIUM PO, Take 1 capsule by mouth daily in the early morning  , Disp: , Rfl:     niacin 100 mg tablet, Take 100 mg by mouth daily with breakfast, Disp: , Rfl:     PARoxetine (PAXIL-CR) 25 mg 24 hr tablet, Take 2 po daily, Disp: 180 tablet, Rfl: 3    pseudoephedrine (SUDAFED) 120 MG 12 hr tablet, Take 120 mg by mouth as needed, Disp: , Rfl:   Allergies   Allergen Reactions    Pollen Extract        The following portions of the patient's history were reviewed and updated as appropriate: allergies, current medications, past family history, past medical history, past social history, past surgical history, and problem list.        Vitals:    12/27/23 0831   BP: 122/72   Pulse: 76   Temp: 97.7 °F (36.5 °C)   SpO2: 95%       Physical Exam  Constitutional:       General: She is not in acute distress.     Appearance: Normal appearance.   Chest:   Breasts:     Right: Skin change (ecchymosis has resolved) present.      Left: Skin change (resolving ecchymosis left breast) present.   Neurological:      Mental Status: She is alert and oriented to person, place, and time.   Psychiatric:         Mood and Affect: Mood normal.           Results:  Labs:  12/8/2023 MRI biopsy left breast 1:00 revealed invasive ductal carcinoma, low-grade, ER 95%, FL 0, HER2 is negative    Imaging  12/5/2023 CAT scan of the chest prompted by  abnormal findings on breast MRI showed multifocal patchy airspace opacities bilateral thought to favor an inflammatory or infectious etiology, likely reactive mediastinal nodes    I reviewed the above laboratory and imaging data.    Pulmonary consult from yesterday was reviewed and was thought to be secondary to either Aspergillus or MAC infections, less likely secondary to malignancy, blood work was ordered and patient is scheduled for bronchoscopy in 2 days    Discussion/Summary: 58-year-old female who initially presented with multicentric carcinoma of the right breast.  She was counseled on a mastectomy at that time.  An MRI showed an abnormality on the left side.  She ultimately had an MRI guided biopsy of the left which also shows carcinoma.  She continues to decline genetic testing.  The breast MRI also showed concern in the lungs and a CAT scan was performed as noted above.  This prompted a pulmonary consult.  She is scheduled for bronchoscopy in 2 days.  In lieu of these findings, I will refer her to medical oncology to discuss the role of hormone therapy in the meantime.  I will also represent her at our breast tumor board.  All of her questions were answered.  We will continue to follow her disposition with our pulmonary colleagues.

## 2023-12-28 LAB
A NIGER IGE QN: 0.14 KU/L
C-ANCA TITR SER IF: NORMAL TITER
MYELOPEROXIDASE AB SER IA-ACNC: <0.2 UNITS (ref 0–0.9)
P-ANCA ATYPICAL TITR SER IF: NORMAL TITER
P-ANCA TITR SER IF: NORMAL TITER
PROTEINASE3 AB SER IA-ACNC: <0.2 UNITS (ref 0–0.9)

## 2023-12-29 ENCOUNTER — HOSPITAL ENCOUNTER (OUTPATIENT)
Dept: GASTROENTEROLOGY | Facility: HOSPITAL | Age: 58
Setting detail: OUTPATIENT SURGERY
End: 2023-12-29
Attending: INTERNAL MEDICINE
Payer: COMMERCIAL

## 2023-12-29 ENCOUNTER — ANESTHESIA EVENT (OUTPATIENT)
Dept: GASTROENTEROLOGY | Facility: HOSPITAL | Age: 58
End: 2023-12-29

## 2023-12-29 ENCOUNTER — ANESTHESIA (OUTPATIENT)
Dept: GASTROENTEROLOGY | Facility: HOSPITAL | Age: 58
End: 2023-12-29

## 2023-12-29 VITALS
DIASTOLIC BLOOD PRESSURE: 56 MMHG | OXYGEN SATURATION: 93 % | SYSTOLIC BLOOD PRESSURE: 121 MMHG | RESPIRATION RATE: 16 BRPM | BODY MASS INDEX: 23.39 KG/M2 | WEIGHT: 132 LBS | TEMPERATURE: 97.5 F | HEIGHT: 63 IN | HEART RATE: 74 BPM

## 2023-12-29 DIAGNOSIS — R93.89 ABNORMAL CT OF THE CHEST: ICD-10-CM

## 2023-12-29 LAB
EOSINOPHIL NFR FLD MANUAL: 2 %
EOSINOPHIL NFR FLD MANUAL: 2 %
LYMPHOCYTES NFR BLD AUTO: 10 %
LYMPHOCYTES NFR BLD AUTO: 9 %
MACROPHAGES NFR FLD: 22 %
MACROPHAGES NFR FLD: 36 %
NEUTS SEG NFR BLD AUTO: 52 %
NEUTS SEG NFR BLD AUTO: 67 %
TOTAL CELLS COUNTED SPEC: 100
TOTAL CELLS COUNTED SPEC: 100

## 2023-12-29 PROCEDURE — 87102 FUNGUS ISOLATION CULTURE: CPT | Performed by: INTERNAL MEDICINE

## 2023-12-29 PROCEDURE — 87077 CULTURE AEROBIC IDENTIFY: CPT | Performed by: INTERNAL MEDICINE

## 2023-12-29 PROCEDURE — 87205 SMEAR GRAM STAIN: CPT | Performed by: INTERNAL MEDICINE

## 2023-12-29 PROCEDURE — 87305 ASPERGILLUS AG IA: CPT | Performed by: INTERNAL MEDICINE

## 2023-12-29 PROCEDURE — 88305 TISSUE EXAM BY PATHOLOGIST: CPT | Performed by: PATHOLOGY

## 2023-12-29 PROCEDURE — 87206 SMEAR FLUORESCENT/ACID STAI: CPT | Performed by: INTERNAL MEDICINE

## 2023-12-29 PROCEDURE — 89051 BODY FLUID CELL COUNT: CPT | Performed by: INTERNAL MEDICINE

## 2023-12-29 PROCEDURE — 87185 SC STD ENZYME DETCJ PER NZM: CPT | Performed by: INTERNAL MEDICINE

## 2023-12-29 PROCEDURE — 88112 CYTOPATH CELL ENHANCE TECH: CPT | Performed by: PATHOLOGY

## 2023-12-29 PROCEDURE — 87070 CULTURE OTHR SPECIMN AEROBIC: CPT | Performed by: INTERNAL MEDICINE

## 2023-12-29 PROCEDURE — 31624 DX BRONCHOSCOPE/LAVAGE: CPT | Performed by: INTERNAL MEDICINE

## 2023-12-29 PROCEDURE — 87798 DETECT AGENT NOS DNA AMP: CPT | Performed by: INTERNAL MEDICINE

## 2023-12-29 PROCEDURE — 87635 SARS-COV-2 COVID-19 AMP PRB: CPT | Performed by: INTERNAL MEDICINE

## 2023-12-29 PROCEDURE — 87184 SC STD DISK METHOD PER PLATE: CPT | Performed by: INTERNAL MEDICINE

## 2023-12-29 PROCEDURE — 88185 FLOWCYTOMETRY/TC ADD-ON: CPT | Performed by: INTERNAL MEDICINE

## 2023-12-29 PROCEDURE — 87116 MYCOBACTERIA CULTURE: CPT | Performed by: INTERNAL MEDICINE

## 2023-12-29 PROCEDURE — 88184 FLOWCYTOMETRY/ TC 1 MARKER: CPT | Performed by: INTERNAL MEDICINE

## 2023-12-29 PROCEDURE — 87252 VIRUS INOCULATION TISSUE: CPT | Performed by: INTERNAL MEDICINE

## 2023-12-29 RX ORDER — LIDOCAINE HYDROCHLORIDE 10 MG/ML
INJECTION, SOLUTION EPIDURAL; INFILTRATION; INTRACAUDAL; PERINEURAL AS NEEDED
Status: DISCONTINUED | OUTPATIENT
Start: 2023-12-29 | End: 2023-12-29

## 2023-12-29 RX ORDER — PROPOFOL 10 MG/ML
INJECTION, EMULSION INTRAVENOUS AS NEEDED
Status: DISCONTINUED | OUTPATIENT
Start: 2023-12-29 | End: 2023-12-29

## 2023-12-29 RX ORDER — SODIUM CHLORIDE 9 MG/ML
INJECTION, SOLUTION INTRAVENOUS CONTINUOUS PRN
Status: DISCONTINUED | OUTPATIENT
Start: 2023-12-29 | End: 2023-12-29

## 2023-12-29 RX ORDER — PROPOFOL 10 MG/ML
INJECTION, EMULSION INTRAVENOUS CONTINUOUS PRN
Status: DISCONTINUED | OUTPATIENT
Start: 2023-12-29 | End: 2023-12-29

## 2023-12-29 RX ADMIN — PROPOFOL 140 MCG/KG/MIN: 10 INJECTION, EMULSION INTRAVENOUS at 09:09

## 2023-12-29 RX ADMIN — PROPOFOL 50 MG: 10 INJECTION, EMULSION INTRAVENOUS at 09:14

## 2023-12-29 RX ADMIN — SODIUM CHLORIDE: 9 INJECTION, SOLUTION INTRAVENOUS at 09:01

## 2023-12-29 RX ADMIN — PROPOFOL 30 MG: 10 INJECTION, EMULSION INTRAVENOUS at 09:09

## 2023-12-29 RX ADMIN — LIDOCAINE HYDROCHLORIDE 5 ML: 10 INJECTION, SOLUTION EPIDURAL; INFILTRATION; INTRACAUDAL; PERINEURAL at 09:08

## 2023-12-29 RX ADMIN — PROPOFOL 120 MG: 10 INJECTION, EMULSION INTRAVENOUS at 09:08

## 2023-12-29 RX ADMIN — PROPOFOL 50 MG: 10 INJECTION, EMULSION INTRAVENOUS at 09:15

## 2023-12-29 NOTE — INTERVAL H&P NOTE
H&P reviewed. After examining the patient I find no changes in the patients condition since the H&P had been written.    Vitals:    12/29/23 0800   BP: 115/61   Pulse: 74   Resp: 16   Temp: (!) 96.2 °F (35.7 °C)   SpO2: 95%     Proceed with bronch    Seamus Montero MD

## 2023-12-29 NOTE — ANESTHESIA POSTPROCEDURE EVALUATION
Post-Op Assessment Note    CV Status:  Stable  Pain Score: 0    Pain management: adequate       Mental Status:  Alert, awake and sleepy   Hydration Status:  Euvolemic   PONV Controlled:  Controlled   Airway Patency:  Patent     Post Op Vitals Reviewed: Yes      Staff: CRNA, Anesthesiologist   Comments: Report given to recoveirng RN, VSS, Pt resting            /61 (12/29/23 0944)    Temp 97.5 °F (36.4 °C) (12/29/23 0944)    Pulse 94 (12/29/23 0944)   Resp 16 (12/29/23 0944)    SpO2 97 % (12/29/23 0944)

## 2023-12-29 NOTE — ANESTHESIA PREPROCEDURE EVALUATION
Procedure:  BRONCHOSCOPY    Relevant Problems   ENDO   (+) Hypothyroidism      GYN   (+) Malignant neoplasm of overlapping sites of right breast in female, estrogen receptor positive    (+) Malignant neoplasm of upper-outer quadrant of left breast in female, estrogen receptor positive       NEURO/PSYCH   (+) Depression        Physical Exam    Airway    Mallampati score: II  TM Distance: >3 FB  Neck ROM: full     Dental   No notable dental hx     Cardiovascular  Rhythm: regular, Rate: normal    Pulmonary   Breath sounds clear to auscultation, Wheezes    Other Findings  post-pubertal.      Anesthesia Plan  ASA Score- 2     Anesthesia Type- general with ASA Monitors.         Additional Monitors:     Airway Plan: LMA.           Plan Factors-Exercise tolerance (METS): >4 METS.    Chart reviewed. EKG reviewed.  Existing labs reviewed. Patient summary reviewed.    Patient is not a current smoker.      Obstructive sleep apnea risk education given perioperatively.        Induction- intravenous.    Postoperative Plan-     Informed Consent- Anesthetic plan and risks discussed with patient.  I personally reviewed this patient with the CRNA. Discussed and agreed on the Anesthesia Plan with the CRNA..

## 2023-12-30 LAB
RHODAMINE-AURAMINE STN SPEC: NORMAL
RHODAMINE-AURAMINE STN SPEC: NORMAL

## 2023-12-31 DIAGNOSIS — J40 BRONCHITIS: Primary | ICD-10-CM

## 2023-12-31 LAB
BACTERIA BRONCH AEROBE CULT: ABNORMAL
GRAM STN SPEC: ABNORMAL
GRAM STN SPEC: ABNORMAL

## 2023-12-31 RX ORDER — AMOXICILLIN 500 MG/1
500 CAPSULE ORAL EVERY 8 HOURS SCHEDULED
Qty: 30 CAPSULE | Refills: 0 | Status: SHIPPED | OUTPATIENT
Start: 2023-12-31 | End: 2024-01-10

## 2024-01-02 LAB
FUNGUS SPEC CULT: NORMAL
FUNGUS SPEC CULT: NORMAL

## 2024-01-03 ENCOUNTER — DOCUMENTATION (OUTPATIENT)
Dept: HEMATOLOGY ONCOLOGY | Facility: CLINIC | Age: 59
End: 2024-01-03

## 2024-01-03 LAB
MYCOBACTERIUM SPEC CULT: NORMAL
MYCOBACTERIUM SPEC CULT: NORMAL
PNEUMOCYSTIS PCR: NEGATIVE
RHODAMINE-AURAMINE STN SPEC: NORMAL
RHODAMINE-AURAMINE STN SPEC: NORMAL

## 2024-01-03 PROCEDURE — 88305 TISSUE EXAM BY PATHOLOGIST: CPT | Performed by: PATHOLOGY

## 2024-01-03 PROCEDURE — 88112 CYTOPATH CELL ENHANCE TECH: CPT | Performed by: PATHOLOGY

## 2024-01-03 NOTE — PROGRESS NOTES
In-basket message received from Dr. Dominguez to move patient from the breast [facility] on 1/8/2024 to 1/15/2024. Chart reviewed and prep completed.

## 2024-01-04 LAB — GALACTOMANNAN AG SPEC IA-ACNC: 0.05 INDEX (ref 0–0.49)

## 2024-01-04 NOTE — LETTER
January 4, 2024     Patient: Richa Medina  YOB: 1965  Date of Visit: 1/4/2024      To Whom it May Concern:    Richa Medina is under my continued professional care. Please excuse her from work for the following days: January 1st, 2024 through January 5th, 2024.  Richa may return to work on 01/08/2024 .    If you have any questions or concerns, please don't hesitate to call.         Sincerely,          Lev Lockhart MD        CC: No Recipients

## 2024-01-05 LAB
SARS-COV-2 RNA SPEC QL NAA+PROBE: NOT DETECTED
SARS-COV-2 RNA SPEC QL NAA+PROBE: NOT DETECTED

## 2024-01-08 LAB
FUNGUS SPEC CULT: NORMAL
FUNGUS SPEC CULT: NORMAL
GALACTOMANNAN AG SPEC IA-ACNC: 0.04 INDEX (ref 0–0.49)

## 2024-01-09 LAB
MYCOBACTERIUM SPEC CULT: NORMAL
MYCOBACTERIUM SPEC CULT: NORMAL
RHODAMINE-AURAMINE STN SPEC: NORMAL
RHODAMINE-AURAMINE STN SPEC: NORMAL

## 2024-01-11 DIAGNOSIS — J14 PNEUMONIA OF BOTH LUNGS DUE TO HAEMOPHILUS INFLUENZAE, UNSPECIFIED PART OF LUNG (HCC): Primary | ICD-10-CM

## 2024-01-15 ENCOUNTER — DOCUMENTATION (OUTPATIENT)
Dept: HEMATOLOGY ONCOLOGY | Facility: CLINIC | Age: 59
End: 2024-01-15

## 2024-01-15 LAB
FUNGUS SPEC CULT: NORMAL
FUNGUS SPEC CULT: NORMAL

## 2024-01-15 NOTE — PROGRESS NOTES
BREAST CANCER MULTIDISCIPLINARY CASE CONFERENCE    DATE: 01/15/2024      PRESENTING DOCTOR: Dr Dominguez    OTHER RELEVANT PROVIDERS:  Dr Dilshad Marte PA-C    DIAGNOSIS:  12/08/2023 MRI Breast biopsy Left breast - Invasive ductal carcinoma, with features of tubular carcinoma ER positive 90-95%, OH negative, HER2 1+ low  11/01/2023 US Core biopsy - Right breast 7:00 5 cmfn invasive ductal carcinoma ER positive 100%. OH positive 90%, FISH negative  11/01/2023 US Core biopsy - Right Breast 12:00 8cmfn Invasive ductal carcinoma ER positive 100%, OH positive 35%, HER2 1+ low          Richa Medina is a 58 y.o. female who was presented at the Breast Multidisciplinary Case Conference today to discuss underlying pulmonary disease following abnormal CAT scan of the chest. Patient diagnosed with bilateral breast cancer after an abnormal finding on the left breast MRI revealed invasive carcinoma. Incidental lung finding prompted a chest CT and additional lung work up. Patient was previously discussed on 12/04/2023 for right breast multicentric breast cancer with DCIS.       GENETICS:  Decined    IMAGING REVIEWED:   11/21/2023 - MRI breast bilateral with and w/o contrast w cad  12/05/2023 - CT chest wo contrast  12/08/2023 - MRI breast biopsy, left breast      PATHOLOGY REVIEWED:  12/08/23 MRI biopsy Left breast invasive carcinoma ER positive 90-95%, OH negative, HER2 low (1+)    PHYSICIAN RECOMMENDED PLAN:  Recommended surgery after CT scan on 01/17/2024  Recommends neoadjuvant endocrine therapy   Consider sending specimen for oncotype      FOLLOW UP APPOINTMENTS:  01/17//2024 - CT Chest wo contrast  01/23/2024 - 11:20am HEM ONC Dr Yung       Team agreed to plan.    The final treatment plan will be left to the discretion of the patient and the treating physician.     DISCLAIMERS:  TO THE TREATING PHYSICIAN:  This conference is a meeting of clinicians from various specialty areas who evaluate and discuss patients  for whom a multidisciplinary treatment approach is being considered. Please note that the above opinion was a consensus of the conference attendees and is intended only to assist in quality care of the discussed patient.  The responsibility for follow up on the input given during the conference, along with any final decisions regarding plan of care, is that of the patient and the patient's provider. Accordingly, appointments have only been recommended based on this information and have NOT been scheduled unless otherwise noted.      TO THE PATIENT:  This summary is a brief record of major aspects of your cancer treatment. You may choose to share a copy with any of your doctors or nurses. However, this is not a detailed or comprehensive record of your care.      NCCN guidelines were readily available for review at this discussion

## 2024-01-16 ENCOUNTER — TELEPHONE (OUTPATIENT)
Age: 59
End: 2024-01-16

## 2024-01-16 NOTE — TELEPHONE ENCOUNTER
Kathia is calling because they had a message left for them stating that a CT was not authorized and wanted to know what to do. I cancelled the patient's CT and asked the patient to please call the ins company and see when the Ct would be auth and to give us a call back with that information also acheduled an appt with Dr. Lockhart for Jan 31 in the [place] office this was the message that was left in the notes section of the appt   SERVICE HAS NOT BEEN AUTHORIZED BY PAYOR/AUTH STILL IN PHYSICIAN REVIEW WITH [facility] TRACKING#[id number]. CALLED PATIENT AND LEFT MESSAGE ON MACHINE TO CANCEL/RESCHEDULE AND FOLLOW UP WITH DR OFFICE WITH QUESTIONS...CHRISTO 01/16/2024

## 2024-01-16 NOTE — TELEPHONE ENCOUNTER
Patient called prior to this call stating that the Prior auth for their scheduled Ct was denied for 1/17/2024 and the patient called the Eligible and there was no prior auth on file. Can we please put a prior auth in for the patient Please advise

## 2024-01-22 LAB
FUNGUS SPEC CULT: NORMAL
FUNGUS SPEC CULT: NORMAL

## 2024-01-23 ENCOUNTER — CONSULT (OUTPATIENT)
Dept: HEMATOLOGY ONCOLOGY | Facility: CLINIC | Age: 59
End: 2024-01-23
Payer: COMMERCIAL

## 2024-01-23 ENCOUNTER — TELEPHONE (OUTPATIENT)
Dept: HEMATOLOGY ONCOLOGY | Facility: CLINIC | Age: 59
End: 2024-01-23

## 2024-01-23 VITALS
SYSTOLIC BLOOD PRESSURE: 124 MMHG | OXYGEN SATURATION: 96 % | TEMPERATURE: 97.9 F | HEIGHT: 63 IN | DIASTOLIC BLOOD PRESSURE: 80 MMHG | WEIGHT: 134.2 LBS | HEART RATE: 85 BPM | BODY MASS INDEX: 23.78 KG/M2 | RESPIRATION RATE: 17 BRPM

## 2024-01-23 DIAGNOSIS — C50.811 MALIGNANT NEOPLASM OF OVERLAPPING SITES OF RIGHT BREAST IN FEMALE, ESTROGEN RECEPTOR POSITIVE: ICD-10-CM

## 2024-01-23 DIAGNOSIS — Z17.0 MALIGNANT NEOPLASM OF UPPER-OUTER QUADRANT OF LEFT BREAST IN FEMALE, ESTROGEN RECEPTOR POSITIVE: ICD-10-CM

## 2024-01-23 DIAGNOSIS — C50.911 BILATERAL MALIGNANT NEOPLASM OF BREAST IN FEMALE, ESTROGEN RECEPTOR POSITIVE, UNSPECIFIED SITE OF BREAST (HCC): Primary | ICD-10-CM

## 2024-01-23 DIAGNOSIS — Z17.0 BILATERAL MALIGNANT NEOPLASM OF BREAST IN FEMALE, ESTROGEN RECEPTOR POSITIVE, UNSPECIFIED SITE OF BREAST (HCC): Primary | ICD-10-CM

## 2024-01-23 DIAGNOSIS — C50.412 MALIGNANT NEOPLASM OF UPPER-OUTER QUADRANT OF LEFT BREAST IN FEMALE, ESTROGEN RECEPTOR POSITIVE: ICD-10-CM

## 2024-01-23 DIAGNOSIS — C50.912 BILATERAL MALIGNANT NEOPLASM OF BREAST IN FEMALE, ESTROGEN RECEPTOR POSITIVE, UNSPECIFIED SITE OF BREAST (HCC): Primary | ICD-10-CM

## 2024-01-23 DIAGNOSIS — Z17.0 MALIGNANT NEOPLASM OF OVERLAPPING SITES OF RIGHT BREAST IN FEMALE, ESTROGEN RECEPTOR POSITIVE: ICD-10-CM

## 2024-01-23 DIAGNOSIS — Z78.0 ASYMPTOMATIC MENOPAUSE: ICD-10-CM

## 2024-01-23 PROCEDURE — 99245 OFF/OP CONSLTJ NEW/EST HI 55: CPT | Performed by: INTERNAL MEDICINE

## 2024-01-23 RX ORDER — ANASTROZOLE 1 MG/1
1 TABLET ORAL DAILY
Qty: 30 TABLET | Refills: 2 | Status: SHIPPED | OUTPATIENT
Start: 2024-01-23

## 2024-01-23 NOTE — PROGRESS NOTES
Oncology Outpatient Consult Note  Richa Medina 58 y.o. female MRN: @ Encounter: [id number]    Date:  1/24/2024      HPI:     Ms. Richa Medina is a 58 Y F with underlying asthma, depression, and hypothyroidism with new diagnosis of bilateral breast cancers. Patient had a screening mammogram in August 2023, which reported a focal asymmetry at the 12 o'clock position; otherwise patient never had any palpable masses or skin changes involving either of her breasts. Follow-up diagnostic mammogram/US from October 2023 reported multifocal disease in right breast (at 12 o'clock and 7 o'clock positions), in addition to foci of enhancement in upper outer quadrant of left breast. Patient underwent core needle biopsy on 11/1/23; both right breast masses at 7'clock and 12 o'clock positions were biopsied. 7 o'clock mass resulted as invasive ductal carcinoma, Grade 1, % positive, MN 90% positive, HER2 2+; 12 o'clock mass also resulted as invasive ductal carcinoma, Grade 1, % positive, MN 35% positive, HER2 1+.     Patient was recommended to pursue MRI of both breasts for better evaluation; it reported multifocal malignant in right breast with no evidence of pectoralis muscle, skin, or nipple invasion. Additionally, there was a 8 mm non-mass enhancement reported in the left breast at 1 o'clock position. Additionally, there was an abnormal opacification and enhancement in the right lower lung for which CT chest was recommended. CT chest was pursued on 12/5/23, which reported multifocal patchy airspace opacities in the lungs with predilection for the anterior portions of the lungs, including the anterior upper lobes, right middle lobe and lingula, and anterior left lower lobe. Pattern was favored to of inflammatory or infectious etiology, with greatest suspicion for atypical mycobacterial infection. Ddx also included organizing pneumonia, an atypical appearance of sarcoidosis, and pulmonary metastatic disease, although  latter was felt much less likely. Biopsy of left breast was pursued on 12/8/23; pathology reported invasive ductal carcinoma, Grade 1, ER 95% positive, MT 0%, HER2 1+. Bronchoscopy was pursued on 12/29/23; cytology was negative for malignancy, but bronchial culture and gram stain was positive for 1+ growth of haemophilus influenzae. Patient was started on a 10 day course of amoxicillin. Patient said that she has been having a cough for almost one year now, sometimes productive but otherwise dry; it seems to have slightly improved over the past several weeks, although still present.    Patient is currently planned for bilateral mastectomy, however surgery decisions and date to be determined after repeat CT chest findings are reviewed. Patient was recommended to meanwhile see medical oncology to discuss about neoadjuvant endocrine therapy.     Of note, patient relayed that she was on hormone replacement for about 5 years to help alleviate her hot flash symptoms (stopped 1-2 years ago). Patient has also declined genetic testing.    Social history: patient denied any current or past tobacco use, alcohol abuse, or illicit drug use. 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     Family history: melanoma in both mom and dad. Has 2 boys, both of whom are healthy.     Review of Systems   Constitutional:  Negative for appetite change, chills, fatigue, fever and unexpected weight change.   HENT:   Negative for hearing loss, mouth sores, nosebleeds, sore throat and trouble swallowing.    Eyes:  Negative for eye problems and icterus.   Respiratory:  Positive for cough. Negative for chest tightness, shortness of breath and wheezing.    Cardiovascular:  Negative for chest pain and palpitations.   Gastrointestinal:  Negative for abdominal pain, blood in stool, constipation, diarrhea, nausea and vomiting.   Endocrine: Negative for hot flashes.   Genitourinary:  Negative for dysuria, frequency and  hematuria.    Musculoskeletal:  Negative for back pain, flank pain, gait problem, myalgias and neck pain.   Skin:  Negative for itching and rash.   Neurological:  Negative for dizziness, gait problem, headaches, light-headedness and speech difficulty.   Hematological:  Does not bruise/bleed easily.   Psychiatric/Behavioral:  Negative for confusion. The patient is not nervous/anxious.      ECOG PS: 0    Cancer Staging:  Cancer Staging   Malignant neoplasm of overlapping sites of right breast in female, estrogen receptor positive   Staging form: Breast, AJCC 8th Edition  - Clinical stage from 11/1/2023: Stage IA (cT1c, cN0, cM0, G1, ER+, MN+, HER2-) - Signed by Sisi Dominguez MD on 11/13/2023  Stage prefix: Initial diagnosis  Method of lymph node assessment: Clinical  Histologic grading system: 3 grade system    Malignant neoplasm of upper-outer quadrant of left breast in female, estrogen receptor positive   Staging form: Breast, AJCC 8th Edition  - Clinical stage from 12/8/2023: Stage IA (cT1b, cN0, cM0, G1, ER+, MN-, HER2-) - Signed by Sisi Dominguez MD on 12/27/2023  Stage prefix: Initial diagnosis  Method of lymph node assessment: Clinical  Histologic grading system: 3 grade system      Molecular Testing:     Oncology History Overview Note   Screening mammogram (8/4/23): focal asymmetry in upper region of the right breast at 12 o'clock position (patient otherwise asymptomatic with no palpable masses or skin changes involving either of her breast)     Diagnostic mammogram/US (10/31/23): multifocal disease in right breast (at 12 o'clock and 7 o'clock positions), in addition to foci of enhancement in upper outer quadrant of left breast.      Core needle biopsy (11/1/23): right breast masses at 7'clock and 12 o'clock positions were biopsied. The 7 o'clock mass resulted as invasive ductal carcinoma, Grade 1, % positive, MN 90% positive, HER2 2+; 12 o'clock mass pathology resulted as IDC, Grade 1, % positive,  WV 35% positive, HER2 1+      MRI breast (11/21/23): multifocal malignancy in right breast. No evidence of pectoralis muscle, skin, or nipple invasion. 8 mm non-mass enhancement in left breast at 1 o'clock position. Additionally, there was an abnormal opacification and enhancement in the right lower lung for which CT chest was recommended.      CT chest (12/5/23): Multifocal patchy airspace opacities in the lungs; predilection for the anterior portions of the lungs, including the anterior upper lobes, right middle lobe and lingula, and anterior left lower lobe. Pattern was favored to of inflammatory or infectious etiology, with greatest suspicion for atypical mycobacterial infection. Other possibilities include organizing pneumonia or an atypical appearance of sarcoidosis. Although not completely excluded, pulmonary metastatic disease is felt much less likely. Mildly prominent mediastinal lymph nodes, likely reactive.     12/8/23: left breast core needle biopsy. Pathology reported invasive ductal carcinoma, Grade 1, ER 95% positive, WV 0%, HER2 1+.      12/29/23: underwent bronchoscopy; cytology reported pulmonary macrophages and mixed inflammatory cells, negative for malignant cells. Bronchial culture and gram stain was positive for H. Influenzae, for which patient was started on a 10 day course of amoxicillin.      Malignant neoplasm of overlapping sites of right breast in female, estrogen receptor positive    11/1/2023 -  Cancer Staged    Staging form: Breast, AJCC 8th Edition  - Clinical stage from 11/1/2023: Stage IA (cT1c, cN0, cM0, G1, ER+, WV+, HER2-) - Signed by Sisi Dominguez MD on 11/13/2023  Stage prefix: Initial diagnosis  Method of lymph node assessment: Clinical  Histologic grading system: 3 grade system       11/13/2023 Initial Diagnosis    Malignant neoplasm of overlapping sites of right breast in female, estrogen receptor positive      Malignant neoplasm of upper-outer quadrant of left breast in  female, estrogen receptor positive    12/8/2023 -  Cancer Staged    Staging form: Breast, AJCC 8th Edition  - Clinical stage from 12/8/2023: Stage IA (cT1b, cN0, cM0, G1, ER+, WY-, HER2-) - Signed by Sisi Dominguez MD on 12/27/2023  Stage prefix: Initial diagnosis  Method of lymph node assessment: Clinical  Histologic grading system: 3 grade system       12/27/2023 Initial Diagnosis    Malignant neoplasm of upper-outer quadrant of left breast in female, estrogen receptor positive              Test Results:    Imaging: Bronchoscopy    Result Date: 12/29/2023  Narrative: Table formatting from the original result was not included. [facility] [address] [facility] [address] [phone] DATE OF SERVICE: 12/29/23 PHYSICIAN(S): Attending: Seamus Montero MD Fellow: Jules Davis DO INDICATION: Abnormal CT of the chest POST-OP DIAGNOSIS: See the impression below. PREPROCEDURE: Standard airway preparation completed per respiratory therapy protocol.  Informed consent was obtained. Images reviewed prior to the procedure.  A Time Out was performed. No suspicion or identified risk for TB or other airborne infectious disease; bronchoscopy procedure being performed for diagnostic purposes. PROCEDURE: Bronchoscopy DETAILS OF PROCEDURE: Patient was taken to the procedure room where a time out was performed to confirm correct patient and correct procedure. The patient underwent general anesthesia, which was administered by an anesthesia professional. The patient's blood pressure, ECG, ETCO2, heart rate, oxygen and respirations were monitored throughout the procedure. The patient experienced no blood loss. The scope was introduced through the laryngeal mask airway. The procedure was not difficult. The patient tolerated the procedure well. There were no apparent adverse events. ANESTHESIA INFORMATION: ASA: II Anesthesia Type: General FINDINGS: The vocal cords appeared normal. Moderate, thick and purulent  "secretions present in the trachea, main reyes, lingula, right middle lobar bronchus and right lower lobar bronchus; secretions were easily removed and the airway was cleared; performed washing Bronchoalveolar lavage was performed x3 in the right middle lobar bronchus with 150 mL of saline instilled and a total return of 60 mL; the fluid appeared purulent. Thick purulent secretions Bronchoalveolar lavage was performed x3 with 150 mL of saline instilled and a total return of 35 mL; the fluid appeared thick. Thick purulent secretions SPECIMENS: ID Type Source Tests Collected by Time Destination A :   Lung, Right Middle Lobe Bronchoalveolar Lavage   12/29/2023 [id number]       Impression: Thick purulent secretions seen at the main reyes, RML, RLL, Lingula which were easily removed. BAL of RML and lingula showed thick purulent secretions. RECOMMENDATION:  Await cytology, gram stain & cultures  Jules Davis DO PGY-IV Pulmonary Critical Care Fellow [facility] ====================================================================== Attending attestation: I was present and personally supervised the performance of the bronchoscopy by the pulmonary fellow, Dr Davis.  I agree with the procedure note as documented above.  Inflamed airways with thick white mucus. Normal anatomy. No endobronchial lesions. Follow-up with Dr Lockhart after resulted diagnostic testing Seamus Montero MD       Labs:   Lab Results   Component Value Date    WBC 5.06 09/28/2023    HGB 13.4 09/28/2023    HCT 39.5 09/28/2023    MCV 89 09/28/2023     09/28/2023     Lab Results   Component Value Date    K 4.6 09/28/2023     09/28/2023    CO2 27 09/28/2023    BUN 11 09/28/2023    CREATININE 0.65 09/28/2023    GLUF 86 09/28/2023    CALCIUM 9.9 09/28/2023    AST 22 09/28/2023    ALT 21 09/28/2023    ALKPHOS 69 09/28/2023    EGFR 96 09/28/2023         No results found for: \"SPEP\", \"UPEP\"    No results found for: " "\"PSA\"    No results found for: \"CEA\"    No results found for: \"AFP\"    No results found for: \"IRON\", \"TIBC\", \"FERRITIN\"    No results found for: \"ONAZIGWZ56\"            Active Problems:   Patient Active Problem List   Diagnosis    Bunion    Depression    Hypothyroidism    Prediabetes    Vitamin D deficiency    Easy bruising    Medial epicondylitis of elbow, right    Malignant neoplasm of overlapping sites of right breast in female, estrogen receptor positive     Abnormal CT of the chest    Malignant neoplasm of upper-outer quadrant of left breast in female, estrogen receptor positive        Past Medical History:   Past Medical History:   Diagnosis Date    Depression     Disease of thyroid gland     Osteopenia     Rosacea 02/09/2021    Vitamin D deficiency 07/24/2018       Surgical History:   Past Surgical History:   Procedure Laterality Date    BREAST BIOPSY Right 11/01/2023    X 2 sites    LASIK      MRI BREAST BIOPSY LEFT (ALL INCLUSIVE) Left 12/8/2023    GA NEUROPLASTY &/TRANSPOS MEDIAN NRV CARPAL TUNNE Right 07/10/2018    Procedure: CARPAL TUNNEL RELEASE;  Surgeon: Maicol Taylor DO;  Location: [facility];  Service: Orthopedics    GA NEUROPLASTY &/TRANSPOS MEDIAN NRV CARPAL TUNNE Left 04/19/2019    Procedure: RELEASE CARPAL TUNNEL;  Surgeon: Peterson Alejandro MD;  Location: [facility];  Service: Orthopedics    SEPTOPLASTY      SINUS SURGERY      US GUIDANCE BREAST BIOPSY RIGHT EACH ADDITIONAL Right 11/01/2023    US GUIDED BREAST BIOPSY RIGHT COMPLETE Right 11/01/2023       Family History:    Family History   Problem Relation Age of Onset    Hashimoto's thyroiditis Mother     Thyroid disease Mother     Melanoma Mother         50's    Melanoma Father         50's    Crohn's disease Father     Hashimoto's thyroiditis Sister     No Known Problems Sister     No Known Problems Sister     Colon cancer Maternal Uncle         50's    Depression Maternal Grandmother     Diabetes Maternal Grandmother     No Known Problems " Maternal Grandfather     No Known Problems Paternal Grandmother     No Known Problems Paternal Grandfather        Cancer-related family history includes Colon cancer in her maternal uncle; Melanoma in her father and mother.    Social History:   Social History     Socioeconomic History    Marital status: /Civil Union     Spouse name: Not on file    Number of children: Not on file    Years of education: Not on file    Highest education level: Not on file   Occupational History    Not on file   Tobacco Use    Smoking status: Never    Smokeless tobacco: Never   Vaping Use    Vaping status: Never Used   Substance and Sexual Activity    Alcohol use: Yes     Comment: rarely    Drug use: Never    Sexual activity: Yes     Partners: Male     Birth control/protection: Male Sterilization, Post-menopausal   Other Topics Concern    Not on file   Social History Narrative    Not on file     Social Determinants of Health     Financial Resource Strain: Not on file   Food Insecurity: Not on file   Transportation Needs: Not on file   Physical Activity: Not on file   Stress: Not on file   Social Connections: Not on file   Intimate Partner Violence: Not on file   Housing Stability: Not on file       Current Medications:   Current Outpatient Medications   Medication Sig Dispense Refill    anastrozole (ARIMIDEX) 1 mg tablet Take 1 tablet (1 mg total) by mouth daily 30 tablet 2    Calcium 250 MG CAPS Take by mouth daily with dinner        cetirizine (ZyrTEC) 10 mg tablet Take 10 mg by mouth as needed      Cholecalciferol (VITAMIN D3 PO) Take by mouth      levothyroxine 25 mcg tablet TAKE ONE TABLET BY MOUTH EVERY DAY MONDAY THRU FRIDAY AND TAKE 2 TABLETS EVERY DAY ON SAT & SUN. 114 tablet 0    MAGNESIUM PO Take 200 mg by mouth daily with dinner        Medium Chain Triglycerides (MCT OIL PO) Take by mouth      MULTIPLE VITAMINS-CALCIUM PO Take 1 capsule by mouth daily in the early morning        niacin 100 mg tablet Take 100 mg by  mouth daily with breakfast      PARoxetine (PAXIL-CR) 25 mg 24 hr tablet Take 2 po daily 180 tablet 3    pseudoephedrine (SUDAFED) 120 MG 12 hr tablet Take 120 mg by mouth as needed       No current facility-administered medications for this visit.       Allergies:   Allergies   Allergen Reactions    Pollen Extract          Physical Exam:    Body surface area is 1.63 meters squared.    Wt Readings from Last 3 Encounters:   01/23/24 60.9 kg (134 lb 3.2 oz)   12/29/23 59.9 kg (132 lb)   12/27/23 60.1 kg (132 lb 9.6 oz)        Temp Readings from Last 3 Encounters:   01/23/24 97.9 °F (36.6 °C)   12/29/23 97.5 °F (36.4 °C)   12/27/23 97.7 °F (36.5 °C) (Temporal)        BP Readings from Last 3 Encounters:   01/23/24 124/80   12/29/23 121/56   12/27/23 122/72         Pulse Readings from Last 3 Encounters:   01/23/24 85   12/29/23 74   12/27/23 76     @LASTSAO2(3)@    Physical Exam  Constitutional:       General: She is not in acute distress.     Appearance: Normal appearance. She is not toxic-appearing.   HENT:      Head: Normocephalic and atraumatic.      Mouth/Throat:      Mouth: Mucous membranes are moist.      Pharynx: No oropharyngeal exudate or posterior oropharyngeal erythema.   Eyes:      General: No scleral icterus.     Extraocular Movements: Extraocular movements intact.      Conjunctiva/sclera: Conjunctivae normal.      Pupils: Pupils are equal, round, and reactive to light.   Cardiovascular:      Rate and Rhythm: Normal rate and regular rhythm.      Heart sounds: No murmur heard.     No gallop.   Pulmonary:      Effort: No respiratory distress.      Breath sounds: Normal breath sounds. No stridor. No wheezing or rhonchi.   Abdominal:      General: Bowel sounds are normal. There is no distension.      Palpations: Abdomen is soft. There is no mass.      Tenderness: There is no abdominal tenderness.   Musculoskeletal:         General: No swelling, tenderness, deformity or signs of injury.      Cervical back:  Normal range of motion. No rigidity.      Right lower leg: No edema.      Left lower leg: No edema.   Lymphadenopathy:      Cervical: No cervical adenopathy.   Skin:     Capillary Refill: Capillary refill takes less than 2 seconds.      Coloration: Skin is not jaundiced or pale.      Findings: No bruising or erythema.   Neurological:      General: No focal deficit present.      Mental Status: She is oriented to person, place, and time.      Motor: No weakness.        Assessment/ Plan:      1. Bilateral malignant neoplasm of breast in female, estrogen receptor positive, unspecified site of breast (HCC)        2. Malignant neoplasm of overlapping sites of right breast in female, estrogen receptor positive   Ambulatory referral to Hematology / Oncology    anastrozole (ARIMIDEX) 1 mg tablet      3. Malignant neoplasm of upper-outer quadrant of left breast in female, estrogen receptor positive   Ambulatory referral to Hematology / Oncology      4. Asymptomatic menopause  DXA bone density spine hip and pelvis        Ms. Richa Medina is a 58 Y F with new diagnosis of bilateral ER/WA positive breast cancers. Patient had breast imaging in October 2023, which raised concern for multifocal disease in right breast (at 12 o'clock and 7 o'clock positions), in addition to foci of enhancement in upper outer quadrant of left breast. Biopsy of the 7 o'clock mass resulted as invasive ductal carcinoma, Grade 1, % positive, WA 90% positive, HER2 2+; 12 o'clock mass also resulted as invasive ductal carcinoma, Grade 1, % positive, WA 35% positive, HER2 1+. MRI of both breasts was recommended for better evaluation especially with the questionable left breast enhancement reported on the diagnostic mammogram; it confirmed multifocal malignancy and a 8 mm non-mass enhancement in the left breast at 1 o'clock position. Biopsy of left breast was pursued on 12/8/23; pathology reported invasive ductal carcinoma, Grade 1, ER 95%  positive, [place] 0%, HER2 1+.     Of note, MRI breast had reported an abnormal opacification and enhancement in the right lower lung for which CT chest was recommended. Dedicated CT chest reported multifocal patchy airspace opacities in the lungs with pattern favored to be of inflammatory or infectious etiology, with greatest suspicion for atypical mycobacterial infection. Bronchoscopy was pursued on 12/29/23; cytology was negative for malignancy, but bronchial culture and gram stain was positive H. influenzae, for which patient was prescribed a course of abx with amoxicillin.     Patient is currently planned for bilateral mastectomies, however surgery decisions and date to be determined after repeat CT chest findings are reviewed. Patient is scheduled to have her repeat CT chest on 1/24/24. Meanwhile, patient was recommended to start taking anastrozole as a neoadjuvant hormonal therapy given ER/KY positive status and as we are unsure at this time when exactly her surgery will be. Common potential side effects of anastrozole were discussed with the patient (including hot flashes, joint aches, and increased risk for osteoporosis) and patient was in agreement to start taking anastrozole. She was advised to get a DXA scan to establish baseline bone density; she was also informed to start taking vit D and Ca supplements for bone health. Additionally, we will send for Oncotype DX score to see if patient would have any additional benefit of decreasing systemic cancer recurrence by pursuing chemotherapy. Patient was in agreement with the plan as noted above. She was advised to follow-up in office about 2 months and to keep us updated regarding surgery plans once that's finalized. She knows to call the office with any questions or concerns in the interim.

## 2024-01-23 NOTE — TELEPHONE ENCOUNTER
Oncotype dx submitted for specimen B of [id number]. Pathology report, facesheet and insurance cards faxed to [phone]

## 2024-01-24 ENCOUNTER — HOSPITAL ENCOUNTER (OUTPATIENT)
Dept: RADIOLOGY | Facility: HOSPITAL | Age: 59
Discharge: HOME | End: 2024-01-24
Payer: COMMERCIAL

## 2024-01-24 ENCOUNTER — TELEPHONE (OUTPATIENT)
Dept: PULMONOLOGY | Facility: CLINIC | Age: 59
End: 2024-01-24

## 2024-01-24 DIAGNOSIS — J14 PNEUMONIA OF BOTH LUNGS DUE TO HAEMOPHILUS INFLUENZAE, UNSPECIFIED PART OF LUNG (HCC): ICD-10-CM

## 2024-01-24 PROCEDURE — G1004 CDSM NDSC: HCPCS

## 2024-01-24 PROCEDURE — 71250 CT THORAX DX C-: CPT

## 2024-01-24 NOTE — TELEPHONE ENCOUNTER
Dr. Lockhart won't be in the [place] office in the morning on 1/31, we have openings in the afternoon. Lm to pt  call back to reschedule.

## 2024-01-26 ENCOUNTER — LAB REQUISITION (OUTPATIENT)
Dept: LAB | Facility: HOSPITAL | Age: 59
End: 2024-01-26

## 2024-01-26 DIAGNOSIS — R92.8 OTHER ABNORMAL AND INCONCLUSIVE FINDINGS ON DIAGNOSTIC IMAGING OF BREAST: ICD-10-CM

## 2024-01-29 LAB
FUNGUS SPEC CULT: NORMAL
FUNGUS SPEC CULT: NORMAL

## 2024-01-31 ENCOUNTER — OFFICE VISIT (OUTPATIENT)
Dept: PULMONOLOGY | Facility: CLINIC | Age: 59
End: 2024-01-31
Payer: COMMERCIAL

## 2024-01-31 ENCOUNTER — APPOINTMENT (OUTPATIENT)
Dept: LAB | Facility: MEDICAL CENTER | Age: 59
End: 2024-01-31
Payer: COMMERCIAL

## 2024-01-31 VITALS
DIASTOLIC BLOOD PRESSURE: 78 MMHG | SYSTOLIC BLOOD PRESSURE: 112 MMHG | HEART RATE: 87 BPM | WEIGHT: 133 LBS | OXYGEN SATURATION: 98 % | TEMPERATURE: 98 F | BODY MASS INDEX: 23.57 KG/M2 | HEIGHT: 63 IN

## 2024-01-31 DIAGNOSIS — C50.912 BILATERAL MALIGNANT NEOPLASM OF BREAST IN FEMALE, UNSPECIFIED ESTROGEN RECEPTOR STATUS, UNSPECIFIED SITE OF BREAST (HCC): ICD-10-CM

## 2024-01-31 DIAGNOSIS — R73.03 PREDIABETES: ICD-10-CM

## 2024-01-31 DIAGNOSIS — R76.8 ELEVATED IGE LEVEL: ICD-10-CM

## 2024-01-31 DIAGNOSIS — F32.A DEPRESSION, UNSPECIFIED DEPRESSION TYPE: ICD-10-CM

## 2024-01-31 DIAGNOSIS — C50.911 BILATERAL MALIGNANT NEOPLASM OF BREAST IN FEMALE, UNSPECIFIED ESTROGEN RECEPTOR STATUS, UNSPECIFIED SITE OF BREAST (HCC): ICD-10-CM

## 2024-01-31 DIAGNOSIS — E03.9 HYPOTHYROIDISM, UNSPECIFIED TYPE: ICD-10-CM

## 2024-01-31 DIAGNOSIS — E55.9 VITAMIN D DEFICIENCY: ICD-10-CM

## 2024-01-31 DIAGNOSIS — J18.9 MULTIFOCAL PNEUMONIA: ICD-10-CM

## 2024-01-31 DIAGNOSIS — J18.9 MULTIFOCAL PNEUMONIA: Primary | ICD-10-CM

## 2024-01-31 PROCEDURE — 36415 COLL VENOUS BLD VENIPUNCTURE: CPT

## 2024-01-31 PROCEDURE — 84443 ASSAY THYROID STIM HORMONE: CPT

## 2024-01-31 PROCEDURE — 85025 COMPLETE CBC W/AUTO DIFF WBC: CPT

## 2024-01-31 PROCEDURE — 82306 VITAMIN D 25 HYDROXY: CPT

## 2024-01-31 PROCEDURE — 83036 HEMOGLOBIN GLYCOSYLATED A1C: CPT

## 2024-01-31 PROCEDURE — 99215 OFFICE O/P EST HI 40 MIN: CPT | Performed by: INTERNAL MEDICINE

## 2024-01-31 PROCEDURE — 82785 ASSAY OF IGE: CPT

## 2024-01-31 PROCEDURE — 80053 COMPREHEN METABOLIC PANEL: CPT

## 2024-01-31 NOTE — PROGRESS NOTES
Pulmonary Follow Up Note   Richa Medina 58 y.o. female MRN: [id number]  1/31/2024      Assessment:    1. Multifocal pneumonia  S/p bronchoscopy which grew H influenze  She was treated appropriately with 10 course of antibiotics  Currently much improved but mild productive cough  Check Sputum culture to ensure resolution  F/up official CT read, upon my review there is improvement in multifocal pneumonia, faint patchy areas remain especially in the lingula  -     Sputum culture and Gram stain; Future    2. Elevated IgE level  IgE level >1000  A. Fumigatus specific IgE also positive  ANCA and DUSTIN negative  Given recurrent sinopulmonary infections, consider Jobs syndrome vs ABPA  Check IgE level again  Refer to Allergy and immunology with Dr. Aurora Rosario. I did send a personal message to her  -     IgE; Future  -     Ambulatory Referral to Allergy; Future    3. 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 Recommend use of duonebs post surgery if needed, incentive spirometry and DVT prophylaxis at the discretion of the surgical team.   I sent an updated message to her Oncologist  If plan for immunosuppressive chemotherapy, recommend awaiting sputum culture to ensure no further pathogenic growth    Plan:    Diagnoses and all orders for this visit:    Multifocal pneumonia  -     Sputum culture and Gram stain; Future    Elevated IgE level  -     IgE; Future  -     Ambulatory Referral to Allergy; Future    Bilateral malignant neoplasm of breast in female, unspecified estrogen receptor status,  unspecified site of breast (HCC)        No follow-ups on file.    History of Present Illness   HPI:  Richa Medina is a 58 y.o. female who presents for follow up of multifocal pneumonia. Since her last visit, she reports feeling much better but still somewhat symptomatic. She is dealing with sinus drainage, otherwise no acute complaints.    Review of Systems   Constitutional:  Negative for appetite change, chills and fever.   HENT:  Positive for congestion. Negative for ear pain, postnasal drip, rhinorrhea, sneezing, sore throat and trouble swallowing.    Eyes:  Negative for itching.   Respiratory:  Positive for cough and shortness of breath. Negative for wheezing and stridor.    Cardiovascular:  Negative for chest pain, palpitations and leg swelling.   Gastrointestinal:  Negative for abdominal distention, abdominal pain, nausea and vomiting.   Genitourinary:  Negative for dysuria and flank pain.   Musculoskeletal:  Negative for arthralgias and myalgias.   Skin:  Negative for color change.   Neurological:  Negative for dizziness, light-headedness and headaches.   Psychiatric/Behavioral: Negative.         Historical Information   Past Medical History:   Diagnosis Date    Depression     Disease of thyroid gland     Osteopenia     Rosacea 02/09/2021    Vitamin D deficiency 07/24/2018     Past Surgical History:   Procedure Laterality Date    BREAST BIOPSY Right 11/01/2023    X 2 sites    LASIK      MRI BREAST BIOPSY LEFT (ALL INCLUSIVE) Left 12/8/2023    DC NEUROPLASTY &/TRANSPOS MEDIAN NRV CARPAL TUNNE Right 07/10/2018    Procedure: CARPAL TUNNEL RELEASE;  Surgeon: Maicol Taylor DO;  [facility];  Service: Orthopedics    DC NEUROPLASTY &/TRANSPOS MEDIAN NRV CARPAL TUNNE Left 04/19/2019    Procedure: RELEASE CARPAL TUNNEL;  Surgeon: Peterson Alejandro MD;  [facility];  Service: Orthopedics    SEPTOPLASTY      SINUS SURGERY      US GUIDANCE BREAST BIOPSY RIGHT EACH ADDITIONAL Right 11/01/2023      "GUIDED BREAST BIOPSY RIGHT COMPLETE Right 11/01/2023     Family History   Problem Relation Age of Onset    Hashimoto's thyroiditis Mother     Thyroid disease Mother     Melanoma Mother         50's    Melanoma Father         50's    Crohn's disease Father     Hashimoto's thyroiditis Sister     No Known Problems Sister     No Known Problems Sister     Colon cancer Maternal Uncle         50's    Depression Maternal Grandmother     Diabetes Maternal Grandmother     No Known Problems Maternal Grandfather     No Known Problems Paternal Grandmother     No Known Problems Paternal Grandfather          Meds/Allergies     Current Outpatient Medications:     anastrozole (ARIMIDEX) 1 mg tablet, Take 1 tablet (1 mg total) by mouth daily, Disp: 30 tablet, Rfl: 2    Calcium 250 MG CAPS, Take by mouth daily with dinner  , Disp: , Rfl:     cetirizine (ZyrTEC) 10 mg tablet, Take 10 mg by mouth as needed, Disp: , Rfl:     Cholecalciferol (VITAMIN D3 PO), Take by mouth, Disp: , Rfl:     levothyroxine 25 mcg tablet, TAKE ONE TABLET BY MOUTH EVERY DAY MONDAY THRU FRIDAY AND TAKE 2 TABLETS EVERY DAY ON SAT & SUN., Disp: 114 tablet, Rfl: 0    MAGNESIUM PO, Take 200 mg by mouth daily with dinner  , Disp: , Rfl:     Medium Chain Triglycerides (MCT OIL PO), Take by mouth, Disp: , Rfl:     MULTIPLE VITAMINS-CALCIUM PO, Take 1 capsule by mouth daily in the early morning  , Disp: , Rfl:     niacin 100 mg tablet, Take 100 mg by mouth daily with breakfast, Disp: , Rfl:     PARoxetine (PAXIL-CR) 25 mg 24 hr tablet, Take 2 po daily, Disp: 180 tablet, Rfl: 3    pseudoephedrine (SUDAFED) 120 MG 12 hr tablet, Take 120 mg by mouth as needed, Disp: , Rfl:   Allergies   Allergen Reactions    Pollen Extract        Vitals: Blood pressure 112/78, pulse 87, temperature 98 °F (36.7 °C), temperature source Tympanic, height 5' 3\" (1.6 m), weight 60.3 kg (133 lb), SpO2 98%, not currently breastfeeding. Body mass index is 23.56 kg/m². Oxygen Therapy  SpO2: 98 " %  Oxygen Therapy: None (Room air)      Physical Exam  Physical Exam  Constitutional:       General: She is not in acute distress.     Appearance: She is not diaphoretic.   HENT:      Head: Normocephalic and atraumatic.      Nose: Nose normal.      Mouth/Throat:      Pharynx: No oropharyngeal exudate.   Eyes:      General: No scleral icterus.     Conjunctiva/sclera: Conjunctivae normal.      Pupils: Pupils are equal, round, and reactive to light.   Neck:      Thyroid: No thyromegaly.      Vascular: No JVD.      Trachea: No tracheal deviation.   Cardiovascular:      Rate and Rhythm: Normal rate and regular rhythm.      Heart sounds: Normal heart sounds. No murmur heard.     No friction rub. No gallop.   Pulmonary:      Effort: Pulmonary effort is normal. No respiratory distress.      Breath sounds: Normal breath sounds. No stridor. No wheezing or rales.   Abdominal:      General: Bowel sounds are normal. There is no distension.      Palpations: Abdomen is soft.      Tenderness: There is no abdominal tenderness. There is no guarding or rebound.   Musculoskeletal:         General: No deformity. Normal range of motion.      Cervical back: Normal range of motion and neck supple.   Lymphadenopathy:      Cervical: No cervical adenopathy.   Skin:     General: Skin is warm.      Findings: No erythema or rash.   Neurological:      Mental Status: She is alert and oriented to person, place, and time.      Cranial Nerves: No cranial nerve deficit.      Sensory: No sensory deficit.         Labs: I have personally reviewed pertinent lab results.  Lab Results   Component Value Date    WBC 5.06 09/28/2023    HGB 13.4 09/28/2023    HCT 39.5 09/28/2023    MCV 89 09/28/2023     09/28/2023     Lab Results   Component Value Date    CALCIUM 9.9 09/28/2023    K 4.6 09/28/2023    CO2 27 09/28/2023     09/28/2023    BUN 11 09/28/2023    CREATININE 0.65 09/28/2023     Lab Results   Component Value Date    IGE 1,178 (H) 12/26/2023      Lab Results   Component Value Date    ALT 21 09/28/2023    AST 22 09/28/2023    ALKPHOS 69 09/28/2023         Imaging and other studies: I have personally reviewed pertinent reports.   and I have personally reviewed pertinent films in PACS  CT chest pending- significant improvement in multifocal pneumonia    Pulmonary function testing: none on file    EKG, Pathology, and Other Studies: I have personally reviewed pertinent reports.   and I have personally reviewed pertinent films in PACS    Answers submitted by the patient for this visit:  Pulmonology Questionnaire (Submitted on 1/31/2024)  Chief Complaint: Primary symptoms  Chronicity: chronic  When did you first notice your symptoms?: more than 1 year ago  How often do your symptoms occur?: daily  Since you first noticed this problem, how has it changed?: gradually improving  Do you have shortness of breath that occurs with effort or exertion?: Yes  Do you have ear congestion?: No  Do you have heartburn?: No  Do you have fatigue?: Yes  Do you have nasal congestion?: No  Do you have shortness of breath when lying flat?: No  Do you have shortness of breath when you wake up?: No  Do you have sweats?: Yes  Have you experienced weight loss?: No  Which of the following makes your symptoms worse?: nothing  Which of the following makes your symptoms better?: nothing    Lev Lockhart MD  Pulmonary and Critical Care   [facility]

## 2024-02-01 LAB
25(OH)D3 SERPL-MCNC: 53.3 NG/ML (ref 30–100)
ALBUMIN SERPL BCP-MCNC: 4.4 G/DL (ref 3.5–5)
ALP SERPL-CCNC: 79 U/L (ref 34–104)
ALT SERPL W P-5'-P-CCNC: 28 U/L (ref 7–52)
ANION GAP SERPL CALCULATED.3IONS-SCNC: 9 MMOL/L
AST SERPL W P-5'-P-CCNC: 26 U/L (ref 13–39)
BASOPHILS # BLD AUTO: 0.03 THOUSANDS/ÂΜL (ref 0–0.1)
BASOPHILS NFR BLD AUTO: 0 % (ref 0–1)
BILIRUB SERPL-MCNC: 0.49 MG/DL (ref 0.2–1)
BUN SERPL-MCNC: 10 MG/DL (ref 5–25)
CALCIUM SERPL-MCNC: 9.9 MG/DL (ref 8.4–10.2)
CHLORIDE SERPL-SCNC: 99 MMOL/L (ref 96–108)
CO2 SERPL-SCNC: 25 MMOL/L (ref 21–32)
CREAT SERPL-MCNC: 0.73 MG/DL (ref 0.6–1.3)
EOSINOPHIL # BLD AUTO: 0.84 THOUSAND/ÂΜL (ref 0–0.61)
EOSINOPHIL NFR BLD AUTO: 11 % (ref 0–6)
ERYTHROCYTE [DISTWIDTH] IN BLOOD BY AUTOMATED COUNT: 13.6 % (ref 11.6–15.1)
EST. AVERAGE GLUCOSE BLD GHB EST-MCNC: 111 MG/DL
GFR SERPL CREATININE-BSD FRML MDRD: 89 ML/MIN/1.73SQ M
GLUCOSE SERPL-MCNC: 88 MG/DL (ref 65–140)
HBA1C MFR BLD: 5.5 %
HCT VFR BLD AUTO: 39.7 % (ref 34.8–46.1)
HGB BLD-MCNC: 12.8 G/DL (ref 11.5–15.4)
IMM GRANULOCYTES # BLD AUTO: 0.03 THOUSAND/UL (ref 0–0.2)
IMM GRANULOCYTES NFR BLD AUTO: 0 % (ref 0–2)
LYMPHOCYTES # BLD AUTO: 1.69 THOUSANDS/ÂΜL (ref 0.6–4.47)
LYMPHOCYTES NFR BLD AUTO: 22 % (ref 14–44)
MCH RBC QN AUTO: 29.2 PG (ref 26.8–34.3)
MCHC RBC AUTO-ENTMCNC: 32.2 G/DL (ref 31.4–37.4)
MCV RBC AUTO: 90 FL (ref 82–98)
MONOCYTES # BLD AUTO: 0.66 THOUSAND/ÂΜL (ref 0.17–1.22)
MONOCYTES NFR BLD AUTO: 8 % (ref 4–12)
NEUTROPHILS # BLD AUTO: 4.58 THOUSANDS/ÂΜL (ref 1.85–7.62)
NEUTS SEG NFR BLD AUTO: 59 % (ref 43–75)
NRBC BLD AUTO-RTO: 0 /100 WBCS
PLATELET # BLD AUTO: 283 THOUSANDS/UL (ref 149–390)
PMV BLD AUTO: 10.3 FL (ref 8.9–12.7)
POTASSIUM SERPL-SCNC: 4.4 MMOL/L (ref 3.5–5.3)
PROT SERPL-MCNC: 7.9 G/DL (ref 6.4–8.4)
RBC # BLD AUTO: 4.39 MILLION/UL (ref 3.81–5.12)
SODIUM SERPL-SCNC: 133 MMOL/L (ref 135–147)
TSH SERPL DL<=0.05 MIU/L-ACNC: 2.91 UIU/ML (ref 0.45–4.5)
WBC # BLD AUTO: 7.83 THOUSAND/UL (ref 4.31–10.16)

## 2024-02-02 ENCOUNTER — TELEPHONE (OUTPATIENT)
Dept: SURGICAL ONCOLOGY | Facility: CLINIC | Age: 59
End: 2024-02-02

## 2024-02-02 LAB — TOTAL IGE SMQN RAST: 708 KU/L (ref 0–113)

## 2024-02-02 NOTE — TELEPHONE ENCOUNTER
"----- Message from Sisi Dominguez MD sent at 2/1/2024  5:18 PM EST -----  He is saying \"ok\" to proceed with surgery but is also sending her to an allergist. She should be on anastrazole in which case it is not a rush but we can call her to see when she would like to come in and set up surgery.     ----- Message -----  From: Lucila Hilario RN  Sent: 2/1/2024  10:57 AM EST  To: Sisi Dominguez MD    Pulmonary follow up completed, see note from Dr Lockhart.  ----- Message -----  From: Sisi Dominguez MD  Sent: 12/27/2023   9:21 AM EST  To: Lucila Hilario RN; Sisi Dominguez MD    F/u pul w/u        "

## 2024-02-02 NOTE — TELEPHONE ENCOUNTER
Richa returned call to office. She is waiting for a consult with an allergist to be scheduled. She was given an appt to discuss/plan surgery with Dr Dominguez on 02/26/24.

## 2024-02-02 NOTE — TELEPHONE ENCOUNTER
Attempted calling Richa to schedule her a pre op visit to discuss surgery, no answer, left her a message to return call to the office.

## 2024-02-04 DIAGNOSIS — E87.1 HYPONATREMIA: Primary | ICD-10-CM

## 2024-02-05 ENCOUNTER — APPOINTMENT (OUTPATIENT)
Dept: LAB | Facility: CLINIC | Age: 59
End: 2024-02-05
Payer: COMMERCIAL

## 2024-02-05 LAB — SCAN RESULT: NORMAL

## 2024-02-05 PROCEDURE — 87070 CULTURE OTHR SPECIMN AEROBIC: CPT

## 2024-02-05 PROCEDURE — 87205 SMEAR GRAM STAIN: CPT

## 2024-02-08 LAB
BACTERIA SPT RESP CULT: ABNORMAL
GRAM STN SPEC: ABNORMAL
GRAM STN SPEC: ABNORMAL

## 2024-02-11 ENCOUNTER — TELEPHONE (OUTPATIENT)
Dept: HEMATOLOGY ONCOLOGY | Facility: CLINIC | Age: 59
End: 2024-02-11

## 2024-02-11 NOTE — TELEPHONE ENCOUNTER
I called and left a message to let the patient know that her Oncotype score was 9 and as far as we know right now she will not need chemotherapy.  She has an appointment with Dr. Dominguez for surgical planning.  I left her a callback number for my office if she has any further questions.

## 2024-02-22 ENCOUNTER — APPOINTMENT (OUTPATIENT)
Dept: LAB | Facility: MEDICAL CENTER | Age: 59
End: 2024-02-22
Payer: COMMERCIAL

## 2024-02-22 DIAGNOSIS — J40 BRONCHITIS: ICD-10-CM

## 2024-02-22 DIAGNOSIS — J31.0 RHINITIS, UNSPECIFIED TYPE: ICD-10-CM

## 2024-02-22 DIAGNOSIS — J01.91 ACUTE RECURRENT SINUSITIS, UNSPECIFIED LOCATION: ICD-10-CM

## 2024-02-22 DIAGNOSIS — E87.1 HYPONATREMIA: ICD-10-CM

## 2024-02-22 DIAGNOSIS — J18.9 PNEUMONIA DUE TO INFECTIOUS ORGANISM, UNSPECIFIED LATERALITY, UNSPECIFIED PART OF LUNG: ICD-10-CM

## 2024-02-22 DIAGNOSIS — R76.8 ELEVATED IGE LEVEL: ICD-10-CM

## 2024-02-22 LAB
ANION GAP SERPL CALCULATED.3IONS-SCNC: 7 MMOL/L
BUN SERPL-MCNC: 10 MG/DL (ref 5–25)
CALCIUM SERPL-MCNC: 9.5 MG/DL (ref 8.4–10.2)
CHLORIDE SERPL-SCNC: 101 MMOL/L (ref 96–108)
CO2 SERPL-SCNC: 27 MMOL/L (ref 21–32)
CREAT SERPL-MCNC: 0.67 MG/DL (ref 0.6–1.3)
GFR SERPL CREATININE-BSD FRML MDRD: 95 ML/MIN/1.73SQ M
GLUCOSE P FAST SERPL-MCNC: 82 MG/DL (ref 65–99)
IGA SERPL-MCNC: 101 MG/DL (ref 66–433)
IGG SERPL-MCNC: 1607 MG/DL (ref 635–1741)
IGM SERPL-MCNC: 73 MG/DL (ref 45–281)
POTASSIUM SERPL-SCNC: 4.1 MMOL/L (ref 3.5–5.3)
SODIUM SERPL-SCNC: 135 MMOL/L (ref 135–147)

## 2024-02-22 PROCEDURE — 82784 ASSAY IGA/IGD/IGG/IGM EACH: CPT

## 2024-02-22 PROCEDURE — 86317 IMMUNOASSAY INFECTIOUS AGENT: CPT

## 2024-02-22 PROCEDURE — 86003 ALLG SPEC IGE CRUDE XTRC EA: CPT

## 2024-02-22 PROCEDURE — 80048 BASIC METABOLIC PNL TOTAL CA: CPT

## 2024-02-22 PROCEDURE — 36415 COLL VENOUS BLD VENIPUNCTURE: CPT

## 2024-02-22 PROCEDURE — 86162 COMPLEMENT TOTAL (CH50): CPT

## 2024-02-22 PROCEDURE — 86606 ASPERGILLUS ANTIBODY: CPT

## 2024-02-22 PROCEDURE — 87305 ASPERGILLUS AG IA: CPT

## 2024-02-23 LAB — CH50 SERPL-ACNC: 59 U/ML

## 2024-02-24 LAB
A FUMIGATUS IGE QN: 0.15 KUA/I
A NIGER IGE QN: 0.11 KU/L

## 2024-02-25 LAB
A FLAVUS AB SER QL ID: NEGATIVE
A FUMIGATUS AB SER QL ID: NEGATIVE
A NIGER AB SER QL ID: NEGATIVE

## 2024-02-26 ENCOUNTER — TELEPHONE (OUTPATIENT)
Dept: SURGICAL ONCOLOGY | Facility: CLINIC | Age: 59
End: 2024-02-26

## 2024-02-26 ENCOUNTER — OFFICE VISIT (OUTPATIENT)
Dept: SURGICAL ONCOLOGY | Facility: CLINIC | Age: 59
End: 2024-02-26
Payer: COMMERCIAL

## 2024-02-26 VITALS
TEMPERATURE: 97.6 F | OXYGEN SATURATION: 95 % | SYSTOLIC BLOOD PRESSURE: 120 MMHG | WEIGHT: 134.2 LBS | RESPIRATION RATE: 16 BRPM | DIASTOLIC BLOOD PRESSURE: 68 MMHG | BODY MASS INDEX: 23.78 KG/M2 | HEART RATE: 78 BPM | HEIGHT: 63 IN

## 2024-02-26 DIAGNOSIS — Z17.0 MALIGNANT NEOPLASM OF UPPER-OUTER QUADRANT OF LEFT BREAST IN FEMALE, ESTROGEN RECEPTOR POSITIVE: ICD-10-CM

## 2024-02-26 DIAGNOSIS — C50.412 MALIGNANT NEOPLASM OF UPPER-OUTER QUADRANT OF LEFT BREAST IN FEMALE, ESTROGEN RECEPTOR POSITIVE: ICD-10-CM

## 2024-02-26 DIAGNOSIS — Z79.811 USE OF AROMATASE INHIBITORS: ICD-10-CM

## 2024-02-26 DIAGNOSIS — Z17.0 MALIGNANT NEOPLASM OF OVERLAPPING SITES OF RIGHT BREAST IN FEMALE, ESTROGEN RECEPTOR POSITIVE: Primary | ICD-10-CM

## 2024-02-26 DIAGNOSIS — C50.811 MALIGNANT NEOPLASM OF OVERLAPPING SITES OF RIGHT BREAST IN FEMALE, ESTROGEN RECEPTOR POSITIVE: Primary | ICD-10-CM

## 2024-02-26 LAB — C TETANI IGG SER IA-ACNC: 0.96 IU/ML

## 2024-02-26 PROCEDURE — 99215 OFFICE O/P EST HI 40 MIN: CPT | Performed by: SURGERY

## 2024-02-26 NOTE — PROGRESS NOTES
Surgical Oncology Follow Up       [address]  [facility] [place]  [address]  [facility] [address]    Richa Medina  1965  [id number]  [address]  [facility] [place]  [address]  [place] [address]    Chief Complaint   Patient presents with    Follow-up       Assessment/Plan   Diagnoses and all orders for this visit:    Malignant neoplasm of overlapping sites of right breast in female, estrogen receptor positive   -     Mammo diagnostic bilateral w 3d & cad; Future    Malignant neoplasm of upper-outer quadrant of left breast in female, estrogen receptor positive   -     Mammo diagnostic bilateral w 3d & cad; Future    Use of aromatase inhibitors  -     Mammo diagnostic bilateral w 3d & cad; Future        Advance Care Planning/Advance Directives:  Discussed disease status, cancer treatment plans and/or cancer treatment goals with the patient.     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 breast in female, estrogen receptor positive    11/1/2023 -  Cancer Staged    Staging form: Breast, AJCC 8th Edition  - Clinical stage from 11/1/2023: Stage IA (cT1c, cN0, cM0, G1, ER+, IL+, HER2-) - Signed by Sisi Dominguez MD on 11/13/2023  Stage prefix: Initial diagnosis  Method of lymph node assessment: Clinical  Histologic grading system: 3 grade system       11/13/2023 Initial Diagnosis    Malignant neoplasm of overlapping sites of right breast in female, estrogen receptor positive      Malignant neoplasm of upper-outer quadrant of left breast in female, estrogen receptor positive    12/8/2023 -  Cancer Staged    Staging form: Breast, AJCC 8th Edition  - Clinical stage from 12/8/2023: Stage IA (cT1b, cN0, cM0, G1, ER+, IL-, HER2-) - Signed by Sisi Dominguez MD on 12/27/2023  Stage prefix: Initial diagnosis  Method of lymph node assessment: Clinical  Histologic grading system: 3 grade  system       12/27/2023 Initial Diagnosis    Malignant neoplasm of upper-outer quadrant of left breast in female, estrogen receptor positive          History of Present Illness: Patient is here today for a follow-up visit to discuss surgery for her bilateral breast cancer, has been followed by pulmonology and recently saw an allergist  -Interval History: Follow-up CAT scan of the chest    Review of Systems:  Review of Systems   Constitutional:  Negative for appetite change, fever and unexpected weight change.        Hot flashes   HENT: Negative.  Negative for trouble swallowing.    Eyes: Negative.    Respiratory:  Positive for cough. Negative for shortness of breath.    Cardiovascular: Negative.  Negative for chest pain.   Gastrointestinal: Negative.  Negative for abdominal pain, nausea and vomiting.   Endocrine: Negative.    Genitourinary: Negative.  Negative for dysuria.   Musculoskeletal: Negative.  Negative for arthralgias and myalgias.   Skin: Negative.    Allergic/Immunologic: Negative.    Neurological: Negative.  Negative for headaches.   Hematological: Negative.  Negative for adenopathy. Does not bruise/bleed easily.   Psychiatric/Behavioral: Negative.         Patient Active Problem List   Diagnosis    Bunion    Depression    Hypothyroidism    Prediabetes    Vitamin D deficiency    Easy bruising    Medial epicondylitis of elbow, right    Malignant neoplasm of overlapping sites of right breast in female, estrogen receptor positive     Abnormal CT of the chest    Malignant neoplasm of upper-outer quadrant of left breast in female, estrogen receptor positive     Use of aromatase inhibitors     Past Medical History:   Diagnosis Date    Depression     Disease of thyroid gland     Osteopenia     Rosacea 02/09/2021    Vitamin D deficiency 07/24/2018     Past Surgical History:   Procedure Laterality Date    BREAST BIOPSY Right 11/01/2023    X 2 sites    LASIK      MRI BREAST BIOPSY LEFT (ALL INCLUSIVE) Left  12/8/2023    AL NEUROPLASTY &/TRANSPOS MEDIAN NRV CARPAL TUNNE Right 07/10/2018    Procedure: CARPAL TUNNEL RELEASE;  Surgeon: Maicol Taylor DO;  [facility];  Service: Orthopedics    AL NEUROPLASTY &/TRANSPOS MEDIAN NRV CARPAL TUNNE Left 04/19/2019    Procedure: RELEASE CARPAL TUNNEL;  Surgeon: Peterson Alejandro MD;  [facility];  Service: Orthopedics    SEPTOPLASTY      SINUS SURGERY      US GUIDANCE BREAST BIOPSY RIGHT EACH ADDITIONAL Right 11/01/2023    US GUIDED BREAST BIOPSY RIGHT COMPLETE Right 11/01/2023     Family History   Problem Relation Age of Onset    Hashimoto's thyroiditis Mother     Thyroid disease Mother     Melanoma Mother         50's    Melanoma Father         50's    Crohn's disease Father     Hashimoto's thyroiditis Sister     No Known Problems Sister     No Known Problems Sister     Colon cancer Maternal Uncle         50's    Depression Maternal Grandmother     Diabetes Maternal Grandmother     No Known Problems Maternal Grandfather     No Known Problems Paternal Grandmother     No Known Problems Paternal Grandfather      Social History     Socioeconomic History    Marital status: /Civil Union     Spouse name: Not on file    Number of children: Not on file    Years of education: Not on file    Highest education level: Not on file   Occupational History    Not on file   Tobacco Use    Smoking status: Never    Smokeless tobacco: Never   Vaping Use    Vaping status: Never Used   Substance and Sexual Activity    Alcohol use: Yes     Comment: rarely    Drug use: Never    Sexual activity: Yes     Partners: Male     Birth control/protection: Male Sterilization, Post-menopausal   Other Topics Concern    Not on file   Social History Narrative    Do you have pets? 3 dogs & 1 cat Is pet allowed in bedroom?Yes    Are you a smoker? Never    Does anyone smoke in your home? No       Do you live with smokers? No    [place] frequently? No   How many times a year? N/A      Social  Determinants of Health     Financial Resource Strain: Not on file   Food Insecurity: Not on file   Transportation Needs: Not on file   Physical Activity: Not on file   Stress: Not on file   Social Connections: Not on file   Intimate Partner Violence: Not on file   Housing Stability: Not on file       Current Outpatient Medications:     anastrozole (ARIMIDEX) 1 mg tablet, Take 1 tablet (1 mg total) by mouth daily, Disp: 30 tablet, Rfl: 2    budesonide-formoterol (Symbicort) 80-4.5 MCG/ACT inhaler, Inhale 2 puffs 2 (two) times a day Rinse mouth after use., Disp: 10.2 g, Rfl: 5    Calcium 250 MG CAPS, Take by mouth daily with dinner  , Disp: , Rfl:     cetirizine (ZyrTEC) 10 mg tablet, Take 10 mg by mouth as needed, Disp: , Rfl:     Cholecalciferol (VITAMIN D3 PO), Take by mouth, Disp: , Rfl:     levothyroxine 25 mcg tablet, TAKE ONE TABLET BY MOUTH EVERY DAY MONDAY THRU FRIDAY AND TAKE 2 TABLETS EVERY DAY ON SAT & SUN., Disp: 114 tablet, Rfl: 0    MAGNESIUM PO, Take 200 mg by mouth daily with dinner  , Disp: , Rfl:     Medium Chain Triglycerides (MCT OIL PO), Take by mouth, Disp: , Rfl:     MULTIPLE VITAMINS-CALCIUM PO, Take 1 capsule by mouth daily in the early morning  , Disp: , Rfl:     niacin 100 mg tablet, Take 100 mg by mouth daily with breakfast, Disp: , Rfl:     PARoxetine (PAXIL-CR) 25 mg 24 hr tablet, Take 2 po daily, Disp: 180 tablet, Rfl: 3    pseudoephedrine (SUDAFED) 120 MG 12 hr tablet, Take 120 mg by mouth as needed, Disp: , Rfl:     Spacer/Aero-Holding Chambers (Vortex Valved Holding Chamber) ANGELINE, Use 2 (two) times a day, Disp: 1 each, Rfl: 0  Allergies   Allergen Reactions    Pollen Extract        The following portions of the patient's history were reviewed and updated as appropriate: allergies, current medications, past family history, past medical history, past social history, past surgical history, and problem list.        Vitals:    02/26/24 1434   BP: 120/68   Pulse: 78   Resp: 16   Temp: 97.6  °F (36.4 °C)   SpO2: 95%       Physical Exam  Constitutional:       General: She is not in acute distress.     Appearance: Normal appearance. She is well-developed.   HENT:      Head: Normocephalic and atraumatic.   Cardiovascular:      Heart sounds: Normal heart sounds.   Pulmonary:      Breath sounds: Normal breath sounds.   Chest:   Breasts:     Right: Mass (fullness 12:00, discrete mass 7:00) present. No swelling, bleeding, inverted nipple, nipple discharge, skin change or tenderness.      Left: No swelling, bleeding, inverted nipple, mass, nipple discharge, skin change or tenderness.       Abdominal:      Palpations: Abdomen is soft.   Musculoskeletal:      Right lower leg: No edema.      Left lower leg: No edema.   Lymphadenopathy:      Upper Body:      Right upper body: No supraclavicular, axillary or pectoral adenopathy.      Left upper body: No supraclavicular, axillary or pectoral adenopathy.   Neurological:      Mental Status: She is alert and oriented to person, place, and time.   Psychiatric:         Mood and Affect: Mood normal.           Results:  Labs:  11/1/2023 core biopsy right breast 7:00 revealed an invasive duct carcinoma ER/KS positive and HER2 negative    11/1/2023 core biopsy right breast 12:00 revealed an invasive duct carcinoma ER/KS positive and HER2 negative    12/8/2023 core biopsy left breast 1:00 revealed invasive duct carcinoma ER positive KS negative HER2 negative    Imaging  I reviewed both her contrast-enhanced mammogram and MRI with her.  She has unifocal disease in the left breast.  She has multifocal/multicentric disease in the right breast with additional areas of enhancement noted specifically inferior and posterior to the superior Thais    1/24/2024 CAT scan of the chest was overall improvement in the tree-in-bud nodularity and scattered areas of peribronchial groundglass change compatible with improved infectious/inflammatory bronchiolitis    I reviewed the above laboratory  and imaging data.    Pulmonary note from 1/31/2024 and allergy note from 2/22/2024 were both reviewed.  Patient deemed low to intermediate risk for surgery and katty-/postoperative recommendations are outlined.    Discussion/Summary: 58-year-old female with bilateral breast carcinoma.  The left side is unifocal in nature.  The right side is multicentric with foci at at least 7 and 12:00.  She did not have any genetic testing as she declined.  I discussed these findings with her.  Her imaging was reviewed with her as noted above.  She could have a left sided lumpectomy.  I am recommending a right-sided mastectomy.  I would be concerned about doing a 2 site lumpectomy on the right given the additional areas of enhancement.  The patient has been on anastrozole given the timeframe from diagnosis with the underlying pulmonary issues.  She would like a preoperative mammogram to see where she stands at the present moment prior to making any definitive surgical plans.  This is reasonable as her last screening mammogram was more than 6 months ago.  I will make these arrangements for her and plan to see her again following the follow-up mammogram to arrange surgery.  I did discuss with her if she is accepting of a mastectomy, I would recommend deferring any reconstruction as this would require additional time under anesthesia.  She can always readdress this in the future.

## 2024-02-27 ENCOUNTER — HOSPITAL ENCOUNTER (OUTPATIENT)
Dept: MAMMOGRAPHY | Facility: CLINIC | Age: 59
Discharge: HOME | End: 2024-02-27
Payer: COMMERCIAL

## 2024-02-27 ENCOUNTER — TELEPHONE (OUTPATIENT)
Dept: HEMATOLOGY ONCOLOGY | Facility: CLINIC | Age: 59
End: 2024-02-27

## 2024-02-27 ENCOUNTER — HOSPITAL ENCOUNTER (OUTPATIENT)
Dept: ULTRASOUND IMAGING | Facility: CLINIC | Age: 59
Discharge: HOME | End: 2024-02-27
Payer: COMMERCIAL

## 2024-02-27 VITALS — BODY MASS INDEX: 23.74 KG/M2 | WEIGHT: 134 LBS | HEIGHT: 63 IN

## 2024-02-27 VITALS — SYSTOLIC BLOOD PRESSURE: 115 MMHG | DIASTOLIC BLOOD PRESSURE: 74 MMHG | HEART RATE: 76 BPM

## 2024-02-27 DIAGNOSIS — Z79.811 USE OF AROMATASE INHIBITORS: ICD-10-CM

## 2024-02-27 DIAGNOSIS — C50.412 MALIGNANT NEOPLASM OF UPPER-OUTER QUADRANT OF LEFT BREAST IN FEMALE, ESTROGEN RECEPTOR POSITIVE: ICD-10-CM

## 2024-02-27 DIAGNOSIS — C50.811 MALIGNANT NEOPLASM OF OVERLAPPING SITES OF RIGHT BREAST IN FEMALE, ESTROGEN RECEPTOR POSITIVE: ICD-10-CM

## 2024-02-27 DIAGNOSIS — Z17.0 MALIGNANT NEOPLASM OF OVERLAPPING SITES OF RIGHT BREAST IN FEMALE, ESTROGEN RECEPTOR POSITIVE: ICD-10-CM

## 2024-02-27 DIAGNOSIS — R92.8 ABNORMAL FINDINGS ON DIAGNOSTIC IMAGING OF BREAST: ICD-10-CM

## 2024-02-27 DIAGNOSIS — Z17.0 MALIGNANT NEOPLASM OF UPPER-OUTER QUADRANT OF LEFT BREAST IN FEMALE, ESTROGEN RECEPTOR POSITIVE: ICD-10-CM

## 2024-02-27 LAB — GALACTOMANNAN AG SPEC IA-ACNC: 0.11 INDEX (ref 0–0.49)

## 2024-02-27 PROCEDURE — G0279 TOMOSYNTHESIS, MAMMO: HCPCS

## 2024-02-27 PROCEDURE — 77066 DX MAMMO INCL CAD BI: CPT

## 2024-02-27 PROCEDURE — 19285 PERQ DEV BREAST 1ST US IMAG: CPT

## 2024-02-27 RX ORDER — LIDOCAINE HYDROCHLORIDE 10 MG/ML
5 INJECTION, SOLUTION EPIDURAL; INFILTRATION; INTRACAUDAL; PERINEURAL ONCE
Status: COMPLETED | OUTPATIENT
Start: 2024-02-27 | End: 2024-02-27

## 2024-02-27 RX ADMIN — LIDOCAINE HYDROCHLORIDE 5 ML: 10 INJECTION, SOLUTION EPIDURAL; INFILTRATION; INTRACAUDAL; PERINEURAL at 12:16

## 2024-02-27 NOTE — TELEPHONE ENCOUNTER
Patient Call    Who are you speaking with? Patient    If it is not the patient, are they listed on an active communication consent form? N/A   What is the reason for this call? Patient calling to speak with Dr. Dominguez's team.  She needs to schedule a pre-op appt   Does this require a call back? yes   If a call back is required, please list best call back number [phone]   If a call back is required, advise that a message will be forwarded to their care team and someone will return their call as soon as possible.   Did you relay this information to the patient? yes

## 2024-02-27 NOTE — PROGRESS NOTES
Procedure type:    __x___ultrasound guided _____stereotactic    Breast:    _x____Left _____Right    Location:12:00 7CMFN      Needle:    # of passes:1    Clip:Thais     Performed by: Dr. Lantigua    Pressure held for 5 minutes by: HALLE Vázquez    Steri Strips:    ___X__yes _____no    Band aid:    __X___yes_____no    Tolerated procedure:    __X___yes _____no

## 2024-02-27 NOTE — PROGRESS NOTES
Talked  to patient regarding recommendation for;    _____ RIGHT ___X___LEFT      __X___Ultrasound guided marichuy  placement.     Pt states that procedure was explained to her, additional questions answered at this time    __X___Verbalized understanding.      Blood thinners: No: ___x__ Yes: _____ What:     BPt given name/# for any further questions/needs    Pt agreeable to a post procedure call.

## 2024-02-28 ENCOUNTER — TELEPHONE (OUTPATIENT)
Dept: SURGICAL ONCOLOGY | Facility: CLINIC | Age: 59
End: 2024-02-28

## 2024-02-28 NOTE — TELEPHONE ENCOUNTER
Patient had required testing to schedule appointment with Dr. Dominguez. Appointment available 3/4/24 @7412. Confirmed patient's availability with . Appointment scheduled.

## 2024-02-28 NOTE — PROGRESS NOTES
"Post procedure call completed    Bleeding: _____yes __x___no    Pain: _____yes ____x__no    Redness/Swelling: ______yes ____x__no    Band aid removed: _____yes ___x__no-\"will remove tonite after bath\"      "

## 2024-03-02 LAB
DEPRECATED S PNEUM14 IGG SER-MCNC: 0.1 UG/ML
DEPRECATED S PNEUM19 IGG SER-MCNC: 1.3 UG/ML
DEPRECATED S PNEUM23 IGG SER-MCNC: <0.1 UG/ML
DEPRECATED S PNEUM3 IGG SER-MCNC: <0.1 UG/ML
DEPRECATED S PNEUM4 IGG SER-MCNC: <0.1 UG/ML
DEPRECATED S PNEUM8 AB SER-MCNC: <0.3 UG/ML
DEPRECATED S PNEUM9 IGG SER-MCNC: <0.1 UG/ML
FLUBV RNA SPEC QL NAA+PROBE: <0.1 UG/ML
S PNEUM DA 18C IGG SER-MCNC: <0.1 UG/ML
S PNEUM DA 19A IGG SER-MCNC: 1.5 UG/ML
S PNEUM DA 6B IGG SER-MCNC: <0.1 UG/ML
SL AMB PNEUMO AB TYPE 17 (17F): 0.5 UG/ML
SL AMB PNEUMO AB TYPE 20: 0.4 UG/ML
SL AMB PNEUMO AB TYPE 22 (22F): 0.2 UG/ML
SL AMB PNEUMO AB TYPE 2: 2.3 UG/ML
SL AMB PNEUMO AB TYPE 34 (10A): 0.5 UG/ML
SL AMB PNEUMO AB TYPE 43 (11A): 0.8 UG/ML
SL AMB PNEUMO AB TYPE 54 (15B): <0.2 UG/ML
SL AMB PNEUMO AB TYPE 5: <0.1 UG/ML
SL AMB PNEUMO AB TYPE 70 (33F): 0.4 UG/ML
STREP PNEUMO TYPE 1: 0.2 UG/ML
STREP PNEUMO TYPE 51: 0.5 UG/ML
STREP PNEUMO TYPE 68: <0.1 UG/ML

## 2024-03-04 ENCOUNTER — OFFICE VISIT (OUTPATIENT)
Dept: SURGICAL ONCOLOGY | Facility: CLINIC | Age: 59
End: 2024-03-04
Payer: COMMERCIAL

## 2024-03-04 VITALS
BODY MASS INDEX: 24.27 KG/M2 | HEART RATE: 74 BPM | SYSTOLIC BLOOD PRESSURE: 116 MMHG | DIASTOLIC BLOOD PRESSURE: 70 MMHG | TEMPERATURE: 98 F | WEIGHT: 137 LBS | RESPIRATION RATE: 16 BRPM | OXYGEN SATURATION: 96 % | HEIGHT: 63 IN

## 2024-03-04 DIAGNOSIS — Z79.811 USE OF AROMATASE INHIBITORS: ICD-10-CM

## 2024-03-04 DIAGNOSIS — Z17.0 MALIGNANT NEOPLASM OF OVERLAPPING SITES OF RIGHT BREAST IN FEMALE, ESTROGEN RECEPTOR POSITIVE: Primary | ICD-10-CM

## 2024-03-04 DIAGNOSIS — Z17.0 MALIGNANT NEOPLASM OF UPPER-OUTER QUADRANT OF LEFT BREAST IN FEMALE, ESTROGEN RECEPTOR POSITIVE: ICD-10-CM

## 2024-03-04 DIAGNOSIS — C50.412 MALIGNANT NEOPLASM OF UPPER-OUTER QUADRANT OF LEFT BREAST IN FEMALE, ESTROGEN RECEPTOR POSITIVE: ICD-10-CM

## 2024-03-04 DIAGNOSIS — C50.811 MALIGNANT NEOPLASM OF OVERLAPPING SITES OF RIGHT BREAST IN FEMALE, ESTROGEN RECEPTOR POSITIVE: Primary | ICD-10-CM

## 2024-03-04 PROCEDURE — 99215 OFFICE O/P EST HI 40 MIN: CPT | Performed by: SURGERY

## 2024-03-04 RX ORDER — ACETAMINOPHEN 10 MG/ML
1000 INJECTION, SOLUTION INTRAVENOUS
OUTPATIENT
Start: 2024-03-05 | End: 2024-03-06

## 2024-03-04 RX ORDER — ENOXAPARIN SODIUM 100 MG/ML
40 INJECTION SUBCUTANEOUS ONCE
OUTPATIENT
Start: 2024-03-04 | End: 2024-03-04

## 2024-03-04 RX ORDER — CEFAZOLIN SODIUM 1 G/50ML
1000 SOLUTION INTRAVENOUS
OUTPATIENT
Start: 2024-03-04

## 2024-03-04 NOTE — PROGRESS NOTES
Surgical Oncology Follow Up       [address]  [facility] [place]  [address]  [facility] [address]    Richa Medina  1965  [id number]  [address]  [facility] [place]  [address] [facility] [address]    Chief Complaint   Patient presents with    Pre-op Exam       Assessment/Plan   Diagnoses and all orders for this visit:    Malignant neoplasm of overlapping sites of right breast in female, estrogen receptor positive   -     Ambulatory referral to Plastic Surgery; Future  -     Case request operating room: LEFT BREAST HILARY LOCALIZATION LUMPECTOMY., RIGHT BREAST MASTECTOMY, BIOPSY LYMPH NODE SENTINEL; Standing  -     UA w Reflex to Microscopic w Reflex to Culture; Future  -     Comprehensive metabolic panel; Future  -     CBC and differential; Future  -     EKG 12 lead; Future  -     NM lymphatic breast; Future  -     Case request operating room: LEFT BREAST HILARY LOCALIZATION LUMPECTOMY., RIGHT BREAST MASTECTOMY, BIOPSY LYMPH NODE SENTINEL    Malignant neoplasm of upper-outer quadrant of left breast in female, estrogen receptor positive   -     Case request operating room: LEFT BREAST HILARY LOCALIZATION LUMPECTOMY., RIGHT BREAST MASTECTOMY, BIOPSY LYMPH NODE SENTINEL; Standing  -     UA w Reflex to Microscopic w Reflex to Culture; Future  -     Comprehensive metabolic panel; Future  -     CBC and differential; Future  -     EKG 12 lead; Future  -     NM lymphatic breast; Future  -     Case request operating room: LEFT BREAST HILARY LOCALIZATION LUMPECTOMY., RIGHT BREAST MASTECTOMY, BIOPSY LYMPH NODE SENTINEL    Use of aromatase inhibitors    Other orders  -     enoxaparin (LOVENOX) subcutaneous injection 40 mg  -     Apply SCD or Foot pumps; Standing  -     ceFAZolin (ANCEF) IVPB (premix in dextrose) 1,000 mg 50 mL  -     acetaminophen (Ofirmev) injection 1,000 mg  -     Incentive spirometry; Standing  -     Insert and  maintain IV line; Standing  -     Void On-Call to O.R.; Standing  -     Place sequential compression device; Standing        Advance Care Planning/Advance Directives:  Discussed disease status, cancer treatment plans and/or cancer treatment goals with the patient.     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 breast in female, estrogen receptor positive    11/1/2023 -  Cancer Staged    Staging form: Breast, AJCC 8th Edition  - Clinical stage from 11/1/2023: Stage IA (cT1c, cN0, cM0, G1, ER+, FL+, HER2-) - Signed by Sisi Dominguez MD on 11/13/2023  Stage prefix: Initial diagnosis  Method of lymph node assessment: Clinical  Histologic grading system: 3 grade system       11/13/2023 Initial Diagnosis    Malignant neoplasm of overlapping sites of right breast in female, estrogen receptor positive      Malignant neoplasm of upper-outer quadrant of left breast in female, estrogen receptor positive    12/8/2023 -  Cancer Staged    Staging form: Breast, AJCC 8th Edition  - Clinical stage from 12/8/2023: Stage IA (cT1b, cN0, cM0, G1, ER+, FL-, HER2-) - Signed by Siis Dominguez MD on 12/27/2023  Stage prefix: Initial diagnosis  Method of lymph node assessment: Clinical  Histologic grading system: 3 grade system       12/27/2023 Initial Diagnosis    Malignant neoplasm of upper-outer quadrant of left breast in female, estrogen receptor positive          History of Present Illness: Patient is here today to discuss and set up surgery for the bilateral breast carcinoma, she has declined genetic testing.  She did have follow-up imaging at her request a few days ago.  She had a Thais reflector placed into the left breast at that time, she has been on anastrozole given the delay to surgery secondary to the underlying lung infection  -Interval History: As noted    Review of Systems:  Review of Systems   Constitutional: Negative.  Negative for appetite change, fever and unexpected weight  change.   HENT: Negative.  Negative for trouble swallowing.    Eyes: Negative.    Respiratory:  Positive for cough. Negative for shortness of breath.    Cardiovascular: Negative.  Negative for chest pain.   Gastrointestinal: Negative.  Negative for abdominal pain, nausea and vomiting.   Endocrine: Negative.    Genitourinary: Negative.  Negative for dysuria.   Musculoskeletal: Negative.  Negative for arthralgias and myalgias.   Skin: Negative.    Allergic/Immunologic: Negative.    Neurological: Negative.  Negative for headaches.   Hematological: Negative.  Negative for adenopathy. Does not bruise/bleed easily.   Psychiatric/Behavioral: Negative.         Patient Active Problem List   Diagnosis    Bunion    Depression    Hypothyroidism    Prediabetes    Vitamin D deficiency    Easy bruising    Medial epicondylitis of elbow, right    Malignant neoplasm of overlapping sites of right breast in female, estrogen receptor positive     Abnormal CT of the chest    Malignant neoplasm of upper-outer quadrant of left breast in female, estrogen receptor positive     Use of aromatase inhibitors     Past Medical History:   Diagnosis Date    Depression     Disease of thyroid gland     Osteopenia     Rosacea 02/09/2021    Vitamin D deficiency 07/24/2018     Past Surgical History:   Procedure Laterality Date    BREAST BIOPSY Right 11/01/2023    X 2 sites    BREAST BIOPSY Left 12/08/2023    LASIK      MRI BREAST BIOPSY LEFT (ALL INCLUSIVE) Left 12/08/2023    CT NEUROPLASTY &/TRANSPOS MEDIAN NRV CARPAL TUNNE Right 07/10/2018    Procedure: CARPAL TUNNEL RELEASE;  Surgeon: Maicol Taylor DO;  [facility];  Service: Orthopedics    CT NEUROPLASTY &/TRANSPOS MEDIAN NRV CARPAL TUNNE Left 04/19/2019    Procedure: RELEASE CARPAL TUNNEL;  Surgeon: Peterson Alejandro MD;  [facility];  Service: Orthopedics    SEPTOPLASTY      SINUS SURGERY      US BREAST NEEDLE LOC LEFT Left 2/27/2024    US GUIDANCE BREAST BIOPSY RIGHT EACH  ADDITIONAL Right 11/01/2023    US GUIDED BREAST BIOPSY RIGHT COMPLETE Right 11/01/2023     Family History   Problem Relation Age of Onset    Hashimoto's thyroiditis Mother     Thyroid disease Mother     Melanoma Mother         50's    Melanoma Father         50's    Crohn's disease Father     Hashimoto's thyroiditis Sister     No Known Problems Sister     No Known Problems Sister     Colon cancer Maternal Uncle         50's    Depression Maternal Grandmother     Diabetes Maternal Grandmother     No Known Problems Maternal Grandfather     No Known Problems Paternal Grandmother     No Known Problems Paternal Grandfather      Social History     Socioeconomic History    Marital status: /Civil Union     Spouse name: Not on file    Number of children: Not on file    Years of education: Not on file    Highest education level: Not on file   Occupational History    Not on file   Tobacco Use    Smoking status: Never    Smokeless tobacco: Never   Vaping Use    Vaping status: Never Used   Substance and Sexual Activity    Alcohol use: Yes     Comment: rarely    Drug use: Never    Sexual activity: Yes     Partners: Male     Birth control/protection: Male Sterilization, Post-menopausal   Other Topics Concern    Not on file   Social History Narrative    Do you have pets? 3 dogs & 1 cat Is pet allowed in bedroom?Yes    Are you a smoker? Never    Does anyone smoke in your home? No       Do you live with smokers? No    [place] frequently? No   How many times a year? N/A      Social Determinants of Health     Financial Resource Strain: Not on file   Food Insecurity: Not on file   Transportation Needs: Not on file   Physical Activity: Not on file   Stress: Not on file   Social Connections: Not on file   Intimate Partner Violence: Not on file   Housing Stability: Not on file       Current Outpatient Medications:     anastrozole (ARIMIDEX) 1 mg tablet, Take 1 tablet (1 mg total) by mouth daily, Disp: 30 tablet, Rfl: 2     budesonide-formoterol (Symbicort) 80-4.5 MCG/ACT inhaler, Inhale 2 puffs 2 (two) times a day Rinse mouth after use., Disp: 10.2 g, Rfl: 5    Calcium 250 MG CAPS, Take by mouth daily with dinner  , Disp: , Rfl:     cetirizine (ZyrTEC) 10 mg tablet, Take 10 mg by mouth as needed, Disp: , Rfl:     Cholecalciferol (VITAMIN D3 PO), Take by mouth, Disp: , Rfl:     levothyroxine 25 mcg tablet, TAKE ONE TABLET BY MOUTH EVERY DAY MONDAY THRU FRIDAY AND TAKE 2 TABLETS EVERY DAY ON SAT & SUN., Disp: 114 tablet, Rfl: 0    MAGNESIUM PO, Take 200 mg by mouth daily with dinner  , Disp: , Rfl:     Medium Chain Triglycerides (MCT OIL PO), Take by mouth, Disp: , Rfl:     MULTIPLE VITAMINS-CALCIUM PO, Take 1 capsule by mouth daily in the early morning  , Disp: , Rfl:     niacin 100 mg tablet, Take 100 mg by mouth daily with breakfast, Disp: , Rfl:     PARoxetine (PAXIL-CR) 25 mg 24 hr tablet, Take 2 po daily, Disp: 180 tablet, Rfl: 3    pseudoephedrine (SUDAFED) 120 MG 12 hr tablet, Take 120 mg by mouth as needed, Disp: , Rfl:     Spacer/Aero-Holding Chambers (Vortex Valved Holding Chamber) ANGELINE, Use 2 (two) times a day, Disp: 1 each, Rfl: 0  Allergies   Allergen Reactions    Pollen Extract        The following portions of the patient's history were reviewed and updated as appropriate: allergies, current medications, past family history, past medical history, past social history, past surgical history, and problem list.        Vitals:    03/04/24 1357   BP: 116/70   Pulse: 74   Resp: 16   Temp: 98 °F (36.7 °C)   SpO2: 96%       Physical Exam  Constitutional:       General: She is not in acute distress.     Appearance: Normal appearance.   HENT:      Head: Normocephalic and atraumatic.   Cardiovascular:      Heart sounds: Normal heart sounds.   Pulmonary:      Breath sounds: Normal breath sounds.   Abdominal:      Palpations: Abdomen is soft.   Musculoskeletal:      Right lower leg: No edema.      Left lower leg: No edema.    Neurological:      Mental Status: She is alert and oriented to person, place, and time.   Psychiatric:         Mood and Affect: Mood normal.           Results:  Labs:  Initial pathology was reviewed left breast 1:00 invasive ductal ER 95%, NH negative, HER2 is 1+; right breast multifocal at 7 and 12:00 invasive ductal carcinoma hormone positive and HER2 negative    Imaging  2/27/2023 bilateral 3D diagnostic mammogram right side shows the biopsy-proven malignancy at 12:00 unchanged measuring 15 mm, biopsy-proven malignancy at 7:00 also on change 9 mm, additional areas of architectural distortion, left side stoplight clip from the MRI biopsy upper outer that does correspond with the area of non-mass enhancement on the contrast mammogram; Thais reflector was also placed into the left breast 1:00    I reviewed the above laboratory and imaging data.    Discussion/Summary: 58-year-old female who presents with multicentric carcinoma of the right breast and an unifocal carcinoma of the left breast.  Both are hormone positive and HER2 negative.  She developed some sort of atypical respiratory infection around that same time and was seen by pulmonary medicine.  She has been cleared by them for surgery.  She was placed on anastrozole in the interim.  She had follow-up diagnostic imaging given the timeframe that has elapsed.  She continues to have multicentric disease on the right side and a unifocal area noted on the left side.  A Thais reflector was placed into the left breast malignancy.  She is now agreeable to a right mastectomy.  She was counseled on a THAIS localized lumpectomy of the left breast, right breast mastectomy, bilateral lymphatic mapping and sentinel node biopsies with possible axillary dissection on the right side only.  She is now expressing a desire to meet with plastic surgery to talk about the options of reconstruction versus flat aesthetic closure.  I will make these arrangements for her and plan to  coordinate surgery with them.

## 2024-03-06 NOTE — RESULT ENCOUNTER NOTE
She makes 2/23 protective titers to Pneumococcus.  IgE to Aspergillus Niger and Aspergillus fumigatus are low positive.  Aspergillus galactomannan antigen is in normal and acceptable limits  Aspergillus IgG antibodies are undetectable  Tetanus titers are protective  CH 50, IgG, IgA, IgM are in normal and acceptable limits

## 2024-03-08 ENCOUNTER — APPOINTMENT (OUTPATIENT)
Dept: LAB | Facility: HOSPITAL | Age: 59
End: 2024-03-08
Payer: COMMERCIAL

## 2024-03-08 DIAGNOSIS — Z17.0 MALIGNANT NEOPLASM OF OVERLAPPING SITES OF RIGHT BREAST IN FEMALE, ESTROGEN RECEPTOR POSITIVE: ICD-10-CM

## 2024-03-08 DIAGNOSIS — C50.811 MALIGNANT NEOPLASM OF OVERLAPPING SITES OF RIGHT BREAST IN FEMALE, ESTROGEN RECEPTOR POSITIVE: ICD-10-CM

## 2024-03-08 DIAGNOSIS — C50.412 MALIGNANT NEOPLASM OF UPPER-OUTER QUADRANT OF LEFT BREAST IN FEMALE, ESTROGEN RECEPTOR POSITIVE: ICD-10-CM

## 2024-03-08 DIAGNOSIS — Z17.0 MALIGNANT NEOPLASM OF UPPER-OUTER QUADRANT OF LEFT BREAST IN FEMALE, ESTROGEN RECEPTOR POSITIVE: ICD-10-CM

## 2024-03-08 LAB
ALBUMIN SERPL BCP-MCNC: 4.4 G/DL (ref 3.5–5)
ALP SERPL-CCNC: 70 U/L (ref 34–104)
ALT SERPL W P-5'-P-CCNC: 23 U/L (ref 7–52)
ANION GAP SERPL CALCULATED.3IONS-SCNC: 4 MMOL/L
AST SERPL W P-5'-P-CCNC: 19 U/L (ref 13–39)
ATRIAL RATE: 67 BPM
BASOPHILS # BLD AUTO: 0.03 THOUSANDS/ÂΜL (ref 0–0.1)
BASOPHILS NFR BLD AUTO: 1 % (ref 0–1)
BILIRUB SERPL-MCNC: 0.63 MG/DL (ref 0.2–1)
BILIRUB UR QL STRIP: NEGATIVE
BUN SERPL-MCNC: 9 MG/DL (ref 5–25)
CALCIUM SERPL-MCNC: 9.5 MG/DL (ref 8.4–10.2)
CHLORIDE SERPL-SCNC: 103 MMOL/L (ref 96–108)
CLARITY UR: CLEAR
CO2 SERPL-SCNC: 28 MMOL/L (ref 21–32)
COLOR UR: COLORLESS
CREAT SERPL-MCNC: 0.68 MG/DL (ref 0.6–1.3)
EOSINOPHIL # BLD AUTO: 0.61 THOUSAND/ÂΜL (ref 0–0.61)
EOSINOPHIL NFR BLD AUTO: 14 % (ref 0–6)
ERYTHROCYTE [DISTWIDTH] IN BLOOD BY AUTOMATED COUNT: 13.1 % (ref 11.6–15.1)
GFR SERPL CREATININE-BSD FRML MDRD: 95 ML/MIN/1.73SQ M
GLUCOSE SERPL-MCNC: 89 MG/DL (ref 65–140)
GLUCOSE UR STRIP-MCNC: NEGATIVE MG/DL
HCT VFR BLD AUTO: 39.5 % (ref 34.8–46.1)
HGB BLD-MCNC: 13 G/DL (ref 11.5–15.4)
HGB UR QL STRIP.AUTO: NEGATIVE
IMM GRANULOCYTES # BLD AUTO: 0 THOUSAND/UL (ref 0–0.2)
IMM GRANULOCYTES NFR BLD AUTO: 0 % (ref 0–2)
KETONES UR STRIP-MCNC: NEGATIVE MG/DL
LEUKOCYTE ESTERASE UR QL STRIP: NEGATIVE
LYMPHOCYTES # BLD AUTO: 1.71 THOUSANDS/ÂΜL (ref 0.6–4.47)
LYMPHOCYTES NFR BLD AUTO: 39 % (ref 14–44)
MCH RBC QN AUTO: 29.3 PG (ref 26.8–34.3)
MCHC RBC AUTO-ENTMCNC: 32.9 G/DL (ref 31.4–37.4)
MCV RBC AUTO: 89 FL (ref 82–98)
MONOCYTES # BLD AUTO: 0.34 THOUSAND/ÂΜL (ref 0.17–1.22)
MONOCYTES NFR BLD AUTO: 8 % (ref 4–12)
NEUTROPHILS # BLD AUTO: 1.72 THOUSANDS/ÂΜL (ref 1.85–7.62)
NEUTS SEG NFR BLD AUTO: 38 % (ref 43–75)
NITRITE UR QL STRIP: NEGATIVE
NRBC BLD AUTO-RTO: 0 /100 WBCS
PH UR STRIP.AUTO: 7 [PH]
PLATELET # BLD AUTO: 236 THOUSANDS/UL (ref 149–390)
PMV BLD AUTO: 10.1 FL (ref 8.9–12.7)
POTASSIUM SERPL-SCNC: 4 MMOL/L (ref 3.5–5.3)
PR INTERVAL: 154 MS
PROT SERPL-MCNC: 7.6 G/DL (ref 6.4–8.4)
PROT UR STRIP-MCNC: NEGATIVE MG/DL
QRS AXIS: 143 DEGREES
QRSD INTERVAL: 94 MS
QT INTERVAL: 382 MS
QTC INTERVAL: 403 MS
RBC # BLD AUTO: 4.44 MILLION/UL (ref 3.81–5.12)
SODIUM SERPL-SCNC: 135 MMOL/L (ref 135–147)
SP GR UR STRIP.AUTO: 1.01 (ref 1–1.03)
T WAVE AXIS: 150 DEGREES
UROBILINOGEN UR STRIP-ACNC: <2 MG/DL
VENTRICULAR RATE: 67 BPM
WBC # BLD AUTO: 4.41 THOUSAND/UL (ref 4.31–10.16)

## 2024-03-08 PROCEDURE — 81003 URINALYSIS AUTO W/O SCOPE: CPT

## 2024-03-08 PROCEDURE — 85025 COMPLETE CBC W/AUTO DIFF WBC: CPT

## 2024-03-08 PROCEDURE — 36415 COLL VENOUS BLD VENIPUNCTURE: CPT

## 2024-03-08 PROCEDURE — 80053 COMPREHEN METABOLIC PANEL: CPT

## 2024-03-11 ENCOUNTER — OFFICE VISIT (OUTPATIENT)
Dept: PLASTIC SURGERY | Facility: CLINIC | Age: 59
End: 2024-03-11
Payer: COMMERCIAL

## 2024-03-11 VITALS
HEIGHT: 63 IN | SYSTOLIC BLOOD PRESSURE: 126 MMHG | WEIGHT: 135 LBS | BODY MASS INDEX: 23.92 KG/M2 | DIASTOLIC BLOOD PRESSURE: 80 MMHG

## 2024-03-11 DIAGNOSIS — C50.811 MALIGNANT NEOPLASM OF OVERLAPPING SITES OF RIGHT BREAST IN FEMALE, ESTROGEN RECEPTOR POSITIVE: ICD-10-CM

## 2024-03-11 DIAGNOSIS — Z17.0 MALIGNANT NEOPLASM OF OVERLAPPING SITES OF RIGHT BREAST IN FEMALE, ESTROGEN RECEPTOR POSITIVE: ICD-10-CM

## 2024-03-11 PROCEDURE — 99205 OFFICE O/P NEW HI 60 MIN: CPT | Performed by: STUDENT IN AN ORGANIZED HEALTH CARE EDUCATION/TRAINING PROGRAM

## 2024-03-12 NOTE — PROGRESS NOTES
Assessment and Plan:  Ms. Medina is a 58 y.o. female presenting with bilateral breast cancer    We had a long discussion regarding the spectrum of breast reconstruction options ranging from none, external prosthetic, implants based reconstruction (TE vs direct), and autologous.  We also discussed the immediate vs delayed pathways.  We discussed all the procedures, risks, benefits, alternatives and postoperative instructions and expectations.  I explicitly explained that this is a large, team based approach where oncologic treatment is the ultimate goal.  The timeline for reconstruction is variable depending on oncologic therapy required.  I do think this patient is a candidate for left mastopexy and right aesthetic flat closure based on her tumor, extirpative plan, anatomy and goals.  She will then decide in the future if she wishes to pursue autologous fat transfer vs implant based reconstruction on the right in a delayed fashion.  I do think fat transfer is reasonable for her as her breast size is certainly not large and this could afford her a reasonable and proportional contour.  Informed consent obtained.  Will coordinate with Dr. Dominguez.    History of Present Illness:   Ms. Medina is a 58 y.o. female presenting with bilateral breast cancer.  She is planning for right mastectomy and left lumpectomy.  She believes she is a C/D cup.  She enjoys walking her dogs and the beach.  She denies nicotine use.  She presents with her  today.      Review of Systems:  A 12 point ROS was performed and negative except per HPI.    Past Medical History:  Past Medical History:   Diagnosis Date    Depression     Disease of thyroid gland     Osteopenia     Rosacea 02/09/2021    Vitamin D deficiency 07/24/2018       Past Surgical History:  Past Surgical History:   Procedure Laterality Date    BREAST BIOPSY Right 11/01/2023    X 2 sites    BREAST BIOPSY Left 12/08/2023    LASIK      MRI BREAST BIOPSY LEFT (ALL INCLUSIVE) Left  12/08/2023    OK NEUROPLASTY &/TRANSPOS MEDIAN NRV CARPAL TUNNE Right 07/10/2018    Procedure: CARPAL TUNNEL RELEASE;  Surgeon: Maicol Tyalor DO;  [facility];  Service: Orthopedics    OK NEUROPLASTY &/TRANSPOS MEDIAN NRV CARPAL TUNNE Left 04/19/2019    Procedure: RELEASE CARPAL TUNNEL;  Surgeon: Peterson Alejandro MD;  [facility];  Service: Orthopedics    SEPTOPLASTY      SINUS SURGERY      US BREAST NEEDLE LOC LEFT Left 2/27/2024    US GUIDANCE BREAST BIOPSY RIGHT EACH ADDITIONAL Right 11/01/2023    US GUIDED BREAST BIOPSY RIGHT COMPLETE Right 11/01/2023       Social History:  Social History     Tobacco Use    Smoking status: Never    Smokeless tobacco: Never   Vaping Use    Vaping status: Never Used   Substance Use Topics    Alcohol use: Yes     Comment: rarely    Drug use: Never       Family History:  Family History   Problem Relation Age of Onset    Hashimoto's thyroiditis Mother     Thyroid disease Mother     Melanoma Mother         50's    Melanoma Father         50's    Crohn's disease Father     Hashimoto's thyroiditis Sister     No Known Problems Sister     No Known Problems Sister     Colon cancer Maternal Uncle         50's    Depression Maternal Grandmother     Diabetes Maternal Grandmother     No Known Problems Maternal Grandfather     No Known Problems Paternal Grandmother     No Known Problems Paternal Grandfather        Allergies:  Allergies   Allergen Reactions    Pollen Extract        Medications:  Current Outpatient Medications on File Prior to Visit   Medication Sig Dispense Refill    anastrozole (ARIMIDEX) 1 mg tablet Take 1 tablet (1 mg total) by mouth daily 30 tablet 2    budesonide-formoterol (Symbicort) 80-4.5 MCG/ACT inhaler Inhale 2 puffs 2 (two) times a day Rinse mouth after use. 10.2 g 5    Calcium 250 MG CAPS Take by mouth daily with dinner        cetirizine (ZyrTEC) 10 mg tablet Take 10 mg by mouth as needed      Cholecalciferol (VITAMIN D3 PO) Take by mouth       "levothyroxine 25 mcg tablet TAKE ONE TABLET BY MOUTH EVERY DAY MONDAY THRU FRIDAY AND TAKE 2 TABLETS EVERY DAY ON SAT & SUN. 114 tablet 0    MAGNESIUM PO Take 200 mg by mouth daily with dinner        Medium Chain Triglycerides (MCT OIL PO) Take by mouth      MULTIPLE VITAMINS-CALCIUM PO Take 1 capsule by mouth daily in the early morning        niacin 100 mg tablet Take 100 mg by mouth daily with breakfast      PARoxetine (PAXIL-CR) 25 mg 24 hr tablet Take 2 po daily 180 tablet 3    pseudoephedrine (SUDAFED) 120 MG 12 hr tablet Take 120 mg by mouth as needed      Spacer/Aero-Holding Chambers (Vortex Valved Holding Chamber) ANGELINE Use 2 (two) times a day 1 each 0     No current facility-administered medications on file prior to visit.         Physical Examination:  /80   Ht 5' 3\" (1.6 m)   Wt 61.2 kg (135 lb)   BMI 23.91 kg/m²   Estimated body mass index is 23.91 kg/m² as calculated from the following:    Height as of this encounter: 5' 3\" (1.6 m).    Weight as of this encounter: 61.2 kg (135 lb).  General: NAD, well appearing, AAOx3  HEENT: NCAT, EOMI, MMM, supple  Resp: Nonlabored  Heart: RRR  Abdomen: Soft, ND, NT  Extremities/MSK: no LE edema, no obvious deficits in ROM  Neuro: grossly intact with no obvious deficits  Skin: no obvious lesions or rashes  Breast: right palpable mass inferiorly, no palpable axillary lymphadenopathy, grade I ptosis      Samara Felix, DO  Plastic and Reconstructive Surgery    "

## 2024-03-13 DIAGNOSIS — C50.412 MALIGNANT NEOPLASM OF UPPER-OUTER QUADRANT OF LEFT BREAST IN FEMALE, ESTROGEN RECEPTOR POSITIVE: ICD-10-CM

## 2024-03-13 DIAGNOSIS — Z17.0 MALIGNANT NEOPLASM OF OVERLAPPING SITES OF RIGHT BREAST IN FEMALE, ESTROGEN RECEPTOR POSITIVE: Primary | ICD-10-CM

## 2024-03-13 DIAGNOSIS — C50.811 MALIGNANT NEOPLASM OF OVERLAPPING SITES OF RIGHT BREAST IN FEMALE, ESTROGEN RECEPTOR POSITIVE: Primary | ICD-10-CM

## 2024-03-13 DIAGNOSIS — Z17.0 MALIGNANT NEOPLASM OF UPPER-OUTER QUADRANT OF LEFT BREAST IN FEMALE, ESTROGEN RECEPTOR POSITIVE: ICD-10-CM

## 2024-03-13 DIAGNOSIS — R94.31 ABNORMAL EKG: ICD-10-CM

## 2024-03-14 ENCOUNTER — ANESTHESIA EVENT (OUTPATIENT)
Dept: PERIOP | Facility: HOSPITAL | Age: 59
End: 2024-03-14
Payer: COMMERCIAL

## 2024-03-18 ENCOUNTER — TELEPHONE (OUTPATIENT)
Dept: SURGICAL ONCOLOGY | Facility: CLINIC | Age: 59
End: 2024-03-18

## 2024-03-18 NOTE — TELEPHONE ENCOUNTER
Called and left a message with  for patient requesting a return call in regards to OR with Dr Dominguez on 3/22/24 needing to be rescheduled due to providers's family emergency.    Patient returned call and I again explained reason for needing to reschedule her surgery. I explained that the next date Dr Dominguez and Elvira are available to work together is 4/19/24 and she was agreeable. New post op appointment provided for 5/6/24 at 8:30 am. Patient is requesting a new work note be sent to her with updated dates.

## 2024-03-19 NOTE — PRE-PROCEDURE INSTRUCTIONS
Pre-Surgery Instructions:   Medication Instructions    anastrozole (ARIMIDEX) 1 mg tablet Take day of surgery.    budesonide-formoterol (Symbicort) 80-4.5 MCG/ACT inhaler Take day of surgery.    Calcium 250 MG CAPS Stop taking 7 days prior to surgery.    Cholecalciferol (VITAMIN D3 PO) Stop taking 7 days prior to surgery.    levocetirizine (XYZAL) 5 MG tablet Take day of surgery.    levothyroxine 25 mcg tablet Take day of surgery.    MAGNESIUM PO Stop taking 7 days prior to surgery.    Medium Chain Triglycerides (MCT OIL PO) Stop taking 7 days prior to surgery.    mometasone (NASONEX) 50 mcg/act nasal spray Uses PRN- OK to take day of surgery    MULTIPLE VITAMINS-CALCIUM PO Stop taking 7 days prior to surgery.    niacin 100 mg tablet Stop taking 7 days prior to surgery.    PARoxetine (PAXIL-CR) 25 mg 24 hr tablet Take day of surgery.    pseudoephedrine (SUDAFED) 120 MG 12 hr tablet Take night before surgery    Medication instructions for day surgery reviewed. Please use only a sip of water to take your instructed medications. Avoid all over the counter vitamins, supplements and NSAIDS for one week prior to surgery per anesthesia guidelines. Tylenol is ok to take as needed.     You will receive a call one business day prior to surgery with an arrival time and hospital directions. If your surgery is scheduled on a Monday, the hospital will be calling you on the Friday prior to your surgery. If you have not heard from anyone by 8pm, please call the hospital supervisor through the hospital  at [phone]. ([place] [phone] or [place] [phone]).    Do not eat or drink anything after midnight the night before your surgery, including candy, mints, lifesavers, or chewing gum. Do not drink alcohol 24hrs before your surgery. Try not to smoke at least 24hrs before your surgery.       Follow the pre surgery showering instructions as listed in the “My Surgical Experience Booklet” or otherwise provided by your  surgeon's office. Do not use a blade to shave the surgical area 1 week before surgery. It is okay to use a clean electric clippers up to 24 hours before surgery. Do not apply any lotions, creams, including makeup, cologne, deodorant, or perfumes after showering on the day of your surgery. Do not use dry shampoo, hair spray, hair gel, or any type of hair products.     No contact lenses, eye make-up, or artificial eyelashes. Remove nail polish, including gel polish, and any artificial, gel, or acrylic nails if possible. Remove all jewelry including rings and body piercing jewelry.     Wear causal clothing that is easy to take on and off. Consider your type of surgery.    Keep any valuables, jewelry, piercings at home. Please bring any specially ordered equipment (sling, braces) if indicated.    Arrange for a responsible person to drive you to and from the hospital on the day of your surgery. Please confirm the visitor policy for the day of your procedure when you receive your phone call with an arrival time.     Call the surgeon's office with any new illnesses, exposures, or additional questions prior to surgery.    Please reference your “My Surgical Experience Booklet” for additional information to prepare for your upcoming surgery.    Pt has surgical soap.

## 2024-03-22 ENCOUNTER — ANESTHESIA (OUTPATIENT)
Dept: PERIOP | Facility: HOSPITAL | Age: 59
End: 2024-03-22
Payer: COMMERCIAL

## 2024-03-24 DIAGNOSIS — E03.9 HYPOTHYROIDISM, UNSPECIFIED TYPE: ICD-10-CM

## 2024-03-24 RX ORDER — LEVOTHYROXINE SODIUM 0.03 MG/1
TABLET ORAL
Qty: 114 TABLET | Refills: 0 | Status: SHIPPED | OUTPATIENT
Start: 2024-03-24

## 2024-03-25 ENCOUNTER — HOSPITAL ENCOUNTER (OUTPATIENT)
Dept: BONE DENSITY | Facility: MEDICAL CENTER | Age: 59
Discharge: HOME | End: 2024-03-25
Payer: COMMERCIAL

## 2024-03-25 DIAGNOSIS — Z78.0 ASYMPTOMATIC MENOPAUSE: ICD-10-CM

## 2024-03-25 PROCEDURE — 77080 DXA BONE DENSITY AXIAL: CPT

## 2024-03-29 ENCOUNTER — TELEMEDICINE (OUTPATIENT)
Dept: ANESTHESIOLOGY | Facility: CLINIC | Age: 59
End: 2024-03-29
Payer: COMMERCIAL

## 2024-03-29 DIAGNOSIS — Z17.0 MALIGNANT NEOPLASM OF OVERLAPPING SITES OF RIGHT BREAST IN FEMALE, ESTROGEN RECEPTOR POSITIVE (HCC): ICD-10-CM

## 2024-03-29 DIAGNOSIS — C50.412 MALIGNANT NEOPLASM OF UPPER-OUTER QUADRANT OF LEFT BREAST IN FEMALE, ESTROGEN RECEPTOR POSITIVE (HCC): ICD-10-CM

## 2024-03-29 DIAGNOSIS — F32.A DEPRESSION, UNSPECIFIED DEPRESSION TYPE: Primary | ICD-10-CM

## 2024-03-29 DIAGNOSIS — R73.03 PREDIABETES: ICD-10-CM

## 2024-03-29 DIAGNOSIS — Z17.0 MALIGNANT NEOPLASM OF UPPER-OUTER QUADRANT OF LEFT BREAST IN FEMALE, ESTROGEN RECEPTOR POSITIVE (HCC): ICD-10-CM

## 2024-03-29 DIAGNOSIS — C50.811 MALIGNANT NEOPLASM OF OVERLAPPING SITES OF RIGHT BREAST IN FEMALE, ESTROGEN RECEPTOR POSITIVE (HCC): ICD-10-CM

## 2024-03-29 DIAGNOSIS — R94.31 ABNORMAL EKG: ICD-10-CM

## 2024-03-29 PROCEDURE — 99243 OFF/OP CNSLTJ NEW/EST LOW 30: CPT | Performed by: NURSE PRACTITIONER

## 2024-03-29 NOTE — PROGRESS NOTES
[facility]  CONSULT: SURGERY OPTIMIZATION   Brief Visit    This Visit is being completed by telephone. The Patient is located at Home and in the following state in which I hold an active license [place]    The patient was identified by name and date of birth. Richa Medina was informed that this is a telemedicine visit and that the visit is being conducted through Telephone.  My office door was closed. No one else was in the room.  She acknowledged consent and understanding of privacy and security of the platform. The patient has agreed to participate and understands they can discontinue the visit at any time.    Patient is aware this is a billable service.     Assessment/Plan:  58 year old female referred to [facility] for pre-surgery optimization & BEST program   Other consult concerns include: Surgery 4.22-recent pulmonary infection  She is scheduled on   Case: [id number] Date/Time: 04/19/24 0955   Procedures:      LEFT BREAST HILARY LOCALIZATION LUMPECTOMY; 0900 NUC MED (Left: Breast)      RIGHT BREAST MASTECTOMY, POSSIBLE AXILLARY DISSECTION (Right: Breast)      Bilateral BIOPSY LYMPH NODE SENTINEL, lymphoscintigraphies, lymphatic mappings (Bilateral: Axilla)      RIGHT AESTHETIC FLAT CLOSURE , IBIS FLAP, PREVENA PLACEMENT (Right: Breast)      MASTOPEXY BREAST (Left: Breast)   Anesthesia type: General   Diagnosis:      Malignant neoplasm of overlapping sites of right breast in female, estrogen receptor positive  [C50.811, Z17.0]      Malignant neoplasm of upper-outer quadrant of left breast in female, estrogen receptor positive  [C50.412, Z17.0]   Pre-op diagnosis:      Malignant neoplasm of overlapping sites of right breast in female, estrogen receptor positive  [C50.811, Z17.0]      Malignant neoplasm of upper-outer quadrant of left breast in female, estrogen receptor positive  [C50.412, Z17.0]   Location: [facility] OR ROOM 05 / [facility]   Surgeons: Sisi Dominguez MD; Samara  Tatyana Felix, DO     Last anesthesia   No problems   encourage lung exercises prior to surgery  Await pulmonary clearance via letter    Problem List Items Addressed This Visit       Depression -   Stable   No concerns today        Prediabetes  Stable  No concerns  SSI risk low  Recommend high-protein diet   Latest Reference Range & Units Most Recent   Hemoglobin A1C Normal 4.0-5.6%; PreDiabetic 5.7-6.4%; Diabetic >=6.5%; Glycemic control for adults with diabetes <7.0% % 5.5  1/31/24 15:54   eAG, EST AVG Glucose mg/dl 111  1/31/24 15:54           Malignant neoplasm of overlapping sites of right breast in female, estrogen receptor positive     Malignant neoplasm of upper-outer quadrant of left breast in female, estrogen receptor positive   Seen for surgical optimization  Admits to being in well health today  Recommend pulmonary clearance via letter  METS is 9, active, denies chest pain and denies shortness of breath  PAT were reviewed  Surgical optimization complete  RA risk low  SSI risk low   [facility] anemia-no needs  Work on best protocol  BEST   Breathing- instructed to exercise lungs prior to surgery via deep breathing techniques   Eat- discussed increasing protein intake prior to surgery   Sleep/stress- encouraged 8-10 sleep @ night, stress reduction, avoid sick contacts and handwashing   Train- encouraged to remain active          1. Multifocal pneumonia  Needs pulmonary clearance  S/p bronchoscopy which grew H influenze  She was treated appropriately with 10 course of antibiotics  Currently much improved but mild productive cough  Check Sputum culture to ensure resolution  F/up official CT read, upon my review there is improvement in multifocal pneumonia, faint patchy areas remain especially in the lingula  Lung infection  Lung issue   Was previously preparing for the surgery  Was found to have a concern on her chest x-ray  Saw a pulmonologist was treated for a lung infection possible no pneumonia for 10  days  Had serial monitoring  And now released as stable for surgery  Encouraged to keep her upcoming pulmonary follow-up on 4/17/2024      Abnormal EKG  Mets 9   Denies cp   Denies sob   Encounter Information   Provider Department Encounter # Center   4/17/2024 3:00 PM Lev Lockhart MD [facility] [id number] [facility]            Component  Ref Range & Units 3 wk ago   Ventricular Rate  BPM 67   Atrial Rate  BPM 67   AK Interval  ms 154   QRSD Interval  ms 94   QT Interval  ms 382   QTC Interval  ms 403   P Axis  degrees    QRS Axis  degrees 143   T Wave Axis  degrees 150   Resulting Agency [facility]   Narrative   Suspect arm lead reversal, interpretation assumes no reversal  Normal sinus rhythm  Low voltage QRS  Lateral infarct , age undetermined  Abnormal ECG  No previous ECGs available  Confirmed by Payton Vigil ([id number]) on 3/8/2024 11:23:11 AM   This result contains additional information. Click to view the full report.       Recent Visits  No visits were found meeting these conditions.  Showing recent visits within past 7 days and meeting all other requirements  Today's Visits  Date Type Provider Dept   03/29/24 Telemedicine ELIDIA Juárez  [facility]   Showing today's visits and meeting all other requirements  Future Appointments  No visits were found meeting these conditions.  Showing future appointments within next 150 days and meeting all other requirements       ros  Denies fevers and denies chills  Denies congestion and denies sore throat  Denies chest pain  Denies palpitations  Denies shortness of breath  Denies abdominal pain  Denies nausea, vomiting, and diarrhea  Denies any issues with their skin... example NO open wounds or sores  Denies rashes  Denies difficulty urinating  Denies any issues with their urine... example a dark color or an odor  Denies dizziness  Denies headaches  Denies confusion and denies hallucinations    Admits to being in well health  today      Physical Assessment   We did not connect via video  Patient was alert and orientated times 3  Mood appropriate  Thought appropriate    I heard no respiratory distress over the phone today      SUBJECTIVE   Richa is a 58-year-old female who was referred to [facility] for presurgery optimization.  Richa explains that she had a routine mammogram screening which revealed an area of concern.  States in her past she had areas of concern but they always checked out okay.  This time they did not check out okay.  Further investigation led to diagnosis of breast CA.  She is now electing to have it removed.    I spoke to Richa over the telephone.  We did not connect via video.  She requested a telephone appointment due to her location.  States she lives in [place] and [place] is too far to travel.  [facility] telephone is much more convenient.  She was pleasant and a pleasure to care for.  She explains that she found her area of concern on routine screening.  Had no symptoms of breast cancer.  Has no family history of breast cancer.  Per patient she is now diagnosed with an estrogen driven breast cancer.      She states she was previously supposed to have the surgery.  For her surgery preparation and workup she was found to have some abnormal areas on her chest x-ray.  She was referred to a pulmonologist.  She was diagnosed with pneumonia.  And she was treated for a lung infection.  She has since had serial monitoring of her lungs.  And now pulmonary states she is stable and allowed to have surgery.  Await final pulmonary clearance to her checklist.  She is going to see them on 4/17/2024.  Await office visit for any further needs.  I placed extra emphasis on exercising lungs prior to surgery.      She admits to being in well health today.  Denies any new developments in her health.  States she is active.  Denies chest pain and denies shortness of breath.  Her METS is 9.  Her PAT's were reviewed as stable.  Surgical  optimization complete.  RA risk low.  SSI risk low.  SOC anemia-no needs.      I have asked her to work on her best protocol and make sure she goes to her pulmonary visit on 4/17/2024     As always we discussed having your BEST surgery, and BEST recovery.  Surgery goals reviewed today.      Breathing exercises   Patient was encouraged to begin lung exercises today.  This could be accomplished through deep breathing and cough exercises.      Eating/nutrition   Encouraged patient to increase oral protein intake prior to surgery.  .  This can be accomplished by consuming chicken, fish, tuna fish, cottage cheese, cheese, eggs, Greek yogurt, and protein shakes as needed.  I encouraged use of protein shakes such ENLIVE.  I also recommended making your own protein shakes with protein powder.   Sleep/Stress management  Patient was encouraged to rest their body prior to surgery.  Encouraged attempting to get 8 hours of sleep at night.  Avoid stress.  Avoid sick contacts.  Encouraged to find a relaxing hobby such as reading, meditation, listening to music.  Training exercises  Patient was encouraged to remain active as possible.  Today bilateral lower extremity generic exercises were taught for muscle strengthening and balance.  All exercises to be done sitting down.     Visit Time  Total Visit Duration: 23 minutes

## 2024-03-29 NOTE — PROGRESS NOTES
[facility]  CONSULT       Patient has the ability to take their vital signs at home no  Allergies reviewed today   PMH reviewed today   Medications reviewed today     See  Assessment below...    METS:9.25       As always we discussed having your BEST surgery, and BEST recovery.  Surgery goals reviewed today.      Breathing exercises   Patient was encouraged to begin lung exercises today.  This could be accomplished through deep breathing and cough exercises.  Eating/nutrition   Encouraged patient to increase oral protein intake prior to surgery.  This can be accomplished by consuming chicken, fish, tuna fish, cottage cheese, cheese, eggs, Greek yogurt, and protein shakes as needed.  I encouraged use of protein shakes such ENLIVE.  I also recommended making your own protein shakes with protein powder.     Sleep/Stress management  Patient was encouraged to rest their body prior to surgery.  Encouraged attempting to get 8 hours of sleep at night.  Avoid stress.  Avoid sick contacts.  Encouraged to find a relaxing hobby such as reading, meditation, listening to music.  Training exercises  Patient was encouraged to remain active as possible.  Today bilateral lower extremity generic exercises were taught for muscle strengthening and balance.  All exercises to be done sitting down.

## 2024-04-02 ENCOUNTER — OFFICE VISIT (OUTPATIENT)
Dept: HEMATOLOGY ONCOLOGY | Facility: CLINIC | Age: 59
End: 2024-04-02
Payer: COMMERCIAL

## 2024-04-02 VITALS
TEMPERATURE: 97.9 F | WEIGHT: 137.6 LBS | SYSTOLIC BLOOD PRESSURE: 136 MMHG | HEIGHT: 63 IN | BODY MASS INDEX: 24.38 KG/M2 | DIASTOLIC BLOOD PRESSURE: 84 MMHG | OXYGEN SATURATION: 97 % | RESPIRATION RATE: 17 BRPM | HEART RATE: 80 BPM

## 2024-04-02 DIAGNOSIS — Z17.0 MALIGNANT NEOPLASM OF OVERLAPPING SITES OF RIGHT BREAST IN FEMALE, ESTROGEN RECEPTOR POSITIVE (HCC): ICD-10-CM

## 2024-04-02 DIAGNOSIS — M25.552 PAIN OF LEFT HIP: Primary | ICD-10-CM

## 2024-04-02 DIAGNOSIS — C50.811 MALIGNANT NEOPLASM OF OVERLAPPING SITES OF RIGHT BREAST IN FEMALE, ESTROGEN RECEPTOR POSITIVE (HCC): ICD-10-CM

## 2024-04-02 PROCEDURE — 99213 OFFICE O/P EST LOW 20 MIN: CPT | Performed by: INTERNAL MEDICINE

## 2024-04-02 RX ORDER — ANASTROZOLE 1 MG/1
1 TABLET ORAL DAILY
Qty: 90 TABLET | Refills: 2 | Status: SHIPPED | OUTPATIENT
Start: 2024-04-02

## 2024-04-06 NOTE — PROGRESS NOTES
HEMATOLOGY / ONCOLOGY CLINIC FOLLOW UP NOTE    Primary Care Provider: Shelby Marte PA-C  Referring Provider:    MRN: [id number]  : 1965    Reason for Encounter: follow up b/l breast cancer       Oncology History Overview Note   2023 - biopsy positive in right breast in 2 areas    7:00 5 cmfn - invasive ductal carcinoma % 90% Her2 2+ with negative FISH    12:00 8 cmfn - invasive ductal carcinoma % KY 35% Her2 1+    2023 - biopsy positive in left breast for invasive ductal carcinoma with tubular features, % KY 35% Her2 1+    2024 - start anastrozole while assessing pulmonary infectious concerns       Malignant neoplasm of overlapping sites of right breast in female, estrogen receptor positive (HCC)   2023 -  Cancer Staged    Staging form: Breast, AJCC 8th Edition  - Clinical stage from 2023: Stage IA (cT1c, cN0, cM0, G1, ER+, KY+, HER2-) - Signed by Sisi Dominguez MD on 2023  Stage prefix: Initial diagnosis  Method of lymph node assessment: Clinical  Histologic grading system: 3 grade system       2023 Initial Diagnosis    Malignant neoplasm of overlapping sites of right breast in female, estrogen receptor positive      Malignant neoplasm of upper-outer quadrant of left breast in female, estrogen receptor positive (HCC)   2023 -  Cancer Staged    Staging form: Breast, AJCC 8th Edition  - Clinical stage from 2023: Stage IA (cT1b, cN0, cM0, G1, ER+, KY-, HER2-) - Signed by Sisi Dominguez MD on 2023  Stage prefix: Initial diagnosis  Method of lymph node assessment: Clinical  Histologic grading system: 3 grade system       2023 Initial Diagnosis    Malignant neoplasm of upper-outer quadrant of left breast in female, estrogen receptor positive          Interval History: Patient presents for follow up of her bilateral ER positive breast cancer.  It is multifocal on the right and unifocal on the left.  She has surgery planned for .  She  has been on anastrozole well a pulmonary evaluation could be done in the setting of infection.  Her breathing is much better.  She tolerated anastrozole well.  She did have some hot flashes but they were not very bothersome to her.  She is taking calcium and vitamin D.  She is also complaining of new left hip and groin pain.  It is worse when walking for long periods of time.  She had a DEXA scan prior to this visit that shows the worst T-score being -1.9.         REVIEW OF SYSTEMS:  Please note that a 14-point review of systems was performed to include Constitutional, HEENT, Respiratory, CVS, GI, , Musculoskeletal, Integumentary, Neurologic, Rheumatologic, Endocrinologic, Psychiatric, Lymphatic, and Hematologic/Oncologic systems were reviewed and are negative unless otherwise stated in HPI. Positive and negative findings pertinent to this evaluation are incorporated into the history of present illness.      ECOG PS: 0    PROBLEM LIST:  Patient Active Problem List   Diagnosis    Bunion    Depression    Hypothyroidism    Prediabetes    Vitamin D deficiency    Easy bruising    Medial epicondylitis of elbow, right    Malignant neoplasm of overlapping sites of right breast in female, estrogen receptor positive (HCC)    Abnormal CT of the chest    Malignant neoplasm of upper-outer quadrant of left breast in female, estrogen receptor positive (HCC)    Use of aromatase inhibitors    Abnormal EKG       Assessment / Plan: 59-year-old female with bilateral ER positive breast cancer, multifocal on the right and unifocal on the left.  Her surgery is planned for April 19.  I will see her back in May to review the pathology results.  She can continue anastrozole in the meantime.  She does have osteopenia and will continue with calcium and vitamin D.  I am getting an x-ray of her left hip because of the pain she is having.  Weightbearing exercises can certainly help bone density but I want to make sure there is nothing obvious  wrong with her hip before she would engage in any exercise program.  With that result when I get it.  She knows to call in the interim with any questions or concerns.       I spent 20 minutes on chart review, face to face counseling time, coordination of care and documentation.    Past Medical History:   has a past medical history of Asthma, Cancer (HCC), Disease of thyroid gland, Hearing aid worn, Hearing loss, Osteopenia, Rosacea (02/09/2021), Seasonal allergies, and Vitamin D deficiency (07/24/2018).    PAST SURGICAL HISTORY:   has a past surgical history that includes Septoplasty; Sinus surgery; LASIK; pr neuroplasty &/transpos median nrv carpal tunne (Right, 07/10/2018); pr neuroplasty &/transpos median nrv carpal tunne (Left, 04/19/2019); US guided breast biopsy right complete (Right, 11/01/2023); US guidance breast biopsy right each additional (Right, 11/01/2023); Breast biopsy (Right, 11/01/2023); MRI breast biopsy left (all inclusive) (Left, 12/08/2023); Breast biopsy (Left, 12/08/2023); and US breast needle loc left (Left, 2/27/2024).    CURRENT MEDICATIONS  Current Outpatient Medications   Medication Sig Dispense Refill    anastrozole (ARIMIDEX) 1 mg tablet Take 1 tablet (1 mg total) by mouth daily 90 tablet 2    budesonide-formoterol (Symbicort) 80-4.5 MCG/ACT inhaler Inhale 2 puffs 2 (two) times a day Rinse mouth after use. 30 g 1    Calcium 250 MG CAPS Take by mouth daily with dinner        Cholecalciferol (VITAMIN D3 PO) Take by mouth      levocetirizine (XYZAL) 5 MG tablet Take 1 tablet (5 mg total) by mouth every evening 90 tablet 1    levothyroxine 25 mcg tablet TAKE ONE TABLET BY MOUTH EVERY DAY MONDAY THRU FRIDAY AND TAKE 2 TABLETS EVERY DAY ON SAT & SUN. 114 tablet 0    MAGNESIUM PO Take 200 mg by mouth daily with dinner        Medium Chain Triglycerides (MCT OIL PO) Take by mouth      mometasone (NASONEX) 50 mcg/act nasal spray 2 sprays into each nostril daily 51 g 1    MULTIPLE  "VITAMINS-CALCIUM PO Take 1 capsule by mouth daily in the early morning        niacin 100 mg tablet Take 100 mg by mouth daily with breakfast      PARoxetine (PAXIL-CR) 25 mg 24 hr tablet Take 2 po daily 180 tablet 3    pseudoephedrine (SUDAFED) 120 MG 12 hr tablet Take 120 mg by mouth as needed      Spacer/Aero-Holding Chambers (Vortex Valved Holding Chamber) ANGELINE Use 2 (two) times a day 1 each 0     No current facility-administered medications for this visit.     [unfilled]    SOCIAL HISTORY:   reports that she has never smoked. She has never used smokeless tobacco. She reports current alcohol use. She reports that she does not use drugs.     FAMILY HISTORY:  family history includes Colon cancer in her maternal uncle; Crohn's disease in her father; Depression in her maternal grandmother; Diabetes in her maternal grandmother; Hashimoto's thyroiditis in her mother and sister; Melanoma in her father and mother; No Known Problems in her maternal grandfather, paternal grandfather, paternal grandmother, sister, and sister; Thyroid disease in her mother.     ALLERGIES:  is allergic to pollen extract.      Physical Exam:  Vital Signs:   Visit Vitals  /84 (BP Location: Left arm, Patient Position: Sitting, Cuff Size: Adult)   Pulse 80   Temp 97.9 °F (36.6 °C)   Resp 17   Ht 5' 3\" (1.6 m)   Wt 62.4 kg (137 lb 9.6 oz)   SpO2 97%   BMI 24.37 kg/m²   OB Status Postmenopausal   Smoking Status Never   BSA 1.65 m²     Body mass index is 24.37 kg/m².  Body surface area is 1.65 meters squared.    GEN: Alert, awake oriented x3, in no acute distress  HEENT- No pallor, icterus, cyanosis, no oral mucosal lesions,   LAD - no palpable cervical, clavicle, axillary, inguinal LAD  Heart- normal S1 S2, regular rate and rhythm, No murmur, rubs.   Lungs- clear breathing sound bilateral.   Abdomen- soft, Non tender, bowel sounds present  Extremities- No cyanosis, clubbing, edema  Neuro- No focal neurological deficit    Labs:  Lab Results "   Component Value Date    WBC 4.41 03/08/2024    HGB 13.0 03/08/2024    HCT 39.5 03/08/2024    MCV 89 03/08/2024     03/08/2024     Lab Results   Component Value Date    SODIUM 135 03/08/2024    K 4.0 03/08/2024     03/08/2024    CO2 28 03/08/2024    AGAP 4 03/08/2024    BUN 9 03/08/2024    CREATININE 0.68 03/08/2024    GLUC 89 03/08/2024    GLUF 82 02/22/2024    CALCIUM 9.5 03/08/2024    AST 19 03/08/2024    ALT 23 03/08/2024    ALKPHOS 70 03/08/2024    TP 7.6 03/08/2024    TBILI 0.63 03/08/2024    EGFR 95 03/08/2024

## 2024-04-17 ENCOUNTER — OFFICE VISIT (OUTPATIENT)
Dept: PULMONOLOGY | Facility: CLINIC | Age: 59
End: 2024-04-17
Payer: COMMERCIAL

## 2024-04-17 ENCOUNTER — TELEPHONE (OUTPATIENT)
Age: 59
End: 2024-04-17

## 2024-04-17 ENCOUNTER — OFFICE VISIT (OUTPATIENT)
Dept: PLASTIC SURGERY | Facility: CLINIC | Age: 59
End: 2024-04-17
Payer: COMMERCIAL

## 2024-04-17 VITALS
WEIGHT: 139 LBS | SYSTOLIC BLOOD PRESSURE: 120 MMHG | OXYGEN SATURATION: 98 % | TEMPERATURE: 97.6 F | HEART RATE: 69 BPM | HEIGHT: 63 IN | DIASTOLIC BLOOD PRESSURE: 78 MMHG | BODY MASS INDEX: 24.63 KG/M2

## 2024-04-17 DIAGNOSIS — C50.811 MALIGNANT NEOPLASM OF OVERLAPPING SITES OF RIGHT BREAST IN FEMALE, ESTROGEN RECEPTOR POSITIVE (HCC): ICD-10-CM

## 2024-04-17 DIAGNOSIS — Z17.0 MALIGNANT NEOPLASM OF OVERLAPPING SITES OF RIGHT BREAST IN FEMALE, ESTROGEN RECEPTOR POSITIVE (HCC): Primary | ICD-10-CM

## 2024-04-17 DIAGNOSIS — Z17.0 MALIGNANT NEOPLASM OF OVERLAPPING SITES OF RIGHT BREAST IN FEMALE, ESTROGEN RECEPTOR POSITIVE (HCC): ICD-10-CM

## 2024-04-17 DIAGNOSIS — C50.811 MALIGNANT NEOPLASM OF OVERLAPPING SITES OF RIGHT BREAST IN FEMALE, ESTROGEN RECEPTOR POSITIVE (HCC): Primary | ICD-10-CM

## 2024-04-17 DIAGNOSIS — R93.89 ABNORMAL CT OF THE CHEST: Primary | ICD-10-CM

## 2024-04-17 PROCEDURE — 99213 OFFICE O/P EST LOW 20 MIN: CPT | Performed by: STUDENT IN AN ORGANIZED HEALTH CARE EDUCATION/TRAINING PROGRAM

## 2024-04-17 PROCEDURE — 99214 OFFICE O/P EST MOD 30 MIN: CPT | Performed by: INTERNAL MEDICINE

## 2024-04-17 RX ORDER — GABAPENTIN 300 MG/1
300 CAPSULE ORAL 3 TIMES DAILY
Qty: 15 CAPSULE | Refills: 0 | Status: SHIPPED | OUTPATIENT
Start: 2024-04-17 | End: 2024-04-22

## 2024-04-17 RX ORDER — SENNOSIDES 8.6 MG
650 CAPSULE ORAL EVERY 6 HOURS
Qty: 20 TABLET | Refills: 0 | Status: SHIPPED | OUTPATIENT
Start: 2024-04-17 | End: 2024-04-22

## 2024-04-17 RX ORDER — OXYCODONE HYDROCHLORIDE 5 MG/1
5 TABLET ORAL EVERY 6 HOURS PRN
Qty: 10 TABLET | Refills: 0 | Status: SHIPPED | OUTPATIENT
Start: 2024-04-17

## 2024-04-17 RX ORDER — TRAMADOL HYDROCHLORIDE 50 MG/1
50 TABLET ORAL EVERY 6 HOURS PRN
Qty: 20 TABLET | Refills: 0 | Status: SHIPPED | OUTPATIENT
Start: 2024-04-17

## 2024-04-17 RX ORDER — IBUPROFEN 800 MG/1
800 TABLET ORAL EVERY 8 HOURS PRN
Qty: 15 TABLET | Refills: 0 | Status: SHIPPED | OUTPATIENT
Start: 2024-04-17

## 2024-04-17 NOTE — PROGRESS NOTES
Pulmonary Follow Up Note   Richa Medina 59 y.o. female MRN: [id number]  4/17/2024      Assessment:  1. Multifocal pneumonia  S/p bronchoscopy which grew H influenze  She was treated appropriately with 10 course of antibiotics  Sputum culture shows resolved     2. Elevated IgE level  IgE level >1000  A. Fumigatus specific IgE also positive  ANCA and DUSTIN negative  Given recurrent sinopulmonary infections, consider Jobs syndrome vs ABPA  On Symbicort inhaler, mometasone nasal spray and antihistamine     3. 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 Recommend use of duonebs post surgery if needed, incentive spirometry and DVT prophylaxis at the discretion of the surgical team.       Plan:    Diagnoses and all orders for this visit:    Abnormal CT of the chest    Malignant neoplasm of overlapping sites of right breast in female, estrogen receptor positive (HCC)        Return if symptoms worsen or fail to improve.    History of Present Illness   HPI:  Richa Medina is a 59 y.o. female who presents for follow up.  She has since been seen by allergy and immunology.  She was started on Symbicort inhaler, along with mometasone nasal spray and levocetirizine.  She reports doing well.  She denies any sinus congestion, cough, shortness of breath.  She has not required any extra Symbicort dosing.  She is planned for mastectomy later this week.    Review of Systems   Constitutional:  Negative for activity change, chills, diaphoresis, fatigue and fever.   HENT:  Negative for congestion, postnasal drip,  rhinorrhea, sinus pressure, sneezing, sore throat and trouble swallowing.    Eyes: Negative.    Respiratory:  Negative for cough, chest tightness and shortness of breath.    Cardiovascular:  Negative for chest pain, palpitations and leg swelling.   Gastrointestinal:  Negative for constipation, diarrhea, nausea and vomiting.   Endocrine: Negative.    Genitourinary: Negative.    Musculoskeletal:  Negative for back pain, joint swelling and neck pain.   Skin: Negative.    Allergic/Immunologic: Negative.    Neurological:  Negative for dizziness, syncope, weakness, light-headedness and headaches.   Hematological: Negative.    Psychiatric/Behavioral: Negative.     All other systems reviewed and are negative.      Historical Information   Past Medical History:   Diagnosis Date    Asthma     Cancer (HCC)     bilateral    Disease of thyroid gland     Hearing aid worn     bilateral    Hearing loss     Osteopenia     Rosacea 02/09/2021    Seasonal allergies     Vitamin D deficiency 07/24/2018     Past Surgical History:   Procedure Laterality Date    BREAST BIOPSY Right 11/01/2023    X 2 sites    BREAST BIOPSY Left 12/08/2023    LASIK      MRI BREAST BIOPSY LEFT (ALL INCLUSIVE) Left 12/08/2023    AK NEUROPLASTY &/TRANSPOS MEDIAN NRV CARPAL TUNNE Right 07/10/2018    Procedure: CARPAL TUNNEL RELEASE;  Surgeon: Maicol Taylor DO;  Location: [facility];  Service: Orthopedics    AK NEUROPLASTY &/TRANSPOS MEDIAN NRV CARPAL TUNNE Left 04/19/2019    Procedure: RELEASE CARPAL TUNNEL;  Surgeon: Peterson Alejandro MD;  Location: [facility];  Service: Orthopedics    SEPTOPLASTY      SINUS SURGERY      US BREAST NEEDLE LOC LEFT Left 2/27/2024    US GUIDANCE BREAST BIOPSY RIGHT EACH ADDITIONAL Right 11/01/2023    US GUIDED BREAST BIOPSY RIGHT COMPLETE Right 11/01/2023     Family History   Problem Relation Age of Onset    Hashimoto's thyroiditis Mother     Thyroid disease Mother     Melanoma Mother         50's    Melanoma Father         50's     Crohn's disease Father     Hashimoto's thyroiditis Sister     No Known Problems Sister     No Known Problems Sister     Colon cancer Maternal Uncle         50's    Depression Maternal Grandmother     Diabetes Maternal Grandmother     No Known Problems Maternal Grandfather     No Known Problems Paternal Grandmother     No Known Problems Paternal Grandfather          Meds/Allergies     Current Outpatient Medications:     anastrozole (ARIMIDEX) 1 mg tablet, Take 1 tablet (1 mg total) by mouth daily, Disp: 90 tablet, Rfl: 2    budesonide-formoterol (Symbicort) 80-4.5 MCG/ACT inhaler, Inhale 2 puffs 2 (two) times a day Rinse mouth after use., Disp: 30 g, Rfl: 1    Calcium 250 MG CAPS, Take by mouth daily with dinner  , Disp: , Rfl:     Cholecalciferol (VITAMIN D3 PO), Take by mouth, Disp: , Rfl:     levocetirizine (XYZAL) 5 MG tablet, Take 1 tablet (5 mg total) by mouth every evening, Disp: 90 tablet, Rfl: 1    levothyroxine 25 mcg tablet, TAKE ONE TABLET BY MOUTH EVERY DAY MONDAY THRU FRIDAY AND TAKE 2 TABLETS EVERY DAY ON SAT & SUN., Disp: 114 tablet, Rfl: 0    MAGNESIUM PO, Take 200 mg by mouth daily with dinner  , Disp: , Rfl:     Medium Chain Triglycerides (MCT OIL PO), Take by mouth, Disp: , Rfl:     mometasone (NASONEX) 50 mcg/act nasal spray, 2 sprays into each nostril daily, Disp: 51 g, Rfl: 1    MULTIPLE VITAMINS-CALCIUM PO, Take 1 capsule by mouth daily in the early morning  , Disp: , Rfl:     niacin 100 mg tablet, Take 100 mg by mouth daily with breakfast, Disp: , Rfl:     PARoxetine (PAXIL-CR) 25 mg 24 hr tablet, Take 2 po daily, Disp: 180 tablet, Rfl: 3    pseudoephedrine (SUDAFED) 120 MG 12 hr tablet, Take 120 mg by mouth as needed, Disp: , Rfl:     Spacer/Aero-Holding Chambers (Vortex Valved Holding Chamber) ANGELINE, Use 2 (two) times a day, Disp: 1 each, Rfl: 0  Allergies   Allergen Reactions    Pollen Extract        Vitals: Blood pressure 120/78, pulse 69, temperature 97.6 °F (36.4 °C), temperature  "source Tympanic, height 5' 3\" (1.6 m), weight 63 kg (139 lb), SpO2 98%, not currently breastfeeding. Body mass index is 24.62 kg/m². Oxygen Therapy  SpO2: 98 %  Oxygen Therapy: None (Room air)      Physical Exam  Physical Exam  Vitals and nursing note reviewed.   Constitutional:       Appearance: Normal appearance. She is normal weight.   HENT:      Head: Normocephalic and atraumatic.      Right Ear: External ear normal.      Left Ear: External ear normal.      Nose: Nose normal.      Mouth/Throat:      Mouth: Mucous membranes are moist.      Pharynx: Oropharynx is clear.   Eyes:      Conjunctiva/sclera: Conjunctivae normal.   Cardiovascular:      Rate and Rhythm: Normal rate and regular rhythm.      Pulses: Normal pulses.      Heart sounds: Normal heart sounds.   Pulmonary:      Effort: Pulmonary effort is normal.      Breath sounds: Normal breath sounds.   Abdominal:      General: Abdomen is flat. Bowel sounds are normal.      Palpations: Abdomen is soft.   Musculoskeletal:         General: No swelling or tenderness.      Cervical back: Neck supple. No muscular tenderness.   Skin:     General: Skin is warm and dry.      Capillary Refill: Capillary refill takes less than 2 seconds.   Neurological:      Mental Status: She is alert and oriented to person, place, and time. Mental status is at baseline.   Psychiatric:         Mood and Affect: Mood normal.         Behavior: Behavior normal.         Thought Content: Thought content normal.         Judgment: Judgment normal.         Labs: I have personally reviewed pertinent lab results.  Lab Results   Component Value Date    WBC 4.41 03/08/2024    HGB 13.0 03/08/2024    HCT 39.5 03/08/2024    MCV 89 03/08/2024     03/08/2024     Lab Results   Component Value Date    CALCIUM 9.5 03/08/2024    K 4.0 03/08/2024    CO2 28 03/08/2024     03/08/2024    BUN 9 03/08/2024    CREATININE 0.68 03/08/2024     Lab Results   Component Value Date     (H) 01/31/2024 " "    Lab Results   Component Value Date    ALT 23 03/08/2024    AST 19 03/08/2024    ALKPHOS 70 03/08/2024         Imaging and other studies: I have personally reviewed pertinent reports.   and I have personally reviewed pertinent films in PACS  CT chest 1/24/2024:Scattered tree-in-bud nodules with mild peribronchiolar groundglass change, and mild subsegmental atelectasis in the middle lobe and lingula. There is associated mild diffuse bronchial wall thickening, few scattered areas of mucus plugging, and   distal bronchiolectasis in the middle lobe and lingula. Overall, the findings are improved compared to 12/5/2023. No new or suspicious findings.  Pulmonary function testing 3/14/2024: Normal spirometry    EKG, Pathology, and Other Studies: I have personally reviewed pertinent reports.    EKG 3/8/2024: Normal sinus rhythm    Jose Alberto Mcbride MD  Pulmonary/Critical Care Fellow PGY-V  [facility]      Disclaimer: Portions of the record may have been created with voice recognition software. Occasional wrong word or \"sound a like\" substitutions may have occurred due to the inherent limitations of voice recognition software. Careful consideration should be taken to recognize, using context, where substitutions have occurred.   "

## 2024-04-17 NOTE — TELEPHONE ENCOUNTER
Patient called asking what pharmacy her medication went to she said she would like it to go to the [facility] in [place]

## 2024-04-18 NOTE — ANESTHESIA PREPROCEDURE EVALUATION
"Procedure:  LEFT BREAST HILARY LOCALIZATION LUMPECTOMY; 0800 NUC MED (Left: Breast)  RIGHT BREAST MASTECTOMY, POSSIBLE AXILLARY DISSECTION (Right: Breast)  Bilateral BIOPSY LYMPH NODE SENTINEL, lymphoscintigraphies, lymphatic mappings (Bilateral: Axilla)  RIGHT AESTHETIC FLAT CLOSURE , IBIS FLAP, PREVENA PLACEMENT (Right: Breast)  MASTOPEXY BREAST (Left: Breast)    Relevant Problems   ENDO   (+) Hypothyroidism      GYN   (+) Malignant neoplasm of overlapping sites of right breast in female, estrogen receptor positive (HCC)   (+) Malignant neoplasm of upper-outer quadrant of left breast in female, estrogen receptor positive (HCC)      NEURO/PSYCH   (+) Depression        Recent multifocal PNA, had an ABX course:    Pulmonary clearance:   \"From pulmonary standpoint, based on probability scoring using ARISCAT, she is at low to intermediate risk for perioperative pulmonary complications given recent pneumonia. Recommend use of duonebs post surgery if needed, incentive spirometry and DVT prophylaxis at the discretion of the surgical team.\"   Physical Exam    Airway    Mallampati score: I  TM Distance: >3 FB  Neck ROM: full     Dental   No notable dental hx     Cardiovascular  Cardiovascular exam normal    Pulmonary  Pulmonary exam normal     Other Findings  post-pubertal.      Anesthesia Plan  ASA Score- 2     Anesthesia Type- general with ASA Monitors.         Additional Monitors:     Airway Plan: ETT.           Plan Factors-Exercise tolerance (METS): >4 METS.    Chart reviewed.   Existing labs reviewed. Patient summary reviewed.    Patient is not a current smoker.      Obstructive sleep apnea risk education given perioperatively.        Induction- intravenous.    Postoperative Plan- Plan for postoperative opioid use.     Informed Consent- Anesthetic plan and risks discussed with patient.  I personally reviewed this patient with the CRNA. Discussed and agreed on the Anesthesia Plan with the CRNA..                "

## 2024-04-19 ENCOUNTER — HOSPITAL ENCOUNTER (OUTPATIENT)
Dept: NUCLEAR MEDICINE | Facility: HOSPITAL | Age: 59
Discharge: HOME | End: 2024-04-19
Attending: SURGERY
Payer: COMMERCIAL

## 2024-04-19 ENCOUNTER — HOSPITAL ENCOUNTER (OUTPATIENT)
Facility: HOSPITAL | Age: 59
Setting detail: OUTPATIENT SURGERY
Discharge: HOME | End: 2024-04-20
Attending: SURGERY | Admitting: SURGERY
Payer: COMMERCIAL

## 2024-04-19 ENCOUNTER — APPOINTMENT (OUTPATIENT)
Dept: MAMMOGRAPHY | Facility: HOSPITAL | Age: 59
End: 2024-04-19
Payer: COMMERCIAL

## 2024-04-19 DIAGNOSIS — C50.412 MALIGNANT NEOPLASM OF UPPER-OUTER QUADRANT OF LEFT BREAST IN FEMALE, ESTROGEN RECEPTOR POSITIVE (HCC): ICD-10-CM

## 2024-04-19 DIAGNOSIS — Z17.0 MALIGNANT NEOPLASM OF OVERLAPPING SITES OF RIGHT BREAST IN FEMALE, ESTROGEN RECEPTOR POSITIVE (HCC): ICD-10-CM

## 2024-04-19 DIAGNOSIS — Z17.0 MALIGNANT NEOPLASM OF UPPER-OUTER QUADRANT OF LEFT BREAST IN FEMALE, ESTROGEN RECEPTOR POSITIVE (HCC): ICD-10-CM

## 2024-04-19 DIAGNOSIS — C50.811 MALIGNANT NEOPLASM OF OVERLAPPING SITES OF RIGHT BREAST IN FEMALE, ESTROGEN RECEPTOR POSITIVE (HCC): ICD-10-CM

## 2024-04-19 PROCEDURE — 38525 BIOPSY/REMOVAL LYMPH NODES: CPT | Performed by: SURGERY

## 2024-04-19 PROCEDURE — 14301 TIS TRNFR ANY 30.1-60 SQ CM: CPT | Performed by: PHYSICIAN ASSISTANT

## 2024-04-19 PROCEDURE — 19307 MAST MOD RAD: CPT | Performed by: PHYSICIAN ASSISTANT

## 2024-04-19 PROCEDURE — 19301 PARTIAL MASTECTOMY: CPT | Performed by: SURGERY

## 2024-04-19 PROCEDURE — 38900 IO MAP OF SENT LYMPH NODE: CPT | Performed by: SURGERY

## 2024-04-19 PROCEDURE — 19301 PARTIAL MASTECTOMY: CPT | Performed by: PHYSICIAN ASSISTANT

## 2024-04-19 PROCEDURE — 14301 TIS TRNFR ANY 30.1-60 SQ CM: CPT | Performed by: STUDENT IN AN ORGANIZED HEALTH CARE EDUCATION/TRAINING PROGRAM

## 2024-04-19 PROCEDURE — 38900 IO MAP OF SENT LYMPH NODE: CPT | Performed by: PHYSICIAN ASSISTANT

## 2024-04-19 PROCEDURE — A9541 TC99M SULFUR COLLOID: HCPCS

## 2024-04-19 PROCEDURE — 88302 TISSUE EXAM BY PATHOLOGIST: CPT | Performed by: STUDENT IN AN ORGANIZED HEALTH CARE EDUCATION/TRAINING PROGRAM

## 2024-04-19 PROCEDURE — C9290 INJ, BUPIVACAINE LIPOSOME: HCPCS | Performed by: STUDENT IN AN ORGANIZED HEALTH CARE EDUCATION/TRAINING PROGRAM

## 2024-04-19 PROCEDURE — 38792 RA TRACER ID OF SENTINL NODE: CPT

## 2024-04-19 PROCEDURE — 19307 MAST MOD RAD: CPT | Performed by: SURGERY

## 2024-04-19 PROCEDURE — 38525 BIOPSY/REMOVAL LYMPH NODES: CPT | Performed by: PHYSICIAN ASSISTANT

## 2024-04-19 PROCEDURE — 19318 BREAST REDUCTION: CPT | Performed by: STUDENT IN AN ORGANIZED HEALTH CARE EDUCATION/TRAINING PROGRAM

## 2024-04-19 PROCEDURE — 88307 TISSUE EXAM BY PATHOLOGIST: CPT | Performed by: STUDENT IN AN ORGANIZED HEALTH CARE EDUCATION/TRAINING PROGRAM

## 2024-04-19 PROCEDURE — 88331 PATH CONSLTJ SURG 1 BLK 1SPC: CPT | Performed by: STUDENT IN AN ORGANIZED HEALTH CARE EDUCATION/TRAINING PROGRAM

## 2024-04-19 PROCEDURE — NC001 PR NO CHARGE: Performed by: PHYSICIAN ASSISTANT

## 2024-04-19 PROCEDURE — 19318 BREAST REDUCTION: CPT | Performed by: PHYSICIAN ASSISTANT

## 2024-04-19 PROCEDURE — 88309 TISSUE EXAM BY PATHOLOGIST: CPT | Performed by: STUDENT IN AN ORGANIZED HEALTH CARE EDUCATION/TRAINING PROGRAM

## 2024-04-19 RX ORDER — BUDESONIDE AND FORMOTEROL FUMARATE DIHYDRATE 80; 4.5 UG/1; UG/1
2 AEROSOL RESPIRATORY (INHALATION) 2 TIMES DAILY
Status: DISCONTINUED | OUTPATIENT
Start: 2024-04-19 | End: 2024-04-20 | Stop reason: HOSPADM

## 2024-04-19 RX ORDER — PROMETHAZINE HYDROCHLORIDE 25 MG/ML
6.25 INJECTION, SOLUTION INTRAMUSCULAR; INTRAVENOUS ONCE AS NEEDED
Status: DISCONTINUED | OUTPATIENT
Start: 2024-04-19 | End: 2024-04-19 | Stop reason: HOSPADM

## 2024-04-19 RX ORDER — KETOROLAC TROMETHAMINE 30 MG/ML
INJECTION, SOLUTION INTRAMUSCULAR; INTRAVENOUS AS NEEDED
Status: DISCONTINUED | OUTPATIENT
Start: 2024-04-19 | End: 2024-04-19

## 2024-04-19 RX ORDER — SODIUM CHLORIDE 9 MG/ML
75 INJECTION, SOLUTION INTRAVENOUS CONTINUOUS
Status: DISCONTINUED | OUTPATIENT
Start: 2024-04-19 | End: 2024-04-20 | Stop reason: HOSPADM

## 2024-04-19 RX ORDER — CEFAZOLIN SODIUM 1 G/50ML
1000 SOLUTION INTRAVENOUS EVERY 8 HOURS
Qty: 100 ML | Refills: 0 | Status: COMPLETED | OUTPATIENT
Start: 2024-04-19 | End: 2024-04-20

## 2024-04-19 RX ORDER — ACETAMINOPHEN 10 MG/ML
1000 INJECTION, SOLUTION INTRAVENOUS
Status: COMPLETED | OUTPATIENT
Start: 2024-04-19 | End: 2024-04-19

## 2024-04-19 RX ORDER — HYDROMORPHONE HCL/PF 1 MG/ML
SYRINGE (ML) INJECTION AS NEEDED
Status: DISCONTINUED | OUTPATIENT
Start: 2024-04-19 | End: 2024-04-19

## 2024-04-19 RX ORDER — DEXAMETHASONE SODIUM PHOSPHATE 10 MG/ML
INJECTION, SOLUTION INTRAMUSCULAR; INTRAVENOUS AS NEEDED
Status: DISCONTINUED | OUTPATIENT
Start: 2024-04-19 | End: 2024-04-19

## 2024-04-19 RX ORDER — ACETAMINOPHEN 325 MG/1
975 TABLET ORAL EVERY 8 HOURS
Status: DISCONTINUED | OUTPATIENT
Start: 2024-04-19 | End: 2024-04-20 | Stop reason: HOSPADM

## 2024-04-19 RX ORDER — DEXTROSE MONOHYDRATE AND SODIUM CHLORIDE 5; .45 G/100ML; G/100ML
75 INJECTION, SOLUTION INTRAVENOUS CONTINUOUS
Status: DISCONTINUED | OUTPATIENT
Start: 2024-04-19 | End: 2024-04-19

## 2024-04-19 RX ORDER — SODIUM CHLORIDE, SODIUM LACTATE, POTASSIUM CHLORIDE, CALCIUM CHLORIDE 600; 310; 30; 20 MG/100ML; MG/100ML; MG/100ML; MG/100ML
INJECTION, SOLUTION INTRAVENOUS CONTINUOUS PRN
Status: DISCONTINUED | OUTPATIENT
Start: 2024-04-19 | End: 2024-04-19

## 2024-04-19 RX ORDER — BUPIVACAINE HYDROCHLORIDE 5 MG/ML
INJECTION, SOLUTION EPIDURAL; INTRACAUDAL AS NEEDED
Status: DISCONTINUED | OUTPATIENT
Start: 2024-04-19 | End: 2024-04-19 | Stop reason: HOSPADM

## 2024-04-19 RX ORDER — NIACIN 100 MG
100 TABLET ORAL
Status: DISCONTINUED | OUTPATIENT
Start: 2024-04-20 | End: 2024-04-20 | Stop reason: HOSPADM

## 2024-04-19 RX ORDER — HYDROMORPHONE HCL/PF 1 MG/ML
0.5 SYRINGE (ML) INJECTION EVERY 4 HOURS PRN
Status: DISCONTINUED | OUTPATIENT
Start: 2024-04-19 | End: 2024-04-20 | Stop reason: HOSPADM

## 2024-04-19 RX ORDER — ONDANSETRON 2 MG/ML
4 INJECTION INTRAMUSCULAR; INTRAVENOUS ONCE AS NEEDED
Status: DISCONTINUED | OUTPATIENT
Start: 2024-04-19 | End: 2024-04-19 | Stop reason: HOSPADM

## 2024-04-19 RX ORDER — ROCURONIUM BROMIDE 10 MG/ML
INJECTION, SOLUTION INTRAVENOUS AS NEEDED
Status: DISCONTINUED | OUTPATIENT
Start: 2024-04-19 | End: 2024-04-19

## 2024-04-19 RX ORDER — GABAPENTIN 300 MG/1
300 CAPSULE ORAL 3 TIMES DAILY
Status: DISCONTINUED | OUTPATIENT
Start: 2024-04-19 | End: 2024-04-20 | Stop reason: HOSPADM

## 2024-04-19 RX ORDER — ONDANSETRON 2 MG/ML
4 INJECTION INTRAMUSCULAR; INTRAVENOUS EVERY 6 HOURS PRN
Status: DISCONTINUED | OUTPATIENT
Start: 2024-04-19 | End: 2024-04-20 | Stop reason: HOSPADM

## 2024-04-19 RX ORDER — ISOSULFAN BLUE 50 MG/5ML
INJECTION, SOLUTION SUBCUTANEOUS AS NEEDED
Status: DISCONTINUED | OUTPATIENT
Start: 2024-04-19 | End: 2024-04-19 | Stop reason: HOSPADM

## 2024-04-19 RX ORDER — FENTANYL CITRATE/PF 50 MCG/ML
25 SYRINGE (ML) INJECTION
Status: DISCONTINUED | OUTPATIENT
Start: 2024-04-19 | End: 2024-04-19 | Stop reason: HOSPADM

## 2024-04-19 RX ORDER — HYDROMORPHONE HCL/PF 1 MG/ML
0.5 SYRINGE (ML) INJECTION
Status: CANCELLED | OUTPATIENT
Start: 2024-04-19

## 2024-04-19 RX ORDER — ONDANSETRON 2 MG/ML
INJECTION INTRAMUSCULAR; INTRAVENOUS AS NEEDED
Status: DISCONTINUED | OUTPATIENT
Start: 2024-04-19 | End: 2024-04-19

## 2024-04-19 RX ORDER — PROPOFOL 10 MG/ML
INJECTION, EMULSION INTRAVENOUS AS NEEDED
Status: DISCONTINUED | OUTPATIENT
Start: 2024-04-19 | End: 2024-04-19

## 2024-04-19 RX ORDER — LIDOCAINE HYDROCHLORIDE 20 MG/ML
INJECTION, SOLUTION EPIDURAL; INFILTRATION; INTRACAUDAL; PERINEURAL AS NEEDED
Status: DISCONTINUED | OUTPATIENT
Start: 2024-04-19 | End: 2024-04-19

## 2024-04-19 RX ORDER — PAROXETINE HYDROCHLORIDE HEMIHYDRATE 25 MG/1
50 TABLET, FILM COATED, EXTENDED RELEASE ORAL DAILY
Status: DISCONTINUED | OUTPATIENT
Start: 2024-04-20 | End: 2024-04-20 | Stop reason: HOSPADM

## 2024-04-19 RX ORDER — ENOXAPARIN SODIUM 100 MG/ML
40 INJECTION SUBCUTANEOUS ONCE
Status: COMPLETED | OUTPATIENT
Start: 2024-04-19 | End: 2024-04-19

## 2024-04-19 RX ORDER — ANASTROZOLE 1 MG/1
1 TABLET ORAL DAILY
Status: DISCONTINUED | OUTPATIENT
Start: 2024-04-19 | End: 2024-04-20 | Stop reason: HOSPADM

## 2024-04-19 RX ORDER — LEVOTHYROXINE SODIUM 0.05 MG/1
50 TABLET ORAL
Status: DISCONTINUED | OUTPATIENT
Start: 2024-04-20 | End: 2024-04-20 | Stop reason: HOSPADM

## 2024-04-19 RX ORDER — OXYCODONE HYDROCHLORIDE 5 MG/1
5 TABLET ORAL EVERY 4 HOURS PRN
Status: DISCONTINUED | OUTPATIENT
Start: 2024-04-19 | End: 2024-04-20 | Stop reason: HOSPADM

## 2024-04-19 RX ORDER — PROPOFOL 10 MG/ML
INJECTION, EMULSION INTRAVENOUS CONTINUOUS PRN
Status: DISCONTINUED | OUTPATIENT
Start: 2024-04-19 | End: 2024-04-19

## 2024-04-19 RX ORDER — CEFAZOLIN SODIUM 1 G/50ML
1000 SOLUTION INTRAVENOUS
Status: DISCONTINUED | OUTPATIENT
Start: 2024-04-19 | End: 2024-04-19 | Stop reason: HOSPADM

## 2024-04-19 RX ORDER — FENTANYL CITRATE 50 UG/ML
INJECTION, SOLUTION INTRAMUSCULAR; INTRAVENOUS AS NEEDED
Status: DISCONTINUED | OUTPATIENT
Start: 2024-04-19 | End: 2024-04-19

## 2024-04-19 RX ORDER — MIDAZOLAM HYDROCHLORIDE 2 MG/2ML
INJECTION, SOLUTION INTRAMUSCULAR; INTRAVENOUS AS NEEDED
Status: DISCONTINUED | OUTPATIENT
Start: 2024-04-19 | End: 2024-04-19

## 2024-04-19 RX ADMIN — SODIUM CHLORIDE 8 MCG: 9 INJECTION, SOLUTION INTRAVENOUS at 11:59

## 2024-04-19 RX ADMIN — GABAPENTIN 300 MG: 300 CAPSULE ORAL at 16:38

## 2024-04-19 RX ADMIN — SODIUM CHLORIDE 125 ML/HR: 0.9 INJECTION, SOLUTION INTRAVENOUS at 07:16

## 2024-04-19 RX ADMIN — ROCURONIUM BROMIDE 20 MG: 10 INJECTION, SOLUTION INTRAVENOUS at 11:56

## 2024-04-19 RX ADMIN — SODIUM CHLORIDE 75 ML/HR: 0.9 INJECTION, SOLUTION INTRAVENOUS at 16:51

## 2024-04-19 RX ADMIN — OXYCODONE 5 MG: 5 TABLET ORAL at 16:38

## 2024-04-19 RX ADMIN — ROCURONIUM BROMIDE 50 MG: 10 INJECTION, SOLUTION INTRAVENOUS at 09:27

## 2024-04-19 RX ADMIN — SODIUM CHLORIDE: 0.9 INJECTION, SOLUTION INTRAVENOUS at 10:54

## 2024-04-19 RX ADMIN — FENTANYL CITRATE 100 MCG: 50 INJECTION INTRAMUSCULAR; INTRAVENOUS at 09:27

## 2024-04-19 RX ADMIN — ONDANSETRON 4 MG: 2 INJECTION INTRAMUSCULAR; INTRAVENOUS at 13:33

## 2024-04-19 RX ADMIN — DEXAMETHASONE SODIUM PHOSPHATE 10 MG: 10 INJECTION INTRAMUSCULAR; INTRAVENOUS at 09:32

## 2024-04-19 RX ADMIN — PROPOFOL 150 MG: 10 INJECTION, EMULSION INTRAVENOUS at 09:27

## 2024-04-19 RX ADMIN — CEFAZOLIN SODIUM 1000 MG: 1 SOLUTION INTRAVENOUS at 20:04

## 2024-04-19 RX ADMIN — PROPOFOL 70 MCG/KG/MIN: 10 INJECTION, EMULSION INTRAVENOUS at 09:27

## 2024-04-19 RX ADMIN — SODIUM CHLORIDE, SODIUM LACTATE, POTASSIUM CHLORIDE, AND CALCIUM CHLORIDE: .6; .31; .03; .02 INJECTION, SOLUTION INTRAVENOUS at 10:55

## 2024-04-19 RX ADMIN — MIDAZOLAM 2 MG: 1 INJECTION INTRAMUSCULAR; INTRAVENOUS at 09:24

## 2024-04-19 RX ADMIN — HYDROMORPHONE HYDROCHLORIDE 0.5 MG: 1 INJECTION, SOLUTION INTRAMUSCULAR; INTRAVENOUS; SUBCUTANEOUS at 10:25

## 2024-04-19 RX ADMIN — HYDROMORPHONE HYDROCHLORIDE 0.5 MG: 1 INJECTION, SOLUTION INTRAMUSCULAR; INTRAVENOUS; SUBCUTANEOUS at 12:37

## 2024-04-19 RX ADMIN — SODIUM CHLORIDE, SODIUM LACTATE, POTASSIUM CHLORIDE, AND CALCIUM CHLORIDE: .6; .31; .03; .02 INJECTION, SOLUTION INTRAVENOUS at 13:28

## 2024-04-19 RX ADMIN — ENOXAPARIN SODIUM 40 MG: 40 INJECTION SUBCUTANEOUS at 07:15

## 2024-04-19 RX ADMIN — KETOROLAC TROMETHAMINE 30 MG: 30 INJECTION, SOLUTION INTRAMUSCULAR; INTRAVENOUS at 13:36

## 2024-04-19 RX ADMIN — CEFAZOLIN SODIUM 1000 MG: 1 SOLUTION INTRAVENOUS at 13:21

## 2024-04-19 RX ADMIN — ANASTROZOLE 1 MG: 1 TABLET, COATED ORAL at 16:49

## 2024-04-19 RX ADMIN — BUDESONIDE AND FORMOTEROL FUMARATE DIHYDRATE 2 PUFF: 80; 4.5 AEROSOL RESPIRATORY (INHALATION) at 18:23

## 2024-04-19 RX ADMIN — SODIUM CHLORIDE 8 MCG: 9 INJECTION, SOLUTION INTRAVENOUS at 12:29

## 2024-04-19 RX ADMIN — GABAPENTIN 300 MG: 300 CAPSULE ORAL at 20:04

## 2024-04-19 RX ADMIN — SODIUM CHLORIDE 4 MCG: 9 INJECTION, SOLUTION INTRAVENOUS at 10:48

## 2024-04-19 RX ADMIN — ACETAMINOPHEN 1000 MG: 10 INJECTION INTRAVENOUS at 07:15

## 2024-04-19 RX ADMIN — LIDOCAINE HYDROCHLORIDE 100 MG: 20 INJECTION, SOLUTION EPIDURAL; INFILTRATION; INTRACAUDAL at 09:27

## 2024-04-19 RX ADMIN — CEFAZOLIN SODIUM 1000 MG: 1 SOLUTION INTRAVENOUS at 09:22

## 2024-04-19 RX ADMIN — ONDANSETRON 4 MG: 2 INJECTION INTRAMUSCULAR; INTRAVENOUS at 09:32

## 2024-04-19 RX ADMIN — SODIUM CHLORIDE 8 MCG: 9 INJECTION, SOLUTION INTRAVENOUS at 10:50

## 2024-04-19 RX ADMIN — SODIUM CHLORIDE 4 MCG: 9 INJECTION, SOLUTION INTRAVENOUS at 10:47

## 2024-04-19 RX ADMIN — ROCURONIUM BROMIDE 30 MG: 10 INJECTION, SOLUTION INTRAVENOUS at 10:25

## 2024-04-19 RX ADMIN — ACETAMINOPHEN 975 MG: 325 TABLET, FILM COATED ORAL at 16:38

## 2024-04-19 RX ADMIN — SUGAMMADEX 200 MG: 100 INJECTION, SOLUTION INTRAVENOUS at 13:47

## 2024-04-19 NOTE — OP NOTE
OPERATIVE REPORT  PATIENT NAME: Richa Medina    :  1965  MRN: [id number]  Pt Location: [facility] OR ROOM 05    SURGERY DATE: 2024    Surgeons and Role:  Panel 1:     * Sisi Dominguez MD - Primary     * Dominick Ramos PA-C - Assisting      Preop Diagnosis:  Malignant neoplasm of overlapping sites of right breast in female, estrogen receptor positive (HCC) [C50.811, Z17.0]  Malignant neoplasm of upper-outer quadrant of left breast in female, estrogen receptor positive (HCC) [C50.412, Z17.0]    Post-Op Diagnosis Codes:     * Malignant neoplasm of overlapping sites of right breast in female, estrogen receptor positive (HCC) [C50.811, Z17.0]     * Malignant neoplasm of upper-outer quadrant of left breast in female, estrogen receptor positive (HCC) [C50.412, Z17.0]    Procedure(s):  Left - LEFT BREAST MARICHUY LOCALIZATION LUMPECTOMY;  Right - RIGHT BREAST MASTECTOMY. AXILLARY DISSECTION  Bilateral - Bilateral BX LYMPH NODE SENTINEL. lymphoscintigraphies. lymphatic mappings  Use of marichuy   Specimen radiograph  Injection of blue dye  Use of gamma probe    Specimen(s):  ID Type Source Tests Collected by Time Destination   1 : Left breast lumpectomy suture marks long lateral, short superior Tissue Breast, Left TISSUE EXAM Sisi Dominguez MD 2024 1023    2 : Superior margin, suture marks true margin Tissue Breast, Left TISSUE EXAM Sisi Dominguez MD 2024 1023    3 : Medial margin, suture marks true margin Tissue Breast, Left TISSUE EXAM Sisi Dominguez MD 2024 1023    4 : Anterior margin, suture marks true margin Tissue Breast, Left TISSUE EXAM Sisi Dominguez MD 2024 1025    5 : Left axillary sentinel lymph node #1 Tissue Lymph Node, [place] TISSUE EXAM Sisi Dominguez MD 2024 1023    6 : Left axillary sentinel lymph node #2 Tissue Lymph Node, [place] TISSUE EXAM Sisi Dominguez MD 2024 1056    7 : Left axillary sentinel lymph node #3 Tissue Lymph Node, [place] TISSUE EXAM Sisi Dominguez MD  4/19/2024 1057    8 : Left axillary sentinel lymph node #4 Tissue Lymph Node, [place] TISSUE EXAM Sisi Dominguez MD 4/19/2024 1059    9 : Right axilla sentinel lymph node #1 Tissue Lymph Node, [place] TISSUE EXAM Sisi Dominguez MD 4/19/2024 1210    10 : Right axilla sentinel lymph node #2 Tissue Lymph Node, [place] TISSUE EXAM Sisi Dominguez MD 4/19/2024 1211    11 : Right breast suture marks long lateral and short superior Tissue Breast, Right TISSUE EXAM Sisi Dominguez MD 4/19/2024 1023    12 : right Axillary contents Tissue Axillary TISSUE EXAM Sisi Dominguez MD 4/19/2024 1301        Estimated Blood Loss:   Minimal    Drains:  Urethral Catheter Latex 16 Fr. (Active)   Number of days: 0       Anesthesia Type:   General    Operative Indications:  Malignant neoplasm of overlapping sites of right breast in female, estrogen receptor positive (HCC) [C50.811, Z17.0]  Malignant neoplasm of upper-outer quadrant of left breast in female, estrogen receptor positive (HCC) [C50.412, Z17.0]      Operative Findings:  Thais reflector in specimen left breast: [place] node #1 right axilla was negative, sentinel node #2 right axilla was positive for malignancy    Complications:   None    Procedure and Technique:  Richa is a 59-year-old female who presents with bilateral breast carcinoma, the left side being a unifocal in nature and the right side being multicentric in nature.  She was counseled on a left lumpectomy with sentinel node biopsy along with a right mastectomy, sentinel node biopsy and possible axillary node dissection.  She opted for a left mastopexy anesthetic flat closure on the right.  She presented the day of surgery to the radiology suite and had bilateral nuclear medicine injections.  From there she went to the operating room.  She had preoperative antibiotics and DVT prophylaxis.  She was administered general anesthesia.  She had a 3 cc injection of Lymphazurin into the bilateral subareolar plexus.  She was prepped  and draped in the usual standard fashion.  Timeout was performed.  Attention was turned to the left breast upper slightly outer.  The savvy probe was used to identify the reflector.  The skin was marked in this location.  Half percent Marcaine plain was injected for local anesthesia.  The superior circumareolar axis was incised.  Electrocautery was used to dissect superior slightly lateral and deep to the area of concern.  A margin of tissue was excised superior, lateral, inferior, medial and posterior.  The savvy probe was used throughout to help guide dissection.  The posterior extent was taken down to the muscle.  The specimen was marked with a short stitch superior and a long stitch lateral.  The specimen was interrogated to confirm reflector removal.  It was also imaged in the operating room room feeling at the standard clip and a Thais reflector at the superior and medial aspect and also close to the anterior aspect.  Therefore new margins were excised in these locations and sutures were placed on the true margins.  All breast specimens were submitted to pathology in formalin.  The lumpectomy cavity was irrigated and hemostasis was achieved.  Hemoclips were placed within the lumpectomy base along the muscle.  Attention was then turned to the left axilla.  There was an area of increased radioactive uptake at the lower axillary hairline.  Additional half percent Marcaine plain was injected for local anesthesia.  An incision was created through the skin and subcutaneous tissue.  Electrocautery was used to dissect through the remaining subcutaneous tissue and clavipectoral fascia to enter the axillary fat pad.  There was a blue lymphatic channel that traced to a chain of lymph nodes which were blue stained and radioactive in nature.  These were elevated into the wound using an Allis clamp.  Hemoclips were placed on small vessels.  The nodes were excised, divided and submitted separately as sentinel node 1, 2 and 3  of the left axilla.  There was an additional radioactive node that was deep along the thoracodorsal bundle.  This was elevated into the wound again with an Allis clamp.  Small clips were placed on a branch off of the thoracodorsal bundle but the bundle itself was preserved intact throughout.  The fourth node was then submitted to pathology in formalin and as well.  Following removal of the fourth lymph node, there were no additional blue stained, radioactive or palpable nodes.  Her axilla was also irrigated and hemostasis was achieved.  Surgicel powder was placed in the axilla for additional hemostasis.  The axilla was then closed in a layered fashion using multiple interrupted 3-0 Monocryl suture and a running 4-0 Monocryl subcuticular stitch.  Attention was then turned to the right breast.  Additional half percent Marcaine plain was used for local anesthesia.  An elliptical incision was created around the nipple areolar complex.  Electrocautery was used to dissect her mastectomy flaps to the level of the clavicle superior, to the sternal border medial and to the latissimus dorsi muscle lateral.  Attention was turned to the upper outer aspect.  The clavipectoral fascia was incised to expose the axillary fat pad.  There was a blue lymphatic channel that traced deep into the mid axilla leading to 2 adjacent blue stained and radioactive nodes.  These were elevated into the wound using an Allis clamp.  They were excised,  and submitted separately for touch prep analysis as sentinel node 1 and 2 of the right axilla.  The inferior mastectomy flap was then created to the level of the inframammary fold.  The breast was excised from the underlying pectoralis including the pectoralis fascia.  This was marked with a short stitch superior and a long stitch lateral.  It was submitted to pathology in formalin.  Intraoperative evaluation of the lymph nodes showed tumor cells within the second sentinel node.  Therefore  the additional axillary contents was excised to the level of the axillary vein superior, to the thoracodorsal bundle deep, to the serratus medial and to the latissimus dorsi muscle lateral.  The long thoracic nerve and thoracodorsal bundle was preserved intact throughout.  Multiple hemoclips were used on small draining vessels.  This tissue was submitted to pathology as right axillary content.  Her wound was irrigated and hemostasis was achieved.  The patient then remained intubated and hemodynamically stable condition for the remaining portion of her procedure which will be dictated by Dr. Felix.   A physician assistant was required during the procedure for retraction, tissue handling, dissection and suturing; no residents were available.    Patient Disposition:  hemodynamically stable    [place] Node Biopsy for Breast Cancer - Left  Operation performed with curative intent. Yes   Tracer(s) used to identify sentinel nodes in the upfront surgery (non-neoadjuvant) setting (select all that apply). Dye and Radioactive tracer   Tracer(s) used to identify sentinel nodes in the neoadjuvant setting (select all that apply). N/A   All nodes (colored or non-colored) present at the end of a dye-filled lymphatic channel were removed. Yes   All significantly radioactive nodes were removed. Yes   All palpably suspicious nodes were removed. N/A   Biopsy-proven positive nodes marked with clips prior to chemotherapy were identified and removed.              [place] Node Biopsy for Breast Cancer -right  Operation performed with curative intent. Yes   Tracer(s) used to identify sentinel nodes in the upfront surgery (non-neoadjuvant) setting (select all that apply). Dye and Radioactive tracer   Tracer(s) used to identify sentinel nodes in the neoadjuvant setting (select all that apply). N/A   All nodes (colored or non-colored) present at the end of a dye-filled lymphatic channel were removed. Yes   All significantly radioactive nodes  were removed. Yes   All palpably suspicious nodes were removed. N/A   Biopsy-proven positive nodes marked with clips prior to chemotherapy were identified and removed. N/A    N/A                                                and Axillary Lymph Node Dissection for Breast Cancer - Right  Operation performed with curative intent. Yes   Resection was performed within the boundaries of the axillary vein, chest wall (serratus anterior), and latissimus dorsi. Yes   Nerves identified and preserved during dissection (select all that apply). Long thoracic nerve, Thoracodorsal nerve, and Branches of the intercostobrachial nerves   Level III nodes were removed. No            SIGNATURE: Sisi Dominguez MD  DATE: April 19, 2024  TIME: 1:11 PM

## 2024-04-19 NOTE — ANESTHESIA POSTPROCEDURE EVALUATION
"Post-Op Assessment Note    CV Status:  Stable    Pain management: adequate       Mental Status:  Alert and awake   Hydration Status:  Euvolemic   PONV Controlled:  Controlled   Airway Patency:  Patent     Post Op Vitals Reviewed: Yes    No anethesia notable event occurred.    Staff: Anesthesiologist             /58   Pulse 94   Temp 99.2 °F (37.3 °C) (Temporal)   Resp 12   Ht 5' 3\" (1.6 m)   Wt 63.1 kg (139 lb 1.8 oz)   SpO2 93%   BMI 24.64 kg/m²      "

## 2024-04-19 NOTE — INTERVAL H&P NOTE
H&P reviewed. After examining the patient I find no changes in the patients condition since the H&P had been written.    Vitals:    04/19/24 0714   BP: 120/64   Pulse: 63   Resp: 16   Temp: 97.8 °F (36.6 °C)   SpO2: 97%

## 2024-04-19 NOTE — DISCHARGE INSTR - AVS FIRST PAGE
Surgery Date: 4/19/2024                Patient: Richa Medina  Surgeon: Dr. Felix     Postoperative Instructions   Breast Reconstruction     Dressings:  [x] A prevena wound vac was placed over your incisions. Please do not get the dressing wet. You may sponge bathe around the dressing. Please keep wound vac plugged into outlet whenever you are not mobile. If vac alarms, please use extra dressing to patch. Please call the office if vac continues to alarm    [x] Please leave tegaderm (clear dressing over your drain) in place  [] No dressings are required but you may cover the incision with gauze for comfort.  [x] Wear your surgical bra at all times except while showering.  [x] Strip and record your STANLEY drain output as instructed.  Be sure to bring the output log to your follow up appointment.  [] Other instructions:      Bathing:  [x] You may sponge bathe around the prevena dressing.   [x] No submerging incision in bathtub, pool, hot tub and/or lake.     Activity:  [x] No heavy lifting (> 10lbs).  [x] No strenuous exercise.  [x] Walking is mandatory daily.  [x] Sleeping may be more comfortable with your head elevated.  [x] No driving until off pain medications and you have resumed full range of motion.     Diet and Medication:  [x] Resume diet as tolerated.  High protein diet is important for healing.  Remain well hydrated with water and minimize sodium intake.  [x] Resume preoperative medications.  [x] Pain medications as prescribed.  You may also begin to use ibuprofen 48 hours after surgery.  [] Finish all antibiotics as prescribed.  [x] Apply ice to area as needed for pain.  Do not place ice directly on skin.  Do not use heat.  [] Other instructions:      It is expected to have some bruising, swelling and mild oozing at the incision site and of the surrounding area.  If there is more than you expect, an enlarging area or you suspect an infection, please call the office.     Some patients may experience a  low-grade fever after surgery.  If it is above 100.4, please call the office.     If you do not have a postoperative office appointment scheduled, please call the office today and let the staff know you are to be seen in 7 days.      Please call [phone] (Dr. Felix's office) with any questions, concerns or changes.      STANLEY Drain Output Log     Date Time Drain #1

## 2024-04-19 NOTE — OP NOTE
OPERATIVE REPORT  PATIENT NAME: Richa Medina    :  1965  MRN: [id number]  Pt Location: [facility] OR ROOM 05    SURGERY DATE: 2024    Surgeons and Role:  Panel 1:     * Sisi Dominguez MD - Primary     * Dominick Ramos PA-C - Assisting  Panel 2:     * Samara Felix DO - Primary     * Abiola Mack PA-C - Assisting     * iMssy Coppola PA-C - Assisting    Preop Diagnosis:  Malignant neoplasm of overlapping sites of right breast in female, estrogen receptor positive (HCC) [C50.811, Z17.0]  Malignant neoplasm of upper-outer quadrant of left breast in female, estrogen receptor positive (HCC) [C50.412, Z17.0]    Post-Op Diagnosis Codes:     * Malignant neoplasm of overlapping sites of right breast in female, estrogen receptor positive (HCC) [C50.811, Z17.0]     * Malignant neoplasm of upper-outer quadrant of left breast in female, estrogen receptor positive (HCC) [C50.412, Z17.0]    Procedure(s):  Left - LEFT BREAST HILARY LOCALIZATION LUMPECTOMY;  Right - RIGHT BREAST MASTECTOMY. PSB AXILLARY DISSECTION  Bilateral - Bilateral BX LYMPH NODE SENTINEL. lymphoscintigraphies. lymphatic mappings  Right - RIGHT AESTHETIC FLAT CLOSURE . IBIS FLAP. PREVENA PLACEMENT  Left - MASTOPEXY BREAST    Specimen(s):  ID Type Source Tests Collected by Time Destination   1 : Left breast lumpectomy suture marks long lateral, short superior Tissue Breast, Left TISSUE EXAM Sisi Dominguez MD 2024 1023    2 : Superior margin, suture marks true margin Tissue Breast, Left TISSUE EXAM Sisi Dominguez MD 2024 1023    3 : Medial margin, suture marks true margin Tissue Breast, Left TISSUE EXAM Sisi Dominguez MD 2024 1023    4 : Anterior margin, suture marks true margin Tissue Breast, Left TISSUE EXAM Sisi Dominguez MD 2024 1025    5 : Left axillary sentinel lymph node #1 Tissue Lymph Node, [place] TISSUE EXAM Sisi Dominguez MD 2024 1023    6 : Left axillary sentinel lymph node #2 Tissue Lymph Node, [place]  TISSUE EXAM Sisi Dominguez MD 4/19/2024 1056    7 : Left axillary sentinel lymph node #3 Tissue Lymph Node, [place] TISSUE EXAM Sisi Dominguez MD 4/19/2024 1057    8 : Left axillary sentinel lymph node #4 Tissue Lymph Node, [place] TISSUE EXAM Sisi Dominguez MD 4/19/2024 1059    9 : Right axilla sentinel lymph node #1 Tissue Lymph Node, [place] TISSUE EXAM Sisi Dominguez MD 4/19/2024 1210    10 : Right axilla sentinel lymph node #2 Tissue Lymph Node, [place] TISSUE EXAM Sisi Dominguez MD 4/19/2024 1211    11 : Right breast suture marks long lateral and short superior Tissue Breast, Right TISSUE EXAM Sisi Dominguez MD 4/19/2024 1023    12 : right Axillary contents Tissue Axillary TISSUE EXAM Sisi Dominguez MD 4/19/2024 1301    13 : LEFT BREAST TISSUE Tissue Breast, Left TISSUE EXAM Sisi Dominguez MD 4/19/2024 1332    14 : RIGHT BREAST TISSUE Tissue Breast, Right TISSUE EXAM Sisi Dominguez MD 4/19/2024 1333        Estimated Blood Loss:   25 ml    Drains:  Closed/Suction Drain Right;Lateral Chest Bulb 15 Fr. (Active)   Number of days: 0       [REMOVED] Urethral Catheter Latex 16 Fr. (Removed)   Number of days: 0       Anesthesia Type:   General    Operative Indications:  60 yo female presents for bilateral extirpative and reconstructive procedures    Operative Findings:  Right aesthetic flat closure with 50 cm2 Kyler flap  Left oncoplastic reduction/mastopexy with 40cm 2 Asn flap  200 cm2 prevena placement    Complications:   None    Procedure and Technique:  The patient was seen preoperatively.  The procedure, risks, benefits and alternatives were discussed.  Informed consent was obtained.  The patient was site marked preoperatively.  The patient was brought to the operating room where she was positioned supine with all of her pressure points appropriately padded.  Anesthesia commenced.     The patient was prepped and draped in usual sterile fashion.   A timeout was performed and verified.  Dr. Dominguez completed the  lumpectomy and SLNB portion of the procedure on the left and then I began my portion. Of note, the base of the lumpectomy cavity along the pectoralis muscle was clipped.  The wise pattern markings were incised as well as a 38 mm areola cookie cutter markings.  I de-epithelialized the inferior pedicle as well as a laterally extending garcia flap.  This was then dissected down to pectoralis fascia.  The dissection was carried superiorly along the pectoralis fascia to allow for mobilization of the pedicle more superiorly.  The medial and lateral pillars were thinned to create proper contour of the breast without undue tension on the closure.  The area was copiously irrigated and hemostasis was obtained.  A pec block was performed.  The garcia flap was then rotated superiorly and medially and tacked to the periosteum using 2-0 PDS.  The shape and closure was tailor tacked and excess skin was excised sharply.   Closure commenced using 2-0 PDS for the fascia, 3-0 monocryl for the deep dermis and 4-0 PDS to approximate the skin.  The site for the NAC was measured and incised. The underlying NAC was brought to the surface without undue tension and was secured in placed using 3-0 monocryl and 5-0 monocryl.       Dr. Dominguez completed the mastectomy portion of the procedure on the right.   The defect was inspected and hemostasis was ensured.  A pec block was performed using exparel.  The abdominal wall was advanced as a Kyler flap superiorly in progressive fashion.  2-0 PDS then used was used to reinforce/reconstruct the inframammary fold with this Kyler flap to the rib periosteum as well as alieviate tension from the closure.  A 15 Fr STANLEY was placed and secured to the skin using 2-0 nylon.  The incision was then closed in layers using 2-0 PDS, 3-0 monocryl and 4-0 PDS for the fascia, deep dermis and skin respectively.       The incisions were cleansed and dried.  The Prevena wound vac was fashioned along the entire length of the  defect and secured using the adhesive strips.  Adequate seal was obtained.        The instrument, sponge and needle count was correct an verified prior to completion of the case.     The patient emerged from anesthesia and was transferred to the recovery room in stable condition.        Patient Disposition:  PACU       SIGNATURE: Samara Felix DO  DATE: April 19, 2024  TIME: 1:59 PM    No qualified plastic surgery resident was available for the case.  The PA-C provided assistance with retraction and suturing.

## 2024-04-19 NOTE — PLAN OF CARE
Problem: PAIN - ADULT  Goal: Verbalizes/displays adequate comfort level or baseline comfort level  Description: Interventions:  - Encourage patient to monitor pain and request assistance  - Assess pain using appropriate pain scale  - Administer analgesics based on type and severity of pain and evaluate response  - Implement non-pharmacological measures as appropriate and evaluate response  - Consider cultural and social influences on pain and pain management  - Notify physician/advanced practitioner if interventions unsuccessful or patient reports new pain  Outcome: Progressing     Problem: HEMATOLOGIC - ADULT  Goal: Maintains hematologic stability  Description: INTERVENTIONS  - Assess for signs and symptoms of bleeding or hemorrhage  - Monitor labs  - Administer supportive blood products/factors as ordered and appropriate  Outcome: Progressing     Problem: SKIN/TISSUE INTEGRITY - ADULT  Goal: Incision(s), wounds(s) or drain site(s) healing without S/S of infection  Description: INTERVENTIONS  - Assess and document dressing, incision, wound bed, drain sites and surrounding tissue  - Provide patient and family education  - Perform skin care/dressing changes every 4 hours  Outcome: Progressing     Problem: METABOLIC, FLUID AND ELECTROLYTES - ADULT  Goal: Electrolytes maintained within normal limits  Description: INTERVENTIONS:  - Monitor labs and assess patient for signs and symptoms of electrolyte imbalances  - Administer electrolyte replacement as ordered  - Monitor response to electrolyte replacements, including repeat lab results as appropriate  - Instruct patient on fluid and nutrition as appropriate  Outcome: Progressing  Goal: Fluid balance maintained  Description: INTERVENTIONS:  - Monitor labs   - Monitor I/O and WT  - Instruct patient on fluid and nutrition as appropriate  - Assess for signs & symptoms of volume excess or deficit  Outcome: Progressing     Problem: GENITOURINARY - ADULT  Goal: Absence of  urinary retention  Description: INTERVENTIONS:  - Assess patient’s ability to void and empty bladder  - Monitor I/O  - Bladder scan as needed  - Discuss with physician/AP medications to alleviate retention as needed  - Discuss catheterization for long term situations as appropriate  Outcome: Progressing

## 2024-04-20 ENCOUNTER — HOME HEALTH ADMISSION (OUTPATIENT)
Dept: HOME HEALTH SERVICES | Facility: HOME HEALTHCARE | Age: 59
End: 2024-04-20
Payer: COMMERCIAL

## 2024-04-20 VITALS
RESPIRATION RATE: 16 BRPM | OXYGEN SATURATION: 99 % | SYSTOLIC BLOOD PRESSURE: 108 MMHG | TEMPERATURE: 98.9 F | HEART RATE: 76 BPM | DIASTOLIC BLOOD PRESSURE: 68 MMHG | HEIGHT: 63 IN | WEIGHT: 139.11 LBS | BODY MASS INDEX: 24.65 KG/M2

## 2024-04-20 PROCEDURE — 99024 POSTOP FOLLOW-UP VISIT: CPT | Performed by: PHYSICIAN ASSISTANT

## 2024-04-20 PROCEDURE — 99024 POSTOP FOLLOW-UP VISIT: CPT | Performed by: SURGERY

## 2024-04-20 RX ADMIN — GABAPENTIN 300 MG: 300 CAPSULE ORAL at 08:25

## 2024-04-20 RX ADMIN — LEVOTHYROXINE SODIUM 50 MCG: 50 TABLET ORAL at 06:35

## 2024-04-20 RX ADMIN — ANASTROZOLE 1 MG: 1 TABLET, COATED ORAL at 08:26

## 2024-04-20 RX ADMIN — Medication 100 MG: at 08:26

## 2024-04-20 RX ADMIN — BUDESONIDE AND FORMOTEROL FUMARATE DIHYDRATE 2 PUFF: 80; 4.5 AEROSOL RESPIRATORY (INHALATION) at 08:25

## 2024-04-20 RX ADMIN — PAROXETINE 50 MG: 25 TABLET, FILM COATED, EXTENDED RELEASE ORAL at 08:26

## 2024-04-20 RX ADMIN — CEFAZOLIN SODIUM 1000 MG: 1 SOLUTION INTRAVENOUS at 03:37

## 2024-04-20 RX ADMIN — ACETAMINOPHEN 975 MG: 325 TABLET, FILM COATED ORAL at 08:25

## 2024-04-20 NOTE — PROGRESS NOTES
"Progress Note - General Surgery   Richa Medina 59 y.o. female MRN: [id number]  Unit/Bed#: E2 -01 Encounter: [id number]    Assessment:  59-year-old female with b/l invasive ductal carcinoma now s/p L lumpectomy, R mastectomy, R axillary dissection, right flap reconstruction, left mastopexy in a combined case with surgical oncology and plastic surgery     AVSS  STANLEY drain 190 serosanguinous  Plan:  -Regular diet  -Tentatively plan for DC today 4/20  -CM consulted for home VNA  -Continue wound VAC  -Analgesia and antiemetic as needed  -Appropriate home medicines reordered  -OOB as able  -I-S  Subjective/Objective     Subjective: Patient seen and examined.  NAEO.  Pain well-controlled at this time.  Patient states that she has ambulated and voided without issue.  She denies N/V, fever/chills, chest pains, shortness of breath.  States that she would like to be DC'd today.    Objective:     Blood pressure 100/53, pulse 74, temperature 98.3 °F (36.8 °C), temperature source Oral, resp. rate 18, height 5' 3\" (1.6 m), weight 63.1 kg (139 lb 1.8 oz), SpO2 97%, not currently breastfeeding.,Body mass index is 24.64 kg/m².      Intake/Output Summary (Last 24 hours) at 4/20/2024 0630  Last data filed at 4/20/2024 0337  Gross per 24 hour   Intake 2840 ml   Output 2090 ml   Net 750 ml       Invasive Devices       Peripheral Intravenous Line  Duration             Peripheral IV 04/19/19 Right Hand 1827 days    Peripheral IV 04/19/24 Dorsal (posterior);Left Hand <1 day              Drain  Duration             Closed/Suction Drain Right;Lateral Chest Bulb 15 Fr. <1 day                    Physical Exam:   General - no acute distress, responsive and cooperative  CV - warm, regular rate  Pulm - normal work of breathing, no respiratory distress  Abd - soft, nondistended, nontender; no palpable masses; no guarding or rebound; appears non-peritoneal on exam   Chest: Wound VAC intact with good seal, minimal output from VAC.  STANLEY drain in " place with 190 cc serosanguineous output  Neuro - m/s grossly intact, cn grossly intact  Ext - moving all extremities

## 2024-04-20 NOTE — DISCHARGE SUMMARY
Discharge Summary - Richa Medina 59 y.o. female MRN: [id number]    Unit/Bed#: E2 -01 Encounter: [id number]    Admission Date:     Admitting Diagnosis: Malignant neoplasm of overlapping sites of right breast in female, estrogen receptor positive (HCC) [C50.811, Z17.0]  Malignant neoplasm of upper-outer quadrant of left breast in female, estrogen receptor positive (HCC) [C50.412, Z17.0]    HPI:     Ms. Medina is a 58 y.o. female with bilateral breast cancer.  She is planning for right mastectomy and left lumpectomy.     Procedures Performed: No orders of the defined types were placed in this encounter.      Summary of Hospital Course: Pt underwent left breast lumpectomy, right breast mastectomy, axillary dissection, bilateral sentinel lymphnode biopsy, right flat closure, tristian flap, prevena placement, left mastopexy on 4/19 by Angelica Rasmussen & Elvira. Pt is doing well. Pain is controlled. She tolerated a diet. She is ready to go home.       Complications: N/A    Discharge Diagnosis: Malignant neoplasm of overlapping sites of right breast in female, estrogen receptor positive (HCC) [C50.811, Z17.0]  Malignant neoplasm of upper-outer quadrant of left breast in female, estrogen receptor positive (HCC) [C50.412, Z17.0]    Medical Problems       Resolved Problems  Date Reviewed: 4/19/2024   None         Condition at Discharge: good         Discharge instructions/Information to patient and family:   See after visit summary for information provided to patient and family.      Provisions for Follow-Up Care:  See after visit summary for information related to follow-up care and any pertinent home health orders.      PCP: Shelby Marte PA-C    Disposition: Home    Planned Readmission: No      Discharge Statement   I spent 15 minutes discharging the patient. This time was spent on the day of discharge. I had direct contact with the patient on the day of discharge. Additional documentation is required if more than 30  minutes were spent on discharge.     Discharge Medications:  See after visit summary for reconciled discharge medications provided to patient and family.

## 2024-04-20 NOTE — PROGRESS NOTES
Patient:    MRN:  [id number]    Aidin Request ID:  [id number]    Level of care reserved:  Home Health Agency    Partner Reserved:  [facility], [place] [address] [phone]    Clinical needs requested:    Geography searched:  [id number]    Start of Service:    Request sent:  8:54am EDT on 4/20/2024 by Yamilet Malhotra    Partner reserved:  9:51am EDT on 4/20/2024 by Yamilet Malhotra    Choice list shared:  9:46am EDT on 4/20/2024 by Yamilet Malhotra

## 2024-04-20 NOTE — PLAN OF CARE
Problem: PAIN - ADULT  Goal: Verbalizes/displays adequate comfort level or baseline comfort level  Description: Interventions:  - Encourage patient to monitor pain and request assistance  - Assess pain using appropriate pain scale  - Administer analgesics based on type and severity of pain and evaluate response  - Implement non-pharmacological measures as appropriate and evaluate response  - Consider cultural and social influences on pain and pain management  - Notify physician/advanced practitioner if interventions unsuccessful or patient reports new pain  Outcome: Progressing     Problem: SAFETY ADULT  Goal: Patient will remain free of falls  Description: INTERVENTIONS:  - Educate patient/family on patient safety including physical limitations  - Instruct patient to call for assistance with activity   - Consult OT/PT to assist with strengthening/mobility   - Keep Call bell within reach  - Keep bed low and locked with side rails adjusted as appropriate  - Keep care items and personal belongings within reach  - Initiate and maintain comfort rounds  - Make Fall Risk Sign visible to staff  - Offer Toileting every 2 Hours, in advance of need  - Apply yellow socks and bracelet for high fall risk patients  - Consider moving patient to room near nurses station  Outcome: Progressing     Problem: GENITOURINARY - ADULT  Goal: Absence of urinary retention  Description: INTERVENTIONS:  - Assess patient’s ability to void and empty bladder  - Monitor I/O  - Bladder scan as needed  - Discuss with physician/AP medications to alleviate retention as needed  - Discuss catheterization for long term situations as appropriate  Outcome: Progressing      [facility] EMERGENCY DEPT  EMERGENCY DEPARTMENT ENCOUNTER       Pt Name: Isabelle Dimas  MRN: [id number]  Birthdate 1968  Date of evaluation: 2/4/2024  Provider: Toñito Mulligan MD   PCP: Nelson Razo MD  Note Started: 9:41 PM 2/4/24     CHIEF COMPLAINT       Chief Complaint   Patient presents with    Emesis     Pt presents ambulatory to triage with abdominal pain and vomiting, states starting last week she has felt sweaty, clammy and can't keep down food, states it's the worst pain she has ever felt, PCP referred here for CT scan        HISTORY OF PRESENT ILLNESS: 1 or more elements      History From: Patient, History limited by: No limitations     Isabelle Dimas is a 55 y.o. female with history of asthma, diabetes, hypertension who presents with chief complaint of nausea, vomiting, diarrhea, periumbilical crampy abdominal pain.  Symptoms have been present over the past 1 week.  Symptoms started with nausea, vomiting, diarrhea, and she has had trouble tolerating normal p.o. intake.  Endorses low-grade fevers with chills.  No exposure to sick contacts.  Denies urinary symptoms, chest pain, shortness of breath.     Nursing Notes were all reviewed and agreed with or any disagreements were addressed in the HPI.     REVIEW OF SYSTEMS        Positives and Pertinent negatives as per HPI.    PAST HISTORY     Past Medical History:  Past Medical History:   Diagnosis Date    Asthma     Diabetes (HCC)     GERD (gastroesophageal reflux disease)     Hypertension     Other ill-defined conditions(799.89)     meinere's disease       Past Surgical History:  Past Surgical History:   Procedure Laterality Date    TUBAL LIGATION  1993       Family History:  Family History   Problem Relation Age of Onset    Diabetes Mother     Hypertension Mother     Heart Disease Father     Bipolar Disorder Sister     Other Brother         ARDS    Heart Attack Brother     Anxiety Disorder Daughter     ADHD Son     Schizophrenia Son        Social

## 2024-04-20 NOTE — CASE MANAGEMENT
Case Management Discharge Planning Note    Patient name Richa Medina  Location [facility] /E2 -* MRN [id number]  : 1965 Date 2024       Current Admission Date: 2024  Current Admission Diagnosis:Malignant neoplasm of overlapping sites of right breast in female, estrogen receptor positive (HCC)   Patient Active Problem List    Diagnosis Date Noted    Abnormal EKG 2024    Use of aromatase inhibitors 2024    Malignant neoplasm of upper-outer quadrant of left breast in female, estrogen receptor positive (HCC) 2023    Abnormal CT of the chest 2023    Malignant neoplasm of overlapping sites of right breast in female, estrogen receptor positive (HCC) 2023    Medial epicondylitis of elbow, right 2022    Easy bruising 2020    Prediabetes 2018    Vitamin D deficiency 2018    Hypothyroidism 2016    Depression 2015    Bunion 2014      LOS (days): 0  Geometric Mean LOS (GMLOS) (days):   Days to GMLOS:     OBJECTIVE:            Current admission status: Outpatient Surgery   Preferred Pharmacy:   [facility] [address] [facility]  [address] [place] [address]  Phone: [phone] Fax: [phone]    [facility] [address]  Phone: [phone] Fax: [phone]    [facility] [address]  [facility],  [address]  [facility],  First [address] [facility] [address]  Phone: [phone] Fax: [phone]    Primary Care Provider: Shelby Marte PA-C    Primary Insurance: CAPITAL  Secondary Insurance:     DISCHARGE DETAILS:    Discharge planning discussed with:: Patient and spouse  Freedom of Choice: Yes  Comments - Freedom of Choice: Agreeable to [facility] for SN for drain care  CM contacted family/caregiver?: Yes  Were Treatment Team discharge recommendations reviewed with  patient/caregiver?: Yes  Did patient/caregiver verbalize understanding of patient care needs?: Yes       Contacts  Patient Contacts: Kelvin Medina  Relationship to Patient:: Family  Contact Method: In Person  Reason/Outcome: Discharge Planning    Requested Home Health Care         Is the patient interested in HHC at discharge?: Yes  Home Health Discipline requested:: Nursing  Home Health Agency Name:: [facility]  Home Health Follow-Up Provider:: Referring Provider  Home Health Services Needed:: Wound/Ostomy Care  Homebound Criteria Met:: Immunosuppressed  Supporting Clincal Findings:: Limited Endurance    DME Referral Provided  Referral made for DME?: No    Other Referral/Resources/Interventions Provided:  Interventions: HHC         Treatment Team Recommendation: Home with Home Health Care  Discharge Destination Plan:: Home with Home Health Care  Transport at Discharge : Family                       Accompanied by: Family member

## 2024-04-21 ENCOUNTER — HOME CARE VISIT (OUTPATIENT)
Dept: HOME HEALTH SERVICES | Facility: HOME HEALTHCARE | Age: 59
End: 2024-04-21
Payer: COMMERCIAL

## 2024-04-21 VITALS
SYSTOLIC BLOOD PRESSURE: 120 MMHG | RESPIRATION RATE: 16 BRPM | OXYGEN SATURATION: 96 % | DIASTOLIC BLOOD PRESSURE: 72 MMHG | TEMPERATURE: 97.5 F | HEART RATE: 86 BPM

## 2024-04-21 PROCEDURE — G0299 HHS/HOSPICE OF RN EA 15 MIN: HCPCS

## 2024-04-21 PROCEDURE — 400013 VN SOC

## 2024-04-21 NOTE — CASE COMMUNICATION
"[facility] has admitted your patient to Home Health service with the following disciplines:      SN  This report is informational only, no response is needed  Primary focus of home health care: Post op mastectomy and lumpectomy  Patient stated goals of care: \"for my chest to heal\"  Anticipated visit pattern and next visit date: 2w3, next anticipated SN visit 4/24/24  See medication list - meds in home differ from AVS: n/a  Significa nt clinical findings: A&Ox4. HRR. Lungs clear. SOB distances >20 ft. +BS all quads, LBM reported 4/18/24. Denies incontinence of b/b. Wound VAC intact to BL chest and STANLEY drain to R chest intact with serosanguinous drainage.  Potential barriers to goal achievement: pain, drains, incisions, fall risk    Thank you for allowing us to participate in the care of your patient.      Jessi Watkins RN      "

## 2024-04-22 DIAGNOSIS — Z17.0 MALIGNANT NEOPLASM OF OVERLAPPING SITES OF RIGHT BREAST IN FEMALE, ESTROGEN RECEPTOR POSITIVE (HCC): Primary | ICD-10-CM

## 2024-04-22 DIAGNOSIS — C50.811 MALIGNANT NEOPLASM OF OVERLAPPING SITES OF RIGHT BREAST IN FEMALE, ESTROGEN RECEPTOR POSITIVE (HCC): Primary | ICD-10-CM

## 2024-04-22 RX ORDER — CEPHALEXIN 500 MG/1
500 CAPSULE ORAL EVERY 12 HOURS SCHEDULED
Qty: 14 CAPSULE | Refills: 0 | Status: SHIPPED | OUTPATIENT
Start: 2024-04-22 | End: 2024-04-29

## 2024-04-23 NOTE — PROGRESS NOTES
Patient seen and examined.  Marked for upcoming surgery.  Patient given extra marker to trace lines after instructed preoperative bathing.       All of the patient's questions regarding surgery and her postoperative expectations and instructions.     she is picking up her postoperative medications which were already called in to her pharmacy.     Samara Felix, DO  Plastic and Reconstructive Surgery

## 2024-04-24 ENCOUNTER — HOME CARE VISIT (OUTPATIENT)
Dept: HOME HEALTH SERVICES | Facility: HOME HEALTHCARE | Age: 59
End: 2024-04-24
Payer: COMMERCIAL

## 2024-04-24 VITALS
DIASTOLIC BLOOD PRESSURE: 64 MMHG | HEART RATE: 76 BPM | RESPIRATION RATE: 18 BRPM | WEIGHT: 137 LBS | TEMPERATURE: 97.5 F | OXYGEN SATURATION: 97 % | SYSTOLIC BLOOD PRESSURE: 110 MMHG | BODY MASS INDEX: 24.27 KG/M2

## 2024-04-24 PROCEDURE — 88305 TISSUE EXAM BY PATHOLOGIST: CPT | Performed by: STUDENT IN AN ORGANIZED HEALTH CARE EDUCATION/TRAINING PROGRAM

## 2024-04-24 PROCEDURE — G0299 HHS/HOSPICE OF RN EA 15 MIN: HCPCS

## 2024-04-24 PROCEDURE — 88307 TISSUE EXAM BY PATHOLOGIST: CPT | Performed by: STUDENT IN AN ORGANIZED HEALTH CARE EDUCATION/TRAINING PROGRAM

## 2024-04-24 PROCEDURE — 88309 TISSUE EXAM BY PATHOLOGIST: CPT | Performed by: STUDENT IN AN ORGANIZED HEALTH CARE EDUCATION/TRAINING PROGRAM

## 2024-04-26 ENCOUNTER — OFFICE VISIT (OUTPATIENT)
Dept: PLASTIC SURGERY | Facility: CLINIC | Age: 59
End: 2024-04-26

## 2024-04-26 DIAGNOSIS — C50.412 MALIGNANT NEOPLASM OF UPPER-OUTER QUADRANT OF LEFT BREAST IN FEMALE, ESTROGEN RECEPTOR POSITIVE (HCC): Primary | ICD-10-CM

## 2024-04-26 DIAGNOSIS — Z17.0 MALIGNANT NEOPLASM OF UPPER-OUTER QUADRANT OF LEFT BREAST IN FEMALE, ESTROGEN RECEPTOR POSITIVE (HCC): Primary | ICD-10-CM

## 2024-04-26 PROCEDURE — 99024 POSTOP FOLLOW-UP VISIT: CPT | Performed by: PHYSICIAN ASSISTANT

## 2024-04-26 NOTE — PROGRESS NOTES
Assessment/Plan:     Pt is a 59 YOF who is s/p right breast mastectomy with flat closure and left breast mastopexy by Dr. Felix in conjunction with Dr. Dominguez. Please see HPI.     Patient returns to the office today for a first post operative visit, drain check and prevena removal. Drain with greater than 100cc of output daily. Patient will return to the office in 1 week for a drain check.      Diagnoses and all orders for this visit:    Malignant neoplasm of upper-outer quadrant of left breast in female, estrogen receptor positive (HCC)          Subjective:     Patient ID: Richa Medina is a 59 y.o. female.    HPI    Patient reports no issues or concerns. She has had no issues post operatively.     Review of Systems    See HPI     Objective:     Physical Exam    All incisions are clean, dry and intact. Drain is serous.

## 2024-04-30 ENCOUNTER — HOME CARE VISIT (OUTPATIENT)
Dept: HOME HEALTH SERVICES | Facility: HOME HEALTHCARE | Age: 59
End: 2024-04-30
Payer: COMMERCIAL

## 2024-04-30 VITALS
OXYGEN SATURATION: 99 % | WEIGHT: 137 LBS | TEMPERATURE: 97.9 F | BODY MASS INDEX: 24.27 KG/M2 | RESPIRATION RATE: 18 BRPM | DIASTOLIC BLOOD PRESSURE: 62 MMHG | HEART RATE: 68 BPM | SYSTOLIC BLOOD PRESSURE: 122 MMHG

## 2024-04-30 PROCEDURE — G0299 HHS/HOSPICE OF RN EA 15 MIN: HCPCS

## 2024-05-02 ENCOUNTER — HOME CARE VISIT (OUTPATIENT)
Dept: HOME HEALTH SERVICES | Facility: HOME HEALTHCARE | Age: 59
End: 2024-05-02
Payer: COMMERCIAL

## 2024-05-02 VITALS
SYSTOLIC BLOOD PRESSURE: 102 MMHG | HEART RATE: 84 BPM | BODY MASS INDEX: 24.27 KG/M2 | TEMPERATURE: 97.7 F | WEIGHT: 137 LBS | DIASTOLIC BLOOD PRESSURE: 60 MMHG | OXYGEN SATURATION: 97 % | RESPIRATION RATE: 18 BRPM

## 2024-05-02 PROCEDURE — G0299 HHS/HOSPICE OF RN EA 15 MIN: HCPCS

## 2024-05-02 PROCEDURE — G0180 MD CERTIFICATION HHA PATIENT: HCPCS | Performed by: SURGERY

## 2024-05-03 ENCOUNTER — OFFICE VISIT (OUTPATIENT)
Dept: PLASTIC SURGERY | Facility: CLINIC | Age: 59
End: 2024-05-03

## 2024-05-03 DIAGNOSIS — C50.412 MALIGNANT NEOPLASM OF UPPER-OUTER QUADRANT OF LEFT BREAST IN FEMALE, ESTROGEN RECEPTOR POSITIVE (HCC): Primary | ICD-10-CM

## 2024-05-03 DIAGNOSIS — Z17.0 MALIGNANT NEOPLASM OF UPPER-OUTER QUADRANT OF LEFT BREAST IN FEMALE, ESTROGEN RECEPTOR POSITIVE (HCC): Primary | ICD-10-CM

## 2024-05-03 PROCEDURE — 99024 POSTOP FOLLOW-UP VISIT: CPT | Performed by: PHYSICIAN ASSISTANT

## 2024-05-03 RX ORDER — GABAPENTIN 100 MG/1
100 CAPSULE ORAL
Qty: 30 CAPSULE | Refills: 0 | Status: SHIPPED | OUTPATIENT
Start: 2024-05-03 | End: 2024-06-02

## 2024-05-03 NOTE — PROGRESS NOTES
Assessment/Plan:     Pt is a 59 YOF who is s/p right breast mastectomy with flat closure and left breast mastopexy by Dr. Felix in conjunction with Dr. Dominguez. Please see HPI.      Patient returns to the office today for a routine post operative visit and drain check. Drain with approx 100cc of output daily. Patient will return to the office in 1 week for a drain check or sooner with any questions or concerns.      Diagnoses and all orders for this visit:    Malignant neoplasm of upper-outer quadrant of left breast in female, estrogen receptor positive (HCC)  -     gabapentin (Neurontin) 100 mg capsule; Take 1 capsule (100 mg total) by mouth daily at bedtime as needed (pain)          Subjective:     Patient ID: Richa Medina is a 59 y.o. female.    HPI    Patient has been doing well postoperatively.  No issues or concerns today.  She does have some trouble sleeping at night with nerve type pain.    Review of Systems    See HPI    Objective:     Physical Exam      Incisions are clean, dry, intact and healing appropriately.  Drain is serous.

## 2024-05-06 ENCOUNTER — OFFICE VISIT (OUTPATIENT)
Dept: SURGICAL ONCOLOGY | Facility: CLINIC | Age: 59
End: 2024-05-06

## 2024-05-06 ENCOUNTER — APPOINTMENT (OUTPATIENT)
Dept: RADIOLOGY | Facility: MEDICAL CENTER | Age: 59
End: 2024-05-06
Payer: COMMERCIAL

## 2024-05-06 VITALS
RESPIRATION RATE: 16 BRPM | OXYGEN SATURATION: 99 % | HEIGHT: 63 IN | WEIGHT: 145 LBS | SYSTOLIC BLOOD PRESSURE: 118 MMHG | TEMPERATURE: 98 F | DIASTOLIC BLOOD PRESSURE: 70 MMHG | HEART RATE: 80 BPM | BODY MASS INDEX: 25.69 KG/M2

## 2024-05-06 DIAGNOSIS — C50.412 MALIGNANT NEOPLASM OF UPPER-OUTER QUADRANT OF LEFT BREAST IN FEMALE, ESTROGEN RECEPTOR POSITIVE (HCC): ICD-10-CM

## 2024-05-06 DIAGNOSIS — Z79.811 USE OF ANASTROZOLE (ARIMIDEX): ICD-10-CM

## 2024-05-06 DIAGNOSIS — Z98.890 STATUS POST LEFT BREAST LUMPECTOMY: ICD-10-CM

## 2024-05-06 DIAGNOSIS — Z17.0 MALIGNANT NEOPLASM OF OVERLAPPING SITES OF RIGHT BREAST IN FEMALE, ESTROGEN RECEPTOR POSITIVE (HCC): Primary | ICD-10-CM

## 2024-05-06 DIAGNOSIS — C50.811 MALIGNANT NEOPLASM OF OVERLAPPING SITES OF RIGHT BREAST IN FEMALE, ESTROGEN RECEPTOR POSITIVE (HCC): Primary | ICD-10-CM

## 2024-05-06 DIAGNOSIS — Z17.0 MALIGNANT NEOPLASM OF UPPER-OUTER QUADRANT OF LEFT BREAST IN FEMALE, ESTROGEN RECEPTOR POSITIVE (HCC): ICD-10-CM

## 2024-05-06 DIAGNOSIS — Z90.11 STATUS POST RIGHT MASTECTOMY: ICD-10-CM

## 2024-05-06 DIAGNOSIS — M25.552 PAIN OF LEFT HIP: ICD-10-CM

## 2024-05-06 PROCEDURE — 73502 X-RAY EXAM HIP UNI 2-3 VIEWS: CPT

## 2024-05-06 PROCEDURE — 99024 POSTOP FOLLOW-UP VISIT: CPT | Performed by: SURGERY

## 2024-05-06 NOTE — PROGRESS NOTES
59 y.o. female is here today s/p right mastectomy with aesthetic flat closure and left lumpectomy with oncoplastic reduction mammoplasty. She reports feeling well overall, 1 STANLEY remaining on the right side.      Physical Exam  Constitutional:       General: She is not in acute distress.     Appearance: Normal appearance.   Chest:   Breasts:     Right: Skin change present. Nipple discharge: well healing mastectomy scar, STANLEY in place.     Left: Skin change (well healing reduction scars) present.   Neurological:      Mental Status: She is alert and oriented to person, place, and time.   Psychiatric:         Mood and Affect: Mood normal.         Data:       Staging:    Right breast    Multifocal with the largest component being 12 mm invasive ductal  Tumor grade 1  Margins clean  Estrogen receptor and progesterone receptor status positive  HER2 status and test method negative  Lymph node assessment/status 1 out of 2 positive sentinel nodes, additional nodes negative      Neoadjuvant therapy: Patient was on neoadjuvant anastrozole secondary to underlying lung issues  Stage: IA    Left breast    5 mm tubular ca  Tumor grade 1  Margins clean  Estrogen receptor and progesterone receptor status ER 95%, CO 0  HER2 status and test method 1+  Lymph node assessment/status 0 out of 4      Stage: IA      Diagnoses and all orders for this visit:    Malignant neoplasm of overlapping sites of right breast in female, estrogen receptor positive (HCC)    Status post right mastectomy    Status post left breast lumpectomy    Malignant neoplasm of upper-outer quadrant of left breast in female, estrogen receptor positive (HCC)    Use of anastrozole (Arimidex)        Assessment/Plan: 59-year-old female status post right mastectomy and left breast conservation for bilateral breast carcinoma.  She declined genetic testing.  She was on anastrozole preoperatively secondary to underlying lung issues which delayed time to surgery.  She is healing  well with no signs of infection.  She is scheduled to meet with medical oncology for follow-up visit.  We will plan to see her again in 6 months for survivorship visit or sooner should the need arise.

## 2024-05-07 ENCOUNTER — HOME CARE VISIT (OUTPATIENT)
Dept: HOME HEALTH SERVICES | Facility: HOME HEALTHCARE | Age: 59
End: 2024-05-07
Payer: COMMERCIAL

## 2024-05-07 VITALS
TEMPERATURE: 97.6 F | HEART RATE: 81 BPM | DIASTOLIC BLOOD PRESSURE: 62 MMHG | BODY MASS INDEX: 25.69 KG/M2 | RESPIRATION RATE: 18 BRPM | WEIGHT: 145 LBS | SYSTOLIC BLOOD PRESSURE: 110 MMHG | OXYGEN SATURATION: 99 %

## 2024-05-07 PROCEDURE — G0299 HHS/HOSPICE OF RN EA 15 MIN: HCPCS

## 2024-05-08 ENCOUNTER — OFFICE VISIT (OUTPATIENT)
Dept: PLASTIC SURGERY | Facility: CLINIC | Age: 59
End: 2024-05-08

## 2024-05-08 DIAGNOSIS — Z17.0 MALIGNANT NEOPLASM OF OVERLAPPING SITES OF RIGHT BREAST IN FEMALE, ESTROGEN RECEPTOR POSITIVE (HCC): Primary | ICD-10-CM

## 2024-05-08 DIAGNOSIS — C50.811 MALIGNANT NEOPLASM OF OVERLAPPING SITES OF RIGHT BREAST IN FEMALE, ESTROGEN RECEPTOR POSITIVE (HCC): Primary | ICD-10-CM

## 2024-05-08 PROCEDURE — 99024 POSTOP FOLLOW-UP VISIT: CPT | Performed by: PHYSICIAN ASSISTANT

## 2024-05-08 NOTE — PROGRESS NOTES
Assessment/Plan:     Pt is a 59 YOF who is s/p right breast mastectomy with flat closure and left breast mastopexy by Dr. Felix in conjunction with Dr. Dominguez on 4/19/24. Please see HPI.     Patient returns to the office today for drain check. Drain continues to have approx 50-70cc of output daily.  Dr. Felix was also present for the visit today and recommended  removal.  Patient will keep the area tightly wrapped with ACE wrap and return to the office in 1 week for an incision check or sooner with any questions or concerns.      There are no diagnoses linked to this encounter.      Subjective:     Patient ID: Richa Medina is a 59 y.o. female.    HPI    Pt reports that she has been doing well.  No issues or concerns today.    Review of Systems    See HPI     Objective:     Physical Exam all incisions are clean, dry and intact.  Left breast is soft and supple.    All incisions are clean, dry and intact.  Left breast is supple.  Drain is serous.

## 2024-05-09 ENCOUNTER — HOME CARE VISIT (OUTPATIENT)
Dept: HOME HEALTH SERVICES | Facility: HOME HEALTHCARE | Age: 59
End: 2024-05-09
Payer: COMMERCIAL

## 2024-05-09 VITALS
SYSTOLIC BLOOD PRESSURE: 124 MMHG | TEMPERATURE: 97.7 F | OXYGEN SATURATION: 98 % | WEIGHT: 145 LBS | DIASTOLIC BLOOD PRESSURE: 64 MMHG | HEART RATE: 83 BPM | RESPIRATION RATE: 18 BRPM | BODY MASS INDEX: 25.69 KG/M2

## 2024-05-09 PROCEDURE — G0299 HHS/HOSPICE OF RN EA 15 MIN: HCPCS

## 2024-05-13 ENCOUNTER — OFFICE VISIT (OUTPATIENT)
Dept: PLASTIC SURGERY | Facility: CLINIC | Age: 59
End: 2024-05-13

## 2024-05-13 DIAGNOSIS — Z17.0 MALIGNANT NEOPLASM OF OVERLAPPING SITES OF RIGHT BREAST IN FEMALE, ESTROGEN RECEPTOR POSITIVE (HCC): Primary | ICD-10-CM

## 2024-05-13 DIAGNOSIS — Z17.0 MALIGNANT NEOPLASM OF UPPER-OUTER QUADRANT OF LEFT BREAST IN FEMALE, ESTROGEN RECEPTOR POSITIVE (HCC): ICD-10-CM

## 2024-05-13 DIAGNOSIS — C50.412 MALIGNANT NEOPLASM OF UPPER-OUTER QUADRANT OF LEFT BREAST IN FEMALE, ESTROGEN RECEPTOR POSITIVE (HCC): ICD-10-CM

## 2024-05-13 DIAGNOSIS — C50.811 MALIGNANT NEOPLASM OF OVERLAPPING SITES OF RIGHT BREAST IN FEMALE, ESTROGEN RECEPTOR POSITIVE (HCC): Primary | ICD-10-CM

## 2024-05-13 PROCEDURE — 99024 POSTOP FOLLOW-UP VISIT: CPT | Performed by: PHYSICIAN ASSISTANT

## 2024-05-14 ENCOUNTER — TELEPHONE (OUTPATIENT)
Dept: HEMATOLOGY ONCOLOGY | Facility: CLINIC | Age: 59
End: 2024-05-14

## 2024-05-14 ENCOUNTER — TELEMEDICINE (OUTPATIENT)
Dept: HEMATOLOGY ONCOLOGY | Facility: CLINIC | Age: 59
End: 2024-05-14
Payer: COMMERCIAL

## 2024-05-14 DIAGNOSIS — Z17.0 MALIGNANT NEOPLASM OF OVERLAPPING SITES OF RIGHT BREAST IN FEMALE, ESTROGEN RECEPTOR POSITIVE (HCC): Primary | ICD-10-CM

## 2024-05-14 DIAGNOSIS — C50.811 MALIGNANT NEOPLASM OF OVERLAPPING SITES OF RIGHT BREAST IN FEMALE, ESTROGEN RECEPTOR POSITIVE (HCC): Primary | ICD-10-CM

## 2024-05-14 PROCEDURE — 99442 PR PHYS/QHP TELEPHONE EVALUATION 11-20 MIN: CPT | Performed by: INTERNAL MEDICINE

## 2024-05-15 NOTE — PROGRESS NOTES
Assessment and Plan:  Richa Medina 59 y.o. female s/p right breast mastectomy, bilateral SLNB, left mastopexy 4/19/24, presenting for post op appointment    - left axilla 60cc serous fluid aspirated. Right chest 120cc serous fluid aspirated  - patient wrapped with ace bandage  - encouraged low salt diet  - will have patient return in 1 week for check    Subjective:    Patient feels well. She has noticed a hard lump in her left axilla which was aspirated. We discussed she could need a drain in the future if fluid continues to accumulate. Encouraged compression and low salt diet    Objective:  NAD, AAOx3  Left breast incisions C/D/I, left axilla 60cc serous fluid removed  Right breast incision C/D/I, palpable seroma, 120cc serous fluid removed    Missy Coppola PA-C

## 2024-05-17 NOTE — PROGRESS NOTES
Virtual Brief Visit    This Visit is being completed by telephone. The Patient is located at Home and in the following state in which I hold an active license [place]    The patient was identified by name and date of birth. Richa Medina was informed that this is a telemedicine visit and that the visit is being conducted through Telephone.  My office door was closed. No one else was in the room.  She acknowledged consent and understanding of privacy and security of the video platform. The patient has agreed to participate and understands they can discontinue the visit at any time.    Patient is aware this is a billable service.     Patient presents for follow-up after her bilateral breast surgery.  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  She had a right-sided mastectomy with sentinel lymph node biopsy that showed a multifocal invasive ductal carcinoma, grade 1, %, KS 35%, HER2 1+.  She had a 12 mm and 7 mm lesion with 1 of 8 sentinel lymph nodes positive.  Both lesions were ER positive HER2 negative.  She has recovered well from surgery.  She is still on anastrozole and has noticed an increase in hot flashes after surgery.      Assessment/Plan: 59-year-old female with bilateral breast cancer.  The right-sided lesion was more advanced than anticipated and we will send an Oncotype for decision making about the need for adjuvant chemotherapy.  I will plan on meeting with her at the end of the month to review the result.  If there is no indication for adjuvant chemotherapy I will refer her to radiation oncology.  She can continue with anastrozole in the meantime.  I also told her that she can take gabapentin 300 mg nightly for hot flashes.  All the patient's questions were answered and we will touch base again when Oncotype is available.    Problem List Items Addressed This Visit       Malignant neoplasm of overlapping sites of  right breast in female, estrogen receptor positive (HCC) - Primary    Relevant Orders    Ambulatory referral to Radiation Oncology       Recent Visits  Date Type Provider Dept   05/14/24 Telephone Sandy German RN [facility]   05/14/24 Telemedicine Matilde Yung DO [facility]   Showing recent visits within past 7 days and meeting all other requirements  Future Appointments  No visits were found meeting these conditions.  Showing future appointments within next 150 days and meeting all other requirements         Visit Time  Total Visit Duration: 18 min

## 2024-05-20 ENCOUNTER — PATIENT OUTREACH (OUTPATIENT)
Dept: HEMATOLOGY ONCOLOGY | Facility: CLINIC | Age: 59
End: 2024-05-20

## 2024-05-20 NOTE — PROGRESS NOTES
Breast Oncology Nurse Navigator    Patient called in with a few questions.  She is interested in obtaining mastectomy bras and prosthetics and was not sure how to do so.  Patient had surgery in April 2024.  Dr Dominguez and Dr Felix performed surgery.  Since patient was closed by plastic surgery, advised patient that the plastic surgeon would write the prescriptions for above items.  Patient going for follow up appointment at the plastic surgeon's office on 5/22/24.  Discussed the [facility] as well as [facility] and what each offers to patients.    Reviewed upcoming appointments related to breast cancer diagnosis.  Patient did not know about upcoming consultation with Dr Coekr on 6/5/24.  Appointment details provided, along with directions to the office, in relation to Dr Dominguez's office.  Phone number provided, as patient states she is working at that time.    Patient knows she can reach out with further questions, as needed.  Requested Patient Navigator Patricia reach out to patient after medical oncology follow up and radiation oncology consultation to check in with patient and introduce herself.

## 2024-05-22 ENCOUNTER — OFFICE VISIT (OUTPATIENT)
Dept: PLASTIC SURGERY | Facility: CLINIC | Age: 59
End: 2024-05-22

## 2024-05-22 DIAGNOSIS — Z17.0 MALIGNANT NEOPLASM OF OVERLAPPING SITES OF RIGHT BREAST IN FEMALE, ESTROGEN RECEPTOR POSITIVE (HCC): Primary | ICD-10-CM

## 2024-05-22 DIAGNOSIS — C50.811 MALIGNANT NEOPLASM OF OVERLAPPING SITES OF RIGHT BREAST IN FEMALE, ESTROGEN RECEPTOR POSITIVE (HCC): Primary | ICD-10-CM

## 2024-05-22 PROCEDURE — 99024 POSTOP FOLLOW-UP VISIT: CPT | Performed by: PHYSICIAN ASSISTANT

## 2024-05-22 NOTE — PROGRESS NOTES
Assessment and Plan:  Richa Medina 59 y.o. female s/p right breast mastectomy, right axillary dissection, left SLNB, left mastopexy 4/19/24, presenting for post op appointment    - left axilla continues to be swollen. 50cc serous fluid removed.  - right chest with 45cc serous fluid removed  - continue ace bandage  - encouraged no salt diet  - Instructed patient to monitor if left axilla and right chest fill with fluid again. May need to consult IR for possible drain(s) placement if fluid returns.   - will have patient return next week. Will also discuss with Dr. Felix and Dr. Dominguez        Subjective:     Patient feels well. She has noticed a hard lump in her left axilla which was aspirated. We discussed she could need a drain in the future if fluid continues to accumulate. Encouraged compression and low salt diet     Objective:  NAD, AAOx3  Left breast incisions C/D/I, left axilla 50cc serous fluid removed, mild erythema to left medial incision   Right breast incision C/D/I, 45cc serous fluid removed     Missy Coppola PA-C

## 2024-05-29 ENCOUNTER — OFFICE VISIT (OUTPATIENT)
Dept: PLASTIC SURGERY | Facility: CLINIC | Age: 59
End: 2024-05-29

## 2024-05-29 DIAGNOSIS — Z17.0 MALIGNANT NEOPLASM OF OVERLAPPING SITES OF RIGHT BREAST IN FEMALE, ESTROGEN RECEPTOR POSITIVE (HCC): ICD-10-CM

## 2024-05-29 DIAGNOSIS — C50.811 MALIGNANT NEOPLASM OF OVERLAPPING SITES OF RIGHT BREAST IN FEMALE, ESTROGEN RECEPTOR POSITIVE (HCC): ICD-10-CM

## 2024-05-29 DIAGNOSIS — T88.8XXS FLUID COLLECTION AT SURGICAL SITE, SEQUELA: Primary | ICD-10-CM

## 2024-05-29 PROCEDURE — 99024 POSTOP FOLLOW-UP VISIT: CPT | Performed by: PHYSICIAN ASSISTANT

## 2024-05-30 ENCOUNTER — TELEPHONE (OUTPATIENT)
Dept: HEMATOLOGY ONCOLOGY | Facility: CLINIC | Age: 59
End: 2024-05-30

## 2024-05-30 NOTE — TELEPHONE ENCOUNTER
Spoke with Angela from [facility]. There was a hold on the patient's account because it was flagged as a duplicate order. I asked what the other order was for and Angela states that there was an oncotype submitted in January. I informed her this is a new order. Angela states the hold has been released and there are no other issues. I asked when this will be resulted since the patient's appt is tomorrow. Angela states that she is unsure when it will be resulted as that is not her department. Angela wrote down my name and number and will have another representative call me back from the correct department to speak about timing of results.

## 2024-05-31 ENCOUNTER — TELEPHONE (OUTPATIENT)
Dept: HEMATOLOGY ONCOLOGY | Facility: CLINIC | Age: 59
End: 2024-05-31

## 2024-05-31 NOTE — TELEPHONE ENCOUNTER
Lm on vm advising her oncotype results are still pending, Dr. Yung is okay with her r/s her appt out to the 6/14. Advised patient new appt is 6/14 3:40 with Dr. Yung. Provided her with [phone] if she has additional questions or concerns.

## 2024-05-31 NOTE — TELEPHONE ENCOUNTER
"Rec'd call from oncotype dx \"Hi, this message is for Harleen. This is [facility] where we did the Oncotype DX test I'm calling regarding mutual patient Richa Medina date of birth of [birth year]. It looks like testing is still in process. It looks like we had to confirm some missing information regarding if this is a new primary. It was confirmed and testing is currently in process. We currently have an estimated report date of the 13th If you have any questions, please give us a call back at [phone]. Thank you and have a great day. Bye bye.\"  "

## 2024-06-02 NOTE — PROGRESS NOTES
Assessment and Plan:  Richa Medina 59 y.o. female s/p right breast mastectomy, right axillary dissection, left SLNB, left mastopexy 4/19/24, presenting for post op appointment     - left axilla continues to be swollen. 70cc serous fluid removed.  - right chest no fluid today  - continue ace bandage  - due to patient continuing to have left axilla fluid collection, will consult IR. Area has been drained multiple times and continues to form even with compression and low salt diet. Will aspirate patient next week prior to leaving for vacation. Will place IR consult order for when patient returns from vacation  - will order keflex due to erythema to left breast  - continue with compression     Subjective:    Patient with fullness to left axilla. 70cc serous fluid removed. Discussed IR consult for possible drain placement/possible sclerosis.      Objective:  NAD, AAOx3  Left breast incisions C/D/I, left axilla 70cc serous fluid removed  Right breast incision C/D/I, no palpable seroma    Missy Coppola PA-C

## 2024-06-03 DIAGNOSIS — C50.412 MALIGNANT NEOPLASM OF UPPER-OUTER QUADRANT OF LEFT BREAST IN FEMALE, ESTROGEN RECEPTOR POSITIVE (HCC): Primary | ICD-10-CM

## 2024-06-03 DIAGNOSIS — Z17.0 MALIGNANT NEOPLASM OF UPPER-OUTER QUADRANT OF LEFT BREAST IN FEMALE, ESTROGEN RECEPTOR POSITIVE (HCC): Primary | ICD-10-CM

## 2024-06-05 DIAGNOSIS — C50.412 MALIGNANT NEOPLASM OF UPPER-OUTER QUADRANT OF LEFT BREAST IN FEMALE, ESTROGEN RECEPTOR POSITIVE (HCC): Primary | ICD-10-CM

## 2024-06-05 DIAGNOSIS — Z17.0 MALIGNANT NEOPLASM OF UPPER-OUTER QUADRANT OF LEFT BREAST IN FEMALE, ESTROGEN RECEPTOR POSITIVE (HCC): Primary | ICD-10-CM

## 2024-06-05 RX ORDER — CEPHALEXIN 500 MG/1
500 CAPSULE ORAL EVERY 12 HOURS SCHEDULED
Qty: 14 CAPSULE | Refills: 0 | Status: SHIPPED | OUTPATIENT
Start: 2024-06-05 | End: 2024-06-12

## 2024-06-07 ENCOUNTER — OFFICE VISIT (OUTPATIENT)
Dept: PLASTIC SURGERY | Facility: CLINIC | Age: 59
End: 2024-06-07

## 2024-06-07 DIAGNOSIS — T88.8XXS FLUID COLLECTION AT SURGICAL SITE, SEQUELA: ICD-10-CM

## 2024-06-07 DIAGNOSIS — Z17.0 MALIGNANT NEOPLASM OF UPPER-OUTER QUADRANT OF LEFT BREAST IN FEMALE, ESTROGEN RECEPTOR POSITIVE (HCC): Primary | ICD-10-CM

## 2024-06-07 DIAGNOSIS — C50.412 MALIGNANT NEOPLASM OF UPPER-OUTER QUADRANT OF LEFT BREAST IN FEMALE, ESTROGEN RECEPTOR POSITIVE (HCC): Primary | ICD-10-CM

## 2024-06-07 PROCEDURE — 99024 POSTOP FOLLOW-UP VISIT: CPT

## 2024-06-07 NOTE — PROGRESS NOTES
[facility] Plastic and Reconstructive Surgery  [address], [address], [place] [address]  [phone]    Patient Identification: Richa Medina is a 59 y.o. female     History of Present Illness: The patient is a 59 y.o.  year-old female  who presents to the office for drianage of seroma. Patient is 49 days s/p Left Breast Thais Localization Lumpectomy; - Left, Right Breast Mastectomy, Psb Axillary Dissection - Right, Bilateral BX LYMPH NODE SENTINEL, lymphoscintigraphies, lymphatic mappings - Bilateral, Right Aesthetic Flat Closure , Kyler Flap, Prevena Placement - Right, and Mastopexy Breast - Left  on 4/19/2024 by Dr. Felix.  Patient had a recurrent seroma at left axilla following surgery. Was drained in office last visit. Here today to be re-evalueated for drainage. She denies fevers, chills, signs of infection or pain. Pt has an antibiotic for when she is on vacation. Wearing compression as directed.   Patient has no complaints at this time.    Past Medical History:   Diagnosis Date    Asthma     Cancer (HCC)     bilateral    Disease of thyroid gland     Hearing aid worn     bilateral    Hearing loss     Osteopenia     Rosacea 02/09/2021    Seasonal allergies     Vitamin D deficiency 07/24/2018          Review of Systems  Constitutional: Denies fevers, chills or pain.  Skin: Denies any warmth, erythema, edema or mucopurulent drainage.     Physical Exam    Breast: palpable seroma to left axilla. 90cc turbid fluid aspirated    Assessment and Plan:  The patient is an 59 y.o.  year-old female who presents to the office for seroma driainage. Patient is 49 days s/p Left Breast Thais Localization Lumpectomy; - Left, Right Breast Mastectomy, Psb Axillary Dissection - Right, Bilateral BX LYMPH NODE SENTINEL, lymphoscintigraphies, lymphatic mappings - Bilateral, Right Aesthetic Flat Closure , Kyler Flap, Prevena Placement - Right, and Mastopexy Breast - Left  on 4/19/2024 by Dr. Felix         -At today's visit  90cc of turbid fluid aspirated from seroma pocket upon left axilla. Pt tolerated well. She is to take antibiotic as directed when on vacation. Continue compression and low salt diet. Pt has an appt with IR for drain placement 6/18, she may follow up with us after. Call with any concerns.  -The patient is to call the office with any questions or concerns. All of the patient's questions were answered at this time and they agree with the plan of care.      Tiffanie Newberry PA-C  [facility] Plastic and Reconstructive Surgery

## 2024-06-18 ENCOUNTER — HOSPITAL ENCOUNTER (OUTPATIENT)
Dept: RADIOLOGY | Facility: HOSPITAL | Age: 59
Discharge: HOME | End: 2024-06-18
Attending: RADIOLOGY | Admitting: RADIOLOGY
Payer: COMMERCIAL

## 2024-06-18 DIAGNOSIS — C50.412 MALIGNANT NEOPLASM OF UPPER-OUTER QUADRANT OF LEFT BREAST IN FEMALE, ESTROGEN RECEPTOR POSITIVE (HCC): ICD-10-CM

## 2024-06-18 DIAGNOSIS — N64.89 SEROMA OF BREAST: Primary | ICD-10-CM

## 2024-06-18 DIAGNOSIS — Z17.0 MALIGNANT NEOPLASM OF UPPER-OUTER QUADRANT OF LEFT BREAST IN FEMALE, ESTROGEN RECEPTOR POSITIVE (HCC): ICD-10-CM

## 2024-06-18 PROCEDURE — 10030 IMG GID FLU COLL DRG SFT TIS: CPT | Performed by: RADIOLOGY

## 2024-06-18 PROCEDURE — 76937 US GUIDE VASCULAR ACCESS: CPT

## 2024-06-18 PROCEDURE — C1729 CATH, DRAINAGE: HCPCS

## 2024-06-18 PROCEDURE — 10030 IMG GID FLU COLL DRG SFT TIS: CPT

## 2024-06-18 RX ORDER — SODIUM CHLORIDE 9 MG/ML
10 INJECTION, SOLUTION INTRAMUSCULAR; INTRAVENOUS; SUBCUTANEOUS DAILY
Qty: 300 ML | Refills: 2 | Status: SHIPPED | OUTPATIENT
Start: 2024-06-18 | End: 2024-09-16

## 2024-06-18 RX ORDER — LIDOCAINE WITH 8.4% SOD BICARB 0.9%(10ML)
SYRINGE (ML) INJECTION AS NEEDED
Status: COMPLETED | OUTPATIENT
Start: 2024-06-18 | End: 2024-06-18

## 2024-06-18 RX ADMIN — Medication 10 ML: at 11:43

## 2024-06-18 NOTE — DISCHARGE INSTRUCTIONS
"                                                                      TUBE CARE INSTRUCTIONS    Care after your procedure:    Resume your normal diet. Small sips of flat soda will help with nausea.    1. The properly functioning catheter should be forward flushed once (1x) daily with 10ml of normal saline using clean technique. You will be given a prescription for flushes.   To flush the tube, clean both connections with alcohol swab.Twist off the drainage bag/ bulb  tubing and twist the saline syringe into the drainage tube and flush. Remove the syringe and twist the drainage bag / bulb tubing tubing back on.    2. The drainage bag/bulb may be emptied as necessary. Keep a record of the amount of fluid you drain from your tube. This should be done with clean technique as well.     3. A fresh dressing should be applied daily over the tube insertion site.     4. As the tube is secured to the skin with only a suture,try not to pull on your tube. Tub baths are not permitted. Showers are permitted if the patient's skin entry site is prevented from getting wet. Similarly, washcloth \"baths\" are acceptable.     Contact Interventional Radiology at [phone] if:    1. Leakage or large amounts of liquid around the catheter.    2. Fever of 101 degrees lasting several hours without other obvious cause (such as sore throat, flu, etc).    3. Persistent nausea or vomiting.    4. Diminished drainage, which may be associated with pressure or pain. Or when the     drainage from your tube is less than 10mls for 48 hours.    5. Catheter pulled back or falls out.      The following pharmacies carry the flush syringes.       [facility]                     [facility] [address]                         [phone]  [place]                       [place]  Phone [phone]            Phone [phone]                         "                                                                               Nohemy Delacruz   [facility]             [facility]                                [phone]  [address] [place]  Phone [phone]            Phone [phone]                      [facility]                                                                                                          [phone]  [facility]  [address]  [place]                                                                               [facility]  Phone [phone]

## 2024-06-18 NOTE — BRIEF OP NOTE (RAD/CATH)
INTERVENTIONAL RADIOLOGY PROCEDURE NOTE    Date: 6/18/2024    Procedure:   Procedure Summary       Date: 06/18/24 Room / Location: [facility] Interventional Radiology    Anesthesia Start:  Anesthesia Stop:     Procedure: IR DRAINAGE TUBE PLACEMENT Diagnosis:       Malignant neoplasm of upper-outer quadrant of left breast in female, estrogen receptor positive (HCC)      (Recurrent fluid collection to left axilla s/p mastectomy and SLNB Has been aspirated in office multiple times for 50-70cc each time. Concerned for lymphatic leak vs recurrent seroma.)    Scheduled Providers:  Responsible Provider:     Anesthesia Type: Not recorded ASA Status: Not recorded            Preoperative diagnosis:   1. Malignant neoplasm of upper-outer quadrant of left breast in female, estrogen receptor positive (HCC)         Postoperative diagnosis: Same.    Surgeon: Chuck Green MD     Assistant: None. No qualified resident was available.    Blood loss: none    Specimens: none     Findings: 10 Fr drain placed into the recurrent left axillary seroma vs lymphocele with aspiration of 80 ml clear, yellow fluid. Will f/u in 1 week to evaluate for sclerosis.    Complications: None immediate.    Anesthesia: local

## 2024-06-18 NOTE — SEDATION DOCUMENTATION
Patient was seen and evaluated by Dr. Green, she was given the opportunity to ask questions and express concerns, she left with a good understanding of today's plan of care.

## 2024-06-22 ENCOUNTER — HOSPITAL ENCOUNTER (EMERGENCY)
Facility: HOSPITAL | Age: 59
Discharge: HOME | End: 2024-06-23
Attending: EMERGENCY MEDICINE
Payer: COMMERCIAL

## 2024-06-22 ENCOUNTER — APPOINTMENT (EMERGENCY)
Dept: CT IMAGING | Facility: HOSPITAL | Age: 59
End: 2024-06-22
Payer: COMMERCIAL

## 2024-06-22 DIAGNOSIS — N61.0 CELLULITIS OF LEFT BREAST: Primary | ICD-10-CM

## 2024-06-22 LAB
ANION GAP SERPL CALCULATED.3IONS-SCNC: 5 MMOL/L (ref 4–13)
BASOPHILS # BLD AUTO: 0.01 THOUSANDS/ÂΜL (ref 0–0.1)
BASOPHILS NFR BLD AUTO: 0 % (ref 0–1)
BUN SERPL-MCNC: 11 MG/DL (ref 5–25)
CALCIUM SERPL-MCNC: 9.9 MG/DL (ref 8.4–10.2)
CHLORIDE SERPL-SCNC: 99 MMOL/L (ref 96–108)
CO2 SERPL-SCNC: 31 MMOL/L (ref 21–32)
CREAT SERPL-MCNC: 0.81 MG/DL (ref 0.6–1.3)
EOSINOPHIL # BLD AUTO: 0.17 THOUSAND/ÂΜL (ref 0–0.61)
EOSINOPHIL NFR BLD AUTO: 2 % (ref 0–6)
ERYTHROCYTE [DISTWIDTH] IN BLOOD BY AUTOMATED COUNT: 12.5 % (ref 11.6–15.1)
GFR SERPL CREATININE-BSD FRML MDRD: 78 ML/MIN/1.73SQ M
GLUCOSE SERPL-MCNC: 111 MG/DL (ref 65–140)
HCT VFR BLD AUTO: 38.1 % (ref 34.8–46.1)
HGB BLD-MCNC: 12.6 G/DL (ref 11.5–15.4)
IMM GRANULOCYTES # BLD AUTO: 0.02 THOUSAND/UL (ref 0–0.2)
IMM GRANULOCYTES NFR BLD AUTO: 0 % (ref 0–2)
LYMPHOCYTES # BLD AUTO: 1.68 THOUSANDS/ÂΜL (ref 0.6–4.47)
LYMPHOCYTES NFR BLD AUTO: 16 % (ref 14–44)
MCH RBC QN AUTO: 29.2 PG (ref 26.8–34.3)
MCHC RBC AUTO-ENTMCNC: 33.1 G/DL (ref 31.4–37.4)
MCV RBC AUTO: 88 FL (ref 82–98)
MONOCYTES # BLD AUTO: 0.87 THOUSAND/ÂΜL (ref 0.17–1.22)
MONOCYTES NFR BLD AUTO: 8 % (ref 4–12)
NEUTROPHILS # BLD AUTO: 7.82 THOUSANDS/ÂΜL (ref 1.85–7.62)
NEUTS SEG NFR BLD AUTO: 74 % (ref 43–75)
NRBC BLD AUTO-RTO: 0 /100 WBCS
PLATELET # BLD AUTO: 237 THOUSANDS/UL (ref 149–390)
PMV BLD AUTO: 9.7 FL (ref 8.9–12.7)
POTASSIUM SERPL-SCNC: 3.9 MMOL/L (ref 3.5–5.3)
RBC # BLD AUTO: 4.31 MILLION/UL (ref 3.81–5.12)
SODIUM SERPL-SCNC: 135 MMOL/L (ref 135–147)
WBC # BLD AUTO: 10.57 THOUSAND/UL (ref 4.31–10.16)

## 2024-06-22 PROCEDURE — 99284 EMERGENCY DEPT VISIT MOD MDM: CPT

## 2024-06-22 PROCEDURE — 99285 EMERGENCY DEPT VISIT HI MDM: CPT | Performed by: EMERGENCY MEDICINE

## 2024-06-22 PROCEDURE — 85025 COMPLETE CBC W/AUTO DIFF WBC: CPT | Performed by: EMERGENCY MEDICINE

## 2024-06-22 PROCEDURE — 80048 BASIC METABOLIC PNL TOTAL CA: CPT | Performed by: EMERGENCY MEDICINE

## 2024-06-22 PROCEDURE — 96365 THER/PROPH/DIAG IV INF INIT: CPT

## 2024-06-22 PROCEDURE — 36415 COLL VENOUS BLD VENIPUNCTURE: CPT | Performed by: EMERGENCY MEDICINE

## 2024-06-22 PROCEDURE — 71260 CT THORAX DX C+: CPT

## 2024-06-22 RX ORDER — SULFAMETHOXAZOLE AND TRIMETHOPRIM 800; 160 MG/1; MG/1
1 TABLET ORAL 2 TIMES DAILY
Qty: 20 TABLET | Refills: 0 | Status: SHIPPED | OUTPATIENT
Start: 2024-06-22 | End: 2024-07-02

## 2024-06-22 RX ORDER — CEFAZOLIN SODIUM 2 G/50ML
2000 SOLUTION INTRAVENOUS ONCE
Status: COMPLETED | OUTPATIENT
Start: 2024-06-22 | End: 2024-06-22

## 2024-06-22 RX ADMIN — IOHEXOL 85 ML: 350 INJECTION, SOLUTION INTRAVENOUS at 22:26

## 2024-06-22 RX ADMIN — CEFAZOLIN SODIUM 2000 MG: 2 SOLUTION INTRAVENOUS at 21:33

## 2024-06-23 VITALS
WEIGHT: 146.39 LBS | HEART RATE: 85 BPM | DIASTOLIC BLOOD PRESSURE: 56 MMHG | TEMPERATURE: 98.7 F | SYSTOLIC BLOOD PRESSURE: 110 MMHG | OXYGEN SATURATION: 97 % | RESPIRATION RATE: 18 BRPM | BODY MASS INDEX: 25.93 KG/M2

## 2024-06-23 NOTE — ED PROVIDER NOTES
"History  Chief Complaint   Patient presents with    Post-op Problem     Pt reports drain placed Tuesday morning to left breast is painful, hot and red, still draining into bag. No fevers at home.      59-year-old female who presents with left breast pain, swelling, redness.  Patient is status post IR drainage of left axillary/breast seroma on 6/18/2024.  Was doing well after the procedure.  Tonight, noticed swelling, redness, \"tightness\" to the left breast.  States that the drain continues to drain normally.        Prior to Admission Medications   Prescriptions Last Dose Informant Patient Reported? Taking?   Calcium 250 MG CAPS  Self Yes No   Sig: Take by mouth daily with dinner     Cholecalciferol (VITAMIN D3 PO)  Self Yes No   Sig: Take 2 capsules by mouth in the morning   MAGNESIUM PO  Self Yes No   Sig: Take 200 mg by mouth daily with dinner     MULTIPLE VITAMINS-CALCIUM PO  Self Yes No   Sig: Take 1 capsule by mouth daily in the early morning     Medium Chain Triglycerides (MCT OIL PO)  Self Yes No   Sig: Take 1 Application by mouth in the morning   PARoxetine (PAXIL-CR) 25 mg 24 hr tablet  Self No No   Sig: Take 2 po daily   Spacer/Aero-Holding Chambers (Vortex Valved Holding Chamber) ANGELINE  Self No No   Sig: Use 2 (two) times a day   anastrozole (ARIMIDEX) 1 mg tablet  Self No No   Sig: Take 1 tablet (1 mg total) by mouth daily   budesonide-formoterol (Symbicort) 80-4.5 MCG/ACT inhaler  Self No No   Sig: Inhale 2 puffs 2 (two) times a day Rinse mouth after use.   gabapentin (Neurontin) 100 mg capsule  Self No No   Sig: Take 1 capsule (100 mg total) by mouth daily at bedtime as needed (pain)   gabapentin (Neurontin) 300 mg capsule   No No   Sig: Take 1 capsule (300 mg total) by mouth 3 (three) times a day for 5 days   ibuprofen (MOTRIN) 800 mg tablet  Self No No   Sig: Take 1 tablet (800 mg total) by mouth every 8 (eight) hours as needed for mild pain (DO NOT BEGIN UNTIL 48 HOURS AFTER SURGERY)   Patient not " taking: Reported on 2024   levocetirizine (XYZAL) 5 MG tablet  Self No No   Sig: Take 1 tablet (5 mg total) by mouth every evening   levothyroxine 25 mcg tablet  Self No No   Sig: TAKE ONE TABLET BY MOUTH EVERY DAY MONDAY THRU FRIDAY AND TAKE 2 TABLETS EVERY DAY ON SAT & SUN.   mometasone (NASONEX) 50 mcg/act nasal spray  Self No No   Si sprays into each nostril daily   niacin 100 mg tablet  Self Yes No   Sig: Take 100 mg by mouth daily with breakfast   pseudoephedrine (SUDAFED) 120 MG 12 hr tablet  Self Yes No   Sig: Take 120 mg by mouth as needed for congestion   sodium chloride, PF, 0.9 %   No No   Sig: 10 mL by Intracatheter route daily Intracatheter flushing daily. May substitute prefilled syringe with normal saline 10 mL vials, 10 mL syringes, and 18 g blunt needles   traMADol (Ultram) 50 mg tablet  Self No No   Sig: Take 1 tablet (50 mg total) by mouth every 6 (six) hours as needed for moderate pain   Patient not taking: Reported on 2024      Facility-Administered Medications: None       Past Medical History:   Diagnosis Date    Asthma     Cancer (HCC)     bilateral    Disease of thyroid gland     Hearing aid worn     bilateral    Hearing loss     Osteopenia     Rosacea 2021    Seasonal allergies     Vitamin D deficiency 2018       Past Surgical History:   Procedure Laterality Date    BREAST BIOPSY Right 11/01/2023    X 2 sites    BREAST BIOPSY Left 2023    IR DRAINAGE TUBE PLACEMENT  2024    LASIK      MRI BREAST BIOPSY LEFT (ALL INCLUSIVE) Left 2023    IA ADJNT TIS TRNSFR/REARGMT ANY AREA 30.1-60 SQ CM Right 2024    Procedure: RIGHT AESTHETIC FLAT CLOSURE , IBIS FLAP, PREVENA PLACEMENT;  Surgeon: Samara Felix DO;  [facility];  Service: Plastics    IA BX/EXC LYMPH NODE OPEN SUPERFICIAL Bilateral 2024    Procedure: Bilateral BX LYMPH NODE SENTINEL, lymphoscintigraphies, lymphatic mappings;  Surgeon: Sisi Dominguez MD;  [facility];   Service: Surgical Oncology    CO EXC BREAST LES PREOP PLMT RAD MARKER OPEN 1 LES Left 4/19/2024    Procedure: LEFT BREAST HILARY LOCALIZATION LUMPECTOMY;;  Surgeon: Sisi Dominguez MD;  [facility];  Service: Surgical Oncology    CO INJ RADIOACTIVE TRACER FOR ID OF SENTINEL NODE Bilateral 4/19/2024    Procedure: Bilateral BX LYMPH NODE SENTINEL, lymphoscintigraphies, lymphatic mappings;  Surgeon: Sisi Dominguez MD;  [facility];  Service: Surgical Oncology    CO INTRAOP SENTINEL LYMPH NODE ID W/DYE INJECTION Bilateral 4/19/2024    Procedure: Bilateral BX LYMPH NODE SENTINEL, lymphoscintigraphies, lymphatic mappings;  Surgeon: Sisi Dominguez MD;  [facility];  Service: Surgical Oncology    CO MASTECTOMY PARTIAL Left 4/19/2024    Procedure: LEFT BREAST HILARY LOCALIZATION LUMPECTOMY;;  Surgeon: Sisi Dominguez MD;  [facility];  Service: Surgical Oncology    CO MASTECTOMY SIMPLE COMPLETE Right 4/19/2024    Procedure: RIGHT BREAST MASTECTOMY, PSB AXILLARY DISSECTION;  Surgeon: Sisi Dominguez MD;  [facility];  Service: Surgical Oncology    CO MASTOPEXY Left 4/19/2024    Procedure: MASTOPEXY BREAST;  Surgeon: Samara Felix DO;  [facility];  Service: Plastics    CO NEUROPLASTY &/TRANSPOS MEDIAN NRV CARPAL TUNNE Right 07/10/2018    Procedure: CARPAL TUNNEL RELEASE;  Surgeon: Maicol Taylor DO;  [facility];  Service: Orthopedics    CO NEUROPLASTY &/TRANSPOS MEDIAN NRV CARPAL TUNNE Left 04/19/2019    Procedure: RELEASE CARPAL TUNNEL;  Surgeon: Peterson Alejandro MD;  [facility];  Service: Orthopedics    SEPTOPLASTY      SINUS SURGERY      US BREAST NEEDLE LOC LEFT Left 2/27/2024    US GUIDANCE BREAST BIOPSY RIGHT EACH ADDITIONAL Right 11/01/2023    US GUIDED BREAST BIOPSY RIGHT COMPLETE Right 11/01/2023       Family History   Problem Relation Age of Onset    Hashimoto's thyroiditis Mother     Thyroid disease Mother     Melanoma Mother         50's     Melanoma Father         50's    Crohn's disease Father     Hashimoto's thyroiditis Sister     No Known Problems Sister     No Known Problems Sister     Colon cancer Maternal Uncle         50's    Depression Maternal Grandmother     Diabetes Maternal Grandmother     No Known Problems Maternal Grandfather     No Known Problems Paternal Grandmother     No Known Problems Paternal Grandfather      I have reviewed and agree with the history as documented.    E-Cigarette/Vaping    E-Cigarette Use Never User      E-Cigarette/Vaping Substances    Nicotine No     THC No     CBD No     Flavoring No     Other No     Unknown No      Social History     Tobacco Use    Smoking status: Never    Smokeless tobacco: Never   Vaping Use    Vaping status: Never Used   Substance Use Topics    Alcohol use: Yes     Comment: occasional    Drug use: Never       Review of Systems   Constitutional:  Negative for chills and fever.   HENT:  Negative for rhinorrhea, sore throat and trouble swallowing.    Eyes:  Negative for photophobia and visual disturbance.   Respiratory:  Negative for cough, chest tightness and shortness of breath.    Cardiovascular:  Negative for chest pain, palpitations and leg swelling.   Gastrointestinal:  Negative for abdominal pain, blood in stool, diarrhea, nausea and vomiting.   Endocrine: Negative for polyuria.   Genitourinary:  Negative for dysuria, flank pain, hematuria, vaginal bleeding and vaginal discharge.   Musculoskeletal:  Negative for back pain and neck pain.   Skin:  Positive for wound. Negative for color change and rash.   Allergic/Immunologic: Negative for immunocompromised state.   Neurological:  Negative for dizziness, weakness, light-headedness, numbness and headaches.   All other systems reviewed and are negative.      Physical Exam  Physical Exam  Vitals and nursing note reviewed.   Constitutional:       General: She is not in acute distress.     Appearance: She is well-developed.   HENT:      Head:  Normocephalic and atraumatic.      Mouth/Throat:      Lips: Pink.      Mouth: Mucous membranes are moist.   Eyes:      General: Lids are normal.      Extraocular Movements: Extraocular movements intact.      Conjunctiva/sclera: Conjunctivae normal.      Pupils: Pupils are equal, round, and reactive to light.   Cardiovascular:      Rate and Rhythm: Normal rate and regular rhythm.   Pulmonary:      Effort: Pulmonary effort is normal.   Chest:          Comments: Drain in left axillary area draining clear/yellow fluid.  Site is clean, dry, intact.  Erythema, warmth, mild tenderness demarcated by marker on patient's left breast.  Right-sided mastectomy.  Musculoskeletal:         General: No swelling.      Cervical back: Full passive range of motion without pain, normal range of motion and neck supple.   Skin:     General: Skin is warm.      Capillary Refill: Capillary refill takes less than 2 seconds.   Neurological:      General: No focal deficit present.      Mental Status: She is alert.   Psychiatric:         Mood and Affect: Mood normal.         Speech: Speech normal.         Behavior: Behavior normal.         Vital Signs  ED Triage Vitals [06/22/24 2047]   Temperature Pulse Respirations Blood Pressure SpO2   98.7 °F (37.1 °C) 85 18 130/63 97 %      Temp Source Heart Rate Source Patient Position - Orthostatic VS BP Location FiO2 (%)   Oral Monitor Sitting Right arm --      Pain Score       1           Vitals:    06/22/24 2047 06/22/24 2315 06/23/24 0001   BP: 130/63 117/57 110/56   Pulse: 85 84 85   Patient Position - Orthostatic VS: Sitting Sitting          Visual Acuity      ED Medications  Medications   ceFAZolin (ANCEF) IVPB (premix in dextrose) 2,000 mg 50 mL (0 mg Intravenous Stopped 6/22/24 2222)   iohexol (OMNIPAQUE) 350 MG/ML injection (MULTI-DOSE) 85 mL (85 mL Intravenous Given 6/22/24 2226)       Diagnostic Studies  Results Reviewed       Procedure Component Value Units Date/Time    Basic metabolic panel  [[id number]] Collected: 06/22/24 2132    Lab Status: Final result Specimen: Blood from Arm, Right Updated: 06/22/24 2152     Sodium 135 mmol/L      Potassium 3.9 mmol/L      Chloride 99 mmol/L      CO2 31 mmol/L      ANION GAP 5 mmol/L      BUN 11 mg/dL      Creatinine 0.81 mg/dL      Glucose 111 mg/dL      Calcium 9.9 mg/dL      eGFR 78 ml/min/1.73sq m     Narrative:      National Kidney Disease Foundation guidelines for Chronic Kidney Disease (CKD):     Stage 1 with normal or high GFR (GFR > 90 mL/min/1.73 square meters)    Stage 2 Mild CKD (GFR = 60-89 mL/min/1.73 square meters)    Stage 3A Moderate CKD (GFR = 45-59 mL/min/1.73 square meters)    Stage 3B Moderate CKD (GFR = 30-44 mL/min/1.73 square meters)    Stage 4 Severe CKD (GFR = 15-29 mL/min/1.73 square meters)    Stage 5 End Stage CKD (GFR <15 mL/min/1.73 square meters)  Note: GFR calculation is accurate only with a steady state creatinine    CBC and differential [[id number]]  (Abnormal) Collected: 06/22/24 2132    Lab Status: Final result Specimen: Blood from Arm, Right Updated: 06/22/24 2138     WBC 10.57 Thousand/uL      RBC 4.31 Million/uL      Hemoglobin 12.6 g/dL      Hematocrit 38.1 %      MCV 88 fL      MCH 29.2 pg      MCHC 33.1 g/dL      RDW 12.5 %      MPV 9.7 fL      Platelets 237 Thousands/uL      nRBC 0 /100 WBCs      Segmented % 74 %      Immature Grans % 0 %      Lymphocytes % 16 %      Monocytes % 8 %      Eosinophils Relative 2 %      Basophils Relative 0 %      Absolute Neutrophils 7.82 Thousands/µL      Absolute Immature Grans 0.02 Thousand/uL      Absolute Lymphocytes 1.68 Thousands/µL      Absolute Monocytes 0.87 Thousand/µL      Eosinophils Absolute 0.17 Thousand/µL      Basophils Absolute 0.01 Thousands/µL                    CT chest with contrast   Final Result by Magdiel Villafuerte MD (06/22 2332)   Addendum (preliminary) 1 of 1 by Magdiel Villafuerte MD (06/22 2332)   ADDENDUM:      Aforementioned findings were discussed with Dr. Gagnon at  11:32 p.m. on    6/22/2024.      Final      Focal 6.8 x 3.8 cm fluid collection in the left axillary region/superolateral left breast extending from the skin surface to the lateral margin of the pectoralis musculature with drainage catheter in position. Findings may reflect postprocedural seroma    given clinical history although superimposed infection i.e. developing abscess not excluded. Clinical correlation recommended.               Workstation performed: [id number]                    Procedures  Procedures         ED Course  ED Course as of 06/23/24 0006   Sat Jun 22, 2024   2243 CT chest with contrast  Approximately 6 x 4 cm fluid collection left breast associated with cellulitis as interpreted by myself.   2341 Text sent to plastics AP.   2341 Spoke with radiologist regarding CT findings.  I did ask about possible septations, states that he does not see any septations and the drain appears to be in proper positioning.  Given the clear, yellow drainage, CT would favor seroma over abscess.   2354 Plastics [facility] added Dr. Felix to the conversation.  Relayed the physical exam findings and CT read.  Likely seroma with associated cellulitis.  They agree patient can be discharged on Bactrim.  They will set up follow up for her on Monday morning.                               SBIRT 20yo+      Flowsheet Row Most Recent Value   Initial Alcohol Screen: US AUDIT-C     1. How often do you have a drink containing alcohol? 0 Filed at: 06/22/2024 2128   2. How many drinks containing alcohol do you have on a typical day you are drinking?  0 Filed at: 06/22/2024 2128   3a. Male UNDER 65: How often do you have five or more drinks on one occasion? 0 Filed at: 06/22/2024 2128   3b. FEMALE Any Age, or MALE 65+: How often do you have 4 or more drinks on one occassion? 0 Filed at: 06/22/2024 2128   Audit-C Score 0 Filed at: 06/22/2024 2128   FATOU: How many times in the past year have you...    Used an illegal drug or used a prescription  medication for non-medical reasons? Never Filed at: 06/22/2024 5205                      Medical Decision Making  Concern for developing cellulitis or deeper infection given presence of tube.  CBC to evaluate for evidence of marked leukocytosis or anemia.  BMP to evaluate renal function and electrolytes.  Will start IV Ancef.  CT chest with contrast to evaluate for deeper infection/abscess.  Fluid draining from the seroma site does not appear to be infected.    Problems Addressed:  Cellulitis of left breast: complicated acute illness or injury    Amount and/or Complexity of Data Reviewed  Labs: ordered.  Radiology: ordered and independent interpretation performed. Decision-making details documented in ED Course.    Risk  Prescription drug management.             Disposition  Final diagnoses:   Cellulitis of left breast     Time reflects when diagnosis was documented in both MDM as applicable and the Disposition within this note       Time User Action Codes Description Comment    6/22/2024 11:56 PM Noel Gagnon Add [N61.0] Cellulitis of left breast           ED Disposition       ED Disposition   Discharge    Condition   Stable    Date/Time   Sat Jun 22, 2024 3819    Comment   Richa MCCAIN Stubits discharge to home/self care.                   Follow-up Information       Follow up With Specialties Details Why Contact Info Additional Information    Samara Felix DO Plastic Surgery   [address]  [address]  [facility] [address]  [phone]       [facility] Emergency Medicine Go to  If symptoms worsen [address] [facility] [id number]  [phone] [facility], [address] [place], [place], [address]            Discharge Medication List as of 6/22/2024 11:57 PM        START taking these medications    Details   sulfamethoxazole-trimethoprim (BACTRIM DS) 800-160 mg per tablet Take 1 tablet by mouth 2 (two) times  a day for 10 days smx-tmp DS (BACTRIM) 800-160 mg tabs (1tab q12 D10), Starting Sat 6/22/2024, Until Tue 7/2/2024, Normal           CONTINUE these medications which have NOT CHANGED    Details   anastrozole (ARIMIDEX) 1 mg tablet Take 1 tablet (1 mg total) by mouth daily, Starting Tue 4/2/2024, Normal      budesonide-formoterol (Symbicort) 80-4.5 MCG/ACT inhaler Inhale 2 puffs 2 (two) times a day Rinse mouth after use., Starting Mon 4/1/2024, Normal      Calcium 250 MG CAPS Take by mouth daily with dinner  , Historical Med      Cholecalciferol (VITAMIN D3 PO) Take 2 capsules by mouth in the morning, Historical Med      gabapentin (Neurontin) 100 mg capsule Take 1 capsule (100 mg total) by mouth daily at bedtime as needed (pain), Starting Fri 5/3/2024, Until Sun 6/2/2024 at 2359, Normal      gabapentin (Neurontin) 300 mg capsule Take 1 capsule (300 mg total) by mouth 3 (three) times a day for 5 days, Starting Wed 4/17/2024, Until Mon 4/22/2024, Normal      ibuprofen (MOTRIN) 800 mg tablet Take 1 tablet (800 mg total) by mouth every 8 (eight) hours as needed for mild pain (DO NOT BEGIN UNTIL 48 HOURS AFTER SURGERY), Starting Wed 4/17/2024, Normal      levocetirizine (XYZAL) 5 MG tablet Take 1 tablet (5 mg total) by mouth every evening, Starting Mon 4/1/2024, Normal      levothyroxine 25 mcg tablet TAKE ONE TABLET BY MOUTH EVERY DAY MONDAY THRU FRIDAY AND TAKE 2 TABLETS EVERY DAY ON SAT & SUN., Normal      MAGNESIUM PO Take 200 mg by mouth daily with dinner  , Starting Thu 4/11/2013, Historical Med      Medium Chain Triglycerides (MCT OIL PO) Take 1 Application by mouth in the morning, Historical Med      mometasone (NASONEX) 50 mcg/act nasal spray 2 sprays into each nostril daily, Starting Mon 4/1/2024, Normal      MULTIPLE VITAMINS-CALCIUM PO Take 1 capsule by mouth daily in the early morning  , Historical Med      niacin 100 mg tablet Take 100 mg by mouth daily with breakfast, Historical Med      PARoxetine  (PAXIL-CR) 25 mg 24 hr tablet Take 2 po daily, Normal      pseudoephedrine (SUDAFED) 120 MG 12 hr tablet Take 120 mg by mouth as needed for congestion, Starting Thu 4/11/2013, Historical Med      sodium chloride, PF, 0.9 % 10 mL by Intracatheter route daily Intracatheter flushing daily. May substitute prefilled syringe with normal saline 10 mL vials, 10 mL syringes, and 18 g blunt needles, Starting Tue 6/18/2024, Until Mon 9/16/2024, Normal      Spacer/Aero-Holding Chambers (Vortex Valved Holding Chamber) ANGELINE Use 2 (two) times a day, Starting Thu 2/22/2024, Normal      traMADol (Ultram) 50 mg tablet Take 1 tablet (50 mg total) by mouth every 6 (six) hours as needed for moderate pain, Starting Wed 4/17/2024, Normal             No discharge procedures on file.    PDMP Review         Value Time User    PDMP Reviewed  Yes 4/17/2024  4:59 PM Missy Coppola PA-C            ED Provider  Electronically Signed by             Noel Gagnon MD  06/23/24 0006

## 2024-06-24 ENCOUNTER — OFFICE VISIT (OUTPATIENT)
Dept: PLASTIC SURGERY | Facility: CLINIC | Age: 59
End: 2024-06-24

## 2024-06-24 DIAGNOSIS — Z17.0 MALIGNANT NEOPLASM OF UPPER-OUTER QUADRANT OF LEFT BREAST IN FEMALE, ESTROGEN RECEPTOR POSITIVE (HCC): Primary | ICD-10-CM

## 2024-06-24 DIAGNOSIS — C50.412 MALIGNANT NEOPLASM OF UPPER-OUTER QUADRANT OF LEFT BREAST IN FEMALE, ESTROGEN RECEPTOR POSITIVE (HCC): Primary | ICD-10-CM

## 2024-06-24 PROCEDURE — 99024 POSTOP FOLLOW-UP VISIT: CPT | Performed by: PHYSICIAN ASSISTANT

## 2024-06-24 NOTE — PROGRESS NOTES
Assessment and Plan:  Richa Medina 59 y.o. female s/p right breast mastectomy, right axillary dissection, left SLNB, left mastopexy 4/19/24, presenting for post op appointment    - patient had drain placed by IR 6/18 to left axilla. She has been draining approximately 50cc serous daily. She has a follow up appointment for tube check with possible sclerosis 6/27/24  - erythema to left breast has been improving since started on bactrim.   - encouraged continued compression to left side.   - return in 2 weeks for breast check. Patient to call if any questions/concerns prior     Subjective:    Patient noticed erythema to left breast over the weekend. She felt unwell on Saturday, no notable fever. She presented to the ER 6/22 for left breast cellulitis and was started on bactrim. CT did show left axilla fluid collection which currently has a drain in the cavity. States her erythema has been improving since started on bactrim.      Objective:  NAD, AAOx3  Left breast incisions C/D/I, left axilla drain in place with serous output, erythema outlined which has not spread  Right breast incision C/D/I, no palpable seroma     Missy Coppola PA-C

## 2024-06-25 ENCOUNTER — CONSULT (OUTPATIENT)
Dept: RADIATION ONCOLOGY | Facility: CLINIC | Age: 59
End: 2024-06-25
Payer: COMMERCIAL

## 2024-06-25 VITALS
BODY MASS INDEX: 25.69 KG/M2 | OXYGEN SATURATION: 98 % | TEMPERATURE: 98 F | SYSTOLIC BLOOD PRESSURE: 104 MMHG | DIASTOLIC BLOOD PRESSURE: 60 MMHG | WEIGHT: 145 LBS | HEART RATE: 80 BPM

## 2024-06-25 DIAGNOSIS — Z17.0 MALIGNANT NEOPLASM OF UPPER-OUTER QUADRANT OF LEFT BREAST IN FEMALE, ESTROGEN RECEPTOR POSITIVE (HCC): Primary | ICD-10-CM

## 2024-06-25 DIAGNOSIS — Z17.0 MALIGNANT NEOPLASM OF OVERLAPPING SITES OF RIGHT BREAST IN FEMALE, ESTROGEN RECEPTOR POSITIVE (HCC): ICD-10-CM

## 2024-06-25 DIAGNOSIS — C50.412 MALIGNANT NEOPLASM OF UPPER-OUTER QUADRANT OF LEFT BREAST IN FEMALE, ESTROGEN RECEPTOR POSITIVE (HCC): Primary | ICD-10-CM

## 2024-06-25 DIAGNOSIS — C50.811 MALIGNANT NEOPLASM OF OVERLAPPING SITES OF RIGHT BREAST IN FEMALE, ESTROGEN RECEPTOR POSITIVE (HCC): ICD-10-CM

## 2024-06-25 PROCEDURE — 99245 OFF/OP CONSLTJ NEW/EST HI 55: CPT | Performed by: RADIOLOGY

## 2024-06-25 PROCEDURE — 99211 OFF/OP EST MAY X REQ PHY/QHP: CPT | Performed by: RADIOLOGY

## 2024-06-25 NOTE — PROGRESS NOTES
Consultation - Radiation Oncology      MRN:[id number] : 1965  Encounter: [id number]  Patient Information: Richa Medina      CHIEF COMPLAINT  Chief Complaint   Patient presents with    Consult     Cancer Staging   Malignant neoplasm of overlapping sites of right breast in female, estrogen receptor positive (HCC)  Staging form: Breast, AJCC 8th Edition  - Clinical stage from 2023: Stage IA (cT1c, cN0, cM0, G1, ER+, AR+, HER2-) - Signed by Sisi Dominguez MD on 2023  Stage prefix: Initial diagnosis  Method of lymph node assessment: Clinical  Histologic grading system: 3 grade system  - Pathologic stage from 2024: No stage assigned - Unsigned  Stage prefix: Initial diagnosis  - Pathologic stage from 2024: Stage IA (pT1c, pN1a(sn), cM0, G1, ER+, AR+, HER2-) - Signed by Sisi Dominguez MD on 2024  Stage prefix: Initial diagnosis  Method of lymph node assessment: [place] lymph node biopsy  Histologic grading system: 3 grade system    Malignant neoplasm of upper-outer quadrant of left breast in female, estrogen receptor positive (HCC)  Staging form: Breast, AJCC 8th Edition  - Clinical stage from 2023: Stage IA (cT1b, cN0, cM0, G1, ER+, AR-, HER2-) - Signed by Sisi Dominguez MD on 2023  Stage prefix: Initial diagnosis  Method of lymph node assessment: Clinical  Histologic grading system: 3 grade system  - Pathologic stage from 2024: Stage IA (pT1a, pN0(sn), cM0, G1, ER+, AR-, HER2-) - Signed by iSsi Dominguez MD on 2024  Stage prefix: Initial diagnosis  Method of lymph node assessment: [place] lymph node biopsy  Histologic grading system: 3 grade system           History of Present Illness   Richa Medina is a 59 y.o. year old female who presents with bilateral breast cancer.  Presents in consultation for discussion of RT.  Referred by Dr. Dominguez.  Additional medical problems include hypothyroidism, pre-diabetes, and vitamin D deficiency.     Presented for routine  screening mammogram with results revealing focal asymmetry in the right breast.  There were no suspicious masses, grouped calcifications, or unexplained architectural distortions noted in left breast.  A f/u diagnostic contrast enhanced mammogram and US was completed.  3 separate masses were noted in right breast. A small foci of enhancement was noted in left breast.  There were no morphologically abnormal axillary nodes noted in either axilla. F/U breast biopsy completed       11/1/23  Tissue Exam  A. Breast, “US biopsy right breast, 7:00, 5 cm FN, 4 passes, 12 G,” Biopsy:  - Invasive breast carcinoma of no special type (ductal NST/invasive ductal carcinoma)  - [place] grade 1 of 3 (Score 5 of 9)               -Tubular formation >10%, score 2               - Nuclear grade 2 of 3, score 2               - Mitoses, score 1  - Confirmed by tumor cell immunophenotype:               - Positive: E-cadherin, p120 - (each in a membranous pattern), JULIANA-3               - Negative: p63, SMMHC, p63  - Invasive carcinoma 4 of 4 submitted core biopsies, max. Dimension = 10 millimeters  - Estrogen, progesterone & HER2 receptor studies pending, to be described in a separate receptor report  - Ductal carcinoma in situ (DCIS): Present, papillary type, low grade  - Lymph-vascular invasion: Not identified   - Perineural invasion: Not identified  - Microcalcifications: Present, within invasive carcinoma and benign ducts     B. Breast, “US biopsy right breast, 12:00, 8 cm FN, 5-passes, 12 G marquee,” Biopsy:  - Invasive breast carcinoma of no special type (ductal NST/invasive ductal carcinoma)  - [place] grade 1 of 3 (Score 5 of 9)               -Tubular formation >10%, score 2               - Nuclear grade 2 of 3, score 2               - Mitoses, score 1  - Confirmed by tumor cell immunophenotype:               - Positive: E-cadherin, p120 - (each in a membranous pattern), JULIANA-3               - Negative: p63, SMMHC, p63  -  Invasive carcinoma 5 of 5 submitted core biopsies, max. Dimension = 11 millimeters  - Estrogen, progesterone & HER2 receptor studies pending, to be described in a separate receptor report  - Ductal carcinoma in situ (DCIS): Present, papillary and solid type, low grade  - Lymph-vascular invasion: Not identified   - Perineural invasion: Not identified  - Microcalcifications: Present, within invasive carcinoma  - Focal typical lobular neoplasia/ Atypical lobular hyperplasia  (Block B1)     11/13/23  Dr. Dominguez  Has declined genetic counseling.  F/U breast MRI is scheduled.  As disease is multicentric, right mastectomy is recommended.  Additional recommendations pending breast MRI     11/21/23  Bilateral Breast MRI  IMPRESSION:  1.  There is non-mass enhancement in the left breast at 1:00 which is suspicious.  This corresponds with the enhancement seen on the contrast-enhanced mammogram.  MRI guided core needle biopsy is recommended.  2.  Multifocal malignancy in the right breast.  There is no evidence of pectoralis muscle, skin or nipple invasion.  The most posterior areas of enhancement are 24 mm inferior/posterior to the superior Thais  reflector.  3.  There is abnormal opacification and enhancement in the right lower lung.  This is incompletely characterized on MRI.  Correlation with known medical history and CT chest is recommended.   ASSESSMENT/BI-RADS CATEGORY:  Left: 4 - Suspicious  Right: 6 - Known Biopsy-Proven Malignancy  Overall: 4 - Suspicious   RECOMMENDATION:       - MRI-guided breast biopsy for the left breast.       - Surgical consultation for the right breast.  -CT Chest      12/4/23  Breast Cancer Multidisciplinary Case Review  PHYSICIAN RECOMMENDED PLAN:  Recommend right breast mastectomy  Recommend left breast biopsy based upon breast MRI findings  If left breast biopsy shows malignancy, recommend genetic testing and consider left breast lumpectomy     12/5/23  CT of Chest  IMPRESSION:   1.  Multifocal patchy airspace opacities are identified within the lungs. There is a predilection for the anterior portions of the lungs, including the anterior upper lobes, right middle lobe and lingula, and anterior left lower lobe. This pattern, in   conjunction with several areas of mucous plugging, favors an inflammatory or infectious etiology, with greatest suspicion for atypical mycobacterial infection. Other possibilities include organizing pneumonia or an atypical appearance of sarcoidosis.   Although not completely excluded, pulmonary metastatic disease is felt much less likely.  2. Mildly prominent mediastinal lymph nodes, likely reactive.  3. Pulmonary consultation is recommended. Close interim reassessment noncontrast chest CT is also recommended in 3 months.     12/8/23  Tissue Exam  A. 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   * [place] grade 1 of 3 (total score: 3 of 9)    -- tubule formation > 75%, score 1    -- nuclear grade 1 of 3, score 1    -- mitoses < 3/mm2, (</= 7 mitoses/10HPF), score 1.   * Confirmed by tumor cell immunophenotype:    -- negative:  p63, calponin-B, SMMHC, p40, S100.     * Invasive carcinoma involves 2 of 8 submitted core biopsies, max. dimension = 5 millimeters.   * Estrogen, progesterone & HER2 receptor studies pending, to be described in a separate receptor report.    * Ductal carcinoma in situ (DCIS): Present; minor component (< 25% of total tumor). Loss of mosaic CK5/6 staining.    -- cribriform pattern, nuclear grade 1 of 3, without necrosis.    * Lymph-vascular invasion: Not identified.    * Microcalcifications: Absent.      12/27/24  Dr. Dominguez  Bronchoscopy is scheduled.  Will refer to medical oncology for discussion of hormone therapy.  Will present at tumor board     12/29/24  Bronchoscopy  FINDINGS:  The vocal cords appeared normal.  Moderate, thick and purulent secretions present in the trachea,  main reyes, lingula, right middle lobar bronchus and right lower lobar bronchus; secretions were easily removed and the airway was cleared; performed washing  Bronchoalveolar lavage was performed x3 in the right middle lobar bronchus with 150 mL of saline instilled and a total return of 60 mL; the fluid appeared purulent. Thick purulent secretions  Bronchoalveolar lavage was performed x3 with 150 mL of saline instilled and a total return of 35 mL; the fluid appeared thick. Thick purulent secretions     1/15/24  Breast Cancer Multidisciplinary Case Conference  PHYSICIAN RECOMMENDED PLAN:  Recommended surgery after CT scan on 01/17/2024  Recommends neoadjuvant endocrine therapy   Consider sending specimen for oncotype     1/23/24  Dr. Yung.  Pending pulmonary clearance will start aromatase inhibitor.  Will also send Oncotype on the 12:00 right breast lesion     1/26/24  Oncotype Recurrence Score=9 from right breast 11/1/23 biopsy     4/19/24 Dr. Dominguez/Dr. Felix   LEFT BREAST HILARY LOCALIZATION LUMPECTOMY; (Left: Breast)       RIGHT BREAST MASTECTOMY, PSB AXILLARY DISSECTION (Right: Breast)       Bilateral BX LYMPH NODE SENTINEL, lymphoscintigraphies, lymphatic mappings (Bilateral: Axilla)       RIGHT AESTHETIC FLAT CLOSURE , IBIS FLAP, PREVENA PLACEMENT (Right: Breast)       MASTOPEXY BREAST (Left: Breast)      4/19/24  Tissue Exam  INVASIVE CARCINOMA OF THE BREAST: Resection   8th Edition - Protocol posted: 12/13/2023INVASIVE CARCINOMA OF THE BREAST: RESECTION - I, J, K, L          SPECIMEN   Procedure   Total mastectomy   Specimen Laterality   Right   TUMOR   Tumor Site   Clock position       7 o'clock       12 o'clock   Tumor Site   Distance from nipple (Centimeters): 5, 8 cm   Histologic Type   Invasive carcinoma of no special type (ductal)   Histologic Grade (Nolvia Histologic Score)       Glandular (Acinar) / Tubular Differentiation   Score 2   Nuclear Pleomorphism   Score 2   Mitotic Rate   Score 1    Overall Grade   Grade 1 (scores of 3, 4 or 5)   Tumor Size   Greatest dimension of largest invasive focus (Millimeters): 12 mm   Tumor Focality   Multiple foci of invasive carcinoma   Number of Foci   2   Sizes of Individual Foci in Millimeters (mm)   12; 11   Ductal Carcinoma In Situ (DCIS)   Present       Negative for extensive intraductal component (EIC)   Architectural Patterns   Cribriform   Nuclear Grade   Grade I (low)   Necrosis   Not identified   Lobular Carcinoma In Situ (LCIS)   Not identified   Lymphatic and / or Vascular Invasion   Not identified   Microcalcifications   Present in DCIS       Present in invasive carcinoma       Present in non-neoplastic tissue   Treatment Effect in the Breast   No known presurgical therapy   MARGINS   Margin Status for Invasive Carcinoma   All margins negative for invasive carcinoma   Distance from Invasive Carcinoma to Closest Margin   Greater than: 2 mm   Margin Status for DCIS   All margins negative for DCIS   Distance from DCIS to Closest Margin   Greater than: 2 mm   REGIONAL LYMPH NODES   Regional Lymph Node Status   Tumor present in regional lymph node(s)   Number of Lymph Nodes with Macrometastases   1   Number of Lymph Nodes with Micrometastases   0   Size of Largest Sheng Metastatic Deposit   6 mm   Extranodal Extension   Not identified   Total Number of Lymph Nodes Examined (sentinel and non-sentinel)   8   Number of Morse Nodes Examined   2   pTNM CLASSIFICATION (AJCC 8th Edition)   Reporting of pT, pN, and (when applicable) pM categories is based on information available to the pathologist at the time the report is issued. As per the AJCC (Chapter 1, 8th Ed.) it is the managing physician’s responsibility to establish the final pathologic stage based upon all pertinent information, including but potentially not limited to this pathology report.   pT Category   pT1c   T Suffix   (m)   pN Category   pN1a   SPECIAL STUDIES           Estrogen Receptor (ER)  Status   Positive (greater than 10% of cells demonstrate nuclear positivity)           Progesterone Receptor (PgR) Status   Positive           HER2 (by immunohistochemistry)   Equivocal (Score 2+)           HER2 (by in situ hybridization)   Negative (not amplified)   Testing Performed on Case Number   [id number]   Comment(s)   This synoptic report is based on the findings in the current specimen and the patient's preceding core biopsy specimens ([id number]), slides from which were reviewed.   .   INVASIVE CARCINOMA OF THE BREAST: Resection   8th Edition - Protocol posted: 12/13/2023INVASIVE CARCINOMA OF THE BREAST: RESECTION - A, B, C, D, E, F, G, H          SPECIMEN   Procedure   Excision (less than total mastectomy)   Specimen Laterality   Left   TUMOR   Tumor Site   Clock position       1 o'clock   Histologic Type   Tubular carcinoma   Histologic Grade ([place] Histologic Score)       Glandular (Acinar) / Tubular Differentiation   Score 1   Nuclear Pleomorphism   Score 1   Mitotic Rate   Score 1   Overall Grade   Grade 1 (scores of 3, 4 or 5)   Tumor Size   Greatest dimension of largest invasive focus (Millimeters): 5 mm   Tumor Focality   Single focus of invasive carcinoma   Ductal Carcinoma In Situ (DCIS)   Present       Negative for extensive intraductal component (EIC)   Architectural Patterns   Cribriform   Nuclear Grade   Grade I (low)   Necrosis   Not identified   Lobular Carcinoma In Situ (LCIS)   Not identified   Lymphatic and / or Vascular Invasion   Not identified   Microcalcifications   Present in non-neoplastic tissue   Treatment Effect in the Breast   No known presurgical therapy   MARGINS   Margin Status for Invasive Carcinoma   All margins negative for invasive carcinoma   Distance from Invasive Carcinoma to Closest Margin   Greater than: 2 mm   Margin Status for DCIS   All margins negative for DCIS   Distance from DCIS to Closest Margin   Greater than: 2 mm   REGIONAL LYMPH NODES   Regional  Lymph Node Status   All regional lymph nodes negative for tumor   Total Number of Lymph Nodes Examined (sentinel and non-sentinel)   4   Number of Stamps Nodes Examined   4   pTNM CLASSIFICATION (AJCC 8th Edition)   Reporting of pT, pN, and (when applicable) pM categories is based on information available to the pathologist at the time the report is issued. As per the AJCC (Chapter 1, 8th Ed.) it is the managing physician’s responsibility to establish the final pathologic stage based upon all pertinent information, including but potentially not limited to this pathology report.   pT Category   pT1a   pN Category   pN0   SPECIAL STUDIES           Estrogen Receptor (ER) Status   Positive (greater than 10% of cells demonstrate nuclear positivity)           Progesterone Receptor (PgR) Status   Negative           HER2 (by immunohistochemistry)   Negative (Score 1+)   Testing Performed on Case Number   [id number]   Comment(s)   This synoptic report is based on the findings in the current specimen and the patient's preceding core biopsy specimens ([id number]), slides from which were reviewed.   .      5/6/24  Dr. Dominguez  Post-op visit.  Reports feeling well.  Has appointments with medical and radiation oncology scheduled.  F/U in 6 months     5/14/24  Dr. Yung  On anastrozole.  Right-sided lesion was more advanced than anticipated.  Will send for oncotype.  If no indication for chemotherapy, will refer to radiation oncology     6/7/24  [place], Plastic Surgery  Turbid fluid aspirated from left axillary seroma.  Has IR appointment for drain placement     6/18/24  IR drain placement     6/22/24 [facility] ED  DX: seroma/cellulitis  Discharged home on bactrim     6/24/24  Plastics  Erythema improved since starting bactrim.  Still draining approx. 50 cc serous daily.  F/U in 2 weeks     Patient seen for consultation today alone.  She cannot palpate her disease and was diagnosed on routine screening mammogram.  She still has a  left axillary drain from her left axillary seroma.  This is draining about 50 cc/day.  She is on antibiotics with Bactrim.  She has some mild discomfort about her drain.  She reports her skin erythema is improving.  She reports having menopause at about the age of 48 and then did have hormone replacement therapy for 8 years.  She lives with her 65-year-old  who is healthy.  She has 2 children both sons that are healthy.  Her mother is 79 and healthy.  Her father is 83 and healthy.  She has 3 sisters that are healthy.  She has no brothers.  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  She does not smoke any tobacco.  She uses alcohol socially.  She denies any previous radiation therapy nor any chemotherapy.    Upcomin24  IR sclerosis  24    Plastics  24  Dr. Yung  24   Surgical Oncology    Historical Information   Oncology History   Malignant neoplasm of overlapping sites of right breast in female, estrogen receptor positive (HCC)   2023 Biopsy    Right breast ultrasound-guided biopsy  A. 7 o'clock, 5 cm from nipple  Invasive breast carcinoma of no special type (ductal NST)  Grade 1  ; MT 90; HER2 2+, FISH negative  Lymphovascular invasion not identified    B. 12 o'clock, 8 cm from nipple  Invasive breast carcinoma of no special type (ductal NST)  Grade 1  , MT 35, HER2 1+  Lymphovascular invasion not identified    Concordant. Malignancy appears multicentric; there were additional concerning areas within the right breast seen on recent imaging. There were also some areas of enhancement in the upper outer quadrant of the left breast on the contrast-enhanced mammogram which did not have a definite correlate on US;. Pretreatment MRI is recommended.     2023 -  Cancer Staged    Staging form: Breast, AJCC 8th Edition  - Clinical stage from 2023: Stage IA (cT1c, cN0, cM0, G1, ER+, MT+, HER2-) - Signed by Sisi Dominguez MD on  11/13/2023  Stage prefix: Initial diagnosis  Method of lymph node assessment: Clinical  Histologic grading system: 3 grade system       11/13/2023 Genetic Testing    Consult with Dr. Dominguez - patient declined genetic testing     11/21/2023 Observation    Bilateral breast MRI IMPRESSION:  1.  There is non-mass enhancement in the left breast at 1:00 which is suspicious.  This corresponds with the enhancement seen on the contrast-enhanced mammogram.  MRI guided core needle biopsy is recommended.  2.  Multifocal malignancy in the right breast.  There is no evidence of pectoralis muscle, skin or nipple invasion.  The most posterior areas of enhancement are 24 mm inferior/posterior to the superior Thais  reflector.  3.  There is abnormal opacification and enhancement in the right lower lung.  This is incompletely characterized on MRI.  Correlation with known medical history and CT chest is recommended.     1/23/2024 -  Hormone Therapy    Consult with Dr. Yung  Anastrozole 1 mg daily   Started in neoadjuvant setting to allow for resolution of pulmonary issues prior to surgery     2/2/2024 Genomic Testing    Oncotype DX Recurrence Score 9  Done on right breast biopsy specimen B     4/19/2024 Surgery    Right mastectomy with axillary dissection and sentinel lymph node biopsy  Invasive carcinoma of no special type (ductal)  Grade 1  1.2 cm (2 foci)  Margins negative  1/8 Lymph nodes with macro-metastases (6 mm, extranodal extension not identified)    Aesthetic flat closure (Dr. Felix)     4/19/2024 -  Cancer Staged    Staging form: Breast, AJCC 8th Edition  - Pathologic stage from 4/19/2024: Stage IA (pT1c, pN1a(sn), cM0, G1, ER+, MI+, HER2-) - Signed by Sisi Dominguez MD on 5/6/2024  Stage prefix: Initial diagnosis  Method of lymph node assessment: [place] lymph node biopsy  Histologic grading system: 3 grade system       Malignant neoplasm of upper-outer quadrant of left breast in female, estrogen receptor positive  (HCC)   11/13/2023 Genetic Testing    Consult with Dr. Dominguez - patient declined genetic testing     11/21/2023 Observation    Bilateral breast MRI IMPRESSION:  1.  There is non-mass enhancement in the left breast at 1:00 which is suspicious.  This corresponds with the enhancement seen on the contrast-enhanced mammogram.  MRI guided core needle biopsy is recommended.  2.  Multifocal malignancy in the right breast.  There is no evidence of pectoralis muscle, skin or nipple invasion.  The most posterior areas of enhancement are 24 mm inferior/posterior to the superior Thais  reflector.  3.  There is abnormal opacification and enhancement in the right lower lung.  This is incompletely characterized on MRI.  Correlation with known medical history and CT chest is recommended.     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 Left malignancy appears unifocal. The non-mass enhancement measured up to 8 mm on MRI. US left axilla negative.     12/8/2023 -  Cancer Staged    Staging form: Breast, AJCC 8th Edition  - Clinical stage from 12/8/2023: Stage IA (cT1b, cN0, cM0, G1, ER+, ND-, HER2-) - Signed by Sisi Dominguez MD on 12/27/2023  Stage prefix: Initial diagnosis  Method of lymph node assessment: Clinical  Histologic grading system: 3 grade system       1/23/2024 -  Hormone Therapy    Consult with Dr. Yung  Anastrozole 1 mg daily   Started in neoadjuvant setting to allow for resolution of pulmonary issues prior to surgery     4/19/2024 Surgery    Left breast THAIS localized lumpectomy with sentinel lymph node biopsy  Tubular carcinoma  Grade 1  5 mm  Margins negative  0/4 Lymph nodes    Mastopexy (Dr. Felix)     4/19/2024 -  Cancer Staged    Staging form: Breast, AJCC 8th Edition  - Pathologic stage from 4/19/2024: Stage IA  (pT1a, pN0(sn), cM0, G1, ER+, CT-, HER2-) - Signed by Sisi Dominguez MD on 5/6/2024  Stage prefix: Initial diagnosis  Method of lymph node assessment: [place] lymph node biopsy  Histologic grading system: 3 grade system         Past Medical History:   Diagnosis Date    Asthma     Breast mass 10/31/23    Cancer (HCC)     bilateral    Depression     Disease of thyroid gland     Hearing aid worn     bilateral    Hearing loss     Osteopenia     Rosacea 02/09/2021    Seasonal allergies     Vitamin D deficiency 07/24/2018     Past Surgical History:   Procedure Laterality Date    BREAST BIOPSY Right 11/01/2023    X 2 sites    BREAST BIOPSY Left 12/08/2023    IR DRAINAGE TUBE PLACEMENT  6/18/2024    LASIK      MRI BREAST BIOPSY LEFT (ALL INCLUSIVE) Left 12/08/2023    CT ADJNT TIS TRNSFR/REARGMT ANY AREA 30.1-60 SQ CM Right 4/19/2024    Procedure: RIGHT AESTHETIC FLAT CLOSURE , IBIS FLAP, PREVENA PLACEMENT;  Surgeon: Samara Felix DO;  Location: [facility];  Service: Plastics    CT BX/EXC LYMPH NODE OPEN SUPERFICIAL Bilateral 4/19/2024    Procedure: Bilateral BX LYMPH NODE SENTINEL, lymphoscintigraphies, lymphatic mappings;  Surgeon: Sisi Dominguez MD;  Location: [facility];  Service: Surgical Oncology    CT EXC BREAST LES PREOP PLMT RAD MARKER OPEN 1 LES Left 4/19/2024    Procedure: LEFT BREAST HILARY LOCALIZATION LUMPECTOMY;;  Surgeon: Sisi Dominguez MD;  [facility];  Service: Surgical Oncology    CT INJ RADIOACTIVE TRACER FOR ID OF SENTINEL NODE Bilateral 4/19/2024    Procedure: Bilateral BX LYMPH NODE SENTINEL, lymphoscintigraphies, lymphatic mappings;  Surgeon: Sisi Dominguez MD;  [facility];  Service: Surgical Oncology    CT INTRAOP SENTINEL LYMPH NODE ID W/DYE INJECTION Bilateral 4/19/2024    Procedure: Bilateral BX LYMPH NODE SENTINEL, lymphoscintigraphies, lymphatic mappings;  Surgeon: Sisi Dominguez MD;  [facility];  Service: Surgical Oncology    CT MASTECTOMY PARTIAL Left 4/19/2024     Procedure: LEFT BREAST HILARY LOCALIZATION LUMPECTOMY;;  Surgeon: Sisi Dominguez MD;  [facility];  Service: Surgical Oncology    MT MASTECTOMY SIMPLE COMPLETE Right 4/19/2024    Procedure: RIGHT BREAST MASTECTOMY, PSB AXILLARY DISSECTION;  Surgeon: Sisi Dominguez MD;  [facility];  Service: Surgical Oncology    MT MASTOPEXY Left 4/19/2024    Procedure: MASTOPEXY BREAST;  Surgeon: Samara Felix DO;  [facility]: [facility];  Service: Plastics    MT NEUROPLASTY &/TRANSPOS MEDIAN NRV CARPAL TUNNE Right 07/10/2018    Procedure: CARPAL TUNNEL RELEASE;  Surgeon: Maicol Taylor DO;  [facility];  Service: Orthopedics    MT NEUROPLASTY &/TRANSPOS MEDIAN NRV CARPAL TUNNE Left 04/19/2019    Procedure: RELEASE CARPAL TUNNEL;  Surgeon: Peterson Alejandro MD;  Location: [facility];  Service: Orthopedics    SEPTOPLASTY      SINUS SURGERY      US BREAST NEEDLE LOC LEFT Left 2/27/2024    US GUIDANCE BREAST BIOPSY RIGHT EACH ADDITIONAL Right 11/01/2023    US GUIDED BREAST BIOPSY RIGHT COMPLETE Right 11/01/2023     Family History   Problem Relation Age of Onset    Hashimoto's thyroiditis Mother     Thyroid disease Mother     Melanoma Mother         50's    Cancer Mother         melanoma    Melanoma Father         50's    Crohn's disease Father     Cancer Father         melanoma    Hashimoto's thyroiditis Sister     No Known Problems Sister     No Known Problems Sister     Colon cancer Maternal Uncle         50's    Depression Maternal Grandmother     Diabetes Maternal Grandmother     No Known Problems Maternal Grandfather     No Known Problems Paternal Grandmother     No Known Problems Paternal Grandfather        Social History   Social History     Substance and Sexual Activity   Alcohol Use Yes    Comment: rarely     Social History     Substance and Sexual Activity   Drug Use Never     Social History     Tobacco Use   Smoking Status Never   Smokeless Tobacco Never     Meds/Allergies     Current  Outpatient Medications:     anastrozole (ARIMIDEX) 1 mg tablet, Take 1 tablet (1 mg total) by mouth daily, Disp: 90 tablet, Rfl: 2    budesonide-formoterol (Symbicort) 80-4.5 MCG/ACT inhaler, Inhale 2 puffs 2 (two) times a day Rinse mouth after use., Disp: 30 g, Rfl: 1    Calcium 250 MG CAPS, Take by mouth daily with dinner  , Disp: , Rfl:     Cholecalciferol (VITAMIN D3 PO), Take 2 capsules by mouth in the morning, Disp: , Rfl:     levocetirizine (XYZAL) 5 MG tablet, Take 1 tablet (5 mg total) by mouth every evening, Disp: 90 tablet, Rfl: 1    levothyroxine 25 mcg tablet, TAKE ONE TABLET BY MOUTH EVERY DAY MONDAY THRU FRIDAY AND TAKE 2 TABLETS EVERY DAY ON SAT & SUN., Disp: 114 tablet, Rfl: 0    MAGNESIUM PO, Take 200 mg by mouth daily with dinner  , Disp: , Rfl:     Medium Chain Triglycerides (MCT OIL PO), Take 1 Application by mouth in the morning, Disp: , Rfl:     MULTIPLE VITAMINS-CALCIUM PO, Take 1 capsule by mouth daily in the early morning  , Disp: , Rfl:     niacin 100 mg tablet, Take 100 mg by mouth daily with breakfast, Disp: , Rfl:     PARoxetine (PAXIL-CR) 25 mg 24 hr tablet, Take 2 po daily, Disp: 180 tablet, Rfl: 3    sodium chloride, PF, 0.9 %, 10 mL by Intracatheter route daily Intracatheter flushing daily. May substitute prefilled syringe with normal saline 10 mL vials, 10 mL syringes, and 18 g blunt needles, Disp: 300 mL, Rfl: 2    sulfamethoxazole-trimethoprim (BACTRIM DS) 800-160 mg per tablet, Take 1 tablet by mouth 2 (two) times a day for 10 days smx-tmp DS (BACTRIM) 800-160 mg tabs (1tab q12 D10), Disp: 20 tablet, Rfl: 0    gabapentin (Neurontin) 100 mg capsule, Take 1 capsule (100 mg total) by mouth daily at bedtime as needed (pain), Disp: 30 capsule, Rfl: 0    gabapentin (Neurontin) 300 mg capsule, Take 1 capsule (300 mg total) by mouth 3 (three) times a day for 5 days, Disp: 15 capsule, Rfl: 0    ibuprofen (MOTRIN) 800 mg tablet, Take 1 tablet (800 mg total) by mouth every 8 (eight) hours  as needed for mild pain (DO NOT BEGIN UNTIL 48 HOURS AFTER SURGERY) (Patient not taking: Reported on 2024), Disp: 15 tablet, Rfl: 0    mometasone (NASONEX) 50 mcg/act nasal spray, 2 sprays into each nostril daily (Patient not taking: Reported on 2024), Disp: 51 g, Rfl: 1    pseudoephedrine (SUDAFED) 120 MG 12 hr tablet, Take 120 mg by mouth as needed for congestion (Patient not taking: Reported on 2024), Disp: , Rfl:     Spacer/Aero-Holding Chambers (Vortex Valved Holding Chamber) ANGELINE, Use 2 (two) times a day, Disp: 1 each, Rfl: 0    traMADol (Ultram) 50 mg tablet, Take 1 tablet (50 mg total) by mouth every 6 (six) hours as needed for moderate pain (Patient not taking: Reported on 2024), Disp: 20 tablet, Rfl: 0  Allergies   Allergen Reactions    Pollen Extract      Review of Systems  Constitutional: Negative.    HENT: Negative.     Eyes: Negative.         Reading glasses   Respiratory: Negative.     Cardiovascular: Negative.    Gastrointestinal: Negative.    Endocrine: Negative.    Genitourinary: Negative.    Musculoskeletal: Negative.    Skin:  Positive for wound (incisions healing.  Cellulitis improving).   Allergic/Immunologic: Positive for environmental allergies.   Neurological: Negative.    Hematological: Negative.    Psychiatric/Behavioral: Negative.        Clinical Trial: no     OB/GYN History:  The patient underwent menarche at 10 years  Menopause Status Post  No LMP recorded. Patient is postmenopausal.  Menopause at 48} years.  Menopause Reason Natural  Hormone replacement therapy: yes.  Years used 8-10 years   2   Para 2   Age at first delivery being 24 years.   Nursing: yes.   Birth control pills: yes.  Years used 6 years     Pregnancy test needed:  no     ONCOTYPE/MAMMOPRINT results not completed     Prior Radiation: none     OBJECTIVE:   /60   Pulse 80   Temp 98 °F (36.7 °C)   Wt 65.8 kg (145 lb)   SpO2 98%   BMI 25.69 kg/m²   Pain Assessment:  0  Performance  Status: ECOG/Zubrod/WHO: 1 - Symptomatic but completely ambulatory    Physical Exam  Vitals and nursing note reviewed. Exam conducted with a chaperone present.   Constitutional:       General: She is not in acute distress.     Appearance: Normal appearance. She is well-developed. She is not diaphoretic.   HENT:      Head: Normocephalic and atraumatic.      Mouth/Throat:      Pharynx: No oropharyngeal exudate.   Eyes:      General: No scleral icterus.     Conjunctiva/sclera: Conjunctivae normal.      Pupils: Pupils are equal, round, and reactive to light.   Neck:      Thyroid: No thyromegaly.      Trachea: No tracheal deviation.   Cardiovascular:      Rate and Rhythm: Normal rate and regular rhythm.      Heart sounds: Normal heart sounds.   Pulmonary:      Effort: Pulmonary effort is normal. No respiratory distress.      Breath sounds: Normal breath sounds. No stridor. No wheezing, rhonchi or rales.   Chest:      Chest wall: No tenderness.   Breasts:     Right: Absent. Skin change: She has well-healed mastectomy incision without any nodularity..      Left: Normal. No swelling, bleeding, inverted nipple, mass, nipple discharge, skin change (She has a left axillary drain in place with serous fluid.  Left breast incisions are well-healed.) or tenderness.   Abdominal:      General: Bowel sounds are normal. There is no distension.      Palpations: Abdomen is soft. There is no mass.      Tenderness: There is no abdominal tenderness. There is no rebound.      Hernia: No hernia is present.   Musculoskeletal:         General: No tenderness. Normal range of motion.      Cervical back: Normal range of motion and neck supple.   Lymphadenopathy:      Cervical: No cervical adenopathy.      Upper Body:      Right upper body: No supraclavicular or axillary adenopathy.      Left upper body: No supraclavicular or axillary adenopathy.   Skin:     General: Skin is warm and dry.      Coloration: Skin is not jaundiced or pale.       Findings: No erythema or rash.   Neurological:      General: No focal deficit present.      Mental Status: She is alert and oriented to person, place, and time.      Cranial Nerves: No cranial nerve deficit.      Sensory: No sensory deficit.      Motor: No weakness.      Coordination: Coordination normal.   Psychiatric:         Mood and Affect: Mood normal.         Behavior: Behavior normal.         Thought Content: Thought content normal.         Judgment: Judgment normal.       RESULTS  Lab Results    Chemistry        Component Value Date/Time    K 3.9 06/22/2024 2132    CL 99 06/22/2024 2132    CO2 31 06/22/2024 2132    BUN 11 06/22/2024 2132    CREATININE 0.81 06/22/2024 2132        Component Value Date/Time    CALCIUM 9.9 06/22/2024 2132    ALKPHOS 70 03/08/2024 1052    AST 19 03/08/2024 1052    ALT 23 03/08/2024 1052            Lab Results   Component Value Date    WBC 10.57 (H) 06/22/2024    HGB 12.6 06/22/2024    HCT 38.1 06/22/2024    MCV 88 06/22/2024     06/22/2024     Imaging Studies  CT chest with contrast    Addendum Date: 6/22/2024 Addendum:   ADDENDUM: Aforementioned findings were discussed with Dr. Gagnon at 11:32 p.m. on 6/22/2024.    Result Date: 6/22/2024  Narrative: CT CHEST WITH IV CONTRAST INDICATION: s/p IR drain placement for left axillary seroma on 6/18/24.  Today, left breast swelling and redness.  Possible abscess formation?. COMPARISON: 1/24/2024. TECHNIQUE: CT examination of the chest was performed. Multiplanar 2D reformatted images were created from the source data. This examination, like all CT scans performed in the [facility], was performed utilizing techniques to minimize radiation dose exposure, including the use of iterative reconstruction and automated exposure control. Radiation dose length product (DLP) for this visit: 169.5 mGy-cm IV Contrast: 85 mL of iohexol (OMNIPAQUE) FINDINGS: LUNGS: Chronic probable atelectasis versus scarring in the right  middle lobe and lingular region again noted. Minimal scarring in the posterior medial right lower lobe adjacent to vertebral. No consolidation or suspicious pulmonary nodule seen. Stable subpleural 3 mm pulmonary nodule in the posterior right upper lobe (3:72). There is no tracheal or endobronchial lesion. PLEURA: Unremarkable. HEART/GREAT VESSELS: Heart is unremarkable for patient's age. No thoracic aortic aneurysm. MEDIASTINUM AND RICHARD: Shotty subcentimeter mediastinal lymph nodes are seen. No pathologic lymphadenopathy by size criteria. Similar probable residual thymic tissue within the anterior mediastinum. CHEST WALL AND LOWER NECK: Prior right total and partial left mastectomy changes in this patient with biopsy confirmed malignancy. Within the left axillary region extending into the superolateral left breast, there is a focal fluid collection measuring approximately 6.8 x 3.8 cm. This collection extends from the skin surface to the lateral margin of the pectoralis musculature. There is a drainage catheter present within this collection. There are adjacent subcutaneous infiltrative changes. There are biopsy related changes also noted in the left axillary region with several adjacent prominent lymph nodes, nonspecific. VISUALIZED STRUCTURES IN THE UPPER ABDOMEN: No acute abnormality. OSSEOUS STRUCTURES: No acute fracture or destructive osseous lesion.     Impression: Focal 6.8 x 3.8 cm fluid collection in the left axillary region/superolateral left breast extending from the skin surface to the lateral margin of the pectoralis musculature with drainage catheter in position. Findings may reflect postprocedural seroma given clinical history although superimposed infection i.e. developing abscess not excluded. Clinical correlation recommended. Workstation performed: [id number]     IR drainage tube placement    Result Date: 6/18/2024  Narrative: Ultrasound-guided abscess drainage Clinical History: Recurrent left  axillary postoperative fluid collection. Sedation time: N/A Procedure: After explaining the risks and benefits of the procedure to the patient, informed consent was obtained. Ultrasound was used to localize the complex left axillary fluid collection. The overlying skin was prepped and draped in the usual sterile fashion and local anesthesia obtained with a 1% lidocaine solution. A 19 gauge single-wall needle was advanced into  the abscess cavity under ultrasound guidance. After looping a 0.035 heavy-duty wire, the tract was dilated and a 10.2 [place] all-purpose drainage catheter placed. The self-retaining loop was locked in position. Ultrasound was used to confirm proper positioning. Approximately 80 cc's of clear, yellow fluid was aspirated and sent for culture and sensitivity. The catheter was secured in place with a 0 prolene suture and placed to a Sebastián-Madrigal drainage bulb. The patient tolerated the procedure well and left the department in stable condition.     Impression: Impression: Successful ultrasound-guided abscess drainage. Workstation performed: [id number]     Pathology: See Above    ASSESSMENT  1. Malignant neoplasm of upper-outer quadrant of left breast in female, estrogen receptor positive (HCC)  Radiation Simulation Treatment      2. Malignant neoplasm of overlapping sites of right breast in female, estrogen receptor positive (HCC)  Ambulatory referral to Radiation Oncology        Cancer Staging   Malignant neoplasm of overlapping sites of right breast in female, estrogen receptor positive (HCC)  Staging form: Breast, AJCC 8th Edition  - Clinical stage from 11/1/2023: Stage IA (cT1c, cN0, cM0, G1, ER+, IN+, HER2-) - Signed by Sisi Dominguez MD on 11/13/2023  Stage prefix: Initial diagnosis  Method of lymph node assessment: Clinical  Histologic grading system: 3 grade system  - Pathologic stage from 4/19/2024: No stage assigned - Unsigned  Stage prefix: Initial diagnosis  - Pathologic stage from  4/19/2024: Stage IA (pT1c, pN1a(sn), cM0, G1, ER+, UT+, HER2-) - Signed by Sisi Dominguez MD on 5/6/2024  Stage prefix: Initial diagnosis  Method of lymph node assessment: [place] lymph node biopsy  Histologic grading system: 3 grade system    Malignant neoplasm of upper-outer quadrant of left breast in female, estrogen receptor positive (HCC)  Staging form: Breast, AJCC 8th Edition  - Clinical stage from 12/8/2023: Stage IA (cT1b, cN0, cM0, G1, ER+, UT-, HER2-) - Signed by Sisi Dominguez MD on 12/27/2023  Stage prefix: Initial diagnosis  Method of lymph node assessment: Clinical  Histologic grading system: 3 grade system  - Pathologic stage from 4/19/2024: Stage IA (pT1a, pN0(sn), cM0, G1, ER+, UT-, HER2-) - Signed by Sisi Dominguez MD on 5/6/2024  Stage prefix: Initial diagnosis  Method of lymph node assessment: [place] lymph node biopsy  Histologic grading system: 3 grade system        PLAN/DISCUSSION  Orders Placed This Encounter   Procedures    Radiation Simulation Treatment          Richa Medina is a 59 y.o. 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  She was diagnosed after abnormal routine screening mammogram and subsequent biopsies as outlined above.  Her workup included a bilateral breast MRI as well.  She had multicentric disease on the right side with mastectomy recommended.  She saw Dr. Yung and has been on anastrozole since January 2024.  As part of her workup she had a chest CT that revealed some abnormalities including prominent mediastinal lymph nodes that were likely reactive.  She did have bronchoscopy with EBUS and negative biopsies.  She was discussed at breast cancer multidisciplinary case review January 15, 2024 with recommendations for neoadjuvant endocrine therapy and sending her specimen for Oncotype.  The right breast specimen was sent for Oncotype and came back at 9.  She had surgery with Dr. Dominguez as well as Dr. Felix on April 19,  2024 with a right breast total mastectomy for her multifocal disease.  She had left breast HILARY localized lumpectomy with mastopexy.  Right breast pathology revealed invasive ductal carcinoma grade 1 with 2 foci of disease with the largest being 12 mm in size.  There was 1 lymph node positive with a 6 mm focus of disease without extracapsular extension.  There were a total of 2 sentinel lymph nodes and 8 total lymph nodes removed.  She was staged with pathologic Stage IA (pT1c, pN1a(sn), cM0, G1, ER+, NE+, HER2-) disease.  On the left side she had lumpectomy of a 5 mm tubular carcinoma grade 1 with 4 negative lymph nodes.  All margins of resection were negative both on the left and right side.  On the left side she has pathologic Stage IA (pT1a, pN0(sn), cM0, G1, ER+, NE-, HER2-) disease.  She has a left axillary seroma with STANLEY drain intact and is still draining fluid.  She is on antibiotics.  We discussed that her infection needs to clear and she needs to be healed from surgery before having any postop adjuvant radiation therapy.  In order to complete her breast conservation management, she will require radiation therapy to the left breast over 3 weeks/16 fractions.  She does not require boost as she has negative margins.  She had mastectomy on the right side and we would not recommend any postop adjuvant radiation therapy on the right side.  We discussed the rationale for radiation therapy on the left side including the acute side effects and the potential chronic complications with her.  She does consent to proceed with the treatment.  She has a tentative simulation appointment scheduled here for July 15, 2024 assuming she has healed and had her drain is removed, otherwise this will need to be rescheduled.  Also, she saw Dr. Yung on May 14, 2024 who wanted to send her tissue for additional Oncotype Dx testing which is pending at this time.      Deep Coker MD  6/25/2024,2:03 PM      Portions of  "the record may have been created with voice recognition software.  Occasional wrong word or \"sound a like\" substitutions may have occurred due to the inherent limitations of voice recognition software.  Read the chart carefully and recognize, using context, where substitutions have occurred.          "

## 2024-06-25 NOTE — PROGRESS NOTES
Richa Medina 1965 is a 59 y.o. female With h/o bilateral breast cancer.  Presents in consultation for discussion of RT.  Referred by Dr. Dominguez.  Additional medical problems include hypothyroidism, pre diabetes, and vitamin D deficiency.    Presented for routine screening mammogram with results revealing focal asymmetry in the right breast.  There were no suspicious masses, grouped calcifications, or unexplained architectural distortions noted in left breast.  A f/u diagnostic contrast enhanced mammogram and US was completed.  3 separate masses were noted in right breast. A small foci of enhancement was noted in left breast.  There were no morphologically abnormal axillary nodes noted in either axilla. F/U breast biopsy completed      11/1/23  Tissue Exam  A. Breast, “US biopsy right breast, 7:00, 5 cm FN, 4 passes, 12 G,” Biopsy:  - Invasive breast carcinoma of no special type (ductal NST/invasive ductal carcinoma)  - Nolvia grade 1 of 3 (Score 5 of 9)               -Tubular formation >10%, score 2               - Nuclear grade 2 of 3, score 2               - Mitoses, score 1  - Confirmed by tumor cell immunophenotype:               - Positive: E-cadherin, p120 - (each in a membranous pattern), JULIANA-3               - Negative: p63, SMMHC, p63  - Invasive carcinoma 4 of 4 submitted core biopsies, max. Dimension = 10 millimeters  - Estrogen, progesterone & HER2 receptor studies pending, to be described in a separate receptor report  - Ductal carcinoma in situ (DCIS): Present, papillary type, low grade  - Lymph-vascular invasion: Not identified   - Perineural invasion: Not identified  - Microcalcifications: Present, within invasive carcinoma and benign ducts     B. Breast, “US biopsy right breast, 12:00, 8 cm FN, 5-passes, 12 G marquee,” Biopsy:  - Invasive breast carcinoma of no special type (ductal NST/invasive ductal carcinoma)  - Nolvia grade 1 of 3 (Score 5 of 9)               -Tubular formation  >10%, score 2               - Nuclear grade 2 of 3, score 2               - Mitoses, score 1  - Confirmed by tumor cell immunophenotype:               - Positive: E-cadherin, p120 - (each in a membranous pattern), JULIANA-3               - Negative: p63, SMMHC, p63  - Invasive carcinoma 5 of 5 submitted core biopsies, max. Dimension = 11 millimeters  - Estrogen, progesterone & HER2 receptor studies pending, to be described in a separate receptor report  - Ductal carcinoma in situ (DCIS): Present, papillary and solid type, low grade  - Lymph-vascular invasion: Not identified   - Perineural invasion: Not identified  - Microcalcifications: Present, within invasive carcinoma  - Focal typical lobular neoplasia/ Atypical lobular hyperplasia  (Block B1)     11/13/23  Dr. Dominguez  Has declined genetic counseling.  F/U breast MRI is scheduled.  As disease is multicentric, right mastectomy is recommended.  Additional recommendations pending breast MRI    11/21/23  Bilateral Breast MRI  IMPRESSION:  1.  There is non-mass enhancement in the left breast at 1:00 which is suspicious.  This corresponds with the enhancement seen on the contrast-enhanced mammogram.  MRI guided core needle biopsy is recommended.  2.  Multifocal malignancy in the right breast.  There is no evidence of pectoralis muscle, skin or nipple invasion.  The most posterior areas of enhancement are 24 mm inferior/posterior to the superior Thais  reflector.  3.  There is abnormal opacification and enhancement in the right lower lung.  This is incompletely characterized on MRI.  Correlation with known medical history and CT chest is recommended.   ASSESSMENT/BI-RADS CATEGORY:  Left: 4 - Suspicious  Right: 6 - Known Biopsy-Proven Malignancy  Overall: 4 - Suspicious   RECOMMENDATION:       - MRI-guided breast biopsy for the left breast.       - Surgical consultation for the right breast.  -CT Chest      12/4/23  Breast Cancer Multidisciplinary Case Review  PHYSICIAN  RECOMMENDED PLAN:  Recommend right breast mastectomy  Recommend left breast biopsy based upon breast MRI findings  If left breast biopsy shows malignancy, recommend genetic testing and consider left breast lumpectomy    12/5/23  CT of Chest  IMPRESSION:   1. Multifocal patchy airspace opacities are identified within the lungs. There is a predilection for the anterior portions of the lungs, including the anterior upper lobes, right middle lobe and lingula, and anterior left lower lobe. This pattern, in   conjunction with several areas of mucous plugging, favors an inflammatory or infectious etiology, with greatest suspicion for atypical mycobacterial infection. Other possibilities include organizing pneumonia or an atypical appearance of sarcoidosis.   Although not completely excluded, pulmonary metastatic disease is felt much less likely.  2. Mildly prominent mediastinal lymph nodes, likely reactive.  3. Pulmonary consultation is recommended. Close interim reassessment noncontrast chest CT is also recommended in 3 months.      12/8/23  Tissue Exam  A. 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   * [place] grade 1 of 3 (total score: 3 of 9)    -- tubule formation > 75%, score 1    -- nuclear grade 1 of 3, score 1    -- mitoses < 3/mm2, (</= 7 mitoses/10HPF), score 1.   * Confirmed by tumor cell immunophenotype:    -- negative:  p63, calponin-B, SMMHC, p40, S100.     * Invasive carcinoma involves 2 of 8 submitted core biopsies, max. dimension = 5 millimeters.   * Estrogen, progesterone & HER2 receptor studies pending, to be described in a separate receptor report.    * Ductal carcinoma in situ (DCIS): Present; minor component (< 25% of total tumor). Loss of mosaic CK5/6 staining.    -- cribriform pattern, nuclear grade 1 of 3, without necrosis.    * Lymph-vascular invasion: Not identified.    * Microcalcifications: Absent.     12/27/24    Angelica  Bronchoscopy is scheduled.  Will refer to medical oncology for discussion of hormone therapy.  Will present at tumor board    12/29/24  Bronchoscopy  FINDINGS:  The vocal cords appeared normal.  Moderate, thick and purulent secretions present in the trachea, main reyes, lingula, right middle lobar bronchus and right lower lobar bronchus; secretions were easily removed and the airway was cleared; performed washing  Bronchoalveolar lavage was performed x3 in the right middle lobar bronchus with 150 mL of saline instilled and a total return of 60 mL; the fluid appeared purulent. Thick purulent secretions  Bronchoalveolar lavage was performed x3 with 150 mL of saline instilled and a total return of 35 mL; the fluid appeared thick. Thick purulent secretions    1/15/24  Breast Cancer Multidisciplinary Case Conference  PHYSICIAN RECOMMENDED PLAN:  Recommended surgery after CT scan on 01/17/2024  Recommends neoadjuvant endocrine therapy   Consider sending specimen for oncotype    1/23/24  Dr. Yung.  Pending pulmonary clearance will start aromatase inhibitor.  Will also send Oncotype on the 12:00 lesion    1/26/24  Oncotype Recurrence Score =9    4/19/24 Dr. Dominguez/Dr. Felix   LEFT BREAST HILARY LOCALIZATION LUMPECTOMY; (Left: Breast)       RIGHT BREAST MASTECTOMY, PSB AXILLARY DISSECTION (Right: Breast)       Bilateral BX LYMPH NODE SENTINEL, lymphoscintigraphies, lymphatic mappings (Bilateral: Axilla)       RIGHT AESTHETIC FLAT CLOSURE , IBIS FLAP, PREVENA PLACEMENT (Right: Breast)       MASTOPEXY BREAST (Left: Breast)     4/19/24  Tissue Exam  INVASIVE CARCINOMA OF THE BREAST: Resection   8th Edition - Protocol posted: 12/13/2023INVASIVE CARCINOMA OF THE BREAST: RESECTION - I, J, K, L  SPECIMEN   Procedure  Total mastectomy   Specimen Laterality  Right   TUMOR   Tumor Site  Clock position     7 o'clock     12 o'clock   Tumor Site  Distance from nipple (Centimeters): 5, 8 cm   Histologic Type  Invasive carcinoma  of no special type (ductal)   Histologic Grade (Nolvia Histologic Score)     Glandular (Acinar) / Tubular Differentiation  Score 2   Nuclear Pleomorphism  Score 2   Mitotic Rate  Score 1   Overall Grade  Grade 1 (scores of 3, 4 or 5)   Tumor Size  Greatest dimension of largest invasive focus (Millimeters): 12 mm   Tumor Focality  Multiple foci of invasive carcinoma   Number of Foci  2   Sizes of Individual Foci in Millimeters (mm)  12; 11   Ductal Carcinoma In Situ (DCIS)  Present     Negative for extensive intraductal component (EIC)   Architectural Patterns  Cribriform   Nuclear Grade  Grade I (low)   Necrosis  Not identified   Lobular Carcinoma In Situ (LCIS)  Not identified   Lymphatic and / or Vascular Invasion  Not identified   Microcalcifications  Present in DCIS     Present in invasive carcinoma     Present in non-neoplastic tissue   Treatment Effect in the Breast  No known presurgical therapy   MARGINS   Margin Status for Invasive Carcinoma  All margins negative for invasive carcinoma   Distance from Invasive Carcinoma to Closest Margin  Greater than: 2 mm   Margin Status for DCIS  All margins negative for DCIS   Distance from DCIS to Closest Margin  Greater than: 2 mm   REGIONAL LYMPH NODES   Regional Lymph Node Status  Tumor present in regional lymph node(s)   Number of Lymph Nodes with Macrometastases  1   Number of Lymph Nodes with Micrometastases  0   Size of Largest Sheng Metastatic Deposit  6 mm   Extranodal Extension  Not identified   Total Number of Lymph Nodes Examined (sentinel and non-sentinel)  8   Number of [place] Nodes Examined  2   pTNM CLASSIFICATION (AJCC 8th Edition)   Reporting of pT, pN, and (when applicable) pM categories is based on information available to the pathologist at the time the report is issued. As per the AJCC (Chapter 1, 8th Ed.) it is the managing physician’s responsibility to establish the final pathologic stage based upon all pertinent information, including but  potentially not limited to this pathology report.   pT Category  pT1c   T Suffix  (m)   pN Category  pN1a   SPECIAL STUDIES        Estrogen Receptor (ER) Status  Positive (greater than 10% of cells demonstrate nuclear positivity)        Progesterone Receptor (PgR) Status  Positive        HER2 (by immunohistochemistry)  Equivocal (Score 2+)        HER2 (by in situ hybridization)  Negative (not amplified)   Testing Performed on Case Number  [id number]   Comment(s)  This synoptic report is based on the findings in the current specimen and the patient's preceding core biopsy specimens ([id number]), slides from which were reviewed.   .   INVASIVE CARCINOMA OF THE BREAST: Resection   8th Edition - Protocol posted: 12/13/2023INVASIVE CARCINOMA OF THE BREAST: RESECTION - A, B, C, D, E, F, G, H  SPECIMEN   Procedure  Excision (less than total mastectomy)   Specimen Laterality  Left   TUMOR   Tumor Site  Clock position     1 o'clock   Histologic Type  Tubular carcinoma   Histologic Grade ([place] Histologic Score)     Glandular (Acinar) / Tubular Differentiation  Score 1   Nuclear Pleomorphism  Score 1   Mitotic Rate  Score 1   Overall Grade  Grade 1 (scores of 3, 4 or 5)   Tumor Size  Greatest dimension of largest invasive focus (Millimeters): 5 mm   Tumor Focality  Single focus of invasive carcinoma   Ductal Carcinoma In Situ (DCIS)  Present     Negative for extensive intraductal component (EIC)   Architectural Patterns  Cribriform   Nuclear Grade  Grade I (low)   Necrosis  Not identified   Lobular Carcinoma In Situ (LCIS)  Not identified   Lymphatic and / or Vascular Invasion  Not identified   Microcalcifications  Present in non-neoplastic tissue   Treatment Effect in the Breast  No known presurgical therapy   MARGINS   Margin Status for Invasive Carcinoma  All margins negative for invasive carcinoma   Distance from Invasive Carcinoma to Closest Margin  Greater than: 2 mm   Margin Status for DCIS  All margins negative for  DCIS   Distance from DCIS to Closest Margin  Greater than: 2 mm   REGIONAL LYMPH NODES   Regional Lymph Node Status  All regional lymph nodes negative for tumor   Total Number of Lymph Nodes Examined (sentinel and non-sentinel)  4   Number of [place] Nodes Examined  4   pTNM CLASSIFICATION (AJCC 8th Edition)   Reporting of pT, pN, and (when applicable) pM categories is based on information available to the pathologist at the time the report is issued. As per the AJCC (Chapter 1, 8th Ed.) it is the managing physician’s responsibility to establish the final pathologic stage based upon all pertinent information, including but potentially not limited to this pathology report.   pT Category  pT1a   pN Category  pN0   SPECIAL STUDIES        Estrogen Receptor (ER) Status  Positive (greater than 10% of cells demonstrate nuclear positivity)        Progesterone Receptor (PgR) Status  Negative        HER2 (by immunohistochemistry)  Negative (Score 1+)   Testing Performed on Case Number  [id number]   Comment(s)  This synoptic report is based on the findings in the current specimen and the patient's preceding core biopsy specimens ([id number]), slides from which were reviewed.   .     24  Dr. Dominguez  Post-op visit.  Reports feeling well.  Has appointments with medical and radiation oncology scheduled.  F/U in 6 months    24  Dr. Yung  On anastrozole.  Right-sided lesion was more advanced than anticipated.  Will send for oncotype.  If no indication for chemotherapy, will refer to radiation oncology    24  Rohith, Plastic Surgery  Turbid fluid aspirated from left axillary seroma.  Has IR appointment for drain placement    24  IR drain placement    24 [facility] ED  DX: seroma/cellulitis  Discharged home on bactrim    24  Plastics  Erythema improved since starting bactrim.  Still draining approx. 50 cc serous daily.  F/U in 2 weeks    Upcomin24  IR  24    Plastics  24    Dilshad  11/6/24   Surgical Oncology    Oncology History   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 7 o'clock, 5 cm from nipple  Invasive breast carcinoma of no special type (ductal NST)  Grade 1  ; OK 90; HER2 2+, FISH negative  Lymphovascular invasion not identified    B. 12 o'clock, 8 cm from nipple  Invasive breast carcinoma of no special type (ductal NST)  Grade 1  , OK 35, HER2 1+  Lymphovascular invasion not identified    Concordant. Malignancy appears multicentric; there were additional concerning areas within the right breast seen on recent imaging. There were also some areas of enhancement in the upper outer quadrant of the left breast on the contrast-enhanced mammogram which did not have a definite correlate on US;. Pretreatment MRI is recommended.     11/1/2023 -  Cancer Staged    Staging form: Breast, AJCC 8th Edition  - Clinical stage from 11/1/2023: Stage IA (cT1c, cN0, cM0, G1, ER+, OK+, HER2-) - Signed by Sisi Dominguez MD on 11/13/2023  Stage prefix: Initial diagnosis  Method of lymph node assessment: Clinical  Histologic grading system: 3 grade system       11/13/2023 Genetic Testing    Consult with Dr. Dominguez - patient declined genetic testing     11/21/2023 Observation    Bilateral breast MRI IMPRESSION:  1.  There is non-mass enhancement in the left breast at 1:00 which is suspicious.  This corresponds with the enhancement seen on the contrast-enhanced mammogram.  MRI guided core needle biopsy is recommended.  2.  Multifocal malignancy in the right breast.  There is no evidence of pectoralis muscle, skin or nipple invasion.  The most posterior areas of enhancement are 24 mm inferior/posterior to the superior Thais  reflector.  3.  There is abnormal opacification and enhancement in the right lower lung.  This is incompletely characterized on MRI.  Correlation  with known medical history and CT chest is recommended.     1/23/2024 -  Hormone Therapy    Consult with Dr. Yung  Anastrozole 1 mg daily   Started in neoadjuvant setting to allow for resolution of pulmonary issues prior to surgery     2/2/2024 Genomic Testing    Oncotype DX Recurrence Score 9  Done on right breast biopsy specimen B     4/19/2024 Surgery    Right mastectomy with axillary dissection and sentinel lymph node biopsy  Invasive carcinoma of no special type (ductal)  Grade 1  1.2 cm (2 foci)  Margins negative  1/8 Lymph nodes with macro-metastases (6 mm, extranodal extension not identified)    Aesthetic flat closure (Dr. Felix)     4/19/2024 -  Cancer Staged    Staging form: Breast, AJCC 8th Edition  - Pathologic stage from 4/19/2024: Stage IA (pT1c, pN1a(sn), cM0, G1, ER+, HI+, HER2-) - Signed by Sisi Dominguez MD on 5/6/2024  Stage prefix: Initial diagnosis  Method of lymph node assessment: [place] lymph node biopsy  Histologic grading system: 3 grade system       Malignant neoplasm of upper-outer quadrant of left breast in female, estrogen receptor positive (HCC)   11/13/2023 Genetic Testing    Consult with Dr. Dominguez - patient declined genetic testing     11/21/2023 Observation    Bilateral breast MRI IMPRESSION:  1.  There is non-mass enhancement in the left breast at 1:00 which is suspicious.  This corresponds with the enhancement seen on the contrast-enhanced mammogram.  MRI guided core needle biopsy is recommended.  2.  Multifocal malignancy in the right breast.  There is no evidence of pectoralis muscle, skin or nipple invasion.  The most posterior areas of enhancement are 24 mm inferior/posterior to the superior Thais  reflector.  3.  There is abnormal opacification and enhancement in the right lower lung.  This is incompletely characterized on MRI.  Correlation with known medical history and CT chest is recommended.     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 Left malignancy appears unifocal. The non-mass enhancement measured up to 8 mm on MRI. US left axilla negative.     12/8/2023 -  Cancer Staged    Staging form: Breast, AJCC 8th Edition  - Clinical stage from 12/8/2023: Stage IA (cT1b, cN0, cM0, G1, ER+, TX-, HER2-) - Signed by Sisi Dominguez MD on 12/27/2023  Stage prefix: Initial diagnosis  Method of lymph node assessment: Clinical  Histologic grading system: 3 grade system       1/23/2024 -  Hormone Therapy    Consult with Dr. Yung  Anastrozole 1 mg daily   Started in neoadjuvant setting to allow for resolution of pulmonary issues prior to surgery     4/19/2024 Surgery    Left breast HILARY localized lumpectomy with sentinel lymph node biopsy  Tubular carcinoma  Grade 1  5 mm  Margins negative  0/4 Lymph nodes    Mastopexy (Dr. Felix)     4/19/2024 -  Cancer Staged    Staging form: Breast, AJCC 8th Edition  - Pathologic stage from 4/19/2024: Stage IA (pT1a, pN0(sn), cM0, G1, ER+, TX-, HER2-) - Signed by Sisi Dominguez MD on 5/6/2024  Stage prefix: Initial diagnosis  Method of lymph node assessment: [place] lymph node biopsy  Histologic grading system: 3 grade system           Review of Systems:  Review of Systems   Constitutional: Negative.    HENT: Negative.     Eyes: Negative.         Reading glasses   Respiratory: Negative.     Cardiovascular: Negative.    Gastrointestinal: Negative.    Endocrine: Negative.    Genitourinary: Negative.    Musculoskeletal: Negative.    Skin:  Positive for wound (incisions healing.  Cellulitis improving).   Allergic/Immunologic: Positive for environmental allergies.   Neurological: Negative.    Hematological: Negative.    Psychiatric/Behavioral: Negative.         Clinical Trial: no    OB/GYN History:  The patient underwent menarche at 10  years  Menopause Status Post  No LMP recorded. Patient is postmenopausal.  Menopause at 48} years.  Menopause Reason Natural  Hormone replacement therapy: yes.  Years used 8-10 years   2   Para 2   Age at first delivery being 24 years.   Nursing: yes.   Birth control pills: yes.  Years used 6 years    Pregnancy test needed:  no    ONCOTYPE/MAMMOPRINT results not completed      Prior Radiation: none    Teaching  [facility] radiation oncology booklet given and reviewed    MST completed    Implantable Devices (Port, Pacemaker, pain stimulator): none    Hip Replacement: none       [unfilled]  Health Maintenance   Topic Date Due    COVID-19 Vaccine (1) Never done    Pneumococcal Vaccine: Pediatrics (0 to 5 Years) and At-Risk Patients (6 to 64 Years) (1 of 2 - PCV) Never done    HIV Screening  Never done    BMI: Followup Plan  Never done    Zoster Vaccine (1 of 2) Never done    RSV Vaccine Age 60+ Years (1 - 1-dose 60+ series) Never done    Depression Screening  2024    Annual Physical  2024    Influenza Vaccine (Season Ended) 2024    Breast Cancer Screening: Mammogram  2025    BMI: Adult  2025    Colorectal Cancer Screening  10/06/2026    Cervical Cancer Screening  2028    DTaP,Tdap,and Td Vaccines (3 - Td or Tdap) 2030    Hepatitis C Screening  Completed    Osteoporosis Screening  Completed    RSV Vaccine age 0-20 Months  Aged Out    HIB Vaccine  Aged Out    IPV Vaccine  Aged Out    Hepatitis A Vaccine  Aged Out    Meningococcal ACWY Vaccine  Aged Out    HPV Vaccine  Aged Out     Past Medical History:   Diagnosis Date    Asthma     Cancer (HCC)     bilateral    Disease of thyroid gland     Hearing aid worn     bilateral    Hearing loss     Osteopenia     Rosacea 2021    Seasonal allergies     Vitamin D deficiency 2018     Past Surgical History:   Procedure Laterality Date    BREAST BIOPSY Right 11/01/2023    X 2 sites    BREAST BIOPSY Left 2023    IR  DRAINAGE TUBE PLACEMENT  6/18/2024    LASIK      MRI BREAST BIOPSY LEFT (ALL INCLUSIVE) Left 12/08/2023    DC ADJNT TIS TRNSFR/REARGMT ANY AREA 30.1-60 SQ CM Right 4/19/2024    Procedure: RIGHT AESTHETIC FLAT CLOSURE , IBIS FLAP, PREVENA PLACEMENT;  Surgeon: Samara Felix DO;  Location: [facility];  Service: Plastics    DC BX/EXC LYMPH NODE OPEN SUPERFICIAL Bilateral 4/19/2024    Procedure: Bilateral BX LYMPH NODE SENTINEL, lymphoscintigraphies, lymphatic mappings;  Surgeon: Sisi Dominguez MD;  [facility];  Service: Surgical Oncology    DC EXC BREAST LES PREOP PLMT RAD MARKER OPEN 1 LES Left 4/19/2024    Procedure: LEFT BREAST HILARY LOCALIZATION LUMPECTOMY;;  Surgeon: Siis Dominguez MD;  [facility];  Service: Surgical Oncology    DC INJ RADIOACTIVE TRACER FOR ID OF SENTINEL NODE Bilateral 4/19/2024    Procedure: Bilateral BX LYMPH NODE SENTINEL, lymphoscintigraphies, lymphatic mappings;  Surgeon: Sisi Dominguez MD;  [facility];  Service: Surgical Oncology    DC INTRAOP SENTINEL LYMPH NODE ID W/DYE INJECTION Bilateral 4/19/2024    Procedure: Bilateral BX LYMPH NODE SENTINEL, lymphoscintigraphies, lymphatic mappings;  Surgeon: Sisi Dominguez MD;  [facility];  Service: Surgical Oncology    DC MASTECTOMY PARTIAL Left 4/19/2024    Procedure: LEFT BREAST HILARY LOCALIZATION LUMPECTOMY;;  Surgeon: Sisi Dominguez MD;  [facility];  Service: Surgical Oncology    DC MASTECTOMY SIMPLE COMPLETE Right 4/19/2024    Procedure: RIGHT BREAST MASTECTOMY, PSB AXILLARY DISSECTION;  Surgeon: Sisi Dominguez MD;  [facility];  Service: Surgical Oncology    DC MASTOPEXY Left 4/19/2024    Procedure: MASTOPEXY BREAST;  Surgeon: Samara Felix DO;  [facility];  Service: Plastics    DC NEUROPLASTY &/TRANSPOS MEDIAN NRV CARPAL TUNNE Right 07/10/2018    Procedure: CARPAL TUNNEL RELEASE;  Surgeon: Maicol Taylor DO;  [facility];  Service: Orthopedics    DC  NEUROPLASTY &/TRANSPOS MEDIAN NRV CARPAL TUNNE Left 04/19/2019    Procedure: RELEASE CARPAL TUNNEL;  Surgeon: Peterson Alejandro MD;  [facility];  Service: Orthopedics    SEPTOPLASTY      SINUS SURGERY      US BREAST NEEDLE LOC LEFT Left 2/27/2024    US GUIDANCE BREAST BIOPSY RIGHT EACH ADDITIONAL Right 11/01/2023    US GUIDED BREAST BIOPSY RIGHT COMPLETE Right 11/01/2023     Family History   Problem Relation Age of Onset    Hashimoto's thyroiditis Mother     Thyroid disease Mother     Melanoma Mother         50's    Melanoma Father         50's    Crohn's disease Father     Hashimoto's thyroiditis Sister     No Known Problems Sister     No Known Problems Sister     Colon cancer Maternal Uncle         50's    Depression Maternal Grandmother     Diabetes Maternal Grandmother     No Known Problems Maternal Grandfather     No Known Problems Paternal Grandmother     No Known Problems Paternal Grandfather      Social History     Tobacco Use    Smoking status: Never    Smokeless tobacco: Never   Vaping Use    Vaping status: Never Used   Substance Use Topics    Alcohol use: Yes     Comment: occasional    Drug use: Never        Current Outpatient Medications:     anastrozole (ARIMIDEX) 1 mg tablet, Take 1 tablet (1 mg total) by mouth daily, Disp: 90 tablet, Rfl: 2    budesonide-formoterol (Symbicort) 80-4.5 MCG/ACT inhaler, Inhale 2 puffs 2 (two) times a day Rinse mouth after use., Disp: 30 g, Rfl: 1    Calcium 250 MG CAPS, Take by mouth daily with dinner  , Disp: , Rfl:     Cholecalciferol (VITAMIN D3 PO), Take 2 capsules by mouth in the morning, Disp: , Rfl:     gabapentin (Neurontin) 100 mg capsule, Take 1 capsule (100 mg total) by mouth daily at bedtime as needed (pain), Disp: 30 capsule, Rfl: 0    gabapentin (Neurontin) 300 mg capsule, Take 1 capsule (300 mg total) by mouth 3 (three) times a day for 5 days, Disp: 15 capsule, Rfl: 0    ibuprofen (MOTRIN) 800 mg tablet, Take 1 tablet (800 mg total) by mouth  every 8 (eight) hours as needed for mild pain (DO NOT BEGIN UNTIL 48 HOURS AFTER SURGERY) (Patient not taking: Reported on 5/6/2024), Disp: 15 tablet, Rfl: 0    levocetirizine (XYZAL) 5 MG tablet, Take 1 tablet (5 mg total) by mouth every evening, Disp: 90 tablet, Rfl: 1    levothyroxine 25 mcg tablet, TAKE ONE TABLET BY MOUTH EVERY DAY MONDAY THRU FRIDAY AND TAKE 2 TABLETS EVERY DAY ON SAT & SUN., Disp: 114 tablet, Rfl: 0    MAGNESIUM PO, Take 200 mg by mouth daily with dinner  , Disp: , Rfl:     Medium Chain Triglycerides (MCT OIL PO), Take 1 Application by mouth in the morning, Disp: , Rfl:     mometasone (NASONEX) 50 mcg/act nasal spray, 2 sprays into each nostril daily, Disp: 51 g, Rfl: 1    MULTIPLE VITAMINS-CALCIUM PO, Take 1 capsule by mouth daily in the early morning  , Disp: , Rfl:     niacin 100 mg tablet, Take 100 mg by mouth daily with breakfast, Disp: , Rfl:     PARoxetine (PAXIL-CR) 25 mg 24 hr tablet, Take 2 po daily, Disp: 180 tablet, Rfl: 3    pseudoephedrine (SUDAFED) 120 MG 12 hr tablet, Take 120 mg by mouth as needed for congestion, Disp: , Rfl:     sodium chloride, PF, 0.9 %, 10 mL by Intracatheter route daily Intracatheter flushing daily. May substitute prefilled syringe with normal saline 10 mL vials, 10 mL syringes, and 18 g blunt needles, Disp: 300 mL, Rfl: 2    Spacer/Aero-Holding Chambers (Vortex Valved Holding Chamber) ANGELINE, Use 2 (two) times a day, Disp: 1 each, Rfl: 0    sulfamethoxazole-trimethoprim (BACTRIM DS) 800-160 mg per tablet, Take 1 tablet by mouth 2 (two) times a day for 10 days smx-tmp DS (BACTRIM) 800-160 mg tabs (1tab q12 D10), Disp: 20 tablet, Rfl: 0    traMADol (Ultram) 50 mg tablet, Take 1 tablet (50 mg total) by mouth every 6 (six) hours as needed for moderate pain (Patient not taking: Reported on 5/6/2024), Disp: 20 tablet, Rfl: 0  Allergies   Allergen Reactions    Pollen Extract       There were no vitals filed for this visit.

## 2024-06-26 ENCOUNTER — TELEPHONE (OUTPATIENT)
Age: 59
End: 2024-06-26

## 2024-06-26 NOTE — TELEPHONE ENCOUNTER
Called Oncotype at [phone] to find out if there are any updates on the patient's oncotype results. Per the , testing is still in process because there was borderline insufficient tissue and they had requested a core tumor specimen from pathology which they have yet to receive. Will reach out to pathology to see if they received any request for the additional specimen.

## 2024-06-27 ENCOUNTER — HOSPITAL ENCOUNTER (OUTPATIENT)
Dept: RADIOLOGY | Facility: HOSPITAL | Age: 59
Discharge: HOME | End: 2024-06-27
Attending: RADIOLOGY | Admitting: RADIOLOGY
Payer: COMMERCIAL

## 2024-06-27 ENCOUNTER — PATIENT OUTREACH (OUTPATIENT)
Dept: HEMATOLOGY ONCOLOGY | Facility: CLINIC | Age: 59
End: 2024-06-27

## 2024-06-27 DIAGNOSIS — N64.89 SEROMA OF BREAST: ICD-10-CM

## 2024-06-27 PROCEDURE — 49424 ASSESS CYST CONTRAST INJECT: CPT | Performed by: RADIOLOGY

## 2024-06-27 PROCEDURE — 76080 X-RAY EXAM OF FISTULA: CPT | Performed by: RADIOLOGY

## 2024-06-27 PROCEDURE — 49424 ASSESS CYST CONTRAST INJECT: CPT

## 2024-06-27 PROCEDURE — 76080 X-RAY EXAM OF FISTULA: CPT

## 2024-06-27 RX ADMIN — IOHEXOL 5 ML: 350 INJECTION, SOLUTION INTRAVENOUS at 12:04

## 2024-06-27 NOTE — BRIEF OP NOTE (RAD/CATH)
INTERVENTIONAL RADIOLOGY PROCEDURE NOTE    Date: 6/27/2024    Procedure:   Procedure Summary       Date: 06/27/24 Room / Location: [facility] Interventional Radiology    Anesthesia Start:  Anesthesia Stop:     Procedure: IR DRAINAGE TUBE CHECK/CHANGE/REPOSITION/REINSERTION/UPSIZE Diagnosis:       Seroma of breast      (Seroma vs lymphocele)    Scheduled Providers:  Responsible Provider:     Anesthesia Type: Not recorded ASA Status: Not recorded            Preoperative diagnosis:   1. Seroma of breast         Postoperative diagnosis: Same.    Surgeon: Yovana Suggs MD     Assistant: None. No qualified resident was available.    Blood loss: 0    Specimens: 0     Findings:     Marked reduction in seroma cavity    We will stop flushing, maintain bulb drainage and check again in approximately week    No sclerosis performed today given small size of cavity    Stitch adjusted due to discomfort    Complications: None immediate.    Anesthesia: none

## 2024-06-27 NOTE — TELEPHONE ENCOUNTER
Spoke to Izabela in pathology. She said they reached out to [employer] on 6/20 to have them send a new order with the case number. Called and spoke to customer service at [employer] and provided them with the case number and date of collection. They are sending a new request to our pathology department today so the core specimen can be sent out.

## 2024-06-27 NOTE — PROGRESS NOTES
Oncology Care Coordinator attempted to outreach patient to assess any questions or concerns.. A voicemail was left stating a reason for my call and my contact information was provided . I requested a return call at patient's earliest convenience.

## 2024-07-01 ENCOUNTER — LAB REQUISITION (OUTPATIENT)
Dept: LAB | Facility: HOSPITAL | Age: 59
End: 2024-07-01

## 2024-07-01 DIAGNOSIS — C50.912 MALIGNANT NEOPLASM OF UNSPECIFIED SITE OF LEFT FEMALE BREAST (HCC): ICD-10-CM

## 2024-07-06 DIAGNOSIS — E03.9 HYPOTHYROIDISM, UNSPECIFIED TYPE: ICD-10-CM

## 2024-07-07 RX ORDER — LEVOTHYROXINE SODIUM 0.03 MG/1
TABLET ORAL
Qty: 114 TABLET | Refills: 0 | Status: SHIPPED | OUTPATIENT
Start: 2024-07-07

## 2024-07-08 ENCOUNTER — OFFICE VISIT (OUTPATIENT)
Dept: PLASTIC SURGERY | Facility: CLINIC | Age: 59
End: 2024-07-08

## 2024-07-08 DIAGNOSIS — C50.412 MALIGNANT NEOPLASM OF UPPER-OUTER QUADRANT OF LEFT BREAST IN FEMALE, ESTROGEN RECEPTOR POSITIVE (HCC): Primary | ICD-10-CM

## 2024-07-08 DIAGNOSIS — Z17.0 MALIGNANT NEOPLASM OF UPPER-OUTER QUADRANT OF LEFT BREAST IN FEMALE, ESTROGEN RECEPTOR POSITIVE (HCC): Primary | ICD-10-CM

## 2024-07-08 PROCEDURE — 77263 THER RADIOLOGY TX PLNG CPLX: CPT | Performed by: RADIOLOGY

## 2024-07-08 PROCEDURE — 99024 POSTOP FOLLOW-UP VISIT: CPT | Performed by: PHYSICIAN ASSISTANT

## 2024-07-09 ENCOUNTER — HOSPITAL ENCOUNTER (OUTPATIENT)
Dept: RADIOLOGY | Facility: HOSPITAL | Age: 59
Discharge: HOME | End: 2024-07-09
Attending: RADIOLOGY | Admitting: RADIOLOGY
Payer: COMMERCIAL

## 2024-07-09 DIAGNOSIS — N64.89 SEROMA OF BREAST: ICD-10-CM

## 2024-07-09 PROCEDURE — 76080 X-RAY EXAM OF FISTULA: CPT | Performed by: RADIOLOGY

## 2024-07-09 PROCEDURE — 76080 X-RAY EXAM OF FISTULA: CPT

## 2024-07-09 PROCEDURE — 49424 ASSESS CYST CONTRAST INJECT: CPT | Performed by: RADIOLOGY

## 2024-07-09 PROCEDURE — 49424 ASSESS CYST CONTRAST INJECT: CPT

## 2024-07-09 RX ADMIN — IOHEXOL 2 ML: 350 INJECTION, SOLUTION INTRAVENOUS at 11:35

## 2024-07-09 NOTE — BRIEF OP NOTE (RAD/CATH)
INTERVENTIONAL RADIOLOGY PROCEDURE NOTE    Date: 7/9/2024    Procedure:   Procedure Summary       Date: 07/09/24 Room / Location: [facility] Interventional Radiology    Anesthesia Start:  Anesthesia Stop:     Procedure: IR DRAINAGE TUBE CHECK/CHANGE/REPOSITION/REINSERTION/UPSIZE Diagnosis:       Seroma of breast      (Seroma)    Scheduled Providers:  Responsible Provider:     Anesthesia Type: Not recorded ASA Status: Not recorded            Preoperative diagnosis:   1. Seroma of breast         Postoperative diagnosis: Same.    Surgeon: Chuck Green MD     Assistant: None. No qualified resident was available.    Blood loss: none    Specimens: none     Findings: Superior 10 Fr drain repositioned and exchanged for a 10 Fr Nunes-meuller drain. Inferior 10 Fr drain exchanged for same. Attempted to reposition into the deeper cavity, but unable to advance wire through focal communication tract.    Complications: None immediate.    Anesthesia: local

## 2024-07-09 NOTE — DISCHARGE INSTRUCTIONS
Drainage Tube Removal    Your drainage tube was removed today.    What you need know at home:   Keep a clean dry dressing at the tube site until the small opening closes. It will take twenty four to forty eight hours. Keep the site dry until it heals. A small amount of drainage on your dressing is normal. Resume your normal diet. Small sips of flat soda will help with any nausea.     Contact Interventional Radiology at [phone] if:    You have pain, fever greater than 101, shaking chills.  If you have increased redness or swelling at the site.   I the drainage from your site does not stop.  If the site drains pus or has a bad odor.

## 2024-07-09 NOTE — BRIEF OP NOTE (RAD/CATH)
INTERVENTIONAL RADIOLOGY PROCEDURE NOTE    Date: 7/9/2024    Procedure:   Procedure Summary       Date: 07/09/24 Room / Location: [facility] Interventional Radiology    Anesthesia Start:  Anesthesia Stop:     Procedure: IR DRAINAGE TUBE CHECK/CHANGE/REPOSITION/REINSERTION/UPSIZE Diagnosis:       Seroma of breast      (Seroma)    Scheduled Providers:  Responsible Provider:     Anesthesia Type: Not recorded ASA Status: Not recorded            Preoperative diagnosis:   1. Seroma of breast         Postoperative diagnosis: Same.    Surgeon: Chuck Green MD     Assistant: None. No qualified resident was available.    Blood loss: none    Specimens: none     Findings: 3 cc residual seroma cavity with no drainage. Catheter was d/c'd.    Complications: None immediate.    Anesthesia: none

## 2024-07-10 LAB
DME PARACHUTE DELIVERY DATE REQUESTED: NORMAL
DME PARACHUTE ITEM DESCRIPTION: NORMAL
DME PARACHUTE ORDER STATUS: NORMAL
DME PARACHUTE SUPPLIER NAME: NORMAL
DME PARACHUTE SUPPLIER PHONE: NORMAL
SCAN RESULT: NORMAL

## 2024-07-10 NOTE — PROGRESS NOTES
Assessment and Plan:  Richa Medina 59 y.o. female s/p right breast mastectomy, right axillary dissection, left SLNB, left mastopexy 4/19/24, presenting for post op appointment    - IR drain with minimal output. Has follow up appointment scheduled  - will place bra clinic referral   - patient will be undergoing radiation. She has breast simulation scheduled for 7/18. Stressed importance of moisturizing skin while undergoing radiation  - return in 4 weeks     Subjective:     Patient has been having low outputs from her drain. No palpable fluid collection. Discussed possible skin changes with radiation which patient was appreciative.      Objective:  NAD, AAOx3  Left breast incisions C/D/I, left axilla drain in place with serous output  Right breast incision C/D/I, no palpable seroma     Missy Coppola PA-C

## 2024-07-11 ENCOUNTER — TELEPHONE (OUTPATIENT)
Dept: OBGYN CLINIC | Facility: OTHER | Age: 59
End: 2024-07-11

## 2024-07-11 ENCOUNTER — PATIENT OUTREACH (OUTPATIENT)
Dept: HEMATOLOGY ONCOLOGY | Facility: CLINIC | Age: 59
End: 2024-07-11

## 2024-07-11 NOTE — PROGRESS NOTES
Patient Navigation attempted to outreach patient for the second time to assess any questions or concerns regarding Medical Oncology Consult. A voicemail was left stating a reason for my call and my contact information was provided . I requested a return call at patient's earliest convenience.

## 2024-07-11 NOTE — TELEPHONE ENCOUNTER
I called patient and left message to get her scheduled at [facility]. Call back #: [phone].

## 2024-07-15 ENCOUNTER — APPOINTMENT (OUTPATIENT)
Dept: RADIATION ONCOLOGY | Facility: CLINIC | Age: 59
End: 2024-07-15
Attending: RADIOLOGY
Payer: COMMERCIAL

## 2024-07-15 PROCEDURE — 77290 THER RAD SIMULAJ FIELD CPLX: CPT | Performed by: RADIOLOGY

## 2024-07-15 PROCEDURE — 77332 RADIATION TREATMENT AID(S): CPT | Performed by: RADIOLOGY

## 2024-07-16 ENCOUNTER — CLINICAL SUPPORT (OUTPATIENT)
Dept: OBGYN CLINIC | Facility: OTHER | Age: 59
End: 2024-07-16

## 2024-07-16 DIAGNOSIS — C50.811 MALIGNANT NEOPLASM OF OVERLAPPING SITES OF RIGHT BREAST IN FEMALE, ESTROGEN RECEPTOR POSITIVE (HCC): Primary | ICD-10-CM

## 2024-07-16 DIAGNOSIS — Z17.0 MALIGNANT NEOPLASM OF OVERLAPPING SITES OF RIGHT BREAST IN FEMALE, ESTROGEN RECEPTOR POSITIVE (HCC): Primary | ICD-10-CM

## 2024-07-16 NOTE — PROGRESS NOTES
Mastectomy Bra Fitting Order Details    Richa MCCAIN Stubits  1965  [id number]    Reason For Visit  Mastectomy bra and prosthesis     Precautions   History of breast cancer     Subjective  Richa underwent a right sided mastectomy with reconstruction plans in the future. She had a left lumpectomy with a lift. States that she used to wear a 36C. States that she is wearing a front zip sports bra.     Surgery Type: Mastectomy (right) lumpectomy with lift (left)    Lymph node removal yes    Date of surgery 4/19/2024    Surgical side bilateral    Objective  Richa has a well healed chest wall with some excess tissue on right side for future reconstruction.     Appointment was observed by fitter in training [facility]    Assessment  17 x 2 - 34 + 2 = 38  19 x 2 = 38   38A      Plan  Ship to home. Instructed on the wear and care of her products and that items will be received in 2- 3 weeks.       Order Details   R mastectomy and L lumpectomy 4/19/24  Adapt air xtra lightstyle 326 size 6 x 1  Little medium x 1  Gela medium nude x 1  chad light nude 38 A x 1   chad black 38A x 1   yves medium x 1  heriberto XL off white x 1   heirberto XL blush x 1   ship to home

## 2024-07-19 ENCOUNTER — OFFICE VISIT (OUTPATIENT)
Dept: HEMATOLOGY ONCOLOGY | Facility: CLINIC | Age: 59
End: 2024-07-19
Payer: COMMERCIAL

## 2024-07-19 VITALS
HEART RATE: 87 BPM | WEIGHT: 143 LBS | DIASTOLIC BLOOD PRESSURE: 80 MMHG | SYSTOLIC BLOOD PRESSURE: 120 MMHG | OXYGEN SATURATION: 92 % | HEIGHT: 63 IN | RESPIRATION RATE: 18 BRPM | BODY MASS INDEX: 25.34 KG/M2

## 2024-07-19 DIAGNOSIS — Z17.0 MALIGNANT NEOPLASM OF OVERLAPPING SITES OF RIGHT BREAST IN FEMALE, ESTROGEN RECEPTOR POSITIVE (HCC): Primary | ICD-10-CM

## 2024-07-19 DIAGNOSIS — C50.811 MALIGNANT NEOPLASM OF OVERLAPPING SITES OF RIGHT BREAST IN FEMALE, ESTROGEN RECEPTOR POSITIVE (HCC): Primary | ICD-10-CM

## 2024-07-19 PROCEDURE — 99213 OFFICE O/P EST LOW 20 MIN: CPT | Performed by: INTERNAL MEDICINE

## 2024-07-21 NOTE — PROGRESS NOTES
HEMATOLOGY / ONCOLOGY CLINIC FOLLOW UP NOTE    Primary Care Provider: Shelby Marte PA-C  Referring Provider:    MRN: [id number]  : 1965    Reason for Encounter: follow up breast cancer       Oncology History Overview Note   2023 - biopsy positive in right breast in 2 areas     7:00 5 cmfn - invasive ductal carcinoma % 90% Her2 2+ with negative FISH     12:00 8 cmfn - invasive ductal carcinoma % IA 35% Her2 1+     2023 - biopsy positive in left breast for invasive ductal carcinoma with tubular features, % IA 35% Her2 1+     2024 - start anastrozole while assessing pulmonary infectious concerns     2024 - left sided partial mastectomy with SLNBx and right sided mastectomy with SLNBx     Left - invasive tubular carcinoma, grade 1, 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 7 o'clock, 5 cm from nipple  Invasive breast carcinoma of no special type (ductal NST)  Grade 1  ; IA 90; HER2 2+, FISH negative  Lymphovascular invasion not identified    B. 12 o'clock, 8 cm from nipple  Invasive breast carcinoma of no special type (ductal NST)  Grade 1  , IA 35, HER2 1+  Lymphovascular invasion not identified    Concordant. Malignancy appears multicentric; there were additional concerning areas within the right breast seen on recent imaging. There were also some areas of enhancement in the upper outer quadrant of the left breast on the contrast-enhanced mammogram which did not have a definite correlate on US;. Pretreatment MRI is recommended.     2023 -  Cancer Staged    Staging form: Breast, AJCC 8th Edition  - Clinical stage from  11/1/2023: Stage IA (cT1c, cN0, cM0, G1, ER+, NM+, HER2-) - Signed by Sisi Dominguez MD on 11/13/2023  Stage prefix: Initial diagnosis  Method of lymph node assessment: Clinical  Histologic grading system: 3 grade system       11/13/2023 Genetic Testing    Consult with Dr. Dominguez - patient declined genetic testing     11/21/2023 Observation    Bilateral breast MRI IMPRESSION:  1.  There is non-mass enhancement in the left breast at 1:00 which is suspicious.  This corresponds with the enhancement seen on the contrast-enhanced mammogram.  MRI guided core needle biopsy is recommended.  2.  Multifocal malignancy in the right breast.  There is no evidence of pectoralis muscle, skin or nipple invasion.  The most posterior areas of enhancement are 24 mm inferior/posterior to the superior Thais  reflector.  3.  There is abnormal opacification and enhancement in the right lower lung.  This is incompletely characterized on MRI.  Correlation with known medical history and CT chest is recommended.     1/23/2024 -  Hormone Therapy    Consult with Dr. Yung  Anastrozole 1 mg daily   Started in neoadjuvant setting to allow for resolution of pulmonary issues prior to surgery     2/2/2024 Genomic Testing    Oncotype DX Recurrence Score 9  Done on right breast biopsy specimen B     4/19/2024 Surgery    Right mastectomy with axillary dissection and sentinel lymph node biopsy  Invasive carcinoma of no special type (ductal)  Grade 1  1.2 cm (2 foci)  Margins negative  1/8 Lymph nodes with macro-metastases (6 mm, extranodal extension not identified)    Aesthetic flat closure (Dr. Felix)     4/19/2024 -  Cancer Staged    Staging form: Breast, AJCC 8th Edition  - Pathologic stage from 4/19/2024: Stage IA (pT1c, pN1a(sn), cM0, G1, ER+, NM+, HER2-) - Signed by Sisi Dominguez MD on 5/6/2024  Stage prefix: Initial diagnosis  Method of lymph node assessment: [place] lymph node biopsy  Histologic grading system: 3 grade system        Malignant neoplasm of upper-outer quadrant of left breast in female, estrogen receptor positive (HCC)   11/13/2023 Genetic Testing    Consult with Dr. Dominguez - patient declined genetic testing     11/21/2023 Observation    Bilateral breast MRI IMPRESSION:  1.  There is non-mass enhancement in the left breast at 1:00 which is suspicious.  This corresponds with the enhancement seen on the contrast-enhanced mammogram.  MRI guided core needle biopsy is recommended.  2.  Multifocal malignancy in the right breast.  There is no evidence of pectoralis muscle, skin or nipple invasion.  The most posterior areas of enhancement are 24 mm inferior/posterior to the superior Thais  reflector.  3.  There is abnormal opacification and enhancement in the right lower lung.  This is incompletely characterized on MRI.  Correlation with known medical history and CT chest is recommended.     12/8/2023 Biopsy    Left breast MRI-guided biopsy  1 o'clock  Invasive breast carcinoma of no special type (ductal) with features of tubular carcinoma  Grade 1  ER 90-95, LA 0, HER2 1+  Lymphovascular invasion not identified    Concordant. Left malignancy appears unifocal. The non-mass enhancement measured up to 8 mm on MRI. US left axilla negative.     12/8/2023 -  Cancer Staged    Staging form: Breast, AJCC 8th Edition  - Clinical stage from 12/8/2023: Stage IA (cT1b, cN0, cM0, G1, ER+, LA-, HER2-) - Signed by Sisi Dominguez MD on 12/27/2023  Stage prefix: Initial diagnosis  Method of lymph node assessment: Clinical  Histologic grading system: 3 grade system       1/23/2024 -  Hormone Therapy    Consult with Dr. Yung  Anastrozole 1 mg daily   Started in neoadjuvant setting to allow for resolution of pulmonary issues prior to surgery     4/19/2024 Surgery    Left breast THAIS localized lumpectomy with sentinel lymph node biopsy  Tubular carcinoma  Grade 1  5 mm  Margins negative  0/4 Lymph nodes    Mastopexy (Dr. Felix)     4/19/2024 -  Cancer  Staged    Staging form: Breast, AJCC 8th Edition  - Pathologic stage from 4/19/2024: Stage IA (pT1a, pN0(sn), cM0, G1, ER+, AK-, HER2-) - Signed by Sisi Dominguez MD on 5/6/2024  Stage prefix: Initial diagnosis  Method of lymph node assessment: [place] lymph node biopsy  Histologic grading system: 3 grade system           Interval History: Patient presents for follow up of 59-year-old female with a history of bilateral ER positive breast cancer.  Left side was very early-stage tubular carcinoma and right side was a multifocal node positive ER positive invasive ductal carcinoma, low-grade.  She started anastrozole in the neoadjuvant setting because of some pulmonary issues that have resolved.  She had a postop seroma and has met with radiation oncology but needs to schedule the simulation.  Hot flashes have gotten better over time on the anastrozole.  She does not have any significant joint pain.  She denies any headaches, fever, shortness of breath.         REVIEW OF SYSTEMS:  Please note that a 14-point review of systems was performed to include Constitutional, HEENT, Respiratory, CVS, GI, , Musculoskeletal, Integumentary, Neurologic, Rheumatologic, Endocrinologic, Psychiatric, Lymphatic, and Hematologic/Oncologic systems were reviewed and are negative unless otherwise stated in HPI. Positive and negative findings pertinent to this evaluation are incorporated into the history of present illness.      ECOG PS: 0    PROBLEM LIST:  Patient Active Problem List   Diagnosis    Bunion    Depression    Hypothyroidism    Prediabetes    Vitamin D deficiency    Easy bruising    Medial epicondylitis of elbow, right    Malignant neoplasm of overlapping sites of right breast in female, estrogen receptor positive (HCC)    Abnormal CT of the chest    Malignant neoplasm of upper-outer quadrant of left breast in female, estrogen receptor positive (HCC)    Use of anastrozole (Arimidex)    Abnormal EKG    Seroma of breast        Assessment / Plan: 59-year-old female with bilateral ER positive breast cancer on anastrozole.  We will plan on a 5 to 10-year course depending on tolerance and bone density.  She will be pursuing radiation soon.  I will plan on seeing her back in 6 months with a CBC and CMP prior.  She knows to call in the interim with any questions or concerns.       I spent 20 minutes on chart review, face to face counseling time, coordination of care and documentation.    Past Medical History:   has a past medical history of Asthma, Breast mass (10/31/23), Cancer (HCC), Depression, Disease of thyroid gland, Hearing aid worn, Hearing loss, Osteopenia, Rosacea (02/09/2021), Seasonal allergies, and Vitamin D deficiency (07/24/2018).    PAST SURGICAL HISTORY:   has a past surgical history that includes Septoplasty; Sinus surgery; LASIK; pr neuroplasty &/transpos median nrv carpal tunne (Right, 07/10/2018); pr neuroplasty &/transpos median nrv carpal tunne (Left, 04/19/2019); US guided breast biopsy right complete (Right, 11/01/2023); US guidance breast biopsy right each additional (Right, 11/01/2023); Breast biopsy (Right, 11/01/2023); MRI breast biopsy left (all inclusive) (Left, 12/08/2023); Breast biopsy (Left, 12/08/2023); US breast needle loc left (Left, 2/27/2024); pr mastectomy partial (Left, 4/19/2024); pr exc breast les preop plmt rad marker open 1 les (Left, 4/19/2024); pr mastectomy simple complete (Right, 4/19/2024); pr bx/exc lymph node open superficial (Bilateral, 4/19/2024); pr intraop sentinel lymph node id w/dye injection (Bilateral, 4/19/2024); pr inj radioactive tracer for id of sentinel node (Bilateral, 4/19/2024); pr adjnt tis trnsfr/reargmt any area 30.1-60 sq cm (Right, 4/19/2024); pr mastopexy (Left, 4/19/2024); IR drainage tube placement (6/18/2024); IR drainage tube check/change/reposition/reinsertion/upsize (6/27/2024); and IR drainage tube check/change/reposition/reinsertion/upsize  (7/9/2024).    CURRENT MEDICATIONS  Current Outpatient Medications   Medication Sig Dispense Refill    anastrozole (ARIMIDEX) 1 mg tablet Take 1 tablet (1 mg total) by mouth daily 90 tablet 2    budesonide-formoterol (Symbicort) 80-4.5 MCG/ACT inhaler Inhale 2 puffs 2 (two) times a day Rinse mouth after use. 30 g 1    Calcium 250 MG CAPS Take by mouth daily with dinner        Cholecalciferol (VITAMIN D3 PO) Take 2 capsules by mouth in the morning      gabapentin (Neurontin) 100 mg capsule Take 1 capsule (100 mg total) by mouth daily at bedtime as needed (pain) 30 capsule 0    gabapentin (Neurontin) 300 mg capsule Take 1 capsule (300 mg total) by mouth 3 (three) times a day for 5 days 15 capsule 0    ibuprofen (MOTRIN) 800 mg tablet Take 1 tablet (800 mg total) by mouth every 8 (eight) hours as needed for mild pain (DO NOT BEGIN UNTIL 48 HOURS AFTER SURGERY) (Patient not taking: Reported on 5/6/2024) 15 tablet 0    levocetirizine (XYZAL) 5 MG tablet Take 1 tablet (5 mg total) by mouth every evening 90 tablet 1    levothyroxine 25 mcg tablet TAKE ONE TABLET BY MOUTH EVERY DAY MONDAY THRU FRIDAY AND TAKE 2 TABLETS EVERY DAY ON SAT & SUN. 114 tablet 0    MAGNESIUM PO Take 200 mg by mouth daily with dinner        Medium Chain Triglycerides (MCT OIL PO) Take 1 Application by mouth in the morning      mometasone (NASONEX) 50 mcg/act nasal spray 2 sprays into each nostril daily (Patient not taking: Reported on 6/25/2024) 51 g 1    MULTIPLE VITAMINS-CALCIUM PO Take 1 capsule by mouth daily in the early morning        niacin 100 mg tablet Take 100 mg by mouth daily with breakfast      PARoxetine (PAXIL-CR) 25 mg 24 hr tablet Take 2 po daily 180 tablet 3    pseudoephedrine (SUDAFED) 120 MG 12 hr tablet Take 120 mg by mouth as needed for congestion (Patient not taking: Reported on 6/25/2024)      sodium chloride, PF, 0.9 % 10 mL by Intracatheter route daily Intracatheter flushing daily. May substitute prefilled syringe with  "normal saline 10 mL vials, 10 mL syringes, and 18 g blunt needles 300 mL 2    Spacer/Aero-Holding Chambers (Vortex Valved Holding Chamber) ANGELINE Use 2 (two) times a day 1 each 0    traMADol (Ultram) 50 mg tablet Take 1 tablet (50 mg total) by mouth every 6 (six) hours as needed for moderate pain (Patient not taking: Reported on 5/6/2024) 20 tablet 0     No current facility-administered medications for this visit.     [unfilled]    SOCIAL HISTORY:   reports that she has never smoked. She has never used smokeless tobacco. She reports current alcohol use. She reports that she does not use drugs.     FAMILY HISTORY:  family history includes Cancer in her father and mother; Colon cancer in her maternal uncle; Crohn's disease in her father; Depression in her maternal grandmother; Diabetes in her maternal grandmother; Hashimoto's thyroiditis in her mother and sister; Melanoma in her father and mother; No Known Problems in her maternal grandfather, paternal grandfather, paternal grandmother, sister, and sister; Thyroid disease in her mother.     ALLERGIES:  is allergic to pollen extract.      Physical Exam:  Vital Signs:   Visit Vitals  /80 (BP Location: Left arm, Patient Position: Sitting, Cuff Size: Adult)   Pulse 87   Resp 18   Ht 5' 3\" (1.6 m)   Wt 64.9 kg (143 lb)   SpO2 92%   BMI 25.33 kg/m²   OB Status Postmenopausal   Smoking Status Never   BSA 1.68 m²     Body mass index is 25.33 kg/m².  Body surface area is 1.68 meters squared.    GEN: Alert, awake oriented x3, in no acute distress  HEENT- No pallor, icterus, cyanosis, no oral mucosal lesions,   LAD - no palpable cervical, clavicle, axillary, inguinal LAD  Heart- normal S1 S2, regular rate and rhythm, No murmur, rubs.   Lungs- clear breathing sound bilateral.   Abdomen- soft, Non tender, bowel sounds present  Extremities- No cyanosis, clubbing, edema  Neuro- No focal neurological deficit    Labs:  Lab Results   Component Value Date    WBC 10.57 (H) 06/22/2024 "    HGB 12.6 06/22/2024    HCT 38.1 06/22/2024    MCV 88 06/22/2024     06/22/2024     Lab Results   Component Value Date    SODIUM 135 06/22/2024    K 3.9 06/22/2024    CL 99 06/22/2024    CO2 31 06/22/2024    AGAP 5 06/22/2024    BUN 11 06/22/2024    CREATININE 0.81 06/22/2024    GLUC 111 06/22/2024    GLUF 82 02/22/2024    CALCIUM 9.9 06/22/2024    AST 19 03/08/2024    ALT 23 03/08/2024    ALKPHOS 70 03/08/2024    TP 7.6 03/08/2024    TBILI 0.63 03/08/2024    EGFR 78 06/22/2024

## 2024-07-24 LAB
DME PARACHUTE DELIVERY DATE REQUESTED: NORMAL
DME PARACHUTE ITEM DESCRIPTION: NORMAL
DME PARACHUTE ORDER STATUS: NORMAL
DME PARACHUTE SUPPLIER NAME: NORMAL
DME PARACHUTE SUPPLIER PHONE: NORMAL

## 2024-07-24 PROCEDURE — 77295 3-D RADIOTHERAPY PLAN: CPT | Performed by: RADIOLOGY

## 2024-07-24 PROCEDURE — 77334 RADIATION TREATMENT AID(S): CPT | Performed by: RADIOLOGY

## 2024-07-24 PROCEDURE — 77300 RADIATION THERAPY DOSE PLAN: CPT | Performed by: RADIOLOGY

## 2024-07-25 ENCOUNTER — PATIENT OUTREACH (OUTPATIENT)
Dept: HEMATOLOGY ONCOLOGY | Facility: CLINIC | Age: 59
End: 2024-07-25

## 2024-07-25 NOTE — PROGRESS NOTES
Patient Navigation attempted to outreach patient for the third time to assess any questions or concerns regarding Medical Oncology Consult. A voicemail was left stating a reason for my call and my contact information was provided . I requested a return call at patient's earliest convenience.

## 2024-07-30 ENCOUNTER — APPOINTMENT (OUTPATIENT)
Dept: RADIATION ONCOLOGY | Facility: CLINIC | Age: 59
End: 2024-07-30
Attending: RADIOLOGY
Payer: COMMERCIAL

## 2024-07-30 PROCEDURE — 77412 RADIATION TX DELIVERY LVL 3: CPT | Performed by: RADIOLOGY

## 2024-07-30 PROCEDURE — 77280 THER RAD SIMULAJ FIELD SMPL: CPT | Performed by: RADIOLOGY

## 2024-07-30 PROCEDURE — 77387 GUIDANCE FOR RADJ TX DLVR: CPT | Performed by: RADIOLOGY

## 2024-07-31 ENCOUNTER — APPOINTMENT (OUTPATIENT)
Dept: RADIATION ONCOLOGY | Facility: CLINIC | Age: 59
End: 2024-07-31
Attending: RADIOLOGY
Payer: COMMERCIAL

## 2024-07-31 LAB
DME PARACHUTE DELIVERY DATE ACTUAL: NORMAL
DME PARACHUTE DELIVERY DATE REQUESTED: NORMAL
DME PARACHUTE ITEM DESCRIPTION: NORMAL
DME PARACHUTE ORDER STATUS: NORMAL
DME PARACHUTE SUPPLIER NAME: NORMAL
DME PARACHUTE SUPPLIER PHONE: NORMAL

## 2024-07-31 PROCEDURE — 77387 GUIDANCE FOR RADJ TX DLVR: CPT | Performed by: INTERNAL MEDICINE

## 2024-07-31 PROCEDURE — 77412 RADIATION TX DELIVERY LVL 3: CPT | Performed by: INTERNAL MEDICINE

## 2024-08-01 ENCOUNTER — APPOINTMENT (OUTPATIENT)
Dept: RADIATION ONCOLOGY | Facility: CLINIC | Age: 59
End: 2024-08-01
Attending: RADIOLOGY
Payer: COMMERCIAL

## 2024-08-01 PROCEDURE — 77412 RADIATION TX DELIVERY LVL 3: CPT | Performed by: RADIOLOGY

## 2024-08-01 PROCEDURE — G6017 INTRAFRACTION TRACK MOTION: HCPCS | Performed by: RADIOLOGY

## 2024-08-01 PROCEDURE — 77387 GUIDANCE FOR RADJ TX DLVR: CPT | Performed by: RADIOLOGY

## 2024-08-02 ENCOUNTER — APPOINTMENT (OUTPATIENT)
Dept: RADIATION ONCOLOGY | Facility: CLINIC | Age: 59
End: 2024-08-02
Attending: RADIOLOGY
Payer: COMMERCIAL

## 2024-08-02 PROCEDURE — 77412 RADIATION TX DELIVERY LVL 3: CPT | Performed by: INTERNAL MEDICINE

## 2024-08-02 PROCEDURE — 77331 SPECIAL RADIATION DOSIMETRY: CPT | Performed by: INTERNAL MEDICINE

## 2024-08-02 PROCEDURE — 77387 GUIDANCE FOR RADJ TX DLVR: CPT | Performed by: INTERNAL MEDICINE

## 2024-08-05 ENCOUNTER — APPOINTMENT (OUTPATIENT)
Dept: RADIATION ONCOLOGY | Facility: CLINIC | Age: 59
End: 2024-08-05
Attending: RADIOLOGY
Payer: COMMERCIAL

## 2024-08-05 PROCEDURE — 77412 RADIATION TX DELIVERY LVL 3: CPT | Performed by: INTERNAL MEDICINE

## 2024-08-05 PROCEDURE — 77387 GUIDANCE FOR RADJ TX DLVR: CPT | Performed by: INTERNAL MEDICINE

## 2024-08-05 PROCEDURE — G6017 INTRAFRACTION TRACK MOTION: HCPCS | Performed by: INTERNAL MEDICINE

## 2024-08-05 PROCEDURE — 77336 RADIATION PHYSICS CONSULT: CPT | Performed by: RADIOLOGY

## 2024-08-06 ENCOUNTER — APPOINTMENT (OUTPATIENT)
Dept: RADIATION ONCOLOGY | Facility: CLINIC | Age: 59
End: 2024-08-06
Attending: RADIOLOGY
Payer: COMMERCIAL

## 2024-08-06 PROCEDURE — 77412 RADIATION TX DELIVERY LVL 3: CPT | Performed by: RADIOLOGY

## 2024-08-06 PROCEDURE — 77387 GUIDANCE FOR RADJ TX DLVR: CPT | Performed by: RADIOLOGY

## 2024-08-06 PROCEDURE — G6017 INTRAFRACTION TRACK MOTION: HCPCS | Performed by: RADIOLOGY

## 2024-08-07 ENCOUNTER — APPOINTMENT (OUTPATIENT)
Dept: RADIATION ONCOLOGY | Facility: CLINIC | Age: 59
End: 2024-08-07
Attending: RADIOLOGY
Payer: COMMERCIAL

## 2024-08-07 ENCOUNTER — DOCUMENTATION (OUTPATIENT)
Dept: HEMATOLOGY ONCOLOGY | Facility: CLINIC | Age: 59
End: 2024-08-07

## 2024-08-07 PROCEDURE — 77387 GUIDANCE FOR RADJ TX DLVR: CPT | Performed by: INTERNAL MEDICINE

## 2024-08-07 PROCEDURE — G6017 INTRAFRACTION TRACK MOTION: HCPCS | Performed by: INTERNAL MEDICINE

## 2024-08-07 PROCEDURE — 77412 RADIATION TX DELIVERY LVL 3: CPT | Performed by: INTERNAL MEDICINE

## 2024-08-07 NOTE — PROGRESS NOTES
Patient Navigation    I have attempted to outreach Pt on three separate dates to review any barriers's to care but have had to leave a voice message requesting a return call.  Since Pt has not returned any of my messages Patient Navigation will no longer try to outreach.  My contact information was provided if they should decide to call.

## 2024-08-08 ENCOUNTER — APPOINTMENT (OUTPATIENT)
Dept: RADIATION ONCOLOGY | Facility: CLINIC | Age: 59
End: 2024-08-08
Attending: RADIOLOGY
Payer: COMMERCIAL

## 2024-08-08 PROCEDURE — 77412 RADIATION TX DELIVERY LVL 3: CPT | Performed by: INTERNAL MEDICINE

## 2024-08-08 PROCEDURE — G6017 INTRAFRACTION TRACK MOTION: HCPCS | Performed by: INTERNAL MEDICINE

## 2024-08-08 PROCEDURE — 77387 GUIDANCE FOR RADJ TX DLVR: CPT | Performed by: INTERNAL MEDICINE

## 2024-08-09 ENCOUNTER — APPOINTMENT (OUTPATIENT)
Dept: RADIATION ONCOLOGY | Facility: CLINIC | Age: 59
End: 2024-08-09
Attending: RADIOLOGY
Payer: COMMERCIAL

## 2024-08-09 PROCEDURE — 77387 GUIDANCE FOR RADJ TX DLVR: CPT | Performed by: INTERNAL MEDICINE

## 2024-08-09 PROCEDURE — 77412 RADIATION TX DELIVERY LVL 3: CPT | Performed by: INTERNAL MEDICINE

## 2024-08-09 PROCEDURE — G6017 INTRAFRACTION TRACK MOTION: HCPCS | Performed by: INTERNAL MEDICINE

## 2024-08-12 ENCOUNTER — APPOINTMENT (OUTPATIENT)
Dept: RADIATION ONCOLOGY | Facility: CLINIC | Age: 59
End: 2024-08-12
Attending: RADIOLOGY
Payer: COMMERCIAL

## 2024-08-12 ENCOUNTER — OFFICE VISIT (OUTPATIENT)
Dept: PLASTIC SURGERY | Facility: CLINIC | Age: 59
End: 2024-08-12
Payer: COMMERCIAL

## 2024-08-12 DIAGNOSIS — Z17.0 MALIGNANT NEOPLASM OF UPPER-OUTER QUADRANT OF LEFT BREAST IN FEMALE, ESTROGEN RECEPTOR POSITIVE (HCC): Primary | ICD-10-CM

## 2024-08-12 DIAGNOSIS — C50.412 MALIGNANT NEOPLASM OF UPPER-OUTER QUADRANT OF LEFT BREAST IN FEMALE, ESTROGEN RECEPTOR POSITIVE (HCC): Primary | ICD-10-CM

## 2024-08-12 PROCEDURE — 99213 OFFICE O/P EST LOW 20 MIN: CPT | Performed by: PHYSICIAN ASSISTANT

## 2024-08-12 PROCEDURE — 77336 RADIATION PHYSICS CONSULT: CPT | Performed by: INTERNAL MEDICINE

## 2024-08-12 PROCEDURE — G6017 INTRAFRACTION TRACK MOTION: HCPCS | Performed by: INTERNAL MEDICINE

## 2024-08-12 PROCEDURE — 77412 RADIATION TX DELIVERY LVL 3: CPT | Performed by: INTERNAL MEDICINE

## 2024-08-12 PROCEDURE — 77387 GUIDANCE FOR RADJ TX DLVR: CPT | Performed by: INTERNAL MEDICINE

## 2024-08-13 ENCOUNTER — APPOINTMENT (OUTPATIENT)
Dept: RADIATION ONCOLOGY | Facility: CLINIC | Age: 59
End: 2024-08-13
Attending: RADIOLOGY
Payer: COMMERCIAL

## 2024-08-13 ENCOUNTER — CLINICAL SUPPORT (OUTPATIENT)
Dept: OBGYN CLINIC | Facility: OTHER | Age: 59
End: 2024-08-13

## 2024-08-13 DIAGNOSIS — Z17.0 MALIGNANT NEOPLASM OF OVERLAPPING SITES OF RIGHT BREAST IN FEMALE, ESTROGEN RECEPTOR POSITIVE (HCC): Primary | ICD-10-CM

## 2024-08-13 DIAGNOSIS — C50.811 MALIGNANT NEOPLASM OF OVERLAPPING SITES OF RIGHT BREAST IN FEMALE, ESTROGEN RECEPTOR POSITIVE (HCC): Primary | ICD-10-CM

## 2024-08-13 PROCEDURE — 77387 GUIDANCE FOR RADJ TX DLVR: CPT | Performed by: RADIOLOGY

## 2024-08-13 PROCEDURE — G6017 INTRAFRACTION TRACK MOTION: HCPCS | Performed by: RADIOLOGY

## 2024-08-13 PROCEDURE — 77427 RADIATION TX MANAGEMENT X5: CPT | Performed by: RADIOLOGY

## 2024-08-13 PROCEDURE — 77412 RADIATION TX DELIVERY LVL 3: CPT | Performed by: RADIOLOGY

## 2024-08-13 NOTE — PROGRESS NOTES
Richa Is here to trial another prosthesis. Previously ordered an adapt air xtra light size 6 however this prosthesis projects past her natural side. Trial of balance adapt air with better symmetry    Return adapt air xtra light style 326 x 1  Order:  balance adapt air style 233 size 6 x 1  ship to home

## 2024-08-13 NOTE — PROGRESS NOTES
Assessment and Plan:  Richa Medina 59 y.o. female s/p right breast mastectomy, right axillary dissection, left SLNB, left mastopexy 4/19/24, presenting for post op appointment    - patient is currently undergoing radiation. She has had 10 treatments of 16. She has mild erythema to her left breast. No blisters, minimal discomfort  - no further fluid collections  - will have patient return over the winter for skin check. We discussed she would not be able to have any additional procedures until closer to 6 months after the end of radiation  - continue moisturizing  - call with any questions/concerns     Subjective:  Patient has been doing well. She has not had any fluid re accumulate. She is tolerating radiation well.      Objective:  NAD, AAOx3  Left breast incisions C/D/I, minimal erythema, non tender to palpation, no palpable fluid collection.   Right breast incision C/D/I, no palpable seroma     Missy Coppola PA-C

## 2024-08-14 ENCOUNTER — APPOINTMENT (OUTPATIENT)
Dept: RADIATION ONCOLOGY | Facility: CLINIC | Age: 59
End: 2024-08-14
Attending: RADIOLOGY
Payer: COMMERCIAL

## 2024-08-14 PROCEDURE — 77412 RADIATION TX DELIVERY LVL 3: CPT | Performed by: INTERNAL MEDICINE

## 2024-08-14 PROCEDURE — G6017 INTRAFRACTION TRACK MOTION: HCPCS | Performed by: INTERNAL MEDICINE

## 2024-08-14 PROCEDURE — 77387 GUIDANCE FOR RADJ TX DLVR: CPT | Performed by: INTERNAL MEDICINE

## 2024-08-15 ENCOUNTER — APPOINTMENT (OUTPATIENT)
Dept: RADIATION ONCOLOGY | Facility: CLINIC | Age: 59
End: 2024-08-15
Attending: RADIOLOGY
Payer: COMMERCIAL

## 2024-08-15 PROCEDURE — G6017 INTRAFRACTION TRACK MOTION: HCPCS | Performed by: RADIOLOGY

## 2024-08-15 PROCEDURE — 77387 GUIDANCE FOR RADJ TX DLVR: CPT | Performed by: RADIOLOGY

## 2024-08-15 PROCEDURE — 77412 RADIATION TX DELIVERY LVL 3: CPT | Performed by: RADIOLOGY

## 2024-08-16 ENCOUNTER — APPOINTMENT (OUTPATIENT)
Dept: RADIATION ONCOLOGY | Facility: CLINIC | Age: 59
End: 2024-08-16
Attending: RADIOLOGY
Payer: COMMERCIAL

## 2024-08-16 NOTE — PROGRESS NOTES
Assessment/Plan:    Will continue with breast care team recommendations.  ASCCP guidelines reviewed and pap with cotesting noted to be up to date; this low risk patient was advised she meets criteria to d/c pap screening at age 63. DEXA UTD and colonoscopy referral placed given family hx of colon cancer and personal hx of breast cancer. Reviewed diet/activity recommendations Calcium 1200 mg and Vit D 600-1000 IU daily.  Discussed postmenopausal considerations and symptoms to report. Kegel exercises as instructed. RTO in one year for routine annual gyn exam or sooner PRN.        Diagnoses and all orders for this visit:    Colon cancer screening    Malignant neoplasm of overlapping sites of right breast in female, estrogen receptor positive (HCC)    Malignant neoplasm of upper-outer quadrant of left breast in female, estrogen receptor positive (HCC)    Encounter for gynecological examination (general) (routine) without abnormal findings        Subjective:      Patient ID: Richa Medina is a 59 y.o. female.    This patient presents for routine annual gyn exam. Last seen 8/2023.  She was diagnosed with right breast cancer 11/2023 and left breast cancer 12/2023 with partial mastectomy B/L. On anastrazole. Declined genetic testing.  She denies  bleeding or spotting, pelvic pain, dyspareunia, breast concerns, abnormal discharge, bowel/bladder dysfunction, depression/anx.   , sexually active and is monogamous.   Pap/HPV up to date and normal, 8/14/23.  Cologuard negative, 9/10/20 and 10/6/23. Has a maternal uncle with colon cancer, declined colonoscopy.  DXA 3/25/24, osteopenia.         The following portions of the patient's history were reviewed and updated as appropriate: allergies, current medications, past family history, past medical history, past social history, past surgical history and problem list.    Review of Systems   Constitutional: Negative.    HENT: Negative.     Respiratory: Negative.    "  Cardiovascular: Negative.    Gastrointestinal: Negative.    Endocrine: Negative.    Genitourinary:  Negative for difficulty urinating, dyspareunia, dysuria, frequency, pelvic pain, urgency, vaginal bleeding, vaginal discharge and vaginal pain.   Musculoskeletal: Negative.    Skin: Negative.    Neurological: Negative.    Psychiatric/Behavioral: Negative.           Objective:      /72   Ht 5' 3\" (1.6 m)   Wt 64.9 kg (143 lb)   BMI 25.33 kg/m²          Physical Exam  Vitals and nursing note reviewed. Exam conducted with a chaperone present.   Constitutional:       Appearance: Normal appearance. She is well-developed.   HENT:      Head: Normocephalic and atraumatic.   Neck:      Thyroid: No thyroid mass or thyromegaly.   Cardiovascular:      Rate and Rhythm: Normal rate and regular rhythm.      Heart sounds: Normal heart sounds.   Pulmonary:      Effort: Pulmonary effort is normal.      Breath sounds: Normal breath sounds.   Chest:   Breasts:     Breasts are symmetrical.      Right: Skin change (surgical scars noted) present. No mass or tenderness.      Left: Skin change (surgical scars noted) present. No inverted nipple, mass, nipple discharge or tenderness.   Abdominal:      General: Bowel sounds are normal.      Palpations: Abdomen is soft.      Tenderness: There is no abdominal tenderness.      Hernia: There is no hernia in the left inguinal area or right inguinal area.   Genitourinary:     General: Normal vulva.      Exam position: Supine.      Pubic Area: No rash.       Labia:         Right: No rash, tenderness, lesion or injury.         Left: No rash, tenderness, lesion or injury.       Urethra: No prolapse, urethral pain, urethral swelling or urethral lesion.      Vagina: Normal. No signs of injury and foreign body. No vaginal discharge, erythema, tenderness, bleeding, lesions or prolapsed vaginal walls.      Cervix: No cervical motion tenderness, discharge, friability, lesion, erythema, cervical " bleeding or eversion.      Uterus: Not deviated, not enlarged, not fixed, not tender and no uterine prolapse.       Adnexa:         Right: No mass, tenderness or fullness.          Left: No mass, tenderness or fullness.        Rectum: No external hemorrhoid.      Comments: Urethra normal without lesions  No bladder tenderness  Musculoskeletal:         General: Normal range of motion.      Cervical back: Normal range of motion and neck supple.   Lymphadenopathy:      Lower Body: No right inguinal adenopathy. No left inguinal adenopathy.   Skin:     General: Skin is warm and dry.   Neurological:      Mental Status: She is alert and oriented to person, place, and time.   Psychiatric:         Speech: Speech normal.         Behavior: Behavior normal. Behavior is cooperative.

## 2024-08-19 ENCOUNTER — APPOINTMENT (OUTPATIENT)
Dept: RADIATION ONCOLOGY | Facility: CLINIC | Age: 59
End: 2024-08-19
Attending: RADIOLOGY
Payer: COMMERCIAL

## 2024-08-19 ENCOUNTER — ANNUAL EXAM (OUTPATIENT)
Dept: GYNECOLOGY | Facility: CLINIC | Age: 59
End: 2024-08-19
Payer: COMMERCIAL

## 2024-08-19 ENCOUNTER — OFFICE VISIT (OUTPATIENT)
Dept: FAMILY MEDICINE CLINIC | Facility: CLINIC | Age: 59
End: 2024-08-19
Payer: COMMERCIAL

## 2024-08-19 VITALS
HEIGHT: 63 IN | WEIGHT: 143 LBS | DIASTOLIC BLOOD PRESSURE: 72 MMHG | BODY MASS INDEX: 25.34 KG/M2 | SYSTOLIC BLOOD PRESSURE: 108 MMHG

## 2024-08-19 VITALS
HEART RATE: 68 BPM | BODY MASS INDEX: 25.44 KG/M2 | OXYGEN SATURATION: 99 % | WEIGHT: 143.6 LBS | SYSTOLIC BLOOD PRESSURE: 112 MMHG | DIASTOLIC BLOOD PRESSURE: 70 MMHG | TEMPERATURE: 97.3 F

## 2024-08-19 DIAGNOSIS — F32.A DEPRESSION, UNSPECIFIED DEPRESSION TYPE: ICD-10-CM

## 2024-08-19 DIAGNOSIS — E55.9 VITAMIN D DEFICIENCY: ICD-10-CM

## 2024-08-19 DIAGNOSIS — R73.03 PREDIABETES: ICD-10-CM

## 2024-08-19 DIAGNOSIS — Z17.0 MALIGNANT NEOPLASM OF UPPER-OUTER QUADRANT OF LEFT BREAST IN FEMALE, ESTROGEN RECEPTOR POSITIVE (HCC): ICD-10-CM

## 2024-08-19 DIAGNOSIS — C50.811 MALIGNANT NEOPLASM OF OVERLAPPING SITES OF RIGHT BREAST IN FEMALE, ESTROGEN RECEPTOR POSITIVE (HCC): ICD-10-CM

## 2024-08-19 DIAGNOSIS — Z00.00 ANNUAL PHYSICAL EXAM: Primary | ICD-10-CM

## 2024-08-19 DIAGNOSIS — Z12.11 COLON CANCER SCREENING: Primary | Chronic | ICD-10-CM

## 2024-08-19 DIAGNOSIS — E03.9 HYPOTHYROIDISM, UNSPECIFIED TYPE: ICD-10-CM

## 2024-08-19 DIAGNOSIS — Z13.220 LIPID SCREENING: ICD-10-CM

## 2024-08-19 DIAGNOSIS — Z01.419 ENCOUNTER FOR GYNECOLOGICAL EXAMINATION (GENERAL) (ROUTINE) WITHOUT ABNORMAL FINDINGS: ICD-10-CM

## 2024-08-19 DIAGNOSIS — C50.412 MALIGNANT NEOPLASM OF UPPER-OUTER QUADRANT OF LEFT BREAST IN FEMALE, ESTROGEN RECEPTOR POSITIVE (HCC): ICD-10-CM

## 2024-08-19 DIAGNOSIS — Z17.0 MALIGNANT NEOPLASM OF OVERLAPPING SITES OF RIGHT BREAST IN FEMALE, ESTROGEN RECEPTOR POSITIVE (HCC): ICD-10-CM

## 2024-08-19 PROBLEM — M77.01 MEDIAL EPICONDYLITIS OF ELBOW, RIGHT: Status: RESOLVED | Noted: 2022-04-12 | Resolved: 2024-08-19

## 2024-08-19 PROCEDURE — 77387 GUIDANCE FOR RADJ TX DLVR: CPT | Performed by: RADIOLOGY

## 2024-08-19 PROCEDURE — S0612 ANNUAL GYNECOLOGICAL EXAMINA: HCPCS | Performed by: OBSTETRICS & GYNECOLOGY

## 2024-08-19 PROCEDURE — G6017 INTRAFRACTION TRACK MOTION: HCPCS | Performed by: RADIOLOGY

## 2024-08-19 PROCEDURE — 77412 RADIATION TX DELIVERY LVL 3: CPT | Performed by: RADIOLOGY

## 2024-08-19 PROCEDURE — 99396 PREV VISIT EST AGE 40-64: CPT | Performed by: PHYSICIAN ASSISTANT

## 2024-08-19 NOTE — LETTER
August 19, 2024     Patient: Richa Medina  YOB: 1965  Date of Visit: 8/19/2024      To Whom It May Concern:    Richa Medina is under my professional care. Richa was seen in my office on 8/19/2024. Richa may return to work on 8/20/2024 .    If you have any questions or concerns, please don't hesitate to call.         Sincerely,          Shelby Marte PA-C        CC: No Recipients

## 2024-08-19 NOTE — PROGRESS NOTES
Adult Annual Physical  Name: Richa Medina      : 1965      MRN: [id number]  Encounter Provider: Shelby Marte PA-C  Encounter Date: 2024   Encounter department: [facility]    Assessment & Plan   1. Annual physical exam  2. Hypothyroidism, unspecified type  Assessment & Plan:  Stable. Continue levothyroxine 25 mcg Monday through Friday and 50 mcg on weekend days. Recheck TSH in several months.  Orders:  -     TSH, 3rd generation with Free T4 reflex; Future  3. Vitamin D deficiency  Assessment & Plan:  Currently on 4000 units daily. Recheck with labs as ordered.  Orders:  -     Vitamin D 25 hydroxy; Future  4. Prediabetes  Assessment & Plan:  Limit carb intake. Recheck labs as ordered.  Orders:  -     Comprehensive metabolic panel; Future  -     CBC and differential; Future  -     Hemoglobin A1C  5. Lipid screening  -     Lipid Panel with Direct LDL reflex; Future  6. Depression, unspecified depression type  Assessment & Plan:  Controlled. Continue Paxil CR 50 mg daily.  Immunizations and preventive care screenings were discussed with patient today. Appropriate education was printed on patient's after visit summary.    Counseling:  Exercise: the importance of regular exercise/physical activity was discussed. Recommend exercise 3-5 times per week for at least 30 minutes.          History of Present Illness     Adult Annual Physical:  Patient presents for annual physical.     Hypothyroidism-on levothyroxine 25 mcg Monday through Friday and 50 mcg on weekend days-last TSH was to 0.913 in 2024    Depression- controlled on Paxil CR 50 mg daily     Hypercholesterolemia-on niacin 100 mg daily -last LDL in 2023 was 117    Vitamin D deficiency-on 4000 units daily - level in January was 53.3    Prediabetes- A1c in January was 5.5% - has been limiting carbs     .     Diet and Physical Activity:  - Diet/Nutrition: consuming 3-5 servings of fruits/vegetables  daily.  - Exercise: no formal exercise. not during the summer    Depression Screening:    - PHQ-9 Score: 1    General Health:  - Sleep: > 8 hours of sleep on average and sleeps well.  - Hearing: normal hearing bilateral ears.  - Vision: goes for regular eye exams and wears glasses. for driving only  - Dental: regular dental visits.    /GYN Health:  - Follows with GYN: yes.   - Menopause: postmenopausal.   - Contraception: menopause. around age 48      Advanced Care Planning:  - Has an advanced directive?: yes    - ACP document given to patient?: no      Review of Systems   Constitutional:  Negative for chills and fever.   HENT:  Negative for congestion, rhinorrhea and sore throat.    Respiratory:  Negative for cough, shortness of breath and wheezing.    Cardiovascular:  Negative for chest pain, palpitations and leg swelling.   Gastrointestinal:  Negative for abdominal pain, blood in stool, constipation, diarrhea, nausea and vomiting.   Genitourinary:  Negative for dysuria and frequency.   Musculoskeletal:  Negative for arthralgias and myalgias.   Skin:  Negative for rash.   Neurological:  Negative for dizziness, syncope and headaches.   Hematological:  Bruises/bleeds easily (bruises easily).   Psychiatric/Behavioral:  Negative for dysphoric mood. The patient is not nervous/anxious.      Medical History Reviewed by provider this encounter:  Tobacco  Allergies  Meds  Surg Hx  Fam Hx  Soc Hx        Objective     /70 (BP Location: Left arm, Cuff Size: Standard)   Pulse 68   Temp (!) 97.3 °F (36.3 °C) (Tympanic)   Wt 65.1 kg (143 lb 9.6 oz)   SpO2 99%   BMI 25.44 kg/m²     Physical Exam  Vitals reviewed.   Constitutional:       General: She is not in acute distress.     Appearance: She is well-developed.   HENT:      Head: Normocephalic and atraumatic.      Right Ear: External ear normal.      Left Ear: External ear normal.      Nose: Nose normal.      Mouth/Throat:      Pharynx: No oropharyngeal  exudate.   Eyes:      Conjunctiva/sclera: Conjunctivae normal.      Pupils: Pupils are equal, round, and reactive to light.   Neck:      Thyroid: No thyromegaly.   Cardiovascular:      Rate and Rhythm: Normal rate and regular rhythm.      Heart sounds: Normal heart sounds. No murmur heard.  Pulmonary:      Effort: Pulmonary effort is normal.      Breath sounds: Normal breath sounds. No wheezing or rales.   Abdominal:      General: Bowel sounds are normal. There is no distension.      Palpations: Abdomen is soft. There is no mass.      Tenderness: There is no abdominal tenderness.   Musculoskeletal:      Cervical back: Normal range of motion and neck supple.   Lymphadenopathy:      Cervical: No cervical adenopathy.   Skin:     General: Skin is warm and dry.      Findings: No rash.   Neurological:      Mental Status: She is alert.      Sensory: No sensory deficit.      Comments: 5/5 strength in UE and LE   Psychiatric:         Behavior: Behavior normal.

## 2024-08-19 NOTE — ASSESSMENT & PLAN NOTE
Stable. Continue levothyroxine 25 mcg Monday through Friday and 50 mcg on weekend days. Recheck TSH in several months.

## 2024-08-19 NOTE — PATIENT INSTRUCTIONS
"Patient Education     Routine physical for adults   The Basics   Written by the doctors and editors at [facility]   What is a physical? -- A physical is a routine visit, or \"check-up,\" with your doctor. You might also hear it called a \"wellness visit\" or \"preventive visit.\"  During each visit, the doctor will:   Ask about your physical and mental health   Ask about your habits, behaviors, and lifestyle   Do an exam   Give you vaccines if needed   Talk to you about any medicines you take   Give advice about your health   Answer your questions  Getting regular check-ups is an important part of taking care of your health. It can help your doctor find and treat any problems you have. But it's also important for preventing health problems.  A routine physical is different from a \"sick visit.\" A sick visit is when you see a doctor because of a health concern or problem. Since physicals are scheduled ahead of time, you can think about what you want to ask the doctor.  How often should I get a physical? -- It depends on your age and health. In general, for people age 21 years and older:   If you are younger than 50 years, you might be able to get a physical every 3 years.   If you are 50 years or older, your doctor might recommend a physical every year.  If you have an ongoing health condition, like diabetes or high blood pressure, your doctor will probably want to see you more often.  What happens during a physical? -- In general, each visit will include:   Physical exam - The doctor or nurse will check your height, weight, heart rate, and blood pressure. They will also look at your eyes and ears. They will ask about how you are feeling and whether you have any symptoms that bother you.   Medicines - It's a good idea to bring a list of all the medicines you take to each doctor visit. Your doctor will talk to you about your medicines and answer any questions. Tell them if you are having any side effects that bother you. You " "should also tell them if you are having trouble paying for any of your medicines.   Habits and behaviors - This includes:   Your diet   Your exercise habits   Whether you smoke, drink alcohol, or use drugs   Whether you are sexually active   Whether you feel safe at home  Your doctor will talk to you about things you can do to improve your health and lower your risk of health problems. They will also offer help and support. For example, if you want to quit smoking, they can give you advice and might prescribe medicines. If you want to improve your diet or get more physical activity, they can help you with this, too.   Lab tests, if needed - The tests you get will depend on your age and situation. For example, your doctor might want to check your:   Cholesterol   Blood sugar   Iron level   Vaccines - The recommended vaccines will depend on your age, health, and what vaccines you already had. Vaccines are very important because they can prevent certain serious or deadly infections.   Discussion of screening - \"Screening\" means checking for diseases or other health problems before they cause symptoms. Your doctor can recommend screening based on your age, risk, and preferences. This might include tests to check for:   Cancer, such as breast, prostate, cervical, ovarian, colorectal, prostate, lung, or skin cancer   Sexually transmitted infections, such as chlamydia and gonorrhea   Mental health conditions like depression and anxiety  Your doctor will talk to you about the different types of screening tests. They can help you decide which screenings to have. They can also explain what the results might mean.   Answering questions - The physical is a good time to ask the doctor or nurse questions about your health. If needed, they can refer you to other doctors or specialists, too.  Adults older than 65 years often need other care, too. As you get older, your doctor will talk to you about:   How to prevent falling at " home   Hearing or vision tests   Memory testing   How to take your medicines safely   Making sure that you have the help and support you need at home  All topics are updated as new evidence becomes available and our peer review process is complete.  This topic retrieved from [employer] on: May 02, 2024.  Topic 400025 Version 1.0  Release: 32.4.3 - C32.122  © 2024 UpToDate, Inc. and/or its affiliates. All rights reserved.  Consumer Information Use and Disclaimer   Disclaimer: This generalized information is a limited summary of diagnosis, treatment, and/or medication information. It is not meant to be comprehensive and should be used as a tool to help the user understand and/or assess potential diagnostic and treatment options. It does NOT include all information about conditions, treatments, medications, side effects, or risks that may apply to a specific patient. It is not intended to be medical advice or a substitute for the medical advice, diagnosis, or treatment of a health care provider based on the health care provider's examination and assessment of a patient's specific and unique circumstances. Patients must speak with a health care provider for complete information about their health, medical questions, and treatment options, including any risks or benefits regarding use of medications. This information does not endorse any treatments or medications as safe, effective, or approved for treating a specific patient. UpToDate, Inc. and its affiliates disclaim any warranty or liability relating to this information or the use thereof.The use of this information is governed by the Terms of Use, available at https://www.woltersmyVBOuwer.com/en/know/clinical-effectiveness-terms. 2024© UpToDate, Inc. and its affiliates and/or licensors. All rights reserved.  Copyright   © 2024 UpToDate, Inc. and/or its affiliates. All rights reserved.

## 2024-08-20 ENCOUNTER — APPOINTMENT (OUTPATIENT)
Dept: RADIATION ONCOLOGY | Facility: CLINIC | Age: 59
End: 2024-08-20
Attending: RADIOLOGY
Payer: COMMERCIAL

## 2024-08-20 PROCEDURE — 77412 RADIATION TX DELIVERY LVL 3: CPT | Performed by: RADIOLOGY

## 2024-08-20 PROCEDURE — 77336 RADIATION PHYSICS CONSULT: CPT | Performed by: RADIOLOGY

## 2024-08-20 PROCEDURE — G6017 INTRAFRACTION TRACK MOTION: HCPCS | Performed by: RADIOLOGY

## 2024-08-20 PROCEDURE — 77387 GUIDANCE FOR RADJ TX DLVR: CPT | Performed by: RADIOLOGY

## 2024-08-21 ENCOUNTER — APPOINTMENT (OUTPATIENT)
Dept: RADIATION ONCOLOGY | Facility: CLINIC | Age: 59
End: 2024-08-21
Attending: RADIOLOGY
Payer: COMMERCIAL

## 2024-08-21 PROCEDURE — 77387 GUIDANCE FOR RADJ TX DLVR: CPT | Performed by: RADIOLOGY

## 2024-08-21 PROCEDURE — 77412 RADIATION TX DELIVERY LVL 3: CPT | Performed by: RADIOLOGY

## 2024-08-21 PROCEDURE — G6017 INTRAFRACTION TRACK MOTION: HCPCS | Performed by: RADIOLOGY

## 2024-08-23 ENCOUNTER — LAB REQUISITION (OUTPATIENT)
Dept: LAB | Facility: HOSPITAL | Age: 59
End: 2024-08-23

## 2024-08-23 DIAGNOSIS — C50.412 MALIGNANT NEOPLASM OF UPPER-OUTER QUADRANT OF LEFT FEMALE BREAST (HCC): ICD-10-CM

## 2024-08-26 ENCOUNTER — APPOINTMENT (OUTPATIENT)
Dept: RADIATION ONCOLOGY | Facility: CLINIC | Age: 59
End: 2024-08-26
Payer: COMMERCIAL

## 2024-09-26 ENCOUNTER — TELEPHONE (OUTPATIENT)
Age: 59
End: 2024-09-26

## 2024-09-26 NOTE — TELEPHONE ENCOUNTER
09/26/24  Screened by: Venus Pagan MA    Referring Provider Dr. Sharon Jackson    Pre- Screening:     There is no height or weight on file to calculate BMI.  Has patient been referred for a routine screening Colonoscopy? yes  Is the patient between 45-75 years old? yes      Previous Colonoscopy    If yes:    Date:     Facility:     Reason:       SCHEDULING STAFF: If the patient is between 45yrs-49yrs, please advise patient to confirm benefits/coverage with their insurance company for a routine screening colonoscopy, some insurance carriers will only cover at 50yrs or older. If the patient is over 75years old, please schedule an office visit.     Does the patient want to see a Gastroenterologist prior to their procedure OR are they having any GI symptoms? no    Has the patient been hospitalized or had abdominal surgery in the past 6 months? yes    Does the patient use supplemental oxygen? no    Does the patient take Coumadin, Lovenox, Plavix, Elliquis, Xarelto, or other blood thinning medication? no    Has the patient had a stroke, cardiac event, or stent placed in the past year? no    SCHEDULING STAFF: If patient answers NO to above questions, then schedule procedure. If patient answers YES to above questions, then schedule office appointment.     If patient is between 45yrs - 49yrs, please advise patient that we will have to confirm benefits & coverage with their insurance company for a routine screening colonoscopy.

## 2024-10-03 ENCOUNTER — OFFICE VISIT (OUTPATIENT)
Dept: URGENT CARE | Facility: CLINIC | Age: 59
End: 2024-10-03
Payer: COMMERCIAL

## 2024-10-03 VITALS
OXYGEN SATURATION: 99 % | TEMPERATURE: 97.8 F | DIASTOLIC BLOOD PRESSURE: 62 MMHG | SYSTOLIC BLOOD PRESSURE: 104 MMHG | RESPIRATION RATE: 18 BRPM | HEART RATE: 79 BPM

## 2024-10-03 DIAGNOSIS — J06.9 BACTERIAL URI: Primary | ICD-10-CM

## 2024-10-03 DIAGNOSIS — B96.89 BACTERIAL URI: Primary | ICD-10-CM

## 2024-10-03 PROCEDURE — 99213 OFFICE O/P EST LOW 20 MIN: CPT | Performed by: NURSE PRACTITIONER

## 2024-10-03 RX ORDER — DOXYCYCLINE 100 MG/1
100 CAPSULE ORAL EVERY 12 HOURS SCHEDULED
Qty: 14 CAPSULE | Refills: 0 | Status: SHIPPED | OUTPATIENT
Start: 2024-10-03 | End: 2024-10-10

## 2024-10-03 NOTE — PROGRESS NOTES
[facility]        NAME: Richa Medina is a 59 y.o. female  : 1965    MRN: [id number]  DATE: October 3, 2024  TIME: 3:15 PM    Assessment and Plan   Bacterial URI [J06.9, B96.89]  1. Bacterial URI  doxycycline hyclate (VIBRAMYCIN) 100 mg capsule      3 weeks of ongoing URI symptoms worsening.  Will start course of doxycycline.  Instructions provided.  Follow-up PCP.  Patient agreement with plan.      Patient Instructions     Follow up with PCP in 3-5 days.  Proceed to  ER if symptoms worsen.    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  She has been taking her allergy med, and inhaler and nasal spray.  Symptoms have been worsening.        Review of Systems   Review of Systems   All other systems reviewed and are negative.        Current Medications     Long-Term Medications   Medication Sig Dispense Refill    anastrozole (ARIMIDEX) 1 mg tablet Take 1 tablet (1 mg total) by mouth daily 90 tablet 2    budesonide-formoterol (Symbicort) 80-4.5 MCG/ACT inhaler Inhale 2 puffs 2 (two) times a day Rinse mouth after use. 30 g 1    Calcium 250 MG CAPS Take by mouth daily with dinner        levocetirizine (XYZAL) 5 MG tablet Take 1 tablet (5 mg total) by mouth every evening 90 tablet 1    levothyroxine 25 mcg tablet TAKE ONE TABLET BY MOUTH EVERY DAY MONDAY THRU FRIDAY AND TAKE 2 TABLETS EVERY DAY ON SAT & SUN. 114 tablet 0    PARoxetine (PAXIL-CR) 25 mg 24 hr tablet Take 2 po daily 180 tablet 3       Current Allergies     Allergies as of 10/03/2024 - Reviewed 10/03/2024   Allergen Reaction Noted    Pollen extract  2020             The following portions of the patient's history were reviewed and updated as appropriate: allergies, current medications, past family history, past medical history, past social history, past surgical history and problem list.    Objective   /62   Pulse 79   Temp 97.8 °F (36.6 °C) (Tympanic)   Resp 18   SpO2 99%        Physical Exam     Physical Exam  Vitals and nursing note reviewed.   Constitutional:       Appearance: Normal appearance. She is well-developed.   HENT:      Head: Normocephalic and atraumatic.      Right Ear: Hearing, tympanic membrane, ear canal and external ear normal.      Left Ear: Hearing, tympanic membrane, ear canal and external ear normal.      Nose: Mucosal edema and congestion present.      Right Sinus: No maxillary sinus tenderness or frontal sinus tenderness.      Left Sinus: No maxillary sinus tenderness or frontal sinus tenderness.      Mouth/Throat:      Lips: Pink.      Mouth: Mucous membranes are moist.      Pharynx: Oropharynx is clear.      Comments: pnd  Eyes:      General: Lids are normal.      Conjunctiva/sclera: Conjunctivae normal.      Pupils: Pupils are equal, round, and reactive to light.   Cardiovascular:      Rate and Rhythm: Normal rate and regular rhythm.      Heart sounds: Normal heart sounds, S1 normal and S2 normal.   Pulmonary:      Effort: Pulmonary effort is normal.      Breath sounds: Normal breath sounds.   Abdominal:      General: Abdomen is flat. Bowel sounds are normal.      Palpations: Abdomen is soft.   Musculoskeletal:         General: Normal range of motion.      Cervical back: Full passive range of motion without pain, normal range of motion and neck supple.   Skin:     General: Skin is warm and dry.   Neurological:      General: No focal deficit present.      Mental Status: She is alert and oriented to person, place, and time.   Psychiatric:         Mood and Affect: Mood normal.         Speech: Speech normal.         Behavior: Behavior  normal. Behavior is cooperative.         Thought Content: Thought content normal.         Judgment: Judgment normal.

## 2024-10-04 ENCOUNTER — TELEPHONE (OUTPATIENT)
Age: 59
End: 2024-10-04

## 2024-10-04 ENCOUNTER — OFFICE VISIT (OUTPATIENT)
Dept: RADIATION ONCOLOGY | Facility: CLINIC | Age: 59
End: 2024-10-04
Attending: RADIOLOGY

## 2024-10-04 DIAGNOSIS — Z79.811 USE OF ANASTROZOLE (ARIMIDEX): ICD-10-CM

## 2024-10-04 DIAGNOSIS — Z17.0 MALIGNANT NEOPLASM OF OVERLAPPING SITES OF RIGHT BREAST IN FEMALE, ESTROGEN RECEPTOR POSITIVE (HCC): Primary | ICD-10-CM

## 2024-10-04 DIAGNOSIS — C50.811 MALIGNANT NEOPLASM OF OVERLAPPING SITES OF RIGHT BREAST IN FEMALE, ESTROGEN RECEPTOR POSITIVE (HCC): Primary | ICD-10-CM

## 2024-10-04 PROCEDURE — 99024 POSTOP FOLLOW-UP VISIT: CPT | Performed by: RADIOLOGY

## 2024-10-04 NOTE — PROGRESS NOTES
Virtual Brief Visit  Name: Richa Medina      : 1965      MRN: [id number]  Encounter Provider: Deep Coker MD  Encounter Date: 10/4/2024   Encounter department: [facility] RADIATION ONCOLOGY    This Visit is being completed by telephone. The Patient is located at Home and in the following state in which I hold an active license [place]    The patient was identified by name and date of birth. Richa Medina was informed that this is a telemedicine visit and that the visit is being conducted through Telephone.  My office door was closed. No one else was in the room.  She acknowledged consent and understanding of privacy and security of the video platform. The patient has agreed to participate and understands they can discontinue the visit at any time.    Patient is aware this is a billable service.     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  She was diagnosed after abnormal routine screening mammogram and subsequent biopsies as outlined above.  Her workup included a bilateral breast MRI as well.  She had multicentric disease on the right side with mastectomy recommended.  She saw Dr. Yung and has been on anastrozole since 2024.  As part of her workup, she had a chest CT that revealed some abnormalities including prominent mediastinal lymph nodes that were likely reactive.  She did have bronchoscopy with EBUS and negative biopsies.  She was discussed at breast cancer multidisciplinary case review January 15, 2024 with recommendations for neoadjuvant endocrine therapy and sending her specimen for Oncotype.  The right breast specimen was sent for Oncotype and came back at 9.  She had surgery with   Angelica as well as Dr. Felix on April 19, 2024 with a right breast total mastectomy for her multifocal disease.  She had left breast HILARY localized lumpectomy with mastopexy.  Right breast pathology revealed invasive ductal carcinoma grade 1 with 2 foci of disease with the largest being 12 mm in size.  There was 1 lymph node positive with a 6 mm focus of disease without extracapsular extension.  There were a total of 2 sentinel lymph nodes and 8 total lymph nodes removed.  She was staged with pathologic Stage IA (pT1c, pN1a(sn), cM0, G1, ER+, NC+, HER2-) disease.  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  All margins of resection were negative both on the left and right side.  On the left side, she has pathologic Stage IA (pT1a, pN0(sn), cM0, G1, ER+, NC-, HER2-) disease.  In order to complete her breast conservation management, she will require radiation therapy to the left breast over 3 weeks/16 fractions.  She does not require boost as she has negative margins.  She had mastectomy on the right side and we did not recommend any postop adjuvant radiation therapy on the right side.  She completed treatment to the left breast to a total dose of 4256 cGy on August 21, 2024.    She returns today for follow-up examination.  She reports she is feeling well and has recovered from radiation therapy.  Her skin is healed and she denies any dryness, erythema, nor itching.  She will continue to apply moisturizer and massage the treated left breast on a daily basis for 1 year.  This will help to prevent any late fibrosis from treatment.  She denies any breast masses bilaterally.  She is on anastrozole daily and has mild hot flashes that have improved.  She has surgical oncology scheduled for November 6, 2024.  She will see Dr. Felix from plastic surgery on December 16, 2024 and Dr. Yung on January 17, 2025.  She does not require any additional radiation therapy.   She will be seen here on an as needed basis as she would like to continue follow with her other physicians.      History of Present Illness   HPI  Oncology History Overview Note   With multicentric carcinoma of right breast and unifocal carcinoma of left breast.  Both are hormone positive and HER2 negative.  Placed on anastrozole.  Underwent lumpectomy on left breast and right breast mastectomy with aesthetic closure with Kyler flap.  She completed RT to left breast on 24.  Today's visit is an EOT follow-up    [id number]    Surgical Oncology  24  Plastic Surgery  25    Medical Oncology     Malignant neoplasm of overlapping sites of right breast in female, estrogen receptor positive (HCC)   2023 Biopsy    Right breast ultrasound-guided biopsy  A. 7 o'clock, 5 cm from nipple  Invasive breast carcinoma of no special type (ductal NST)  Grade 1  ; IL 90; HER2 2+, FISH negative  Lymphovascular invasion not identified    B. 12 o'clock, 8 cm from nipple  Invasive breast carcinoma of no special type (ductal NST)  Grade 1  , IL 35, HER2 1+  Lymphovascular invasion not identified    Concordant. Malignancy appears multicentric; there were additional concerning areas within the right breast seen on recent imaging. There were also some areas of enhancement in the upper outer quadrant of the left breast on the contrast-enhanced mammogram which did not have a definite correlate on US;. Pretreatment MRI is recommended.     2023 -  Cancer Staged    Staging form: Breast, AJCC 8th Edition  - Clinical stage from 2023: Stage IA (cT1c, cN0, cM0, G1, ER+, IL+, HER2-) - Signed by Sisi Dominguez MD on 2023  Stage prefix: Initial diagnosis  Method of lymph node assessment: Clinical  Histologic grading system: 3 grade system       2023 Genetic Testing    Consult with Dr. Dominguez - patient declined genetic testing     2023 Observation    Bilateral breast MRI IMPRESSION:  1.  There  is non-mass enhancement in the left breast at 1:00 which is suspicious.  This corresponds with the enhancement seen on the contrast-enhanced mammogram.  MRI guided core needle biopsy is recommended.  2.  Multifocal malignancy in the right breast.  There is no evidence of pectoralis muscle, skin or nipple invasion.  The most posterior areas of enhancement are 24 mm inferior/posterior to the superior Thais  reflector.  3.  There is abnormal opacification and enhancement in the right lower lung.  This is incompletely characterized on MRI.  Correlation with known medical history and CT chest is recommended.     1/23/2024 -  Hormone Therapy    Consult with Dr. Yung  Anastrozole 1 mg daily   Started in neoadjuvant setting to allow for resolution of pulmonary issues prior to surgery     2/2/2024 Genomic Testing    Oncotype DX Recurrence Score 9  Done on right breast biopsy specimen B     4/19/2024 Surgery    Right mastectomy with axillary dissection and sentinel lymph node biopsy  Invasive carcinoma of no special type (ductal)  Grade 1  1.2 cm (2 foci)  Margins negative  1/8 Lymph nodes with macro-metastases (6 mm, extranodal extension not identified)    Aesthetic flat closure (Dr. Felix)     4/19/2024 -  Cancer Staged    Staging form: Breast, AJCC 8th Edition  - Pathologic stage from 4/19/2024: Stage IA (pT1c, pN1a(sn), cM0, G1, ER+, SC+, HER2-) - Signed by Sisi Dominguez MD on 5/6/2024  Stage prefix: Initial diagnosis  Method of lymph node assessment: [place] lymph node biopsy  Histologic grading system: 3 grade system       Malignant neoplasm of upper-outer quadrant of left breast in female, estrogen receptor positive (HCC)   11/13/2023 Genetic Testing    Consult with Dr. Dominguez - patient declined genetic testing     11/21/2023 Observation    Bilateral breast MRI IMPRESSION:  1.  There is non-mass enhancement in the left breast at 1:00 which is suspicious.  This corresponds with the enhancement seen on the  contrast-enhanced mammogram.  MRI guided core needle biopsy is recommended.  2.  Multifocal malignancy in the right breast.  There is no evidence of pectoralis muscle, skin or nipple invasion.  The most posterior areas of enhancement are 24 mm inferior/posterior to the superior Thais  reflector.  3.  There is abnormal opacification and enhancement in the right lower lung.  This is incompletely characterized on MRI.  Correlation with known medical history and CT chest is recommended.     12/8/2023 Biopsy    Left breast MRI-guided biopsy  1 o'clock  Invasive breast carcinoma of no special type (ductal) with features of tubular carcinoma  Grade 1  ER 90-95, MA 0, HER2 1+  Lymphovascular invasion not identified    Concordant. Left malignancy appears unifocal. The non-mass enhancement measured up to 8 mm on MRI. US left axilla negative.     12/8/2023 -  Cancer Staged    Staging form: Breast, AJCC 8th Edition  - Clinical stage from 12/8/2023: Stage IA (cT1b, cN0, cM0, G1, ER+, MA-, HER2-) - Signed by Sisi Dominguez MD on 12/27/2023  Stage prefix: Initial diagnosis  Method of lymph node assessment: Clinical  Histologic grading system: 3 grade system       1/23/2024 -  Hormone Therapy    Consult with Dr. Yung  Anastrozole 1 mg daily   Started in neoadjuvant setting to allow for resolution of pulmonary issues prior to surgery     4/19/2024 Surgery    Left breast THAIS localized lumpectomy with sentinel lymph node biopsy  Tubular carcinoma  Grade 1  5 mm  Margins negative  0/4 Lymph nodes    Mastopexy (Dr. Felix)     4/19/2024 -  Cancer Staged    Staging form: Breast, AJCC 8th Edition  - Pathologic stage from 4/19/2024: Stage IA (pT1a, pN0(sn), cM0, G1, ER+, MA-, HER2-) - Signed by Sisi Dominguez MD on 5/6/2024  Stage prefix: Initial diagnosis  Method of lymph node assessment: [place] lymph node biopsy  Histologic grading system: 3 grade system       7/30/2024 - 8/21/2024 Radiation      Plan ID Energy Fractions Dose per  Fraction (cGy) Dose Correction (cGy) Total Dose Delivered (cGy) Elapsed Days   BH L Breast 6X 16 / 16 266 0 [id number] 22      Treatment dates:  C1: 7/30/2024 - 8/21/2024            Visit Time  Total Visit Duration: 12 minutes

## 2024-10-04 NOTE — TELEPHONE ENCOUNTER
Patient calling in regarding her phone visit with Dr. Coker, patient is waiting for a call back. Informed patient that Dr. Coker will be calling her for her telephone visit and she does not have to do anything further.

## 2024-10-13 DIAGNOSIS — E03.9 HYPOTHYROIDISM, UNSPECIFIED TYPE: ICD-10-CM

## 2024-10-14 RX ORDER — LEVOTHYROXINE SODIUM 25 UG/1
TABLET ORAL
Qty: 114 TABLET | Refills: 1 | Status: SHIPPED | OUTPATIENT
Start: 2024-10-14
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
## 2024-10-23 DIAGNOSIS — F32.A DEPRESSION, UNSPECIFIED DEPRESSION TYPE: ICD-10-CM

## 2024-10-24 RX ORDER — PAROXETINE HYDROCHLORIDE HEMIHYDRATE 25 MG/1
TABLET, FILM COATED, EXTENDED RELEASE ORAL
Qty: 180 TABLET | Refills: 1 | Status: SHIPPED | OUTPATIENT
Start: 2024-10-24

## 2024-11-04 LAB — SCAN RESULT: NORMAL

## 2024-11-06 ENCOUNTER — OFFICE VISIT (OUTPATIENT)
Dept: SURGICAL ONCOLOGY | Facility: CLINIC | Age: 59
End: 2024-11-06
Payer: COMMERCIAL

## 2024-11-06 VITALS
HEART RATE: 74 BPM | HEIGHT: 63 IN | BODY MASS INDEX: 25.16 KG/M2 | SYSTOLIC BLOOD PRESSURE: 120 MMHG | DIASTOLIC BLOOD PRESSURE: 72 MMHG | TEMPERATURE: 98.1 F | WEIGHT: 142 LBS | OXYGEN SATURATION: 97 % | RESPIRATION RATE: 14 BRPM

## 2024-11-06 DIAGNOSIS — Z17.0 MALIGNANT NEOPLASM OF OVERLAPPING SITES OF RIGHT BREAST IN FEMALE, ESTROGEN RECEPTOR POSITIVE (HCC): Primary | ICD-10-CM

## 2024-11-06 DIAGNOSIS — C50.412 MALIGNANT NEOPLASM OF UPPER-OUTER QUADRANT OF LEFT BREAST IN FEMALE, ESTROGEN RECEPTOR POSITIVE (HCC): ICD-10-CM

## 2024-11-06 DIAGNOSIS — Z79.811 USE OF ANASTROZOLE (ARIMIDEX): ICD-10-CM

## 2024-11-06 DIAGNOSIS — C50.811 MALIGNANT NEOPLASM OF OVERLAPPING SITES OF RIGHT BREAST IN FEMALE, ESTROGEN RECEPTOR POSITIVE (HCC): Primary | ICD-10-CM

## 2024-11-06 DIAGNOSIS — Z17.0 MALIGNANT NEOPLASM OF UPPER-OUTER QUADRANT OF LEFT BREAST IN FEMALE, ESTROGEN RECEPTOR POSITIVE (HCC): ICD-10-CM

## 2024-11-06 PROCEDURE — 99214 OFFICE O/P EST MOD 30 MIN: CPT | Performed by: NURSE PRACTITIONER

## 2024-11-06 NOTE — PROGRESS NOTES
Assessment/Plan:    Diagnoses and all orders for this visit:    Malignant neoplasm of overlapping sites of right breast in female, estrogen receptor positive (HCC)    Malignant neoplasm of upper-outer quadrant of left breast in female, estrogen receptor positive (HCC)  -     Mammo diagnostic left w 3d and cad; Future    Use of anastrozole (Arimidex)    Patient is a 59-year-old female that was diagnosed with a right-sided breast cancer in November 2023.  Her pathology revealed multifocal invasive ductal carcinoma.  One site was %, NC 90%, HER2 FISH negative.  A second site was %, NC 35%, HER2 negative.  She then underwent a left-sided biopsy which revealed an invasive ductal carcinoma with tubular features, %, NC negative, HER2 negative she declined genetic testing.  She received neoadjuvant anastrozole secondary to needing further pulmonary workup for pneumonia prior to surgery. She underwent a right mastectomy and sentinel node biopsy and a left lumpectomy and sentinel node biopsy with Dr. Dominguez.  She had a aesthetic closure on the right and had a mastopexy on the left by Dr. Felix.  She completed adjuvant left breast radiation therapy and is maintained on anastrozole.  Breast cancer survivorship visit performed today and treatment summary and care plan were reviewed with patient.  She offers no new complaints today and there are no worrisome findings on today's clinical breast exam.  Prescription given for left-sided mammogram due in February.  She is up-to-date on colorectal and cervical cancer screening as well as osteoporosis screening.  She would like to think about the [facility] program and will contact me if she is interested in a referral to physical therapy.  Otherwise we will plan to see her back in 6 months for a follow-up visit or sooner should the need arise.  She was instructed to contact us with any changes or concerns in the interim.  All of her questions were answered  today.    REASON FOR VISIT:   Survivorship      Previous therapy:  Oncology History   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 7 o'clock, 5 cm from nipple  Invasive breast carcinoma of no special type (ductal NST)  Grade 1  % AK 90% HER2 2+, FISH negative  Lymphovascular invasion not identified    B. 12 o'clock, 8 cm from nipple  Invasive breast carcinoma of no special type (ductal NST)  Grade 1  % AK 35% HER2 1+  Lymphovascular invasion not identified    Concordant. Malignancy appears multicentric; there were additional concerning areas within the right breast seen on recent imaging. There were also some areas of enhancement in the upper outer quadrant of the left breast on the contrast-enhanced mammogram which did not have a definite correlate on US;. Pretreatment MRI is recommended.     11/1/2023 -  Cancer Staged    Staging form: Breast, AJCC 8th Edition  - Clinical stage from 11/1/2023: Stage IA (cT1c, cN0, cM0, G1, ER+, AK+, HER2-) - Signed by Sisi Dominguez MD on 11/13/2023  Stage prefix: Initial diagnosis  Method of lymph node assessment: Clinical  Histologic grading system: 3 grade system       11/13/2023 Genetic Testing    Consult with Dr. Dominguez - patient declined genetic testing     11/21/2023 Observation    Bilateral breast MRI IMPRESSION:  1.  There is non-mass enhancement in the left breast at 1:00 which is suspicious.  This corresponds with the enhancement seen on the contrast-enhanced mammogram.  MRI guided core needle biopsy is recommended.  2.  Multifocal malignancy in the right breast.  There is no evidence of pectoralis muscle, skin or nipple invasion.  The most posterior areas of enhancement are 24 mm inferior/posterior to the superior Thais  reflector.  3.  There is abnormal opacification and enhancement in the right lower lung.  This is incompletely  characterized on MRI.  Correlation with known medical history and CT chest is recommended.     1/23/2024 -  Hormone Therapy    Anastrozole 1 mg daily   Started in neoadjuvant setting to allow for resolution of pulmonary issues prior to surgery  Dr. Yung     2/2/2024 Genomic Testing    Oncotype DX Recurrence Score 9  Done on right breast biopsy specimen B     4/19/2024 Surgery    Right mastectomy with axillary dissection and sentinel lymph node biopsy  Invasive carcinoma of no special type (ductal)  Grade 1  1.2 cm (2 foci)  Margins negative  1/8 Lymph nodes with macro-metastases (6 mm, extranodal extension not identified)    Aesthetic flat closure (Dr. Felix)     4/19/2024 -  Cancer Staged    Staging form: Breast, AJCC 8th Edition  - Pathologic stage from 4/19/2024: Stage IA (pT1c, pN1a(sn), cM0, G1, ER+, MD+, HER2-) - Signed by Sisi Dominguez MD on 5/6/2024  Stage prefix: Initial diagnosis  Method of lymph node assessment: [place] lymph node biopsy  Histologic grading system: 3 grade system       Malignant neoplasm of upper-outer quadrant of left breast in female, estrogen receptor positive (HCC)   11/13/2023 Genetic Testing    Consult with Dr. Dominguez - patient declined genetic testing     11/21/2023 Observation    Bilateral breast MRI IMPRESSION:  1.  There is non-mass enhancement in the left breast at 1:00 which is suspicious.  This corresponds with the enhancement seen on the contrast-enhanced mammogram.  MRI guided core needle biopsy is recommended.  2.  Multifocal malignancy in the right breast.  There is no evidence of pectoralis muscle, skin or nipple invasion.  The most posterior areas of enhancement are 24 mm inferior/posterior to the superior Thais  reflector.  3.  There is abnormal opacification and enhancement in the right lower lung.  This is incompletely characterized on MRI.  Correlation with known medical history and CT chest is recommended.     12/8/2023 Biopsy    Left breast MRI-guided  biopsy  1 o'clock  Invasive breast carcinoma of no special type (ductal) with features of tubular carcinoma  Grade 1  ER 90% NC 0%, HER2 1+  Lymphovascular invasion not identified    Concordant. Left malignancy appears unifocal. The non-mass enhancement measured up to 8 mm on MRI. US left axilla negative.     12/8/2023 -  Cancer Staged    Staging form: Breast, AJCC 8th Edition  - Clinical stage from 12/8/2023: Stage IA (cT1b, cN0, cM0, G1, ER+, NC-, HER2-) - Signed by Sisi Dominguez MD on 12/27/2023  Stage prefix: Initial diagnosis  Method of lymph node assessment: Clinical  Histologic grading system: 3 grade system       1/23/2024 -  Hormone Therapy    Anastrozole 1 mg daily   Started in neoadjuvant setting to allow for resolution of pulmonary issues prior to surgery  Dr. Yung     4/19/2024 Surgery    Left breast HILARY localized lumpectomy with sentinel lymph node biopsy  Tubular carcinoma  Grade 1  5 mm  Margins negative  0/4 Lymph nodes    Mastopexy (Dr. Felix)     4/19/2024 -  Cancer Staged    Staging form: Breast, AJCC 8th Edition  - Pathologic stage from 4/19/2024: Stage IA (pT1a, pN0(sn), cM0, G1, ER+, NC-, HER2-) - Signed by Sisi Dominguez MD on 5/6/2024  Stage prefix: Initial diagnosis  Method of lymph node assessment: [place] lymph node biopsy  Histologic grading system: 3 grade system       7/30/2024 - 8/21/2024 Radiation      Plan ID Energy Fractions Dose per Fraction (cGy) Dose Correction (cGy) Total Dose Delivered (cGy) Elapsed Days   BH L Breast 6X 16 / 16 266 0 4,256 22      Dr. Coker           Patient ID: Richa Medina is a 59 y.o. female  Presenting today for a follow-up breast cancer survivorship visit.  She completed left breast radiation therapy.  She remains on anastrozole.  She states that she initially had a lot of hot flashes but these have since lessened.  She denies persistent headaches, back pain or bone pain, cough or shortness of breath, abdominal pain.  She works in the  "cafeteria at a local elementary school.          Review of Systems   Constitutional:  Negative for activity change, appetite change, chills, fatigue, fever and unexpected weight change.   Respiratory:  Negative for cough and shortness of breath.    Cardiovascular:  Negative for chest pain.   Gastrointestinal:  Negative for abdominal pain, constipation, diarrhea, nausea and vomiting.   Musculoskeletal:  Negative for arthralgias, back pain, gait problem and myalgias.   Skin:  Negative for color change and rash.   Neurological:  Negative for dizziness and headaches.   Hematological:  Negative for adenopathy.   Psychiatric/Behavioral:  Negative for agitation and confusion.    All other systems reviewed and are negative.      Objective:    Blood pressure 120/72, pulse 74, temperature 98.1 °F (36.7 °C), temperature source Temporal, resp. rate 14, height 5' 3\" (1.6 m), weight 64.4 kg (142 lb), SpO2 97%, not currently breastfeeding.  Body mass index is 25.15 kg/m².      Physical Exam  Vitals reviewed.   Constitutional:       General: She is not in acute distress.     Appearance: Normal appearance. She is well-developed. She is not diaphoretic.   HENT:      Head: Normocephalic and atraumatic.   Cardiovascular:      Rate and Rhythm: Normal rate and regular rhythm.      Heart sounds: Normal heart sounds.   Pulmonary:      Effort: Pulmonary effort is normal.      Breath sounds: Normal breath sounds.   Chest:   Breasts:     Left: No swelling, bleeding, inverted nipple, mass, nipple discharge, skin change or tenderness.      Comments: Right mastectomy site free of masses, skin changes, nodules. No right arm lymphedema  Abdominal:      Palpations: Abdomen is soft. There is no mass.      Tenderness: There is no abdominal tenderness.   Musculoskeletal:         General: Normal range of motion.      Cervical back: Normal range of motion.   Lymphadenopathy:      Upper Body:      Right upper body: No supraclavicular or axillary " adenopathy.      Left upper body: No supraclavicular or axillary adenopathy.   Skin:     General: Skin is warm and dry.      Findings: No rash.   Neurological:      Mental Status: She is alert and oriented to person, place, and time.   Psychiatric:         Speech: Speech normal.         Discussed symptoms related to disease recurrence, Yes    Evaluated for late effects related to cancer treatment, Yes, describe: discussed effects of surgery, RT, AI therapy    Screening current for cervical cancer, Yes, describe: pap8/2023    Screening current for colon cancer, Yes, describe: had a negative cologuard, going to schedule colonoscopy     Cancer rehabilitation services addressed, Yes, describe: patient will contact me if interested in referral to [facility]    Screening current for osteoporosis, Yes, describe: dxa 3/2024    Oncology Treatment Summary reviewed with patient and copy provided, Yes    Referral placed for psychosocial evaluation/screening to oncology social work  Yes    I have spent 35 minutes with Patient  today in which greater than 50% of this time was spent in counseling/coordination of care regarding breast cancer survivorship.

## 2024-11-21 NOTE — TELEPHONE ENCOUNTER
Patient requested updated work excuse to reflect new surgery date. Updated letter and sent to patient's "[employer] account. Also called patient and left voicemail with update regarding letter. Advised her to call back if letter needs to be faxed or mailed.   Start Wegovy 0.25 mg once a week x 4 weeks, then 0.5 mg once a week for 4 weeks, then 1 mg once a week x 4 weeks.    Decrease portions as soon as you start Wegovy. Avoid fried, greasy, fatty foods.     Some nausea in the first couple weeks is not unusual as your body adjusts to the medication.     If you experience persistent severe abdominal pain that radiates to your back, please call the office.             Copyright © 2011, [facility]. For more information about The Healthy Eating Plate, please see The Nutrition Source, Department of Nutrition, [employer], www.thenutritionsource.org, and [employer], www.health.[employer].edu.      Meal Planning & Grocery Shopping    Meal planning builds the foundation for healthy eating. When you have structured ideas for healthy meals and foods available at home to prepare those meals, weight control becomes easier.  If only healthy foods are available at home, then you will be much more likely to eat healthy foods. And you will be less likely to go to a restaurant or  a fast food meal, which tend to be unhealthy and higher in calories than meals prepared at home.      Take 5-10 minutes each week to plan meals for the next 7 days.  Make a grocery list based on the meal plan.    Grocery Shopping Tips:  Shop on a full stomach.  Schedule your shopping for times when you are most motivated and able to be disciplined about your purchases. For example, after a stressful day at work it may be difficult to make the healthiest choices. Shopping at other times, such as early in the morning or after dinner, may be easier.  Focus your shopping on the outside aisles of the store, which tend to contain more fresh foods and lower calorie foods. The inside aisles tend to have more processed foods.  Stick to your list. Avoid buying unhealthy items just because they are on sale.   Compare nutrition labels to check the number of calories  and percentage of fat.      What to buy:    Vegetables  Fresh vegetables  Frozen vegetables with no sauce or added salt  Canned vegetables with no sauce or added salt    Protein  Lean meats, such as chicken and turkey  Limit red meats, such as beef to no more than 1x/week  Limit processed meats, such as cold cuts, sorensno, sausage, and hot dogs. Look for brands that have no nitrites and are minimally processed. Consider turkey sausage or turkey sorenson.  Fish and Shellfish  Eggs  Dried beans  Canned beans (reduced sodium)    Fat  Use healthy oils, such as olive oil or canola oil, for cooking, salad dressings, etc.  Unflavored nuts and seeds  Nut butters (no added sugar)    Dairy  Yogurt (no sugar added)  Cheese  Low-fat milk  Unsweetened nondairy milks (almond milk, soy milk, etc)    Fruit  Fresh Fruit  Frozen fruit with no added sugar  Canned fruit with no added sugar  Dried fruit with no added sugar  100% fruit juice    Whole Grains  Single ingredient grains, such as oats, quinoa, brown rice  Whole-wheat pasta  Sprouted whole-grain bread    What to avoid:  - Avoid fried foods  - Avoid foods with added sugar  - Avoid sugar-sweetened beverages  - Avoid ultra-processed foods      Lifestyle Activity    Lifestyle activity consists of all the activities that burn calories during the course of a normal day. Using the stairs, washing the dishes, or even getting up to turn off the television are all examples of lifestyle activity. All activities, no matter how small, burn calories. Increasing lifestyle activity can help with weight control, so building physical activity into your everyday routine is important.     A pedometer is a tool that can help you track how much lifestyle activity you are getting. It can help you stay active. A pedometer counts each step you take and displays your total steps on the screen. Many smartphones, including iPhones and Androids, have applications that automatically count your steps. If you  decide to use this method, make sure you are holding or wearing your smartphone so it can count all of your steps.     During the next 2 weeks, aim for 5,000 or more steps per day. Each week aim to increase your daily step goal by 250 so that you will reach an average of 10,000 steps per day (equal to walking 4 to 5 miles).     Start making more active choices in your routine in order to increase the amount of walking you do each day.     Strategies to Increase Lifestyle Activity:    Take several 5-10-minute walks during the day.   Set a reminder to take a 5 minute break from your desk every hour.   Choose the farthest entrance to a building that you are entering.   Host walking meetings at work.   Walk down the curry to contact co-workers face-to-face, instead of emailing or calling.  Walk to a restroom, water cooler, or copy machine on a different floor at work.   Walk during your lunch break.   Park farther away in parking lots.   Get off the bus or train earlier and walk farther to your destination.   Take the stairs rather than the elevator or the escalator.   Start a walking club with co-workers or neighbors.   Walk - don't drive - for trips less than one mile.   Take an after-dinner walk with family.   Go for a walk while talking on your wireless phone.   Walk the dog more often.   If you prefer to stay indoors because of the weather, try walking in a shopping mall or doing laps around a large store.   Purchase a treadmill to use at home.   Schedule time for walking every week and stick to it like any other appointment.   Or think of your own strategy: _______________________________       Physical Activity    Physical activity improves your health and helps with weight control, especially weight loss maintenance.      When starting to incorporate an exercise routine into your day, it is helpful to set specific goals.  For example, I will walk at moderate intensity from 12:30-12:45pm on Monday, Wednesday,  and Friday.  Schedule your exercise time into your calendar.  Think of any potential barriers that may come up and prevent you from exercising.  Develop solutions or back-up plans for when these barriers may arise.    Walking is an ideal activity to start with if you do not currently have an exercise routine. You can make the activity more fun by doing it with a friend or listening to music or a book on tape while you do it. If you don't enjoy walking, think of other activities you like, such as bike riding or swimming.  Other alternatives include group fitness classes or exercise at home using DVDs, Apps, etc.    If you are new to exercising, then start with 10 minutes 3-4 days per week.  After two weeks, increase to 15 minutes 3-4 days per week.  If you already exercise more than this, continue at your higher level, or increase by 10-15 minutes if able.         Aerobic Activity  Aerobic Activity gets you breathing harder and your heart beating faster.  Eventually, you should work up to 150 - 300 minutes of moderate-intensity aerobic activity every week or 75 - 150 minutes of vigorous-intensity aerobic activity every week.  An easy way to gauge the intensity of your activity is with the Talk Test.  During moderate activity, you should be able to talk, but not sing without having to pause for a breath.  During vigorous activity, you shouldn't be able to say more than a few words without pausing for a breath.  You should not push yourself until you are completely out of breath, tired, or sore.    Examples of Aerobic Activity:  Walking  Running  Swimming  Biking  Playing sports like tennis or basketball  Dancing  Jumping Rope      Strength Training  It is also recommended that you perform muscle-strengthening activities at least 2 days per week.  Be sure to include activities that work all major muscle groups (legs, arms, abdomen, back, buttocks, chest, and shoulders)    Examples of Strength Training  Lifting  weights  Working with resistance band  Using your body weight for resistance (squats, push-ups, sit-ups)  Pilates  Yoga      https://health.gov/moveyourway/activity-planner/      Remember to keep a record of your activity to track your progress.

## 2024-12-10 ENCOUNTER — OFFICE VISIT (OUTPATIENT)
Dept: FAMILY MEDICINE CLINIC | Facility: CLINIC | Age: 59
End: 2024-12-10
Payer: COMMERCIAL

## 2024-12-10 ENCOUNTER — NURSE TRIAGE (OUTPATIENT)
Dept: OTHER | Facility: OTHER | Age: 59
End: 2024-12-10

## 2024-12-10 ENCOUNTER — HOSPITAL ENCOUNTER (INPATIENT)
Facility: HOSPITAL | Age: 59
LOS: 3 days | Discharge: HOME | DRG: 871 | End: 2024-12-14
Attending: EMERGENCY MEDICINE | Admitting: INTERNAL MEDICINE
Payer: COMMERCIAL

## 2024-12-10 ENCOUNTER — APPOINTMENT (EMERGENCY)
Dept: CT IMAGING | Facility: HOSPITAL | Age: 59
DRG: 871 | End: 2024-12-10
Payer: COMMERCIAL

## 2024-12-10 ENCOUNTER — APPOINTMENT (EMERGENCY)
Dept: RADIOLOGY | Facility: HOSPITAL | Age: 59
DRG: 871 | End: 2024-12-10
Payer: COMMERCIAL

## 2024-12-10 VITALS
BODY MASS INDEX: 25.55 KG/M2 | SYSTOLIC BLOOD PRESSURE: 128 MMHG | OXYGEN SATURATION: 99 % | RESPIRATION RATE: 18 BRPM | DIASTOLIC BLOOD PRESSURE: 72 MMHG | HEIGHT: 63 IN | TEMPERATURE: 97.8 F | WEIGHT: 144.2 LBS | HEART RATE: 75 BPM

## 2024-12-10 DIAGNOSIS — A41.9 SEPTIC SHOCK (HCC): ICD-10-CM

## 2024-12-10 DIAGNOSIS — N39.0 UTI (URINARY TRACT INFECTION): Primary | ICD-10-CM

## 2024-12-10 DIAGNOSIS — N12 PYELONEPHRITIS: ICD-10-CM

## 2024-12-10 DIAGNOSIS — N39.0 URINARY TRACT INFECTION WITH HEMATURIA, SITE UNSPECIFIED: Primary | ICD-10-CM

## 2024-12-10 DIAGNOSIS — R31.9 URINARY TRACT INFECTION WITH HEMATURIA, SITE UNSPECIFIED: Primary | ICD-10-CM

## 2024-12-10 DIAGNOSIS — R74.01 TRANSAMINITIS: ICD-10-CM

## 2024-12-10 DIAGNOSIS — R65.21 SEPTIC SHOCK (HCC): ICD-10-CM

## 2024-12-10 LAB
ALBUMIN SERPL BCG-MCNC: 4.4 G/DL (ref 3.5–5)
ALP SERPL-CCNC: 131 U/L (ref 34–104)
ALT SERPL W P-5'-P-CCNC: 67 U/L (ref 7–52)
ANION GAP SERPL CALCULATED.3IONS-SCNC: 8 MMOL/L (ref 4–13)
APTT PPP: 23 SECONDS (ref 23–34)
AST SERPL W P-5'-P-CCNC: 115 U/L (ref 13–39)
BACTERIA UR QL AUTO: ABNORMAL /HPF
BACTERIA UR QL AUTO: ABNORMAL /HPF
BASOPHILS # BLD AUTO: 0.01 THOUSANDS/ÂΜL (ref 0–0.1)
BASOPHILS NFR BLD AUTO: 1 % (ref 0–1)
BILIRUB SERPL-MCNC: 0.58 MG/DL (ref 0.2–1)
BILIRUB UR QL STRIP: NEGATIVE
BILIRUB UR QL STRIP: NEGATIVE
BUN SERPL-MCNC: 14 MG/DL (ref 5–25)
CALCIUM SERPL-MCNC: 9.7 MG/DL (ref 8.4–10.2)
CHLORIDE SERPL-SCNC: 101 MMOL/L (ref 96–108)
CLARITY UR: CLEAR
CLARITY UR: CLEAR
CO2 SERPL-SCNC: 26 MMOL/L (ref 21–32)
COLOR UR: ABNORMAL
COLOR UR: YELLOW
CREAT SERPL-MCNC: 0.79 MG/DL (ref 0.6–1.3)
EOSINOPHIL # BLD AUTO: 0 THOUSAND/ÂΜL (ref 0–0.61)
EOSINOPHIL NFR BLD AUTO: 0 % (ref 0–6)
ERYTHROCYTE [DISTWIDTH] IN BLOOD BY AUTOMATED COUNT: 12.9 % (ref 11.6–15.1)
GFR SERPL CREATININE-BSD FRML MDRD: 81 ML/MIN/1.73SQ M
GLUCOSE SERPL-MCNC: 88 MG/DL (ref 65–140)
GLUCOSE UR STRIP-MCNC: NEGATIVE MG/DL
GLUCOSE UR STRIP-MCNC: NEGATIVE MG/DL
HCT VFR BLD AUTO: 36.6 % (ref 34.8–46.1)
HGB BLD-MCNC: 12.4 G/DL (ref 11.5–15.4)
HGB UR QL STRIP.AUTO: ABNORMAL
HGB UR QL STRIP.AUTO: ABNORMAL
HYALINE CASTS #/AREA URNS LPF: ABNORMAL /LPF
IMM GRANULOCYTES # BLD AUTO: 0.04 THOUSAND/UL (ref 0–0.2)
IMM GRANULOCYTES NFR BLD AUTO: 2 % (ref 0–2)
INR PPP: 0.93 (ref 0.85–1.19)
KETONES UR STRIP-MCNC: NEGATIVE MG/DL
KETONES UR STRIP-MCNC: NEGATIVE MG/DL
LACTATE SERPL-SCNC: 1.6 MMOL/L (ref 0.5–2)
LEUKOCYTE ESTERASE UR QL STRIP: ABNORMAL
LEUKOCYTE ESTERASE UR QL STRIP: ABNORMAL
LYMPHOCYTES # BLD AUTO: 0.27 THOUSANDS/ÂΜL (ref 0.6–4.47)
LYMPHOCYTES NFR BLD AUTO: 13 % (ref 14–44)
MCH RBC QN AUTO: 29 PG (ref 26.8–34.3)
MCHC RBC AUTO-ENTMCNC: 33.9 G/DL (ref 31.4–37.4)
MCV RBC AUTO: 86 FL (ref 82–98)
MONOCYTES # BLD AUTO: 0.01 THOUSAND/ÂΜL (ref 0.17–1.22)
MONOCYTES NFR BLD AUTO: 1 % (ref 4–12)
MUCOUS THREADS UR QL AUTO: ABNORMAL
NEUTROPHILS # BLD AUTO: 1.8 THOUSANDS/ÂΜL (ref 1.85–7.62)
NEUTS SEG NFR BLD AUTO: 83 % (ref 43–75)
NITRITE UR QL STRIP: NEGATIVE
NITRITE UR QL STRIP: NEGATIVE
NON-SQ EPI CELLS URNS QL MICRO: ABNORMAL /HPF
NON-SQ EPI CELLS URNS QL MICRO: ABNORMAL /HPF
NRBC BLD AUTO-RTO: 0 /100 WBCS
PH UR STRIP.AUTO: 6 [PH]
PH UR STRIP.AUTO: 6 [PH] (ref 4.5–8)
PLATELET # BLD AUTO: 196 THOUSANDS/UL (ref 149–390)
PMV BLD AUTO: 9.8 FL (ref 8.9–12.7)
POTASSIUM SERPL-SCNC: 3.6 MMOL/L (ref 3.5–5.3)
PROCALCITONIN SERPL-MCNC: 0.18 NG/ML
PROT SERPL-MCNC: 7.8 G/DL (ref 6.4–8.4)
PROT UR STRIP-MCNC: ABNORMAL MG/DL
PROT UR STRIP-MCNC: NEGATIVE MG/DL
PROTHROMBIN TIME: 12.6 SECONDS (ref 12.3–15)
RBC # BLD AUTO: 4.27 MILLION/UL (ref 3.81–5.12)
RBC #/AREA URNS AUTO: ABNORMAL /HPF
RBC #/AREA URNS AUTO: ABNORMAL /HPF
SL AMB  POCT GLUCOSE, UA: ABNORMAL
SL AMB LEUKOCYTE ESTERASE,UA: 70
SL AMB POCT BILIRUBIN,UA: ABNORMAL
SL AMB POCT BLOOD,UA: ABNORMAL
SL AMB POCT CLARITY,UA: ABNORMAL
SL AMB POCT COLOR,UA: YELLOW
SL AMB POCT KETONES,UA: ABNORMAL
SL AMB POCT NITRITE,UA: ABNORMAL
SL AMB POCT PH,UA: 5
SL AMB POCT SPECIFIC GRAVITY,UA: 1.01
SL AMB POCT URINE PROTEIN: ABNORMAL
SL AMB POCT UROBILINOGEN: 0.2
SODIUM SERPL-SCNC: 135 MMOL/L (ref 135–147)
SP GR UR STRIP.AUTO: 1.01 (ref 1–1.03)
SP GR UR STRIP.AUTO: 1.01 (ref 1–1.03)
TRANS CELLS #/AREA URNS HPF: PRESENT /[HPF]
UROBILINOGEN UR QL STRIP.AUTO: 0.2 E.U./DL
UROBILINOGEN UR STRIP-ACNC: <2 MG/DL
WBC # BLD AUTO: 2.13 THOUSAND/UL (ref 4.31–10.16)
WBC #/AREA URNS AUTO: ABNORMAL /HPF
WBC #/AREA URNS AUTO: ABNORMAL /HPF

## 2024-12-10 PROCEDURE — 87086 URINE CULTURE/COLONY COUNT: CPT

## 2024-12-10 PROCEDURE — 81002 URINALYSIS NONAUTO W/O SCOPE: CPT | Performed by: FAMILY MEDICINE

## 2024-12-10 PROCEDURE — 80053 COMPREHEN METABOLIC PANEL: CPT | Performed by: EMERGENCY MEDICINE

## 2024-12-10 PROCEDURE — 87040 BLOOD CULTURE FOR BACTERIA: CPT | Performed by: EMERGENCY MEDICINE

## 2024-12-10 PROCEDURE — 99291 CRITICAL CARE FIRST HOUR: CPT

## 2024-12-10 PROCEDURE — 81001 URINALYSIS AUTO W/SCOPE: CPT

## 2024-12-10 PROCEDURE — 85025 COMPLETE CBC W/AUTO DIFF WBC: CPT | Performed by: EMERGENCY MEDICINE

## 2024-12-10 PROCEDURE — 99291 CRITICAL CARE FIRST HOUR: CPT | Performed by: EMERGENCY MEDICINE

## 2024-12-10 PROCEDURE — 84145 PROCALCITONIN (PCT): CPT | Performed by: EMERGENCY MEDICINE

## 2024-12-10 PROCEDURE — 87086 URINE CULTURE/COLONY COUNT: CPT | Performed by: FAMILY MEDICINE

## 2024-12-10 PROCEDURE — 87186 SC STD MICRODIL/AGAR DIL: CPT | Performed by: FAMILY MEDICINE

## 2024-12-10 PROCEDURE — 99213 OFFICE O/P EST LOW 20 MIN: CPT | Performed by: FAMILY MEDICINE

## 2024-12-10 PROCEDURE — 96367 TX/PROPH/DG ADDL SEQ IV INF: CPT

## 2024-12-10 PROCEDURE — 74177 CT ABD & PELVIS W/CONTRAST: CPT

## 2024-12-10 PROCEDURE — 96375 TX/PRO/DX INJ NEW DRUG ADDON: CPT

## 2024-12-10 PROCEDURE — 96366 THER/PROPH/DIAG IV INF ADDON: CPT

## 2024-12-10 PROCEDURE — 87077 CULTURE AEROBIC IDENTIFY: CPT | Performed by: FAMILY MEDICINE

## 2024-12-10 PROCEDURE — 93005 ELECTROCARDIOGRAM TRACING: CPT

## 2024-12-10 PROCEDURE — 36415 COLL VENOUS BLD VENIPUNCTURE: CPT | Performed by: EMERGENCY MEDICINE

## 2024-12-10 PROCEDURE — 85610 PROTHROMBIN TIME: CPT | Performed by: EMERGENCY MEDICINE

## 2024-12-10 PROCEDURE — 83605 ASSAY OF LACTIC ACID: CPT | Performed by: EMERGENCY MEDICINE

## 2024-12-10 PROCEDURE — 85730 THROMBOPLASTIN TIME PARTIAL: CPT | Performed by: EMERGENCY MEDICINE

## 2024-12-10 PROCEDURE — 96365 THER/PROPH/DIAG IV INF INIT: CPT

## 2024-12-10 PROCEDURE — 71046 X-RAY EXAM CHEST 2 VIEWS: CPT

## 2024-12-10 PROCEDURE — 81001 URINALYSIS AUTO W/SCOPE: CPT | Performed by: FAMILY MEDICINE

## 2024-12-10 PROCEDURE — 96376 TX/PRO/DX INJ SAME DRUG ADON: CPT

## 2024-12-10 RX ORDER — ACETAMINOPHEN 325 MG/1
650 TABLET ORAL ONCE
Status: COMPLETED | OUTPATIENT
Start: 2024-12-10 | End: 2024-12-10

## 2024-12-10 RX ORDER — MORPHINE SULFATE 4 MG/ML
4 INJECTION, SOLUTION INTRAMUSCULAR; INTRAVENOUS ONCE
Status: COMPLETED | OUTPATIENT
Start: 2024-12-10 | End: 2024-12-10

## 2024-12-10 RX ORDER — SODIUM CHLORIDE, SODIUM GLUCONATE, SODIUM ACETATE, POTASSIUM CHLORIDE, MAGNESIUM CHLORIDE, SODIUM PHOSPHATE, DIBASIC, AND POTASSIUM PHOSPHATE .53; .5; .37; .037; .03; .012; .00082 G/100ML; G/100ML; G/100ML; G/100ML; G/100ML; G/100ML; G/100ML
1000 INJECTION, SOLUTION INTRAVENOUS ONCE
Status: COMPLETED | OUTPATIENT
Start: 2024-12-10 | End: 2024-12-10

## 2024-12-10 RX ORDER — SODIUM CHLORIDE, SODIUM GLUCONATE, SODIUM ACETATE, POTASSIUM CHLORIDE, MAGNESIUM CHLORIDE, SODIUM PHOSPHATE, DIBASIC, AND POTASSIUM PHOSPHATE .53; .5; .37; .037; .03; .012; .00082 G/100ML; G/100ML; G/100ML; G/100ML; G/100ML; G/100ML; G/100ML
1000 INJECTION, SOLUTION INTRAVENOUS ONCE
Status: COMPLETED | OUTPATIENT
Start: 2024-12-11 | End: 2024-12-11

## 2024-12-10 RX ORDER — IBUPROFEN 400 MG/1
400 TABLET, FILM COATED ORAL ONCE
Status: COMPLETED | OUTPATIENT
Start: 2024-12-10 | End: 2024-12-10

## 2024-12-10 RX ORDER — ONDANSETRON 2 MG/ML
4 INJECTION INTRAMUSCULAR; INTRAVENOUS ONCE
Status: COMPLETED | OUTPATIENT
Start: 2024-12-10 | End: 2024-12-10

## 2024-12-10 RX ORDER — SULFAMETHOXAZOLE AND TRIMETHOPRIM 800; 160 MG/1; MG/1
1 TABLET ORAL EVERY 12 HOURS SCHEDULED
Qty: 10 TABLET | Refills: 0 | Status: SHIPPED | OUTPATIENT
Start: 2024-12-10 | End: 2024-12-14

## 2024-12-10 RX ADMIN — SODIUM CHLORIDE, SODIUM GLUCONATE, SODIUM ACETATE, POTASSIUM CHLORIDE, MAGNESIUM CHLORIDE, SODIUM PHOSPHATE, DIBASIC, AND POTASSIUM PHOSPHATE 1000 ML: .53; .5; .37; .037; .03; .012; .00082 INJECTION, SOLUTION INTRAVENOUS at 23:57

## 2024-12-10 RX ADMIN — SODIUM CHLORIDE, SODIUM GLUCONATE, SODIUM ACETATE, POTASSIUM CHLORIDE, MAGNESIUM CHLORIDE, SODIUM PHOSPHATE, DIBASIC, AND POTASSIUM PHOSPHATE 1000 ML: .53; .5; .37; .037; .03; .012; .00082 INJECTION, SOLUTION INTRAVENOUS at 21:29

## 2024-12-10 RX ADMIN — DEXTROSE 2000 MG: 50 INJECTION, SOLUTION INTRAVENOUS at 21:39

## 2024-12-10 RX ADMIN — SODIUM CHLORIDE, SODIUM GLUCONATE, SODIUM ACETATE, POTASSIUM CHLORIDE, MAGNESIUM CHLORIDE, SODIUM PHOSPHATE, DIBASIC, AND POTASSIUM PHOSPHATE 1000 ML: .53; .5; .37; .037; .03; .012; .00082 INJECTION, SOLUTION INTRAVENOUS at 23:10

## 2024-12-10 RX ADMIN — ONDANSETRON 4 MG: 2 INJECTION INTRAMUSCULAR; INTRAVENOUS at 21:22

## 2024-12-10 RX ADMIN — IBUPROFEN 400 MG: 400 TABLET, FILM COATED ORAL at 22:37

## 2024-12-10 RX ADMIN — IOHEXOL 100 ML: 350 INJECTION, SOLUTION INTRAVENOUS at 22:22

## 2024-12-10 RX ADMIN — ACETAMINOPHEN 650 MG: 325 TABLET, FILM COATED ORAL at 21:27

## 2024-12-10 RX ADMIN — MORPHINE SULFATE 4 MG: 4 INJECTION INTRAVENOUS at 21:22

## 2024-12-10 NOTE — PROGRESS NOTES
"Name: Richa Medina      : 1965      MRN: [id number]  Encounter Provider: Matthew Thomson MD  Encounter Date: 12/10/2024   Encounter department: [facility]  :  Assessment & Plan  Urinary tract infection with hematuria, site unspecified    Treat for urine infection send culture, recheck UA after resolution, to ensure resolution of hematuria.    Orders:    POCT urine dip    UA (URINE) with reflex to Scope; Future    Urine culture; Future           History of Present Illness     HPI    59 year old female presenting for burining with urination, frequency and hematuria.    Saw blood on toilet paper.    No flank pain, no fevers        Review of Systems   Constitutional:  Negative for activity change and appetite change.   Respiratory:  Negative for apnea and chest tightness.    Gastrointestinal:  Negative for abdominal distention and abdominal pain.   Musculoskeletal:  Negative for arthralgias and back pain.       Objective   /72 (BP Location: Left arm, Patient Position: Sitting, Cuff Size: Standard)   Pulse 75   Temp 97.8 °F (36.6 °C) (Tympanic)   Resp 18   Ht 5' 3\" (1.6 m)   Wt 65.4 kg (144 lb 3.2 oz)   SpO2 99%   BMI 25.54 kg/m²      Physical Exam  Constitutional:       Appearance: Normal appearance.   Cardiovascular:      Rate and Rhythm: Normal rate and regular rhythm.      Pulses: Normal pulses.      Heart sounds: Normal heart sounds.   Pulmonary:      Effort: Pulmonary effort is normal.      Breath sounds: Normal breath sounds.   Musculoskeletal:         General: Normal range of motion.   Neurological:      General: No focal deficit present.      Mental Status: She is alert and oriented to person, place, and time.         "

## 2024-12-11 ENCOUNTER — APPOINTMENT (INPATIENT)
Dept: ULTRASOUND IMAGING | Facility: HOSPITAL | Age: 59
DRG: 871 | End: 2024-12-11
Payer: COMMERCIAL

## 2024-12-11 PROBLEM — N12 PYELONEPHRITIS: Status: ACTIVE | Noted: 2024-12-11

## 2024-12-11 PROBLEM — R09.02 HYPOXIA: Status: ACTIVE | Noted: 2024-12-11

## 2024-12-11 PROBLEM — R65.21 SEPTIC SHOCK (HCC): Status: ACTIVE | Noted: 2024-12-11

## 2024-12-11 PROBLEM — A41.9 SEPTIC SHOCK (HCC): Status: ACTIVE | Noted: 2024-12-11

## 2024-12-11 LAB
ALBUMIN SERPL BCG-MCNC: 3.4 G/DL (ref 3.5–5)
ALP SERPL-CCNC: 82 U/L (ref 34–104)
ALT SERPL W P-5'-P-CCNC: 118 U/L (ref 7–52)
ANION GAP SERPL CALCULATED.3IONS-SCNC: 7 MMOL/L (ref 4–13)
ANION GAP SERPL CALCULATED.3IONS-SCNC: 7 MMOL/L (ref 4–13)
AST SERPL W P-5'-P-CCNC: 157 U/L (ref 13–39)
ATRIAL RATE: 110 BPM
BILIRUB SERPL-MCNC: 0.48 MG/DL (ref 0.2–1)
BUN SERPL-MCNC: 14 MG/DL (ref 5–25)
BUN SERPL-MCNC: 14 MG/DL (ref 5–25)
CA-I BLD-SCNC: 1.02 MMOL/L (ref 1.12–1.32)
CALCIUM ALBUM COR SERPL-MCNC: 8.3 MG/DL (ref 8.3–10.1)
CALCIUM SERPL-MCNC: 7.8 MG/DL (ref 8.4–10.2)
CALCIUM SERPL-MCNC: 7.9 MG/DL (ref 8.4–10.2)
CHLORIDE SERPL-SCNC: 103 MMOL/L (ref 96–108)
CHLORIDE SERPL-SCNC: 106 MMOL/L (ref 96–108)
CO2 SERPL-SCNC: 24 MMOL/L (ref 21–32)
CO2 SERPL-SCNC: 24 MMOL/L (ref 21–32)
CREAT SERPL-MCNC: 0.91 MG/DL (ref 0.6–1.3)
CREAT SERPL-MCNC: 0.96 MG/DL (ref 0.6–1.3)
ERYTHROCYTE [DISTWIDTH] IN BLOOD BY AUTOMATED COUNT: 13.1 % (ref 11.6–15.1)
ERYTHROCYTE [DISTWIDTH] IN BLOOD BY AUTOMATED COUNT: 13.2 % (ref 11.6–15.1)
GFR SERPL CREATININE-BSD FRML MDRD: 64 ML/MIN/1.73SQ M
GFR SERPL CREATININE-BSD FRML MDRD: 68 ML/MIN/1.73SQ M
GLUCOSE SERPL-MCNC: 118 MG/DL (ref 65–140)
GLUCOSE SERPL-MCNC: 126 MG/DL (ref 65–140)
HCT VFR BLD AUTO: 31.2 % (ref 34.8–46.1)
HCT VFR BLD AUTO: 32.3 % (ref 34.8–46.1)
HGB BLD-MCNC: 10.6 G/DL (ref 11.5–15.4)
HGB BLD-MCNC: 10.9 G/DL (ref 11.5–15.4)
LACTATE SERPL-SCNC: 1.6 MMOL/L (ref 0.5–2)
MAGNESIUM SERPL-MCNC: 2.1 MG/DL (ref 1.9–2.7)
MCH RBC QN AUTO: 29.9 PG (ref 26.8–34.3)
MCH RBC QN AUTO: 30.1 PG (ref 26.8–34.3)
MCHC RBC AUTO-ENTMCNC: 33.7 G/DL (ref 31.4–37.4)
MCHC RBC AUTO-ENTMCNC: 34 G/DL (ref 31.4–37.4)
MCV RBC AUTO: 89 FL (ref 82–98)
MCV RBC AUTO: 89 FL (ref 82–98)
P AXIS: -27 DEGREES
PHOSPHATE SERPL-MCNC: 2.2 MG/DL (ref 2.3–4.1)
PLATELET # BLD AUTO: 164 THOUSANDS/UL (ref 149–390)
PLATELET # BLD AUTO: 178 THOUSANDS/UL (ref 149–390)
PLATELET # BLD AUTO: 197 THOUSANDS/UL (ref 149–390)
PMV BLD AUTO: 10 FL (ref 8.9–12.7)
PMV BLD AUTO: 9.8 FL (ref 8.9–12.7)
PMV BLD AUTO: 9.9 FL (ref 8.9–12.7)
POTASSIUM SERPL-SCNC: 4.3 MMOL/L (ref 3.5–5.3)
POTASSIUM SERPL-SCNC: 4.5 MMOL/L (ref 3.5–5.3)
PR INTERVAL: 136 MS
PROT SERPL-MCNC: 6.4 G/DL (ref 6.4–8.4)
QRS AXIS: 55 DEGREES
QRSD INTERVAL: 88 MS
QT INTERVAL: 314 MS
QTC INTERVAL: 424 MS
RBC # BLD AUTO: 3.52 MILLION/UL (ref 3.81–5.12)
RBC # BLD AUTO: 3.65 MILLION/UL (ref 3.81–5.12)
SODIUM SERPL-SCNC: 134 MMOL/L (ref 135–147)
SODIUM SERPL-SCNC: 137 MMOL/L (ref 135–147)
T WAVE AXIS: 55 DEGREES
VENTRICULAR RATE: 110 BPM
WBC # BLD AUTO: 12.44 THOUSAND/UL (ref 4.31–10.16)
WBC # BLD AUTO: 12.82 THOUSAND/UL (ref 4.31–10.16)

## 2024-12-11 PROCEDURE — 99255 IP/OBS CONSLTJ NEW/EST HI 80: CPT | Performed by: UROLOGY

## 2024-12-11 PROCEDURE — 82330 ASSAY OF CALCIUM: CPT

## 2024-12-11 PROCEDURE — 93308 TTE F-UP OR LMTD: CPT

## 2024-12-11 PROCEDURE — 85027 COMPLETE CBC AUTOMATED: CPT | Performed by: NURSE PRACTITIONER

## 2024-12-11 PROCEDURE — 80048 BASIC METABOLIC PNL TOTAL CA: CPT | Performed by: NURSE PRACTITIONER

## 2024-12-11 PROCEDURE — 76775 US EXAM ABDO BACK WALL LIM: CPT

## 2024-12-11 PROCEDURE — 87081 CULTURE SCREEN ONLY: CPT

## 2024-12-11 PROCEDURE — 84100 ASSAY OF PHOSPHORUS: CPT

## 2024-12-11 PROCEDURE — 83735 ASSAY OF MAGNESIUM: CPT

## 2024-12-11 PROCEDURE — 99291 CRITICAL CARE FIRST HOUR: CPT | Performed by: INTERNAL MEDICINE

## 2024-12-11 PROCEDURE — 80053 COMPREHEN METABOLIC PANEL: CPT

## 2024-12-11 PROCEDURE — 85049 AUTOMATED PLATELET COUNT: CPT

## 2024-12-11 PROCEDURE — 85027 COMPLETE CBC AUTOMATED: CPT

## 2024-12-11 PROCEDURE — 83605 ASSAY OF LACTIC ACID: CPT | Performed by: NURSE PRACTITIONER

## 2024-12-11 PROCEDURE — 93010 ELECTROCARDIOGRAM REPORT: CPT | Performed by: STUDENT IN AN ORGANIZED HEALTH CARE EDUCATION/TRAINING PROGRAM

## 2024-12-11 RX ORDER — MORPHINE SULFATE 4 MG/ML
4 INJECTION, SOLUTION INTRAMUSCULAR; INTRAVENOUS ONCE
Status: COMPLETED | OUTPATIENT
Start: 2024-12-11 | End: 2024-12-11

## 2024-12-11 RX ORDER — ONDANSETRON 2 MG/ML
INJECTION INTRAMUSCULAR; INTRAVENOUS
Status: DISPENSED
Start: 2024-12-11 | End: 2024-12-11

## 2024-12-11 RX ORDER — CALCIUM GLUCONATE 20 MG/ML
2 INJECTION, SOLUTION INTRAVENOUS ONCE
Status: COMPLETED | OUTPATIENT
Start: 2024-12-11 | End: 2024-12-11

## 2024-12-11 RX ORDER — METOCLOPRAMIDE HYDROCHLORIDE 5 MG/ML
10 INJECTION INTRAMUSCULAR; INTRAVENOUS ONCE
Status: COMPLETED | OUTPATIENT
Start: 2024-12-11 | End: 2024-12-11

## 2024-12-11 RX ORDER — PAROXETINE HYDROCHLORIDE HEMIHYDRATE 25 MG/1
50 TABLET, FILM COATED, EXTENDED RELEASE ORAL DAILY
Status: DISCONTINUED | OUTPATIENT
Start: 2024-12-13 | End: 2024-12-14 | Stop reason: HOSPADM

## 2024-12-11 RX ORDER — HYDROMORPHONE HCL IN WATER/PF 6 MG/30 ML
0.2 PATIENT CONTROLLED ANALGESIA SYRINGE INTRAVENOUS ONCE
Status: COMPLETED | OUTPATIENT
Start: 2024-12-11 | End: 2024-12-11

## 2024-12-11 RX ORDER — CHLORHEXIDINE GLUCONATE ORAL RINSE 1.2 MG/ML
15 SOLUTION DENTAL EVERY 12 HOURS SCHEDULED
Status: DISCONTINUED | OUTPATIENT
Start: 2024-12-11 | End: 2024-12-13

## 2024-12-11 RX ORDER — PAROXETINE HYDROCHLORIDE HEMIHYDRATE 25 MG/1
25 TABLET, FILM COATED, EXTENDED RELEASE ORAL ONCE
Status: COMPLETED | OUTPATIENT
Start: 2024-12-11 | End: 2024-12-11

## 2024-12-11 RX ORDER — ONDANSETRON 2 MG/ML
4 INJECTION INTRAMUSCULAR; INTRAVENOUS EVERY 4 HOURS PRN
Status: DISCONTINUED | OUTPATIENT
Start: 2024-12-11 | End: 2024-12-14 | Stop reason: HOSPADM

## 2024-12-11 RX ORDER — HYDROMORPHONE HCL IN WATER/PF 6 MG/30 ML
0.2 PATIENT CONTROLLED ANALGESIA SYRINGE INTRAVENOUS EVERY 2 HOUR PRN
Refills: 0 | Status: DISCONTINUED | OUTPATIENT
Start: 2024-12-11 | End: 2024-12-11

## 2024-12-11 RX ORDER — HEPARIN SODIUM 5000 [USP'U]/ML
5000 INJECTION, SOLUTION INTRAVENOUS; SUBCUTANEOUS EVERY 8 HOURS SCHEDULED
Status: DISCONTINUED | OUTPATIENT
Start: 2024-12-11 | End: 2024-12-14 | Stop reason: HOSPADM

## 2024-12-11 RX ORDER — ANASTROZOLE 1 MG/1
1 TABLET ORAL DAILY
Status: DISCONTINUED | OUTPATIENT
Start: 2024-12-11 | End: 2024-12-14 | Stop reason: HOSPADM

## 2024-12-11 RX ORDER — HYDROMORPHONE HCL/PF 1 MG/ML
0.5 SYRINGE (ML) INJECTION EVERY 4 HOURS PRN
Status: DISCONTINUED | OUTPATIENT
Start: 2024-12-11 | End: 2024-12-12

## 2024-12-11 RX ORDER — LORAZEPAM 1 MG/1
1 TABLET ORAL ONCE
Status: COMPLETED | OUTPATIENT
Start: 2024-12-11 | End: 2024-12-11

## 2024-12-11 RX ORDER — NIACIN 100 MG
100 TABLET ORAL
Status: DISCONTINUED | OUTPATIENT
Start: 2024-12-11 | End: 2024-12-14 | Stop reason: HOSPADM

## 2024-12-11 RX ORDER — LANOLIN ALCOHOL/MO/W.PET/CERES
1 CREAM (GRAM) TOPICAL
Status: DISCONTINUED | OUTPATIENT
Start: 2024-12-11 | End: 2024-12-11

## 2024-12-11 RX ORDER — ACETAMINOPHEN 325 MG/1
650 TABLET ORAL EVERY 6 HOURS PRN
Status: DISCONTINUED | OUTPATIENT
Start: 2024-12-11 | End: 2024-12-12

## 2024-12-11 RX ORDER — OLANZAPINE 10 MG/2ML
5 INJECTION, POWDER, FOR SOLUTION INTRAMUSCULAR ONCE
Status: COMPLETED | OUTPATIENT
Start: 2024-12-11 | End: 2024-12-11

## 2024-12-11 RX ORDER — BUDESONIDE AND FORMOTEROL FUMARATE DIHYDRATE 80; 4.5 UG/1; UG/1
2 AEROSOL RESPIRATORY (INHALATION) 2 TIMES DAILY
Status: DISCONTINUED | OUTPATIENT
Start: 2024-12-11 | End: 2024-12-14 | Stop reason: HOSPADM

## 2024-12-11 RX ORDER — LEVOTHYROXINE SODIUM 25 UG/1
25 TABLET ORAL
Status: DISCONTINUED | OUTPATIENT
Start: 2024-12-11 | End: 2024-12-14 | Stop reason: HOSPADM

## 2024-12-11 RX ORDER — SODIUM CHLORIDE, SODIUM GLUCONATE, SODIUM ACETATE, POTASSIUM CHLORIDE, MAGNESIUM CHLORIDE, SODIUM PHOSPHATE, DIBASIC, AND POTASSIUM PHOSPHATE .53; .5; .37; .037; .03; .012; .00082 G/100ML; G/100ML; G/100ML; G/100ML; G/100ML; G/100ML; G/100ML
1000 INJECTION, SOLUTION INTRAVENOUS ONCE
Status: COMPLETED | OUTPATIENT
Start: 2024-12-11 | End: 2024-12-11

## 2024-12-11 RX ORDER — OXYCODONE HYDROCHLORIDE 5 MG/1
5 TABLET ORAL EVERY 4 HOURS PRN
Refills: 0 | Status: DISCONTINUED | OUTPATIENT
Start: 2024-12-11 | End: 2024-12-11

## 2024-12-11 RX ORDER — LORAZEPAM 2 MG/ML
0.25 INJECTION INTRAMUSCULAR ONCE
Status: COMPLETED | OUTPATIENT
Start: 2024-12-11 | End: 2024-12-11

## 2024-12-11 RX ORDER — PAROXETINE HYDROCHLORIDE HEMIHYDRATE 25 MG/1
25 TABLET, FILM COATED, EXTENDED RELEASE ORAL ONCE
Status: COMPLETED | OUTPATIENT
Start: 2024-12-12 | End: 2024-12-12

## 2024-12-11 RX ORDER — POTASSIUM CHLORIDE 14.9 MG/ML
20 INJECTION INTRAVENOUS
Status: COMPLETED | OUTPATIENT
Start: 2024-12-11 | End: 2024-12-11

## 2024-12-11 RX ORDER — PAROXETINE HYDROCHLORIDE HEMIHYDRATE 25 MG/1
50 TABLET, FILM COATED, EXTENDED RELEASE ORAL DAILY
Status: DISCONTINUED | OUTPATIENT
Start: 2024-12-11 | End: 2024-12-11

## 2024-12-11 RX ORDER — ONDANSETRON 2 MG/ML
4 INJECTION INTRAMUSCULAR; INTRAVENOUS ONCE
Status: COMPLETED | OUTPATIENT
Start: 2024-12-11 | End: 2024-12-11

## 2024-12-11 RX ORDER — VANCOMYCIN HYDROCHLORIDE 750 MG/150ML
750 INJECTION, SOLUTION INTRAVENOUS EVERY 12 HOURS
Status: COMPLETED | OUTPATIENT
Start: 2024-12-12 | End: 2024-12-13

## 2024-12-11 RX ADMIN — HEPARIN SODIUM 5000 UNITS: 5000 INJECTION INTRAVENOUS; SUBCUTANEOUS at 01:12

## 2024-12-11 RX ADMIN — POTASSIUM CHLORIDE 20 MEQ: 200 INJECTION, SOLUTION INTRAVENOUS at 01:08

## 2024-12-11 RX ADMIN — CHLORHEXIDINE GLUCONATE 15 ML: 1.2 RINSE ORAL at 20:56

## 2024-12-11 RX ADMIN — ONDANSETRON 4 MG: 2 INJECTION INTRAMUSCULAR; INTRAVENOUS at 09:15

## 2024-12-11 RX ADMIN — HEPARIN SODIUM 5000 UNITS: 5000 INJECTION INTRAVENOUS; SUBCUTANEOUS at 16:06

## 2024-12-11 RX ADMIN — MORPHINE SULFATE 4 MG: 4 INJECTION INTRAVENOUS at 16:44

## 2024-12-11 RX ADMIN — ACETAMINOPHEN 650 MG: 325 TABLET, FILM COATED ORAL at 08:39

## 2024-12-11 RX ADMIN — OLANZAPINE 5 MG: 10 INJECTION, POWDER, FOR SOLUTION INTRAMUSCULAR at 23:43

## 2024-12-11 RX ADMIN — LORAZEPAM 0.25 MG: 2 INJECTION INTRAMUSCULAR; INTRAVENOUS at 16:05

## 2024-12-11 RX ADMIN — BUDESONIDE AND FORMOTEROL FUMARATE DIHYDRATE 2 PUFF: 80; 4.5 AEROSOL RESPIRATORY (INHALATION) at 18:06

## 2024-12-11 RX ADMIN — BUDESONIDE AND FORMOTEROL FUMARATE DIHYDRATE 2 PUFF: 80; 4.5 AEROSOL RESPIRATORY (INHALATION) at 08:43

## 2024-12-11 RX ADMIN — MEROPENEM 1000 MG: 1 INJECTION INTRAVENOUS at 21:45

## 2024-12-11 RX ADMIN — CEFEPIME 1000 MG: 1 INJECTION, POWDER, FOR SOLUTION INTRAMUSCULAR; INTRAVENOUS at 08:42

## 2024-12-11 RX ADMIN — LORAZEPAM 1 MG: 1 TABLET ORAL at 18:43

## 2024-12-11 RX ADMIN — ONDANSETRON 4 MG: 2 INJECTION INTRAMUSCULAR; INTRAVENOUS at 14:45

## 2024-12-11 RX ADMIN — SODIUM CHLORIDE, SODIUM GLUCONATE, SODIUM ACETATE, POTASSIUM CHLORIDE, MAGNESIUM CHLORIDE, SODIUM PHOSPHATE, DIBASIC, AND POTASSIUM PHOSPHATE 1000 ML: .53; .5; .37; .037; .03; .012; .00082 INJECTION, SOLUTION INTRAVENOUS at 11:36

## 2024-12-11 RX ADMIN — MEROPENEM 1000 MG: 1 INJECTION INTRAVENOUS at 13:55

## 2024-12-11 RX ADMIN — SODIUM CHLORIDE, SODIUM GLUCONATE, SODIUM ACETATE, POTASSIUM CHLORIDE, MAGNESIUM CHLORIDE, SODIUM PHOSPHATE, DIBASIC, AND POTASSIUM PHOSPHATE 1000 ML: .53; .5; .37; .037; .03; .012; .00082 INJECTION, SOLUTION INTRAVENOUS at 18:45

## 2024-12-11 RX ADMIN — NOREPINEPHRINE BITARTRATE 2 MCG/MIN: 1 INJECTION INTRAVENOUS at 15:53

## 2024-12-11 RX ADMIN — MELATONIN 3 MG: 3 TAB ORAL at 21:43

## 2024-12-11 RX ADMIN — HYDROMORPHONE HYDROCHLORIDE 0.2 MG: 0.2 INJECTION, SOLUTION INTRAMUSCULAR; INTRAVENOUS; SUBCUTANEOUS at 09:15

## 2024-12-11 RX ADMIN — VANCOMYCIN HYDROCHLORIDE 1500 MG: 1 INJECTION, POWDER, LYOPHILIZED, FOR SOLUTION INTRAVENOUS at 18:06

## 2024-12-11 RX ADMIN — ONDANSETRON 4 MG: 2 INJECTION INTRAMUSCULAR; INTRAVENOUS at 10:31

## 2024-12-11 RX ADMIN — NOREPINEPHRINE BITARTRATE 2 MCG/MIN: 1 INJECTION INTRAVENOUS at 02:08

## 2024-12-11 RX ADMIN — SODIUM PHOSPHATE, MONOBASIC, MONOHYDRATE AND SODIUM PHOSPHATE, DIBASIC, ANHYDROUS 30 MMOL: 142; 276 INJECTION, SOLUTION INTRAVENOUS at 21:43

## 2024-12-11 RX ADMIN — CHLORHEXIDINE GLUCONATE 15 ML: 1.2 RINSE ORAL at 01:12

## 2024-12-11 RX ADMIN — HYDROMORPHONE HYDROCHLORIDE 0.2 MG: 0.2 INJECTION, SOLUTION INTRAMUSCULAR; INTRAVENOUS; SUBCUTANEOUS at 14:11

## 2024-12-11 RX ADMIN — ACETAMINOPHEN 650 MG: 325 TABLET, FILM COATED ORAL at 19:40

## 2024-12-11 RX ADMIN — SODIUM CHLORIDE, SODIUM GLUCONATE, SODIUM ACETATE, POTASSIUM CHLORIDE, MAGNESIUM CHLORIDE, SODIUM PHOSPHATE, DIBASIC, AND POTASSIUM PHOSPHATE 1000 ML: .53; .5; .37; .037; .03; .012; .00082 INJECTION, SOLUTION INTRAVENOUS at 01:43

## 2024-12-11 RX ADMIN — OXYCODONE 5 MG: 5 TABLET ORAL at 08:40

## 2024-12-11 RX ADMIN — POTASSIUM CHLORIDE 20 MEQ: 200 INJECTION, SOLUTION INTRAVENOUS at 02:39

## 2024-12-11 RX ADMIN — CALCIUM GLUCONATE 2 G: 20 INJECTION, SOLUTION INTRAVENOUS at 20:56

## 2024-12-11 RX ADMIN — PAROXETINE 25 MG: 25 TABLET, FILM COATED, EXTENDED RELEASE ORAL at 21:55

## 2024-12-11 RX ADMIN — METOCLOPRAMIDE 10 MG: 5 INJECTION, SOLUTION INTRAMUSCULAR; INTRAVENOUS at 12:10

## 2024-12-11 RX ADMIN — HYDROMORPHONE HYDROCHLORIDE 0.2 MG: 0.2 INJECTION, SOLUTION INTRAMUSCULAR; INTRAVENOUS; SUBCUTANEOUS at 12:42

## 2024-12-11 RX ADMIN — LEVOTHYROXINE SODIUM 25 MCG: 25 TABLET ORAL at 05:08

## 2024-12-11 NOTE — CONSULTS
Inpatient consult to Urology  Consult performed by: ELIDIA Owens  Consult ordered by: Shanti Mireles MD      : UROLOGY  Richa Medina 59 y.o. female [id number]   Unit/Bed #: ICU 04  Encounter: [id number]        Assessment  & Plan  :    Pyelonephritis  Assessment & Plan  Patient originally reported increased urinary urgency, dysuria, and gross hematuria yesterday. She presented to the ER for complaints of severe bilateral flank pain and chills after taking single dose of PO Bactrim prescribed by her PCP. Found to meet septic shock criteria with fever (tmax 103), tachycardia, leukopenia, tachypnea. Patient later became hypotensive requiring admission to ICU and started on Levophed.  CT A/P with contrast from 12/10 showed grossly unremarkable right kidney. There is mild wall thickening and mucosal hyperenhancement involving the left renal pelvis and left ureter with subtle periureteral hazy inflammatory stranding seen. Findings are suspicious for a left-sided pyelitis/ureteritis. Bladder was moderately distended and grossly unremarkable.   Renal US today shows no hydronephrosis bilaterally. Bilateral ureteral jets not detected although patient shaking during exam.  Patient with noted rigors and complaints of buttocks pain and nausea. No CVA tenderness or abdominal tenderness noted on physical exam.  Urethral art catheter draining clear yellow urine to gravity.   UA through PCP with large leukocytes and small blood. Microscopic analysis with 4-10 RBC, 20-30 WBC, occasional bacteria.  WBC 2.13 yesterday currently 12.82 today  Creatinine 0.91  Patient took single dose PO Bactrim and received single dose IV Rocephin in the ER. Currently on Cefepime and meropenem.     Plan  Urethral art catheter placed at the recommendation of Urology to ensure adequate bladder decompression. Maintain art catheter until patient clinically improving  Monitor fever curve  Continue IV ABX and adjust based on cultures  Both  myself and Dr. Pruett reviewed patients CT imaging from yesterday and there is no indication for operative intervention at this time. Patients has no CVA tenderness on exam today and Renal US from today also negative for hydronephrosis bilaterally to suggest acute obstruction.   Continue medical optimization through primary team.   Urology will continue to follow along. Consider possible need for repeat CT imaging if patient continues to decline despite bladder decompression and IV antibiotics.            Subjective :    Patient lying in bed with noted rigors and complaining of shaking and buttocks pain. Urethral art catheter present draining clear yellow urine to gravity. She denies any abdominal pain or flank pain at this time.     Richa Medina  is a 59 y.o. female with PMHx significant for  depression, b/l hearing loss, asthma, breast cancer, vitamin d deficiency, osteopenia, rosacea, who was admitted to ICU for septic shock secondary to pyelitis/ureteritis. Patient reports symptoms of dysuria, urgency, and gross hematuria starting on 12/10. She was seen by her PCP and prescribed Bactrim for UTI. She came to the ER after experiencing severe bilateral flank pain, chills, and fatigue which started after taking first dose of Bactrim. She was found to meet septic shock criteria with fever (tmax 103), tachycardia, leukopenia, tachypnea. UA through PCP with large leukocytes and small blood. Microscopic analysis with 4-10 RBC, 20-30 WBC, occasional bacteria. CT in the emergency department revealed grossly unremarkable right kidney. There is mild wall thickening and mucosal hyperenhancement involving the left renal pelvis and left ureter with subtle periureteral hazy inflammatory stranding seen. Findings are suspicious for a left-sided pyelitis/ureteritis. Bladder was moderately distended and grossly unremarkable. She received 2L IVF in the ER with persistent hypotension. Admitted to ICU and started on IV  Rocephin, meropenem, and Levophed.             Allergies   Allergen Reactions    Pollen Extract       Current Outpatient Medications   Medication Instructions    anastrozole (ARIMIDEX) 1 mg, Oral, Daily    budesonide-formoterol (Symbicort) 80-4.5 MCG/ACT inhaler 2 puffs, Inhalation, 2 times daily, Rinse mouth after use.    Calcium 250 MG CAPS Daily with dinner    Cholecalciferol (VITAMIN D3 PO) 2 capsules, Daily    levocetirizine (XYZAL) 5 mg, Oral, Every evening    levothyroxine 25 mcg tablet TAKE ONE TABLET BY MOUTH EVERY DAY MONDAY THRU FRIDAY AND TAKE 2 TABLETS EVERY DAY ON SAT & SUN.    MAGNESIUM  mg, Daily with dinner    Medium Chain Triglycerides (MCT OIL PO) 1 Application, Daily    mometasone (NASONEX) 50 mcg/act nasal spray 2 sprays, Nasal, Daily    MULTIPLE VITAMINS-CALCIUM PO 1 capsule, Daily (early morning)    niacin 100 mg, Daily with breakfast    PARoxetine (PAXIL-CR) 25 mg 24 hr tablet TAKE TWO TABLETS BY MOUTH EVERY DAY    sulfamethoxazole-trimethoprim (BACTRIM DS) 800-160 mg per tablet 1 tablet, Oral, Every 12 hours scheduled      Past Medical History:   Diagnosis Date    Allergic May 2023    Asthma     Breast cancer (HCC) 11/2023    Breast mass 10/31/23    Cancer (HCC)     bilateral    Depression     Disease of thyroid gland     Hearing aid worn     bilateral    Hearing loss     HL (hearing loss)     Osteopenia     Rosacea 02/09/2021    Seasonal allergies     Vitamin D deficiency 07/24/2018     Past Surgical History:   Procedure Laterality Date    BREAST BIOPSY Right 11/01/2023    X 2 sites    BREAST BIOPSY Left 12/08/2023    BREAST LUMPECTOMY  4/2024    IR DRAINAGE TUBE CHECK/CHANGE/REPOSITION/REINSERTION/UPSIZE  06/27/2024    IR DRAINAGE TUBE CHECK/CHANGE/REPOSITION/REINSERTION/UPSIZE  07/09/2024    IR DRAINAGE TUBE PLACEMENT  06/18/2024    LASIK      MASTECTOMY  4/2023    MRI BREAST BIOPSY LEFT (ALL INCLUSIVE) Left 12/08/2023    MT ADJNT TIS TRNSFR/REARGMT ANY AREA 30.1-60 SQ CM Right  04/19/2024    Procedure: RIGHT AESTHETIC FLAT CLOSURE , IBIS FLAP, PREVENA PLACEMENT;  Surgeon: Samara Felix DO;  Location: [facility];  Service: Plastics    MD BX/EXC LYMPH NODE OPEN SUPERFICIAL Bilateral 04/19/2024    Procedure: Bilateral BX LYMPH NODE SENTINEL, lymphoscintigraphies, lymphatic mappings;  Surgeon: Sisi Dominguez MD;  [facility];  Service: Surgical Oncology    MD EXC BREAST LES PREOP PLMT RAD MARKER OPEN 1 LES Left 04/19/2024    Procedure: LEFT BREAST HILARY LOCALIZATION LUMPECTOMY;;  Surgeon: Sisi Dominguez MD;  [facility];  Service: Surgical Oncology    MD INJ RADIOACTIVE TRACER FOR ID OF SENTINEL NODE Bilateral 04/19/2024    Procedure: Bilateral BX LYMPH NODE SENTINEL, lymphoscintigraphies, lymphatic mappings;  Surgeon: Sisi Dominguez MD;  [facility];  Service: Surgical Oncology    MD INTRAOP SENTINEL LYMPH NODE ID W/DYE INJECTION Bilateral 04/19/2024    Procedure: Bilateral BX LYMPH NODE SENTINEL, lymphoscintigraphies, lymphatic mappings;  Surgeon: Sisi Dominguez MD;  [facility];  Service: Surgical Oncology    MD MASTECTOMY PARTIAL Left 04/19/2024    Procedure: LEFT BREAST HILARY LOCALIZATION LUMPECTOMY;;  Surgeon: Sisi Dominguez MD;  [facility];  Service: Surgical Oncology    MD MASTECTOMY SIMPLE COMPLETE Right 04/19/2024    Procedure: RIGHT BREAST MASTECTOMY, PSB AXILLARY DISSECTION;  Surgeon: Sisi Dominguez MD;  [facility];  Service: Surgical Oncology    MD MASTOPEXY Left 04/19/2024    Procedure: MASTOPEXY BREAST;  Surgeon: Samara Felix DO;  [facility];  Service: Plastics    MD NEUROPLASTY &/TRANSPOS MEDIAN NRV CARPAL TUNNE Right 07/10/2018    Procedure: CARPAL TUNNEL RELEASE;  Surgeon: Maicol Taylor DO;  [facility];  Service: Orthopedics    MD NEUROPLASTY &/TRANSPOS MEDIAN NRV CARPAL TUNNE Left 04/19/2019    Procedure: RELEASE CARPAL TUNNEL;  Surgeon: Peterson Alejandro MD;  [facility];   Service: Orthopedics    SEPTOPLASTY      SINUS SURGERY      US BREAST NEEDLE LOC LEFT Left 02/27/2024    US GUIDANCE BREAST BIOPSY RIGHT EACH ADDITIONAL Right 11/01/2023    US GUIDED BREAST BIOPSY RIGHT COMPLETE Right 11/01/2023     Family History   Problem Relation Age of Onset    Hashimoto's thyroiditis Mother     Thyroid disease Mother     Melanoma Mother         50's    Cancer Mother         melanoma    Hearing loss Mother     Melanoma Father         50's    Crohn's disease Father     Cancer Father         melanoma    Hearing loss Father     Hashimoto's thyroiditis Sister     No Known Problems Sister     No Known Problems Sister     Colon cancer Maternal Uncle         50's    Depression Maternal Grandmother     Diabetes Maternal Grandmother     Mental illness Maternal Grandmother     Dementia Maternal Grandmother     No Known Problems Maternal Grandfather     No Known Problems Paternal Grandmother     No Known Problems Paternal Grandfather      Social History     Socioeconomic History    Marital status: /Civil Union     Spouse name: None    Number of children: None    Years of education: None    Highest education level: None   Occupational History    None   Tobacco Use    Smoking status: Never    Smokeless tobacco: Never   Vaping Use    Vaping status: Never Used   Substance and Sexual Activity    Alcohol use: Yes     Comment: rarely    Drug use: No    Sexual activity: Yes     Partners: Male     Birth control/protection: Male Sterilization   Other Topics Concern    None   Social History Narrative    Do you have pets? 3 dogs & 1 cat Is pet allowed in bedroom?Yes    Are you a smoker? Never    Does anyone smoke in your home? No       Do you live with smokers? No    [place] frequently? No   How many times a year? N/A      Social Drivers of Health     Financial Resource Strain: Not on file   Food Insecurity: No Food Insecurity (12/11/2024)    Nursing - Inadequate Food Risk Classification     Worried About  Running Out of Food in the Last Year: Not on file     Ran Out of Food in the Last Year: Not on file     Ran Out of Food in the Last Year: 1   Transportation Needs: No Transportation Needs (2024)    Nursing - Transportation Risk Classification     Lack of Transportation: Not on file     Lack of Transportation: 2   Physical Activity: Not on file   Stress: Not on file   Social Connections: Not on file   Intimate Partner Violence: Unknown (2024)    Nursing IPS     Feels Physically and Emotionally Safe: Not on file     Physically Hurt by Someone: Not on file     Humiliated or Emotionally Abused by Someone: Not on file     Physically Hurt by Someone: 2     Hurt or Threatened by Someone: 2   Housing Stability: Unknown (2024)    Nursing: Inadequate Housing Risk Classification     Has Housing: Not on file     Worried About Losing Housing: Not on file     Unable to Get Utilities: Not on file     Unable to Pay for Housing in the Last Year: 2     Has Housin        Review of Systems   Constitutional:  Positive for chills. Negative for fever.   HENT:  Negative for ear pain and sore throat.    Eyes:  Negative for pain and visual disturbance.   Respiratory:  Negative for cough and shortness of breath.    Cardiovascular:  Negative for chest pain and palpitations.   Gastrointestinal:  Positive for nausea. Negative for abdominal pain and vomiting.   Genitourinary:  Positive for dysuria, flank pain, hematuria and urgency.   Musculoskeletal:  Negative for arthralgias and back pain.        Buttocks pain   Skin:  Negative for color change and rash.   Neurological:  Negative for seizures and syncope.   All other systems reviewed and are negative.       Objective     Physical Exam  Vitals reviewed.   Constitutional:       General: She is not in acute distress.     Appearance: Normal appearance. She is normal weight. She is ill-appearing. She is not toxic-appearing.      Comments: josselyn   HENT:      Head: Normocephalic  and atraumatic.      Nose: Nose normal.      Mouth/Throat:      Mouth: Mucous membranes are moist.      Pharynx: Oropharynx is clear.   Eyes:      General: No scleral icterus.     Conjunctiva/sclera: Conjunctivae normal.   Cardiovascular:      Rate and Rhythm: Regular rhythm. Tachycardia present.      Pulses: Normal pulses.      Heart sounds: Normal heart sounds.   Pulmonary:      Effort: Pulmonary effort is normal. No respiratory distress.      Breath sounds: Normal breath sounds.   Abdominal:      General: Abdomen is flat.      Palpations: Abdomen is soft.      Tenderness: There is no abdominal tenderness. There is no right CVA tenderness or left CVA tenderness.      Hernia: No hernia is present.   Genitourinary:     Comments: Urethral art catheter draining clear yellow urine to gravity  Musculoskeletal:         General: Normal range of motion.      Cervical back: Normal range of motion.   Skin:     General: Skin is warm and dry.   Neurological:      General: No focal deficit present.      Mental Status: She is alert and oriented to person, place, and time. Mental status is at baseline.   Psychiatric:         Mood and Affect: Mood normal.         Behavior: Behavior normal.         Thought Content: Thought content normal.         Judgment: Judgment normal.                Imagin2024    RENAL ULTRASOUND     INDICATION: Check for pyelonephritis, nephrolithiasis.     COMPARISON: None     TECHNIQUE: Ultrasound of the retroperitoneum was performed with a curvilinear transducer utilizing volumetric sweeps and still imaging techniques.     FINDINGS: Patient shaking during exam per technologist     KIDNEYS:  Symmetric and normal size.  Right kidney: 10.6 x 4.7 x 5.0 cm. Volume 130.2 mL  Left kidney: 10.4 x 4.4 x 4.4 cm. Volume 106.3 mL     Right kidney  Normal echogenicity and contour.  No mass is identified.  No hydronephrosis.  No shadowing calculi.  No perinephric fluid collections.     Left kidney  Normal  echogenicity and contour.  No mass is identified.  No hydronephrosis.  No shadowing calculi.  No perinephric fluid collections.     URETERS:  Nonvisualized.     BLADDER:  Normally distended.  No focal thickening or mass lesions.  Bilateral ureteral jets not detected.        IMPRESSION:     Patient shaking during exam per technologist     No renal calculus, hydronephrosis. Ureteral jets not identified during the study. Pyelonephritis cannot be evaluated on this imaging modality. No pyelonephritis seen on CT of 12/10/2024.        12/10/2024    CT ABDOMEN AND PELVIS WITH IV CONTRAST     INDICATION: UTI, now with back pain and fevers. .     COMPARISON: None.     TECHNIQUE: CT examination of the abdomen and pelvis was performed. Multiplanar 2D reformatted images were created from the source data.     This examination, like all CT scans performed in the [facility], was performed utilizing techniques to minimize radiation dose exposure, including the use of iterative reconstruction and automated exposure control. Radiation dose length   product (DLP) for this visit: 398 mGy-cm     IV Contrast: 100 mL of iohexol (OMNIPAQUE)  Enteric Contrast: Not administered.     FINDINGS:     ABDOMEN     LOWER CHEST: Patchy airspace opacities in portions of the right middle lobe and lingula are partially seen suspicious for airspace disease, recommend clinical correlation. Minimal dependent atelectasis in the bilateral posterior lower lobes. No pleural   or pericardial effusions.     LIVER/BILIARY TREE: Unremarkable.     GALLBLADDER: No calcified gallstones. No pericholecystic inflammatory change.     SPLEEN: Unremarkable.     PANCREAS: Unremarkable.     ADRENAL GLANDS: Unremarkable.     KIDNEYS/URETERS: The kidneys are normal in size and position. Right kidney is grossly unremarkable. No suspicious renal mass, nephrolithiasis, hydronephrosis, or perinephric fluid collections bilaterally. There is mild wall thickening and  mucosal   hyperenhancement involving the left renal pelvis and left ureter with subtle periureteral hazy inflammatory stranding seen. Findings are suspicious for a left-sided pyelitis/ureteritis. Clinical correlation with urinalysis recommended.     STOMACH AND BOWEL: Stomach is moderately distended with air and heterogeneous density debris. Small and large bowel loops appear normal in course and caliber without evidence for obstruction or inflammation. Terminal ileum and appendix are unremarkable.   Scattered sigmoid diverticulosis without evidence for acute diverticulitis.     APPENDIX: No findings to suggest appendicitis.     ABDOMINOPELVIC CAVITY: No ascites. No pneumoperitoneum. No lymphadenopathy.     VESSELS: Unremarkable for patient's age.     PELVIS     REPRODUCTIVE ORGANS: Unremarkable for patient's age.     URINARY BLADDER: Moderately distended and grossly unremarkable.     ABDOMINAL WALL/INGUINAL REGIONS: Unremarkable.     BONES: No acute fracture or suspicious osseous lesion. Mild multilevel degenerative changes of the visualized thoracolumbar spine.     IMPRESSION:     1.  Findings suspicious for left pyelitis/ureteritis noted as described above. Recommend clinical correlation with urinalysis. No nephrolithiasis or obstructive uropathy/hydronephrosis bilaterally.  2.  No evidence of bowel obstruction, inflammation, appendicitis, free air, or free fluid.  3.  Patchy airspace opacities in portions of the right middle lobe and lingula are partially seen suspicious for airspace disease, recommend clinical correlation.      Labs:  Lab Results   Component Value Date    SODIUM 137 12/11/2024    K 4.3 12/11/2024     12/11/2024    CO2 24 12/11/2024    BUN 14 12/11/2024    CREATININE 0.91 12/11/2024    GLUC 118 12/11/2024    CALCIUM 7.9 (L) 12/11/2024         Lab Results   Component Value Date    WBC 12.82 (H) 12/11/2024    HGB 10.9 (L) 12/11/2024    HCT 32.3 (L) 12/11/2024    MCV 89 12/11/2024      12/11/2024              Jovany BRENNER

## 2024-12-11 NOTE — H&P
H&P - Critical Care/ICU   Name: Richa Medina 59 y.o. female I MRN: [id number]  Unit/Bed#: ICU 04 I Date of Admission: 12/10/2024   Date of Service: 12/11/2024 I Hospital Day: 0       Assessment & Plan  Pyelonephritis  Start Cefepime 1g q8 plan to treat for 7 days pending clinical stability   Monitor UOP, maintain UOP of 0.5-1cc/kg/hr  Monitor WBC and fever curve   Septic shock (HCC)  Source control: Cefepime 1g  F/u BC and urine culutre to titrate abx   Perform POCUS and straight leg raise to assess volume status   Resuscitate with additional IVF - 3L in total thus far. Suspect patient likely will require additional IVF given intravascular depletion from sepsis and high fevers  Monitor markers of end organ perfusion including UOP  Monitor WBC and fever curve   Treat fevers with PRN tylenol and likely additional fluids   Hypothyroidism  Continue home synthroid 25mcg each morning   Depression  Continue home paxil 50mg each morning   Monitor qtc  Vitamin D deficiency  With osteopenia  Plan:  D3 supplement daily   Malignant neoplasm of overlapping sites of right breast in female, estrogen receptor positive (HCC)  B/L breast cancer  Stage 1a(R:multifocal invasive, ductal carcinoma, ER positive s/p mastectomy. L focal ductal carcinoma s/p lumpectomy, mastopexy, and radiation and remains on anastrozole)  Hx of negative EBUS with biopsies in January 2024 after abnormal chest CT with groud glass opacities and nodular densities  Plan:  Continue home anastrozole 1mg daily   Hypoxia  Ddxi- likely pulmonary edema related to IVF resuscitation vs recent morphine administration vs vs viral URI/bronchitis. Patient has Hx of b/l PNA as well as multiple patchy focal opacities on prior CTs noted on December 5, 2024 with negative EBUS on January 2024   Patient has had no pulmonary symptoms at home    Plan:  Maintain o2 sat >92%  Disposition: Critical care    History of Present Illness   Richa Medina is a 59 y.o. female with PMH of  depression, b/l hearing loss, asthma, breast cancer, vitamin d deficiency, osteopenia, rosacea, who presents to the ICU for septic shock 2/2 pylonephritis.Patient began having symptoms of dysuria, urinary frequency, hematuria on 12/6. She was seen at urgent care on 12/10 and prescribed Bactrim for a UTI.She came to the ER due to malaise including back pain/body aches/HA/chills after Bactrim. She was found to meet septic shock criteria (fever tmax 103, tachycardic, leukopenic, tachypnea ), positive UA, and have evidence of L pyelonephritis/ureteritis on imaging. She has no rash. She received 2L IVF in ER and was persistently hypotensive, plan to admit to ICU for additional IVF and possible vasopresors.     History obtained from chart review and the patient.  Review of Systems: Review of Systems   Constitutional:  Positive for chills and fatigue. Negative for fever.   HENT:  Negative for ear pain and sore throat.    Eyes:  Negative for pain and visual disturbance.   Respiratory:  Negative for cough and shortness of breath.    Cardiovascular:  Negative for chest pain and palpitations.   Gastrointestinal:  Negative for abdominal pain and vomiting.   Genitourinary:  Positive for dysuria, flank pain, hematuria and urgency.   Musculoskeletal:  Positive for back pain. Negative for arthralgias.   Skin:  Negative for color change and rash.   Neurological:  Positive for headaches. Negative for seizures and syncope.   All other systems reviewed and are negative.      Historical Information   Past Medical History:  May 2023: Allergic  No date: Asthma  11/2023: Breast cancer (HCC)  10/31/23: Breast mass  No date: Cancer (HCC)      Comment:  bilateral  No date: Depression  No date: Disease of thyroid gland  No date: Hearing aid worn      Comment:  bilateral  No date: Hearing loss  No date: HL (hearing loss)  No date: Osteopenia  02/09/2021: Rosacea  No date: Seasonal allergies  07/24/2018: Vitamin D deficiency Past Surgical  History:  11/01/2023: BREAST BIOPSY; Right      Comment:  X 2 sites  12/08/2023: BREAST BIOPSY; Left  4/2024: BREAST LUMPECTOMY  06/27/2024: IR DRAINAGE TUBE CHECK/CHANGE/REPOSITION/REINSERTION/  UPSIZE  07/09/2024: IR DRAINAGE TUBE CHECK/CHANGE/REPOSITION/REINSERTION/  UPSIZE  06/18/2024: IR DRAINAGE TUBE PLACEMENT  No date: LASIK  4/2023: MASTECTOMY  12/08/2023: MRI BREAST BIOPSY LEFT (ALL INCLUSIVE); Left  04/19/2024: LA ADJNT TIS TRNSFR/REARGMT ANY AREA 30.1-60 SQ CM; Right      Comment:  Procedure: RIGHT AESTHETIC FLAT CLOSURE , IBIS FLAP,                PREVENA PLACEMENT;  Surgeon: Samara Felix DO;                 [facility];  Service: Plastics  04/19/2024: LA BX/EXC LYMPH NODE OPEN SUPERFICIAL; Bilateral      Comment:  Procedure: Bilateral BX LYMPH NODE SENTINEL,                lymphoscintigraphies, lymphatic mappings;  Surgeon:                Sisi Dominguez MD;  [facility];  Service:                Surgical Oncology  04/19/2024: LA EXC BREAST LES PREOP PLMT RAD MARKER OPEN 1 LES; Left      Comment:  Procedure: LEFT BREAST HILARY LOCALIZATION LUMPECTOMY;;                 Surgeon: Sisi Dominguez MD;  [facility];                 Service: Surgical Oncology  04/19/2024: LA INJ RADIOACTIVE TRACER FOR ID OF SENTINEL NODE;   Bilateral      Comment:  Procedure: Bilateral BX LYMPH NODE SENTINEL,                lymphoscintigraphies, lymphatic mappings;  Surgeon:                Sisi Dominguez MD;  [facility];  Service:                Surgical Oncology  04/19/2024: LA INTRAOP SENTINEL LYMPH NODE ID W/DYE INJECTION;   Bilateral      Comment:  Procedure: Bilateral BX LYMPH NODE SENTINEL,                lymphoscintigraphies, lymphatic mappings;  Surgeon:                Sisi Dominguez MD;  [facility];  Service:                Surgical Oncology  04/19/2024: LA MASTECTOMY PARTIAL; Left      Comment:  Procedure: LEFT BREAST HILARY LOCALIZATION LUMPECTOMY;;                  Surgeon: Sisi Dominguez MD;  [facility];                 Service: Surgical Oncology  04/19/2024: KY MASTECTOMY SIMPLE COMPLETE; Right      Comment:  Procedure: RIGHT BREAST MASTECTOMY, PSB AXILLARY                DISSECTION;  Surgeon: Sisi Dominguez MD;  [facility];  Service: Surgical Oncology  04/19/2024: KY MASTOPEXY; Left      Comment:  Procedure: MASTOPEXY BREAST;  Surgeon: Samara Felix DO;  [facility];  Service: Plastics  07/10/2018: KY NEUROPLASTY &/TRANSPOS MEDIAN NRV CARPAL TUNNE; Right      Comment:  Procedure: CARPAL TUNNEL RELEASE;  Surgeon: Maicol Taylor DO;  [facility];  Service: Orthopedics  04/19/2019: KY NEUROPLASTY &/TRANSPOS MEDIAN NRV CARPAL TUNNE; Left      Comment:  Procedure: RELEASE CARPAL TUNNEL;  Surgeon: Peterson Alejandro MD;  [facility];  Service: Orthopedics  No date: SEPTOPLASTY  No date: SINUS SURGERY  02/27/2024: US BREAST NEEDLE LOC LEFT; Left  11/01/2023: US GUIDANCE BREAST BIOPSY RIGHT EACH ADDITIONAL; Right  11/01/2023: US GUIDED BREAST BIOPSY RIGHT COMPLETE; Right   Current Outpatient Medications   Medication Instructions    anastrozole (ARIMIDEX) 1 mg, Oral, Daily    budesonide-formoterol (Symbicort) 80-4.5 MCG/ACT inhaler 2 puffs, Inhalation, 2 times daily, Rinse mouth after use.    Calcium 250 MG CAPS Daily with dinner    Cholecalciferol (VITAMIN D3 PO) 2 capsules, Daily    levocetirizine (XYZAL) 5 mg, Oral, Every evening    levothyroxine 25 mcg tablet TAKE ONE TABLET BY MOUTH EVERY DAY MONDAY THRU FRIDAY AND TAKE 2 TABLETS EVERY DAY ON SAT & SUN.    MAGNESIUM  mg, Daily with dinner    Medium Chain Triglycerides (MCT OIL PO) 1 Application, Daily    mometasone (NASONEX) 50 mcg/act nasal spray 2 sprays, Nasal, Daily    MULTIPLE VITAMINS-CALCIUM PO 1 capsule, Daily (early morning)    niacin 100 mg, Daily with breakfast    PARoxetine (PAXIL-CR) 25 mg 24 hr  tablet TAKE TWO TABLETS BY MOUTH EVERY DAY    sulfamethoxazole-trimethoprim (BACTRIM DS) 800-160 mg per tablet 1 tablet, Oral, Every 12 hours scheduled    Allergies   Allergen Reactions    Pollen Extract       Social History     Tobacco Use    Smoking status: Never    Smokeless tobacco: Never   Vaping Use    Vaping status: Never Used   Substance Use Topics    Alcohol use: Yes     Comment: rarely    Drug use: No    Family History   Problem Relation Age of Onset    Hashimoto's thyroiditis Mother     Thyroid disease Mother     Melanoma Mother         50's    Cancer Mother         melanoma    Hearing loss Mother     Melanoma Father         50's    Crohn's disease Father     Cancer Father         melanoma    Hearing loss Father     Hashimoto's thyroiditis Sister     No Known Problems Sister     No Known Problems Sister     Colon cancer Maternal Uncle         50's    Depression Maternal Grandmother     Diabetes Maternal Grandmother     Mental illness Maternal Grandmother     Dementia Maternal Grandmother     No Known Problems Maternal Grandfather     No Known Problems Paternal Grandmother     No Known Problems Paternal Grandfather           Objective :                   Vitals I/O      Most Recent Min/Max in 24hrs   Temp 100 °F (37.8 °C) Temp  Min: 97.8 °F (36.6 °C)  Max: 103 °F (39.4 °C)   Pulse (!) 112 Pulse  Min: 75  Max: 132   Resp (!) 24 Resp  Min: 18  Max: 24   BP (!) 89/51 BP  Min: 77/40  Max: 128/72   O2 Sat 92 % SpO2  Min: 90 %  Max: 99 %      Intake/Output Summary (Last 24 hours) at 12/11/2024 0048  Last data filed at 12/10/2024 2350  Gross per 24 hour   Intake 2050 ml   Output --   Net 2050 ml       Diet Regular; Regular House    Invasive Monitoring           Physical Exam   Physical Exam  Vitals and nursing note reviewed.   Eyes:      General: Vision grossly intact.      Extraocular Movements: Extraocular movements intact.      Conjunctiva/sclera: Conjunctivae normal.      Pupils: Pupils are equal, round, and  reactive to light.   Skin:     General: Skin is warm.   HENT:      Head: Normocephalic and atraumatic.      Right Ear: Tympanic membrane normal.      Left Ear: Tympanic membrane normal.      Mouth/Throat:      Mouth: Mucous membranes are moist.   Neck:   no midline tenderness Cardiovascular:      Rate and Rhythm: Normal rate and regular rhythm.      Heart sounds: Normal heart sounds.   Musculoskeletal:         General: Normal range of motion.      Right lower leg: No edema.      Left lower leg: No edema.   Abdominal: General: Bowel sounds are normal. There is no distension.      Palpations: Abdomen is soft.      Tenderness: There is no abdominal tenderness.   Constitutional:       General: She is not in acute distress.     Appearance: She is well-developed and well-nourished. She is not ill-appearing.      Interventions: She is not sedated and not restrained.     Comments: Sleeping on arrival, easily awoken   Pulmonary:      Effort: Pulmonary effort is normal.      Breath sounds: Normal breath sounds.   Psychiatric:         Mood and Affect: Mood and affect normal.      Comments: Slowed speech likely 2/2 morphine    Neurological:      General: No focal deficit present.      Mental Status: She is alert and oriented to person, place and time. Mental status is at baseline. She is CAM ICU negative.      GCS: GCS eye subscore is 4. GCS verbal subscore is 5. GCS motor subscore is 6.      Cranial Nerves: No dysarthria or facial asymmetry.      Sensory: Sensation is intact.      Motor: gross motor function is at baseline for patient. Strength full and intact in all extremities. No weakness or seizure activity.          Diagnostic Studies        Lab Results: I have reviewed the following results:     Medications:  Scheduled PRN   anastrozole, 1 mg, Daily  budesonide-formoterol, 2 puff, BID  cefepime, 1,000 mg, Q8H  chlorhexidine, 15 mL, Q12H RAQUEL  cholecalciferol, 1,000 Units, Daily  heparin (porcine), 5,000 Units, Q8H  RAQUEL  levothyroxine, 25 mcg, Early Morning  multi-electrolyte, 1,000 mL, Once  niacin, 100 mg, Daily With Breakfast  PARoxetine, 50 mg, Daily  potassium chloride, 20 mEq, Q2H          Continuous    norepinephrine, 1-30 mcg/min, Last Rate: Stopped (12/10/24 2189)         Labs:   CBC    Recent Labs     12/10/24  2116   WBC 2.13*   HGB 12.4   HCT 36.6        BMP    Recent Labs     12/10/24  2116   SODIUM 135   K 3.6      CO2 26   AGAP 8   BUN 14   CREATININE 0.79   CALCIUM 9.7       Coags    Recent Labs     12/10/24  2116   INR 0.93   PTT 23        Additional Electrolytes  No recent results       Blood Gas    No recent results  No recent results LFTs  Recent Labs     12/10/24  2116   ALT 67*   *   ALKPHOS 131*   ALB 4.4   TBILI 0.58       Infectious  Recent Labs     12/10/24  2116   PROCALCITONI 0.18     Glucose  Recent Labs     12/10/24  2116   GLUC 88

## 2024-12-11 NOTE — CASE MANAGEMENT
Case Management Assessment & Discharge Planning Note    Patient name Richa Medina  Location ICU 04/ICU 04 MRN [id number]  : 1965 Date 2024       Current Admission Date: 12/10/2024  Current Admission Diagnosis:Septic shock (HCC)   Patient Active Problem List    Diagnosis Date Noted Date Diagnosed    Pyelonephritis 2024     Septic shock (HCC) 2024     Hypoxia 2024     Seroma of breast 2024     Abnormal EKG 2024     Use of anastrozole (Arimidex) 2024     Malignant neoplasm of upper-outer quadrant of left breast in female, estrogen receptor positive (HCC) 2023     Abnormal CT of the chest 2023     Malignant neoplasm of overlapping sites of right breast in female, estrogen receptor positive (HCC) 2023     Easy bruising 2020     Prediabetes 2018     Vitamin D deficiency 2018     Hypothyroidism 2016     Depression 2015     Bunion 2014       LOS (days): 0  Geometric Mean LOS (GMLOS) (days):   Days to GMLOS:     OBJECTIVE:    Risk of Unplanned Readmission Score: 13.73         Current admission status: Inpatient       Preferred Pharmacy:   [facility]  [address]  [address] [facility] [address]  Phone: [phone] Fax: [phone]    [facility], [address] [email or web address]  [address]  [facility] [address]  Phone: [phone] Fax: [phone]    [facility] [place] - [address]  [facility],  [address]  [facility],  First Floor [facility] [address]  Phone: [phone] Fax: [phone]    Primary Care Provider: Shelby Marte PA-C    Primary Insurance: CAPITAL  Secondary Insurance:     ASSESSMENT:  Active Health Care Proxies       Kelvin Medina Health Care Representative - Spouse   Primary Phone: [phone] (Mobile)  Home Phone: [phone]                 Advance Directives  Does patient  have a Health Care POA?: Yes (no copy found in the chart)  Does patient have Advance Directives?: Yes  Primary Contact: Kelvin Medina ( Spouse) H - [phone]; m - [phone]         Readmission Root Cause  30 Day Readmission: No    Patient Information  Admitted from:: Home  Mental Status: Alert  Assessment information provided by:: Patient  Primary Caregiver: Self  Support Systems: Spouse/significant other  County of Residence: [place]  What city do you live in[email or web address]  Home entry access options. Select all that apply.: Stairs  Number of steps to enter home.:  (12)  Do the steps have railings?: Yes  Type of Current Residence: 2 story home  Upon entering residence, is there a bedroom on the main floor (no further steps)?: No  A bedroom is located on the following floor levels of residence (select all that apply):: Basement, 2nd Floor  Upon entering residence, is there a bathroom on the main floor (no further steps)?: No  Indicate which floors of current residence have a bathroom (select all the apply):: 2nd Floor  Number of steps to basement from main floor: One Flight  Number of steps to 2nd floor from main floor: One Flight  Living Arrangements: Lives w/ Spouse/significant other  Is patient a ?: No    Activities of Daily Living Prior to Admission  Functional Status: Independent  Completes ADLs independently?: Yes  Ambulates independently?: Yes  Does patient have a history of HHC?: Yes ([facility])  Does patient currently have HHC?: No         Patient Information Continued  Does patient have prescription coverage?: Yes  Does patient receive dialysis treatments?: No  Does patient have a history of substance abuse?: No  Does patient have a history of Mental Health Diagnosis?: Yes (Depression)  Is patient receiving treatment for mental health?: Yes (Medication management)  Has patient received inpatient treatment related to mental health in the last 2 years?: No         Means of Transportation  Means of  Transport to Appts:: Family transport      Social Determinants of Health (SDOH)      Flowsheet Row Most Recent Value   Housing Stability    In the last 12 months, was there a time when you were not able to pay the mortgage or rent on time? N   In the past 12 months, how many times have you moved where you were living? 0   At any time in the past 12 months, were you homeless or living in a shelter (including now)? N   Transportation Needs    In the past 12 months, has lack of transportation kept you from medical appointments or from getting medications? no   In the past 12 months, has lack of transportation kept you from meetings, work, or from getting things needed for daily living? No   Food Insecurity    Within the past 12 months, you worried that your food would run out before you got the money to buy more. Never true   Within the past 12 months, the food you bought just didn't last and you didn't have money to get more. Never true   Utilities    In the past 12 months has the electric, gas, oil, or water company threatened to shut off services in your home? No            DISCHARGE DETAILS:    Discharge planning discussed with:: patient        CM contacted family/caregiver?: No- see comments       Contacts  Patient Contacts: Kelvin Medina  Relationship to Patient:: Family  Contact Method: In Person    Requested Home Health Care         Is the patient interested in HHC at discharge?: Yes  Home Health Discipline requested:: Occupational Therapy, Physical Therapy, Nursing  HHA External Referral Reason (only applicable if external HHA name selected): Patient has established relationship with provider      Would you like to participate in our [facility] service program?  : No - Declined       Discharge Destination Plan:: Home with Home Health Care, Home           Additional Comments: CM met with the patient at the bedside for intake assessment and discharge planning, CM introduced self and reviewed role. ANIL  completed intake assessment to the best of the patient's ability and will follow up for discharge planning since the patient was in pain and reported some nausea. There is no discharge recommendation now to discuss. CM will follow up for discharge planning when the patient is more comfortable.

## 2024-12-11 NOTE — ED PROVIDER NOTES
Time reflects when diagnosis was documented in both MDM as applicable and the Disposition within this note       Time User Action Codes Description Comment    12/11/2024 12:04 AM Anel Chambers Add [N39.0] UTI (urinary tract infection)     12/11/2024 12:04 AM Anel Chambers Add [A41.9,  R65.21] Septic shock (HCC)     12/11/2024 12:06 AM Anel Chambers Add [R74.01] Transaminitis           ED Disposition       ED Disposition   Admit    Condition   Stable    Date/Time   Wed Dec 11, 2024 12:04 AM    Comment   Case was discussed with Critical Care and the patient's admission status was agreed to be Admission Status: inpatient status to the service of Dr. Montero.               Assessment & Plan       Medical Decision Making  A 59-year-old female presents with fevers, rigors and back pain that began this evening.  She was diagnosed with a UTI earlier today after 4 days of symptoms.  Concern for ascending UTI and bacteremia.  Will check labs including sepsis markers, urine and CTAP.  Will treat with IV fluid, pain medication, antipyretics and IV antibiotics.    Amount and/or Complexity of Data Reviewed  Labs: ordered. Decision-making details documented in ED Course.  Radiology: ordered and independent interpretation performed. Decision-making details documented in ED Course.    Risk  OTC drugs.  Prescription drug management.  Decision regarding hospitalization.        ED Course as of 12/11/24 0018   Tue Dec 10, 2024   2152 WBC(!): 2.13  New leukopenia   2201 Pt resting more comfortably   2208 Comprehensive metabolic panel(!)  Mildly elevated LFT's, normal bilirubin   2212 Temperature(!): 102.8 °F (39.3 °C)  Will give ibuprofen   2251 Urine Microscopic(!)  Innumerable WBC, occasional bacteria   2257 CT abdomen pelvis with contrast  1.) Findings suspicious for left pyelitis/ureteritis noted as described above. Recommend clinical correlation with urinalysis. No nephrolithiasis or obstructive uropathy/hydronephrosis  bilaterally.  2.) No evidence of bowel obstruction, inflammation, appendicitis, free air, or free fluid.  3.) Patchy airspace opacities in portions of the right middle lobe and lingula are partially seen suspicious for airspace disease, recommend clinical correlation.    UA consistent with acute infection.  Will check CXR for PNA   2308 Blood Pressure(!): 77/40  Will give remainder of 30 cc/kg IVF bolus   2329 Blood Pressure: 93/52  Improving with second bolus of IVF   2339 Critical Care to evaluate   2355 Blood Pressure(!): 80/45  Will give additional liter of IVF and order levo gtt   Wed Dec 11, 2024   0003 Critical care evaluated, will accept to their service       Medications   multi-electrolyte (ISOLYTE-S PH 7.4) bolus 1,000 mL (1,000 mL Intravenous New Bag 12/10/24 2357)   NOREPINEPHRINE 4 MG  ML NSS (CMPD ORDER) infusion (0 mcg/min Intravenous Hold 12/10/24 2359)   chlorhexidine (PERIDEX) 0.12 % oral rinse 15 mL (has no administration in time range)   heparin (porcine) subcutaneous injection 5,000 Units (has no administration in time range)   potassium chloride 40 mEq IVPB (premix) (has no administration in time range)   ceftriaxone (ROCEPHIN) 2 g/50 mL in dextrose IVPB (0 mg Intravenous Stopped 12/10/24 2208)   ondansetron (ZOFRAN) injection 4 mg (4 mg Intravenous Given 12/10/24 2122)   acetaminophen (TYLENOL) tablet 650 mg (650 mg Oral Given 12/10/24 2127)   multi-electrolyte (ISOLYTE-S PH 7.4) bolus 1,000 mL (0 mL Intravenous Stopped 12/10/24 2307)   morphine injection 4 mg (4 mg Intravenous Given 12/10/24 2122)   ibuprofen (MOTRIN) tablet 400 mg (400 mg Oral Given 12/10/24 2237)   iohexol (OMNIPAQUE) 350 MG/ML injection (MULTI-DOSE) 100 mL (100 mL Intravenous Given 12/10/24 2222)   multi-electrolyte (ISOLYTE-S PH 7.4) bolus 1,000 mL (0 mL Intravenous Stopped 12/10/24 2350)       ED Risk Strat Scores                           SBIRT 20yo+      Flowsheet Row Most Recent Value   Initial Alcohol Screen:  US AUDIT-C     1. How often do you have a drink containing alcohol? 2 Filed at: 12/10/2024 2037   2. How many drinks containing alcohol do you have on a typical day you are drinking?  1 Filed at: 12/10/2024 2037   3a. Male UNDER 65: How often do you have five or more drinks on one occasion? 0 Filed at: 12/10/2024 2037   3b. FEMALE Any Age, or MALE 65+: How often do you have 4 or more drinks on one occassion? 0 Filed at: 12/10/2024 2037   Audit-C Score 3 Filed at: 12/10/2024 2037   FATOU: How many times in the past year have you...    Used an illegal drug or used a prescription medication for non-medical reasons? Never Filed at: 12/10/2024 2037                            History of Present Illness       Chief Complaint   Patient presents with    Medical Problem     Pt states she took her first dose of bactrim today for a UTI. Pt has had UTI symptoms since Friday. Pt states after she took the medicine she began feeling unwell, complaining of a headache, body aches, and chills. Pt hyperventilating in triage.        Past Medical History:   Diagnosis Date    Allergic May 2023    Asthma     Breast cancer (HCC) 11/2023    Breast mass 10/31/23    Cancer (HCC)     bilateral    Depression     Disease of thyroid gland     Hearing aid worn     bilateral    Hearing loss     HL (hearing loss)     Osteopenia     Rosacea 02/09/2021    Seasonal allergies     Vitamin D deficiency 07/24/2018      Past Surgical History:   Procedure Laterality Date    BREAST BIOPSY Right 11/01/2023    X 2 sites    BREAST BIOPSY Left 12/08/2023    BREAST LUMPECTOMY  4/2024    IR DRAINAGE TUBE CHECK/CHANGE/REPOSITION/REINSERTION/UPSIZE  06/27/2024    IR DRAINAGE TUBE CHECK/CHANGE/REPOSITION/REINSERTION/UPSIZE  07/09/2024    IR DRAINAGE TUBE PLACEMENT  06/18/2024    LASIK      MASTECTOMY  4/2023    MRI BREAST BIOPSY LEFT (ALL INCLUSIVE) Left 12/08/2023    WV ADJNT TIS TRNSFR/REARGMT ANY AREA 30.1-60 SQ CM Right 04/19/2024    Procedure: RIGHT AESTHETIC FLAT  CLOSURE , IBIS FLAP, PREVENA PLACEMENT;  Surgeon: Samara Felix DO;  Location: [facility];  Service: Plastics    GA BX/EXC LYMPH NODE OPEN SUPERFICIAL Bilateral 04/19/2024    Procedure: Bilateral BX LYMPH NODE SENTINEL, lymphoscintigraphies, lymphatic mappings;  Surgeon: Sisi Dominguez MD;  [facility];  Service: Surgical Oncology    GA EXC BREAST LES PREOP PLMT RAD MARKER OPEN 1 LES Left 04/19/2024    Procedure: LEFT BREAST HILARY LOCALIZATION LUMPECTOMY;;  Surgeon: Sisi Dominguez MD;  [facility];  Service: Surgical Oncology    GA INJ RADIOACTIVE TRACER FOR ID OF SENTINEL NODE Bilateral 04/19/2024    Procedure: Bilateral BX LYMPH NODE SENTINEL, lymphoscintigraphies, lymphatic mappings;  Surgeon: Sisi Dominguez MD;  [facility];  Service: Surgical Oncology    GA INTRAOP SENTINEL LYMPH NODE ID W/DYE INJECTION Bilateral 04/19/2024    Procedure: Bilateral BX LYMPH NODE SENTINEL, lymphoscintigraphies, lymphatic mappings;  Surgeon: Sisi Dominguez MD;  [facility];  Service: Surgical Oncology    GA MASTECTOMY PARTIAL Left 04/19/2024    Procedure: LEFT BREAST HILARY LOCALIZATION LUMPECTOMY;;  Surgeon: Sisi Dominguez MD;  [facility];  Service: Surgical Oncology    GA MASTECTOMY SIMPLE COMPLETE Right 04/19/2024    Procedure: RIGHT BREAST MASTECTOMY, PSB AXILLARY DISSECTION;  Surgeon: Sisi Dominguez MD;  [facility];  Service: Surgical Oncology    GA MASTOPEXY Left 04/19/2024    Procedure: MASTOPEXY BREAST;  Surgeon: Samara Felix DO;  [facility];  Service: Plastics    GA NEUROPLASTY &/TRANSPOS MEDIAN NRV CARPAL TUNNE Right 07/10/2018    Procedure: CARPAL TUNNEL RELEASE;  Surgeon: Maicol Taylor DO;  [facility];  Service: Orthopedics    GA NEUROPLASTY &/TRANSPOS MEDIAN NRV CARPAL TUNNE Left 04/19/2019    Procedure: RELEASE CARPAL TUNNEL;  Surgeon: Peterson Alejandro MD;  [facility];  Service: Orthopedics    SEPTOPLASTY      SINUS  SURGERY      US BREAST NEEDLE LOC LEFT Left 02/27/2024    US GUIDANCE BREAST BIOPSY RIGHT EACH ADDITIONAL Right 11/01/2023    US GUIDED BREAST BIOPSY RIGHT COMPLETE Right 11/01/2023      Family History   Problem Relation Age of Onset    Hashimoto's thyroiditis Mother     Thyroid disease Mother     Melanoma Mother         50's    Cancer Mother         melanoma    Hearing loss Mother     Melanoma Father         50's    Crohn's disease Father     Cancer Father         melanoma    Hearing loss Father     Hashimoto's thyroiditis Sister     No Known Problems Sister     No Known Problems Sister     Colon cancer Maternal Uncle         50's    Depression Maternal Grandmother     Diabetes Maternal Grandmother     Mental illness Maternal Grandmother     Dementia Maternal Grandmother     No Known Problems Maternal Grandfather     No Known Problems Paternal Grandmother     No Known Problems Paternal Grandfather       Social History     Tobacco Use    Smoking status: Never    Smokeless tobacco: Never   Vaping Use    Vaping status: Never Used   Substance Use Topics    Alcohol use: Yes     Comment: rarely    Drug use: No      E-Cigarette/Vaping    E-Cigarette Use Never User       E-Cigarette/Vaping Substances    Nicotine No     THC No     CBD No     Flavoring No     Other No     Unknown No       I have reviewed and agree with the history as documented.     A 60 yo female wtih pmhx of depression, hypothyroidism and breast cancer (s/p mastectomy and radiation); presents with fever, chills, nausea and back pain that began acutely this evening.  Patient states she is had urinary frequency, urgency and hematuria over the past four days.  She was seen by her PCP today and diagnosed with a UTI, she was started on Bactrim for which she took the first dose today.  Patient has otherwise not had chest pain, shortness of breath, vomiting, diarrhea, abdominal pain, peripheral edema and rashes.      History provided by:  Patient, medical records  and spouse      Review of Systems   Constitutional:  Positive for chills and fever.   Gastrointestinal:  Positive for nausea.   Genitourinary:  Positive for frequency, hematuria and urgency.   Musculoskeletal:  Positive for arthralgias and back pain.   All other systems reviewed and are negative.      Objective       ED Triage Vitals [12/10/24 2040]   Temperature Pulse Blood Pressure Respirations SpO2 Patient Position - Orthostatic VS   (!) 103 °F (39.4 °C) (!) 132 118/55 (!) 24 97 % Sitting      Temp Source Heart Rate Source BP Location FiO2 (%) Pain Score    Oral Monitor Right arm -- 10 - Worst Possible Pain      Vitals      Date and Time Temp Pulse SpO2 Resp BP Pain Score FACES Pain Rating User   12/11/24 0015 -- 111 92 % 24 85/52 -- -- MA   12/11/24 0000 -- -- 92 % --  85/48 provider aware -- --    12/10/24 2353 100 °F (37.8 °C) 116 90 % 22 80/45 -- --    12/10/24 2315 -- 128 91 % 24 93/52 -- -- MA   12/10/24 2310 101 °F (38.3 °C) -- -- -- 81/45 -- -- MA   12/10/24 2300 -- 126 91 % 24 77/40 -- -- MA   12/10/24 2245 -- 121 93 % 24 102/52 -- -- MA   12/10/24 2241 102.8 °F (39.3 °C) -- -- -- -- -- -- MA   12/10/24 2237 -- -- -- -- -- Med Not Given for Pain - for MAR use only -- MA   12/10/24 2209 102.8 °F (39.3 °C) -- -- -- -- -- -- MA   12/10/24 2200 -- 124 91 % 24 108/52 -- -- MA   12/10/24 2145 -- 124 91 % 22 118/59 -- -- MA   12/10/24 2127 -- -- -- -- -- Med Not Given for Pain - for MAR use only -- MA   12/10/24 2122 -- -- -- -- -- 10 - Worst Possible Pain -- MA   12/10/24 2040 103 °F (39.4 °C) 132 97 % 24 118/55 10 - Worst Possible Pain -- AB            Physical Exam  General Appearance: alert and oriented, ill-appearing with rigors  Skin:  Warm, dry, intact.  No cyanosis  HEENT: Atraumatic, normocephalic.  No eye drainage.  Normal hearing.  Dry mucous membranes.    Neck: Supple, trachea midline  Cardiac: Regular rhythm, tachycardic; no murmurs, rub, gallops.  No pedal edema, 2+ pulses  Pulmonary: lungs  CTAB; no wheezes, rales, rhonchi  Gastrointestinal: abdomen soft, nontender, nondistended; no guarding or rebound tenderness; good bowel sounds, no mass or bruits. L>R CVA tenderness.  Extremities:  No deformities.  No calf tenderness, no clubbing  Neuro:  no focal motor or sensory deficits, CN 2-12 grossly intact  Psych:  Normal mood and affect, normal judgement and insight       Results Reviewed       Procedure Component Value Units Date/Time    Comprehensive metabolic panel [[id number]]     Lab Status: No result Specimen: Blood     Magnesium [[id number]]     Lab Status: No result Specimen: Blood     Phosphorus [[id number]]     Lab Status: No result Specimen: Blood     Calcium, ionized [[id number]]     Lab Status: No result Specimen: Blood     CBC [[id number]]     Lab Status: No result Specimen: Blood     Platelet count [[id number]]     Lab Status: No result Specimen: Blood     Procalcitonin [[id number]]  (Normal) Collected: 12/10/24 2116    Lab Status: Final result Specimen: Blood from Arm, Left Updated: 12/10/24 2252     Procalcitonin 0.18 ng/ml     Urine Microscopic [[id number]]  (Abnormal) Collected: 12/10/24 2235    Lab Status: Final result Specimen: Urine, Clean Catch Updated: 12/10/24 2248     RBC, UA 30-50 /hpf      WBC, UA Innumerable /hpf      Epithelial Cells Occasional /hpf      Bacteria, UA Occasional /hpf      MUCUS THREADS Occasional     Hyaline Casts, UA 3-5 /lpf      Transitional Epithelial Cells Present    Urine culture [[id number]] Collected: 12/10/24 2235    Lab Status: In process Specimen: Urine, Clean Catch Updated: 12/10/24 2248    Urine Macroscopic, POC [[id number]]  (Abnormal) Collected: 12/10/24 2235    Lab Status: Final result Specimen: Urine Updated: 12/10/24 2236     Color, UA Yellow     Clarity, UA Clear     pH, UA 6.0     Leukocytes, UA Small     Nitrite, UA Negative     Protein, UA 30 (1+) mg/dl      Glucose, UA Negative mg/dl      Ketones, UA Negative mg/dl      Urobilinogen, UA 0.2 E.U./dl       Bilirubin, UA Negative     Occult Blood, UA Large     Specific Gravity, UA 1.010    Narrative:      CLINITEK RESULT    Protime-INR [[id number]]  (Normal) Collected: 12/10/24 2116    Lab Status: Final result Specimen: Blood from Arm, Left Updated: 12/10/24 2207     Protime 12.6 seconds      INR 0.93    Narrative:      INR Therapeutic Range    Indication                                             INR Range      Atrial Fibrillation                                               2.0-3.0  Hypercoagulable State                                    2.0.2.3  Left Ventricular Asist Device                            2.0-3.0  Mechanical Heart Valve                                  -    Aortic(with afib, MI, embolism, HF, LA enlargement,    and/or coagulopathy)                                     2.0-3.0 (2.5-3.5)     Mitral                                                             2.5-3.5  Prosthetic/Bioprosthetic Heart Valve               2.0-3.0  Venous thromboembolism (VTE: VT, PE        2.0-3.0    APTT [[id number]]  (Normal) Collected: 12/10/24 2116    Lab Status: Final result Specimen: Blood from Arm, Left Updated: 12/10/24 2207     PTT 23 seconds     Comprehensive metabolic panel [[id number]]  (Abnormal) Collected: 12/10/24 2116    Lab Status: Final result Specimen: Blood from Arm, Left Updated: 12/10/24 2205     Sodium 135 mmol/L      Potassium 3.6 mmol/L      Chloride 101 mmol/L      CO2 26 mmol/L      ANION GAP 8 mmol/L      BUN 14 mg/dL      Creatinine 0.79 mg/dL      Glucose 88 mg/dL      Calcium 9.7 mg/dL       U/L      ALT 67 U/L      Alkaline Phosphatase 131 U/L      Total Protein 7.8 g/dL      Albumin 4.4 g/dL      Total Bilirubin 0.58 mg/dL      eGFR 81 ml/min/1.73sq m     Narrative:      [employer] guidelines for Chronic Kidney Disease (CKD):     Stage 1 with normal or high GFR (GFR > 90 mL/min/1.73 square meters)    Stage 2 Mild CKD (GFR = 60-89 mL/min/1.73 square meters)     Stage 3A Moderate CKD (GFR = 45-59 mL/min/1.73 square meters)    Stage 3B Moderate CKD (GFR = 30-44 mL/min/1.73 square meters)    Stage 4 Severe CKD (GFR = 15-29 mL/min/1.73 square meters)    Stage 5 End Stage CKD (GFR <15 mL/min/1.73 square meters)  Note: GFR calculation is accurate only with a steady state creatinine    Lactic acid [[id number]]  (Normal) Collected: 12/10/24 2116    Lab Status: Final result Specimen: Blood from Arm, Left Updated: 12/10/24 2204     LACTIC ACID 1.6 mmol/L     Narrative:      Result may be elevated if tourniquet was used during collection.    CBC and differential [[id number]]  (Abnormal) Collected: 12/10/24 2116    Lab Status: Final result Specimen: Blood from Arm, Left Updated: 12/10/24 2149     WBC 2.13 Thousand/uL      RBC 4.27 Million/uL      Hemoglobin 12.4 g/dL      Hematocrit 36.6 %      MCV 86 fL      MCH 29.0 pg      MCHC 33.9 g/dL      RDW 12.9 %      MPV 9.8 fL      Platelets 196 Thousands/uL      nRBC 0 /100 WBCs      Segmented % 83 %      Immature Grans % 2 %      Lymphocytes % 13 %      Monocytes % 1 %      Eosinophils Relative 0 %      Basophils Relative 1 %      Absolute Neutrophils 1.80 Thousands/µL      Absolute Immature Grans 0.04 Thousand/uL      Absolute Lymphocytes 0.27 Thousands/µL      Absolute Monocytes 0.01 Thousand/µL      Eosinophils Absolute 0.00 Thousand/µL      Basophils Absolute 0.01 Thousands/µL     Blood culture #2 [[id number]] Collected: 12/10/24 2138    Lab Status: In process Specimen: Blood from Arm, Left Updated: 12/10/24 2149    Blood culture #1 [[id number]] Collected: 12/10/24 2138    Lab Status: In process Specimen: Blood from Arm, Left Updated: 12/10/24 2149            XR chest 2 views   ED Interpretation by Anel Chambers DO (12/10 2339)   Increased vascular markings, concern for vascular congestion    Image independently interpreted by myself       CT abdomen pelvis with contrast   Final Interpretation by Kaushal Mcintosh MD (12/10 2254)      1.   Findings suspicious for left pyelitis/ureteritis noted as described above. Recommend clinical correlation with urinalysis. No nephrolithiasis or obstructive uropathy/hydronephrosis bilaterally.   2.  No evidence of bowel obstruction, inflammation, appendicitis, free air, or free fluid.   3.  Patchy airspace opacities in portions of the right middle lobe and lingula are partially seen suspicious for airspace disease, recommend clinical correlation.         Workstation performed: [id number]             CriticalCare Time    Date/Time: 12/10/2024 11:30 PM    Performed by: Anel Chambers DO  Authorized by: Anel Chambers DO    Critical care provider statement:     Critical care time (minutes):  55    Critical care time was exclusive of:  Separately billable procedures and treating other patients and teaching time    Critical care was necessary to treat or prevent imminent or life-threatening deterioration of the following conditions:  Sepsis, circulatory failure, shock and dehydration    Critical care was time spent personally by me on the following activities:  Obtaining history from patient or surrogate, development of treatment plan with patient or surrogate, examination of patient, evaluation of patient's response to treatment, re-evaluation of patient's condition, ordering and review of radiographic studies, ordering and performing treatments and interventions, review of old charts and ordering and review of laboratory studies (IVF, IV Abx, vasopressors)    I assumed direction of critical care for this patient from another provider in my specialty: no      ECG 12 Lead Documentation  Date/Time: today/date: 12/11/2024  Performed by: Anel Chambers    ECG reviewed by me, the ED Provider: yes    Patient location:  ED   Previous ECG:  Compared to current, no change   Rate:  110  ECG rate assessment: normal    Rhythm: sinus tachycardic    Ectopy:  none    QRS axis:  Normal  Intervals: normal   Q waves: None   ST  segments:  Normal  T waves: normal      Impression: Sinus tachycardia, otherwise normal EKG    ED Medication and Procedure Management   Prior to Admission Medications   Prescriptions Last Dose Informant Patient Reported? Taking?   Calcium 250 MG CAPS  Self Yes No   Sig: Take by mouth daily with dinner     Cholecalciferol (VITAMIN D3 PO)  Self Yes No   Sig: Take 2 capsules by mouth in the morning   MAGNESIUM PO  Self Yes No   Sig: Take 200 mg by mouth daily with dinner     MULTIPLE VITAMINS-CALCIUM PO  Self Yes No   Sig: Take 1 capsule by mouth daily in the early morning     Medium Chain Triglycerides (MCT OIL PO)  Self Yes No   Sig: Take 1 Application by mouth in the morning   PARoxetine (PAXIL-CR) 25 mg 24 hr tablet  Self No No   Sig: TAKE TWO TABLETS BY MOUTH EVERY DAY   anastrozole (ARIMIDEX) 1 mg tablet  Self No No   Sig: Take 1 tablet (1 mg total) by mouth daily   budesonide-formoterol (Symbicort) 80-4.5 MCG/ACT inhaler  Self No No   Sig: Inhale 2 puffs 2 (two) times a day Rinse mouth after use.   levocetirizine (XYZAL) 5 MG tablet  Self No No   Sig: Take 1 tablet (5 mg total) by mouth every evening   levothyroxine 25 mcg tablet  Self No No   Sig: TAKE ONE TABLET BY MOUTH EVERY DAY MONDAY THRU FRIDAY AND TAKE 2 TABLETS EVERY DAY ON SAT & SUN.   mometasone (NASONEX) 50 mcg/act nasal spray   No No   Si sprays into each nostril daily   niacin 100 mg tablet  Self Yes No   Sig: Take 100 mg by mouth daily with breakfast   sulfamethoxazole-trimethoprim (BACTRIM DS) 800-160 mg per tablet   No No   Sig: Take 1 tablet by mouth every 12 (twelve) hours for 5 days      Facility-Administered Medications: None     Patient's Medications   Discharge Prescriptions    No medications on file     No discharge procedures on file.  ED SEPSIS DOCUMENTATION   Time reflects when diagnosis was documented in both MDM as applicable and the Disposition within this note       Time User Action Codes Description Comment    2024 12:04  "AM Anel Chambers Add [N39.0] UTI (urinary tract infection)     12/11/2024 12:04 AM Anel Chambers Add [A41.9,  R65.21] Septic shock (HCC)     12/11/2024 12:06 AM Anel Chambers Add [R74.01] Transaminitis            Initial Sepsis Screening       Row Name 12/11/24 0005                Is the patient's history suggestive of a new or worsening infection? Yes (Proceed)  -AM        Suspected source of infection urinary tract infection  -AM        Indicate SIRS criteria Hyperthemia > 38.3C (100.9F) OR Hypothermia <36C (96.8F);Tachycardia > 90 bpm;Tachypnea > 20 resp per min;Leukocytosis (WBC > 17227 IJL) OR Leukopenia (WBC <4000 IJL) OR Bandemia (WBC >10% bands)  -AM        Are two or more of the above signs & symptoms of infection both present and new to the patient? Yes (Proceed)  -AM        Assess for evidence of organ dysfunction: Are any of the below criteria present within 6 hours of suspected infection and SIRS criteria that are NOT considered to be chronic conditions? SBP < 90  -AM        Date of presentation of septic shock 12/11/24  -AM        Time of presentation of septic shock 0005  -AM        Fluid Resuscitation: 30 ml/kg IV fluid bolus will be given based on actual body weight  -AM        Is the patient is persistently hypotensive in the hour after fluid bolus administration? If yes, patient meets criteria for vasopressor use. YES  -AM        Sepsis Note: Click \"NEXT\" below (NOT \"close\") to generate sepsis note based on above information. --                  User Key  (r) = Recorded By, (t) = Taken By, (c) = Cosigned By      Initials Name Provider Type    AM Anel Chambers DO Physician                       Anel Chambers DO  12/11/24 0018    "

## 2024-12-11 NOTE — PROCEDURES
POC Cardiac US    Date/Time: 12/11/2024 1:09 AM    Performed by: Bhakti Dixon PA-C  Authorized by: Bhakti iDxon PA-C    Patient location:  ICU  Procedure details:     Exam Type:  Diagnostic    Indications: hypotension and suspected volume depletion      Assessment / Evaluation for: cardiac function and intravascular volume status      Exam Type: initial exam      Image quality: diagnostic    Patient Details:     Cardiac Rhythm:  Regular    Mechanical ventilation: No    Cardiac findings:     Echo technique: limited 2D      Views obtained: parasternal long axis, parasternal short axis, subcostal and apical      Pericardial effusion: absent      Tamponade physiology: absent      Wall motion: normal      LV systolic function: normal      RV dilation: none    Pulmonary findings:     B-lines: no B-lines present    IVC findings:     IVC Size: small      IVC Inspiratory Collapse: partial    Interpretation:      1.5cm IVC, will give additional IVF

## 2024-12-11 NOTE — ASSESSMENT & PLAN NOTE
Patient originally reported increased urinary urgency, dysuria, and gross hematuria yesterday. She presented to the ER for complaints of severe bilateral flank pain and chills after taking single dose of PO Bactrim prescribed by her PCP. Found to meet septic shock criteria with fever (tmax 103), tachycardia, leukopenia, tachypnea. Patient later became hypotensive requiring admission to ICU and started on Levophed.  CT A/P with contrast from 12/10 showed grossly unremarkable right kidney. There is mild wall thickening and mucosal hyperenhancement involving the left renal pelvis and left ureter with subtle periureteral hazy inflammatory stranding seen. Findings are suspicious for a left-sided pyelitis/ureteritis. Bladder was moderately distended and grossly unremarkable.   Renal US today shows no hydronephrosis bilaterally. Bilateral ureteral jets not detected although patient shaking during exam.  Patient with noted rigors and complaints of buttocks pain and nausea. No CVA tenderness or abdominal tenderness noted on physical exam.  Urethral art catheter draining clear yellow urine to gravity.   UA through PCP with large leukocytes and small blood. Microscopic analysis with 4-10 RBC, 20-30 WBC, occasional bacteria.  WBC 10.6 today   Creatinine 0.78  Cefepime, switched to meropenem/vancomycin 12/11     Plan:  Maintain art catheter, ensure adequate bladder decompression. Maintain art catheter until patient clinically improving  Monitor fever curve  Continue IV ABX and adjust based on cultures   No plan for operative intervention   Continue medical optimization through primary team.   Urology will continue to follow peripherally   Consider possible need for repeat CT imaging if patient continues to decline despite bladder decompression and IV antibiotics.

## 2024-12-11 NOTE — PLAN OF CARE

## 2024-12-11 NOTE — ASSESSMENT & PLAN NOTE
Ddxi- likely pulmonary edema related to IVF resuscitation vs recent morphine administration vs vs viral URI/bronchitis. Patient has Hx of b/l PNA as well as multiple patchy focal opacities on prior CTs noted on December 5, 2024 with negative EBUS on January 2024   Patient has had no pulmonary symptoms at home    Plan:  Maintain o2 sat >92%

## 2024-12-11 NOTE — ASSESSMENT & PLAN NOTE
B/L breast cancer  Stage 1a(R:multifocal invasive, ductal carcinoma, ER positive s/p mastectomy. L focal ductal carcinoma s/p lumpectomy, mastopexy, and radiation and remains on anastrozole)  Hx of negative EBUS with biopsies in January 2024 after abnormal chest CT with groud glass opacities and nodular densities  Plan:  Continue home anastrozole 1mg daily

## 2024-12-11 NOTE — TELEPHONE ENCOUNTER
"Patient had first dose of bactrim a couple of hours ago. Now feeling really poorly, bilateral lower back pain, new fever (99.9 oral but did drink water recently so suspect higher), body aches and chills. Can hear patient moaning in the background. No rash, no swelling of lips or tongue, no difficulty breathing. Advised ER evaluation due to abrupt change in clinical status, concern for pyelonephritis.  agreeable and will take her to [place] ED.     Reason for Disposition   [1] Fever > 100 F (37.8 C) AND [2] new-onset since starting antibiotics    Answer Assessment - Initial Assessment Questions  1. MAIN SYMPTOM: \"What is the main symptom you are concerned about?\" (e.g., painful urination, urine frequency)      chills      nausea    2. BETTER-SAME-WORSE: \"Are you getting better, staying the same, or getting worse compared to how you felt at your last visit to the doctor (most recent medical visit)?\"      worse    3. PAIN: \"How bad is the pain?\"  (e.g., Scale 1-10; mild, moderate, or severe)      8/10    4. FEVER: \"Do you have a fever?\" If Yes, ask: \"What is it, how was it measured, and when did it start?\"      99.9 oral but did have water    5. OTHER SYMPTOMS: \"Do you have any other symptoms?\" (e.g., blood in the urine, flank pain, vaginal discharge)      Bilateral lower back pain      And leg pain    6. DIAGNOSIS: \"When was the UTI diagnosed?\" \"By whom?\" \"Was it a kidney infection, bladder infection or both?\"      Today in office  7. ANTIBIOTIC: \"What antibiotic(s) are you taking?\" \"How many times per day?\"      bactrim    8. ANTIBIOTIC - START DATE: \"When did you start taking the antibiotic?\"      today    Protocols used: Urinary Tract Infection on Antibiotic Follow-up Call - Female-Adult-    "

## 2024-12-11 NOTE — ASSESSMENT & PLAN NOTE
Start Cefepime 1g q8 plan to treat for 7 days pending clinical stability   Monitor UOP, maintain UOP of 0.5-1cc/kg/hr  Monitor WBC and fever curve

## 2024-12-11 NOTE — ASSESSMENT & PLAN NOTE
Source control: Cefepime 1g  F/u BC and urine culutre to titrate abx   Perform POCUS and straight leg raise to assess volume status   Resuscitate with additional IVF - 3L in total thus far. Suspect patient likely will require additional IVF given intravascular depletion from sepsis and high fevers  Monitor markers of end organ perfusion including UOP  Monitor WBC and fever curve   Treat fevers with PRN tylenol and likely additional fluids

## 2024-12-12 ENCOUNTER — TELEPHONE (OUTPATIENT)
Dept: PULMONOLOGY | Facility: CLINIC | Age: 59
End: 2024-12-12

## 2024-12-12 DIAGNOSIS — C50.912 BILATERAL MALIGNANT NEOPLASM OF BREAST IN FEMALE, UNSPECIFIED ESTROGEN RECEPTOR STATUS, UNSPECIFIED SITE OF BREAST (HCC): ICD-10-CM

## 2024-12-12 DIAGNOSIS — C50.911 BILATERAL MALIGNANT NEOPLASM OF BREAST IN FEMALE, UNSPECIFIED ESTROGEN RECEPTOR STATUS, UNSPECIFIED SITE OF BREAST (HCC): ICD-10-CM

## 2024-12-12 DIAGNOSIS — J18.9 MULTIFOCAL PNEUMONIA: Primary | ICD-10-CM

## 2024-12-12 DIAGNOSIS — R93.89 ABNORMAL CT OF THE CHEST: ICD-10-CM

## 2024-12-12 LAB
AMMONIA PLAS-SCNC: 23 UMOL/L (ref 18–72)
ANION GAP SERPL CALCULATED.3IONS-SCNC: 6 MMOL/L (ref 4–13)
ARTERIAL PATENCY WRIST A: NO
BACTERIA UR CULT: ABNORMAL
BACTERIA UR CULT: NORMAL
BASE EX.OXY STD BLDV CALC-SCNC: 64.9 % (ref 60–80)
BASE EXCESS BLDV CALC-SCNC: -2.1 MMOL/L
BODY TEMPERATURE: 98.8 DEGREES FEHRENHEIT
BUN SERPL-MCNC: 12 MG/DL (ref 5–25)
CA-I BLD-SCNC: 1.08 MMOL/L (ref 1.12–1.32)
CALCIUM SERPL-MCNC: 8.2 MG/DL (ref 8.4–10.2)
CHLORIDE SERPL-SCNC: 105 MMOL/L (ref 96–108)
CO2 SERPL-SCNC: 25 MMOL/L (ref 21–32)
CREAT SERPL-MCNC: 0.78 MG/DL (ref 0.6–1.3)
ERYTHROCYTE [DISTWIDTH] IN BLOOD BY AUTOMATED COUNT: 13.3 % (ref 11.6–15.1)
GFR SERPL CREATININE-BSD FRML MDRD: 82 ML/MIN/1.73SQ M
GLUCOSE SERPL-MCNC: 97 MG/DL (ref 65–140)
HCO3 BLDV-SCNC: 23.1 MMOL/L (ref 24–30)
HCT VFR BLD AUTO: 29.3 % (ref 34.8–46.1)
HGB BLD-MCNC: 9.6 G/DL (ref 11.5–15.4)
MAGNESIUM SERPL-MCNC: 2.1 MG/DL (ref 1.9–2.7)
MCH RBC QN AUTO: 28.7 PG (ref 26.8–34.3)
MCHC RBC AUTO-ENTMCNC: 32.8 G/DL (ref 31.4–37.4)
MCV RBC AUTO: 88 FL (ref 82–98)
NASAL CANNULA: 4
O2 CT BLDV-SCNC: 10.4 ML/DL
PCO2 BLD: 41.3 MM HG (ref 42–50)
PCO2 BLDV: 41.1 MM HG (ref 42–50)
PH BLD: 7.37 [PH] (ref 7.3–7.4)
PH BLDV: 7.37 [PH] (ref 7.3–7.4)
PHOSPHATE SERPL-MCNC: 4.7 MG/DL (ref 2.3–4.1)
PLATELET # BLD AUTO: 147 THOUSANDS/UL (ref 149–390)
PMV BLD AUTO: 9.7 FL (ref 8.9–12.7)
PO2 BLDV: 35.6 MM HG (ref 35–45)
PO2 VENOUS TEMP CORRECTED: 35.9 MM HG (ref 35–45)
POTASSIUM SERPL-SCNC: 4 MMOL/L (ref 3.5–5.3)
RBC # BLD AUTO: 3.34 MILLION/UL (ref 3.81–5.12)
SODIUM SERPL-SCNC: 136 MMOL/L (ref 135–147)
WBC # BLD AUTO: 10.67 THOUSAND/UL (ref 4.31–10.16)

## 2024-12-12 PROCEDURE — 99232 SBSQ HOSP IP/OBS MODERATE 35: CPT | Performed by: UROLOGY

## 2024-12-12 PROCEDURE — 84100 ASSAY OF PHOSPHORUS: CPT

## 2024-12-12 PROCEDURE — NC001 PR NO CHARGE: Performed by: INTERNAL MEDICINE

## 2024-12-12 PROCEDURE — 82805 BLOOD GASES W/O2 SATURATION: CPT

## 2024-12-12 PROCEDURE — 82330 ASSAY OF CALCIUM: CPT

## 2024-12-12 PROCEDURE — 99232 SBSQ HOSP IP/OBS MODERATE 35: CPT | Performed by: INTERNAL MEDICINE

## 2024-12-12 PROCEDURE — 83735 ASSAY OF MAGNESIUM: CPT

## 2024-12-12 PROCEDURE — 85027 COMPLETE CBC AUTOMATED: CPT

## 2024-12-12 PROCEDURE — 80048 BASIC METABOLIC PNL TOTAL CA: CPT

## 2024-12-12 PROCEDURE — 82140 ASSAY OF AMMONIA: CPT

## 2024-12-12 RX ORDER — FOLIC ACID 1 MG/1
1 TABLET ORAL DAILY
Status: CANCELLED | OUTPATIENT
Start: 2024-12-12

## 2024-12-12 RX ORDER — HYDROMORPHONE HCL/PF 1 MG/ML
0.5 SYRINGE (ML) INJECTION EVERY 4 HOURS PRN
Status: DISCONTINUED | OUTPATIENT
Start: 2024-12-12 | End: 2024-12-14 | Stop reason: HOSPADM

## 2024-12-12 RX ORDER — OXYCODONE HYDROCHLORIDE 10 MG/1
10 TABLET ORAL EVERY 4 HOURS PRN
Refills: 0 | Status: DISCONTINUED | OUTPATIENT
Start: 2024-12-12 | End: 2024-12-12

## 2024-12-12 RX ORDER — HYDROMORPHONE HCL/PF 1 MG/ML
0.5 SYRINGE (ML) INJECTION EVERY 4 HOURS PRN
Refills: 0 | Status: DISCONTINUED | OUTPATIENT
Start: 2024-12-12 | End: 2024-12-12

## 2024-12-12 RX ORDER — LANOLIN ALCOHOL/MO/W.PET/CERES
100 CREAM (GRAM) TOPICAL DAILY
Status: CANCELLED | OUTPATIENT
Start: 2024-12-12

## 2024-12-12 RX ORDER — POLYETHYLENE GLYCOL 3350 17 G/17G
17 POWDER, FOR SOLUTION ORAL DAILY
Status: DISCONTINUED | OUTPATIENT
Start: 2024-12-12 | End: 2024-12-14 | Stop reason: HOSPADM

## 2024-12-12 RX ORDER — SODIUM CHLORIDE, SODIUM GLUCONATE, SODIUM ACETATE, POTASSIUM CHLORIDE, MAGNESIUM CHLORIDE, SODIUM PHOSPHATE, DIBASIC, AND POTASSIUM PHOSPHATE .53; .5; .37; .037; .03; .012; .00082 G/100ML; G/100ML; G/100ML; G/100ML; G/100ML; G/100ML; G/100ML
1000 INJECTION, SOLUTION INTRAVENOUS ONCE
Status: COMPLETED | OUTPATIENT
Start: 2024-12-12 | End: 2024-12-12

## 2024-12-12 RX ORDER — OXYCODONE HYDROCHLORIDE 5 MG/1
5 TABLET ORAL EVERY 4 HOURS PRN
Refills: 0 | Status: DISCONTINUED | OUTPATIENT
Start: 2024-12-12 | End: 2024-12-12

## 2024-12-12 RX ORDER — QUETIAPINE FUMARATE 25 MG/1
12.5 TABLET, FILM COATED ORAL
Status: CANCELLED | OUTPATIENT
Start: 2024-12-12

## 2024-12-12 RX ORDER — CALCIUM GLUCONATE 20 MG/ML
2 INJECTION, SOLUTION INTRAVENOUS ONCE
Status: COMPLETED | OUTPATIENT
Start: 2024-12-12 | End: 2024-12-12

## 2024-12-12 RX ORDER — SENNOSIDES 8.6 MG
1 TABLET ORAL
Status: DISCONTINUED | OUTPATIENT
Start: 2024-12-12 | End: 2024-12-14 | Stop reason: HOSPADM

## 2024-12-12 RX ORDER — HYDROMORPHONE HCL/PF 1 MG/ML
0.5 SYRINGE (ML) INJECTION EVERY 4 HOURS PRN
Status: DISCONTINUED | OUTPATIENT
Start: 2024-12-12 | End: 2024-12-12

## 2024-12-12 RX ORDER — ACETAMINOPHEN 325 MG/1
975 TABLET ORAL EVERY 6 HOURS PRN
Status: DISCONTINUED | OUTPATIENT
Start: 2024-12-12 | End: 2024-12-14 | Stop reason: HOSPADM

## 2024-12-12 RX ADMIN — BUDESONIDE AND FORMOTEROL FUMARATE DIHYDRATE 2 PUFF: 80; 4.5 AEROSOL RESPIRATORY (INHALATION) at 17:23

## 2024-12-12 RX ADMIN — VANCOMYCIN HYDROCHLORIDE 750 MG: 750 INJECTION, SOLUTION INTRAVENOUS at 07:47

## 2024-12-12 RX ADMIN — Medication 100 MG: at 07:47

## 2024-12-12 RX ADMIN — MEROPENEM 1000 MG: 1 INJECTION INTRAVENOUS at 04:49

## 2024-12-12 RX ADMIN — SODIUM CHLORIDE, SODIUM GLUCONATE, SODIUM ACETATE, POTASSIUM CHLORIDE, MAGNESIUM CHLORIDE, SODIUM PHOSPHATE, DIBASIC, AND POTASSIUM PHOSPHATE 1000 ML: .53; .5; .37; .037; .03; .012; .00082 INJECTION, SOLUTION INTRAVENOUS at 11:48

## 2024-12-12 RX ADMIN — Medication 1000 UNITS: at 08:17

## 2024-12-12 RX ADMIN — BUDESONIDE AND FORMOTEROL FUMARATE DIHYDRATE 2 PUFF: 80; 4.5 AEROSOL RESPIRATORY (INHALATION) at 08:17

## 2024-12-12 RX ADMIN — SENNOSIDES 8.6 MG: 8.6 TABLET, FILM COATED ORAL at 23:10

## 2024-12-12 RX ADMIN — HYDROMORPHONE HYDROCHLORIDE 0.5 MG: 1 INJECTION, SOLUTION INTRAMUSCULAR; INTRAVENOUS; SUBCUTANEOUS at 00:52

## 2024-12-12 RX ADMIN — MEROPENEM 1000 MG: 1 INJECTION INTRAVENOUS at 12:45

## 2024-12-12 RX ADMIN — HEPARIN SODIUM 5000 UNITS: 5000 INJECTION INTRAVENOUS; SUBCUTANEOUS at 08:17

## 2024-12-12 RX ADMIN — VANCOMYCIN HYDROCHLORIDE 750 MG: 750 INJECTION, SOLUTION INTRAVENOUS at 21:06

## 2024-12-12 RX ADMIN — POLYETHYLENE GLYCOL 3350 17 G: 17 POWDER, FOR SOLUTION ORAL at 08:17

## 2024-12-12 RX ADMIN — LEVOTHYROXINE SODIUM 25 MCG: 25 TABLET ORAL at 05:00

## 2024-12-12 RX ADMIN — HEPARIN SODIUM 5000 UNITS: 5000 INJECTION INTRAVENOUS; SUBCUTANEOUS at 00:07

## 2024-12-12 RX ADMIN — HEPARIN SODIUM 5000 UNITS: 5000 INJECTION INTRAVENOUS; SUBCUTANEOUS at 16:29

## 2024-12-12 RX ADMIN — ANASTROZOLE 1 MG: 1 TABLET ORAL at 08:17

## 2024-12-12 RX ADMIN — ACETAMINOPHEN 650 MG: 325 TABLET, FILM COATED ORAL at 09:12

## 2024-12-12 RX ADMIN — ACETAMINOPHEN 975 MG: 325 TABLET, FILM COATED ORAL at 16:29

## 2024-12-12 RX ADMIN — MELATONIN 3 MG: 3 TAB ORAL at 23:10

## 2024-12-12 RX ADMIN — PAROXETINE 25 MG: 25 TABLET, FILM COATED, EXTENDED RELEASE ORAL at 08:18

## 2024-12-12 RX ADMIN — CHLORHEXIDINE GLUCONATE 15 ML: 1.2 RINSE ORAL at 08:18

## 2024-12-12 RX ADMIN — MEROPENEM 1000 MG: 1 INJECTION INTRAVENOUS at 23:10

## 2024-12-12 RX ADMIN — CHLORHEXIDINE GLUCONATE 15 ML: 1.2 RINSE ORAL at 23:10

## 2024-12-12 RX ADMIN — CALCIUM GLUCONATE 2 G: 20 INJECTION, SOLUTION INTRAVENOUS at 06:04

## 2024-12-12 NOTE — CASE MANAGEMENT
Case Management Discharge Planning Note    Patient name Richa Medina  Location ICU 04/ICU 04 MRN [id number]  : 1965 Date 2024       Current Admission Date: 12/10/2024  Current Admission Diagnosis:Septic shock (HCC)   Patient Active Problem List    Diagnosis Date Noted Date Diagnosed    Pyelonephritis 2024     Septic shock (HCC) 2024     Hypoxia 2024     Seroma of breast 2024     Abnormal EKG 2024     Use of anastrozole (Arimidex) 2024     Malignant neoplasm of upper-outer quadrant of left breast in female, estrogen receptor positive (HCC) 2023     Abnormal CT of the chest 2023     Malignant neoplasm of overlapping sites of right breast in female, estrogen receptor positive (HCC) 2023     Easy bruising 2020     Prediabetes 2018     Vitamin D deficiency 2018     Hypothyroidism 2016     Depression 2015     Bunion 2014       LOS (days): 1  Geometric Mean LOS (GMLOS) (days):   Days to GMLOS:     OBJECTIVE:  Risk of Unplanned Readmission Score: 22.29         Current admission status: Inpatient   Preferred Pharmacy:   [facility]  [address] [address] [facility]  [address] [facility] [address]  Phone: [phone] Fax: [phone]    [facility] [place], [address] [facility]  [address]  [facility] [address]  Phone: [phone] Fax: [phone]    [facility] [place] - [address]  [facility],  [address]  [facility],  First Floor [facility] [address]  Phone: [phone] Fax: [phone]    Primary Care Provider: Shelby Marte PA-C    Primary Insurance: CAPITAL  Secondary Insurance:     DISCHARGE DETAILS:        Additional Comments: The patient is not yet medically cleared for discharge at this time. Patient was sleeping at the time of care management visit.  did not wake patient for discharge planning today. CM was  informed the patient did not have a restful night. CM will continue to follow patient for discharge planning.

## 2024-12-12 NOTE — PROGRESS NOTES
Richa Medina is a 59 y.o. female who is currently ordered Vancomycin IV with management by the Pharmacy Consult service.  Relevant clinical data and objective / subjective history reviewed.  Vancomycin Assessment:  Indication and Goal AUC/Trough: Urinary tract infection (goal -600, trough >10)  Clinical Status: stable  Micro:     Renal Function:  SCr: 0.78 mg/dL  CrCl: 71.5 mL/min  Renal replacement: Not on dialysis  Days of Therapy: 2  Current Dose: 750 mg IV q12h (x3 doses)  Vancomycin Plan:  New Dosin mg IV every 12 hours (x3 doses)  Estimated AUC: 447 mcg*hr/mL  Estimated Trough: 13 mcg/mL  Next Level:  with AM labs  Renal Function Monitoring: Daily BMP and UOP  Pharmacy will continue to follow closely for s/sx of nephrotoxicity, infusion reactions and appropriateness of therapy.  BMP and CBC will be ordered per protocol. We will continue to follow the patient’s culture results and clinical progress daily.    Agusto Zapata, Pharmacist

## 2024-12-12 NOTE — PROGRESS NOTES
Progress Note - Critical Care/ICU   Name: Richa Medina 59 y.o. female I MRN: [id number]  Unit/Bed#: ICU 04 I Date of Admission: 12/10/2024   Date of Service: 12/12/2024 I Hospital Day: 1       Critical Care Interval Transfer Note:    Brief Hospital Summary:    Ms. Medina is a 59 year old female with PMH of depression,allergies, asthma, breast cancer, vitamin d deficiency, osteopenia, rosacea who presented with fevers, rigors and back pain. She was admitted for shock secondary to pyelonephritis, requiring pressors. Cefepime was switched to meropenem/vancomycin on 12/11 after she worsened clinically.  Urology was consulted and they recommended art be inserted to decompress the bladder. Patient's symptoms improved and Levophed was stopped. MAP goal was above 65 after stopping  Levophed.She is now stable to be transferred.   Barriers to discharge:   IV antibiotics  Had fever over the last 24 hours   Art, voiding trial     Consults: IP CONSULT TO UROLOGY  IP CONSULT TO PHARMACY    Recommended to review admission imaging for incidental findings and document in discharge navigator: Chart reviewed, no known incidental findings noted at this time.      Discharge Plan: TBD  Art Plan: Continue for accurate I/O monitoring for 48 hours       Patient seen and evaluated by Critical Care today and deemed to be appropriate for transfer to Med Surg. Spoke to Dr. Gonsalez from [facility] to accept transfer. Critical care can be contacted via SecureChat with any questions or concerns. Please use the Critical Care AP Role in Secure Chat for any provider inquires until the patient is transferred out of the ICU or until tomorrow at 0600.

## 2024-12-12 NOTE — ASSESSMENT & PLAN NOTE
Source control: Cefepime 1g, Vancomycin, meropenem   F/u BC and urine culture evident for E.coli   Currently off Levophed with map above 65  Monitor markers of end organ perfusion including UOP  Monitor WBC and fever curve   Treat fevers with PRN tylenol and likely additional fluids

## 2024-12-12 NOTE — PROGRESS NOTES
Progress Note - Critical Care/ICU   Name: Richa Medina 59 y.o. female I MRN: [id number]  Unit/Bed#: ICU 04 I Date of Admission: 12/10/2024   Date of Service: 12/12/2024 I Hospital Day: 1      Assessment & Plan  Pyelonephritis  Currently on  Cefepime 1g q8 plan to treat for 2/7 days pending clinical stability,   Vancomycin pending MRSA, Meropenem   Monitor UOP, maintain UOP of 0.5-1cc/kg/hr  Monitor WBC and fever curve   Was febrile overnight with Tmax of 101.3. Urine culture with E. Coli   Septic shock ([facility])  Source control: Cefepime 1g, Vancomycin, meropenem   F/u BC and urine culture evident for E.coli   Currently off Levophed with map above 65  Monitor markers of end organ perfusion including UOP  Monitor WBC and fever curve   Treat fevers with PRN tylenol and likely additional fluids   Hypothyroidism  Continue home synthroid 25mcg each morning   Depression  Continue home paxil 50mg each morning   Monitor qtc  Vitamin D deficiency  With osteopenia  Plan:  D3 supplement daily   Malignant neoplasm of overlapping sites of right breast in female, estrogen receptor positive (HCC)  B/L breast cancer  Stage 1a(R:multifocal invasive, ductal carcinoma, ER positive s/p mastectomy. L focal ductal carcinoma s/p lumpectomy, mastopexy, and radiation and remains on anastrozole)  Hx of negative EBUS with biopsies in January 2024 after abnormal chest CT with groud glass opacities and nodular densities  Plan:  Continue home anastrozole 1mg daily   Hypoxia  Ddxi- likely pulmonary edema related to IVF resuscitation vs recent morphine administration vs vs viral URI/bronchitis. Patient has Hx of b/l PNA as well as multiple patchy focal opacities on prior CTs noted on December 5, 2024 with negative EBUS on January 2024   Oxygen wean off this morning       Plan:  Maintain o2 sat >92%  Disposition: Med Surg    ICU Core Measures     A: Assess, Prevent, and Manage Pain Has pain been assessed? Yes  Need for changes to pain regimen? No    B: Both SAT/SAT  N/A   C: Choice of Sedation RASS Goal: 0 Alert and Calm or N/A patient not on sedation  Need for changes to sedation or analgesia regimen? NA   D: Delirium CAM-ICU: Negative   E: Early Mobility  Plan for early mobility? Yes   F: Family Engagement Plan for family engagement today? Yes       Antibiotic Review: Continue broad spectrum secondary to severity of illness.     Review of Invasive Devices:    Harley Plan: Continue for accurate I/O monitoring for 48 hours        Prophylaxis:  VTE VTE covered by:  heparin (porcine), Subcutaneous, 5,000 Units at 12/12/24 0007       Stress Ulcer  not ordered         24 Hour Events : Overnight: Patient had rigors, could not sit,ripped off her ID.        Subjective This morning, Patient feels a little better. She is off levophed. She complained of dry throat. Oxygen was turned off.This morning, patient denied abdominal/flank/back pain, nausea, vomiting, rigor, but endorse dry mouth.       Objective :                   Vitals I/O      Most Recent Min/Max in 24hrs   Temp 98.9 °F (37.2 °C) Temp  Min: 98.7 °F (37.1 °C)  Max: 101.3 °F (38.5 °C)   Pulse (!) 117 Pulse  Min: 71  Max: 136   Resp (!) 29 Resp  Min: 13  Max: 49   BP (!) 92/44 BP  Min: 80/48  Max: 164/140   O2 Sat 96 % SpO2  Min: 88 %  Max: 99 %      Intake/Output Summary (Last 24 hours) at 12/12/2024 0743  Last data filed at 12/12/2024 0604  Gross per 24 hour   Intake 1409.85 ml   Output 3850 ml   Net -2440.15 ml       Diet Regular; Regular House    Invasive Monitoring           Physical Exam   Physical Exam  Vitals reviewed.   Skin:     General: Skin is warm and dry.   HENT:      Head: Normocephalic and atraumatic.   Cardiovascular:      Rate and Rhythm: Regular rhythm. Tachycardia present.      Pulses: Normal pulses.      Heart sounds: Normal heart sounds.   Musculoskeletal:      Cervical back: Full passive range of motion without pain.      Right lower leg: No edema.      Left lower leg: No edema.    Abdominal: General: Abdomen is flat. Bowel sounds are increased.      Palpations: Abdomen is soft.   Constitutional:       General: She is awake.      Appearance: She is overweight. She is ill-appearing.   Pulmonary:      Effort: Pulmonary effort is normal.      Breath sounds: Normal breath sounds and air entry.   Psychiatric:         Attention and Perception: Attention normal.         Mood and Affect: Mood normal.         Behavior: Behavior is cooperative.   Neurological:      Mental Status: She is alert, oriented to person, place, and time and oriented to person, place and time.          Diagnostic Studies        Lab Results: I have reviewed the following results:     Medications:  Scheduled PRN   anastrozole, 1 mg, Daily  budesonide-formoterol, 2 puff, BID  chlorhexidine, 15 mL, Q12H RAQUEL  cholecalciferol, 1,000 Units, Daily  heparin (porcine), 5,000 Units, Q8H RAQUEL  levothyroxine, 25 mcg, Early Morning  melatonin, 3 mg, HS  meropenem, 1,000 mg, Q8H  niacin, 100 mg, Daily With Breakfast  PARoxetine, 25 mg, Once  [START ON 12/13/2024] PARoxetine, 50 mg, Daily  vancomycin, 750 mg, Q12H      acetaminophen, 650 mg, Q6H PRN  HYDROmorphone, 0.5 mg, Q4H PRN  ondansetron, 4 mg, Q4H PRN       Continuous    norepinephrine, 1-30 mcg/min, Last Rate: Stopped (12/11/24 1615)         Labs:   CBC    Recent Labs     12/11/24  1332 12/12/24  0454   WBC 12.82* 10.67*   HGB 10.9* 9.6*   HCT 32.3* 29.3*    147*     BMP    Recent Labs     12/11/24  1332 12/12/24  0454   SODIUM 137 136   K 4.3 4.0    105   CO2 24 25   AGAP 7 6   BUN 14 12   CREATININE 0.91 0.78   CALCIUM 7.9* 8.2*       Coags    Recent Labs     12/10/24  2116   INR 0.93   PTT 23        Additional Electrolytes  Recent Labs     12/11/24  0516 12/12/24  0454   MG 2.1 2.1   PHOS 2.2* 4.7*   CAIONIZED 1.02* 1.08*          Blood Gas    No recent results  Recent Labs     12/12/24  0006   PHVEN 7.367   FTS0HZB 41.1*   PO2VEN 35.6   EPU9UXX 23.1*   BEVEN -2.1    M1ONOPZ 64.9    LFTs  Recent Labs     12/10/24  2116 12/11/24  0516   ALT 67* 118*   * 157*   ALKPHOS 131* 82   ALB 4.4 3.4*   TBILI 0.58 0.48       Infectious  Recent Labs     12/10/24  2116   PROCALCITONI 0.18     Glucose  Recent Labs     12/10/24  2116 12/11/24  0516 12/11/24  1332 12/12/24  0454   GLUC 88 126 118 97

## 2024-12-12 NOTE — UTILIZATION REVIEW
Initial Clinical Review    Admission: Date/Time/Statement:   Admission Orders (From admission, onward)       Ordered        12/11/24 0005  Inpatient Admission  Once                          Orders Placed This Encounter   Procedures    Inpatient Admission     Standing Status:   Standing     Number of Occurrences:   1     Level of Care:   Critical Care [15]     Estimated length of stay:   More than 2 Midnights     Certification:   I certify that inpatient services are medically necessary for this patient for a duration of greater than two midnights. See H&P and MD Progress Notes for additional information about the patient's course of treatment.     ED Arrival Information       Expected   -    Arrival   12/10/2024 20:34    Acuity   Emergent              Means of arrival   Wheelchair    Escorted by   Family Member    Service   Critical Care/ICU    Admission type   Emergency              Arrival complaint   chills             Chief Complaint   Patient presents with    Medical Problem     Pt states she took her first dose of bactrim today for a UTI. Pt has had UTI symptoms since Friday. Pt states after she took the medicine she began feeling unwell, complaining of a headache, body aches, and chills. Pt hyperventilating in triage.        Initial Presentation: 59 y.o. female to ED in wheel chair w Family member for evaluation & treatment as Inpatient ICU admission due to Septic shock,  pyelonephritis/UTI,     PMH allergic rhinitis, asthma, depression, hypothyroidism, Breast CA DX 2023 s/p Mastectomy 2023/ L lumpectomy 2024 ER+ on Anastrozole, recent hospitalization 1/2024 multifocal PNA s/p bronch/ H influenza elevated IgE + Aspergillus fumigatus presents w 5 day worsening dysuria, urinary frequency, hematuria per PCP 1 day OP RX w Bactrim progressing w back pain, body aches, HA, chills    EXAM  Chills, Fever 130, tachycardia, tachypnea, soft BP becoming hypotensive, became hypoxic in 80s ; labs leukopenia, mild RA, + UA,  CT a/p reveal L pyelonephritis/ ureteritis . Given 2 L IVF, IV antibx, IV Tylenol/ IV Morphine  PLAN  continuous C-R monitoring w pulse oximetry; freq neuro checks. Bedside Cardiac US reveals volume depletion w hypotension give another 1.5 L IVF bolus, IV LEVO,   IV Cefepime 1 GM Q 8 HR for 7 days, strict I/o, follow WBC/ fevers; follow BCX / UCX, IVF.    Cont home meds follow qtc. NC w goal POX > 92%, pain management, consult Urology  Urology  Lying in bed with noted rigors and complaining of shaking and buttocks pain. Urethral art catheter present draining clear yellow urine to gravity. Denies any abdominal pain or flank pain at this time. Renal US w/ no hydronephrosis bilaterally. Bilateral ureteral jets not detected although patient shaking during exam. CT a/p left-sided pyelitis/ureteritis   Currently on IV Cefepime & Meropenem  Art placed, follow fever; adjust IV antibx on CXs; no indication for acute intervention    Date: 12/12/2024   Day 2: ICU level  Urology  Severe urinary tract infection with no evidence of stone disease or obstructive uropathy. Cont antibx if no improvement repeat CT  ICU  agitated/delirious. Suspected alcohol withdrawal, better with redirection/zyprexa/paxil.  Off NE since afternoon. TMAX 101.3F.  Delirium precautions, may start low dose quetiapine. Off IV Pressor since 12/11 4PM w soft BP & neg fluid balance give 1 L Iso lyte. On room air, inquire Radiology to re asssess CT result w patchy opcity @ [place]/ RML on prior CT 6/2024, mild transaminitis tolerating PO cont PRN antiemetic. BCX NGTD, Switched IV antibx to IV Meropenem/ Vanco yesterday- if MRSA neg DC Vanco.  cont art; repeat renal US  Cont SD1 ICU follow BP  ED Treatment-Medication Administration from 12/10/2024 2034 to 12/11/2024 0045         Date/Time Order Dose Route Action     12/10/2024 2139 ceftriaxone (ROCEPHIN) 2 g/50 mL in dextrose IVPB 2,000 mg Intravenous New Bag     12/10/2024 2122 ondansetron (ZOFRAN)  injection 4 mg 4 mg Intravenous Given     12/10/2024 2127 acetaminophen (TYLENOL) tablet 650 mg 650 mg Oral Given     12/10/2024 2129 multi-electrolyte (ISOLYTE-S PH 7.4) bolus 1,000 mL 1,000 mL Intravenous New Bag     12/10/2024 2122 morphine injection 4 mg 4 mg Intravenous Given     12/10/2024 2237 ibuprofen (MOTRIN) tablet 400 mg 400 mg Oral Given     12/10/2024 2222 iohexol (OMNIPAQUE) 350 MG/ML injection (MULTI-DOSE) 100 mL 100 mL Intravenous Given     12/10/2024 2310 multi-electrolyte (ISOLYTE-S PH 7.4) bolus 1,000 mL 1,000 mL Intravenous New Bag     12/10/2024 2357 multi-electrolyte (ISOLYTE-S PH 7.4) bolus 1,000 mL 1,000 mL Intravenous New Bag            Scheduled Medications:  anastrozole, 1 mg, Oral, Daily  budesonide-formoterol, 2 puff, Inhalation, BID  chlorhexidine, 15 mL, Mouth/Throat, Q12H RAQUEL  cholecalciferol, 1,000 Units, Oral, Daily  heparin (porcine), 5,000 Units, Subcutaneous, Q8H RAQUEL  levothyroxine, 25 mcg, Oral, Early Morning  melatonin, 3 mg, Oral, HS  meropenem, 1,000 mg, Intravenous, Q8H  multi-electrolyte, 1,000 mL, Intravenous, Once  niacin, 100 mg, Oral, Daily With Breakfast  norepinephrine 4 mg in 0.9% sodium chloride 250 mL, , ,   [START ON 12/13/2024] PARoxetine, 50 mg, Oral, Daily  polyethylene glycol, 17 g, Oral, Daily  senna, 1 tablet, Oral, HS  vancomycin, 750 mg, Intravenous, Q12H      Continuous IV Infusions:  12/11 0208 & DC 1615=NOREPINEPHRINE 4 MG  ML NSS (CMPD ORDER) infusion  Rate: 3.8-112.5 mL/hr Dose: 1-30 mcg/min  Freq: Titrated Route: IV  Last Dose: Stopped (12/11/24 1615)  Start: 12/11/24 0000 End: 12/12/24 1132     PRN Meds:  acetaminophen, 975 mg, Oral, Q6H PRN  HYDROmorphone, 0.5 mg, Intravenous, Q4H PRN  norepinephrine 4 mg in 0.9% sodium chloride 250 mL, , ,   ondansetron, 4 mg, Intravenous, Q4H PRN  oxyCODONE, 5 mg, Oral, Q4H PRN   Or  oxyCODONE, 10 mg, Oral, Q4H PRN      ED Triage Vitals [12/10/24 2040]   Temperature Pulse Respirations Blood Pressure SpO2  Pain Score   (!) 103 °F (39.4 °C) (!) 132 (!) 24 118/55 97 % 10 - Worst Possible Pain     Weight (last 2 days)       Date/Time Weight    12/11/24 0600 70.9 (156.31)    12/11/24 0048 70.9 (156.31)    12/11/24 0006 70.9 (156.31)            Vital Signs (last 3 days)       Date/Time Temp Pulse Resp BP MAP (mmHg) SpO2 Calculated FIO2 (%) - Nasal Cannula Nasal Cannula O2 Flow Rate (L/min) O2 Device Patient Position - Orthostatic VS Gretchen Coma Scale Score Pain    12/12/24 1150 -- 83 17 113/59 82 -- -- -- -- -- -- --    12/12/24 1140 -- 86 17 69/40 50 92 % -- -- -- -- -- --    12/12/24 1100 -- 86 15 84/46 59 -- -- -- -- -- -- --    12/12/24 1000 -- 91 19 106/54 73 -- -- -- -- -- -- --    12/12/24 0930 -- -- -- -- -- -- -- -- -- -- 15 No Pain    12/12/24 0912 99.9 °F (37.7 °C) 108 28 94/50 69 96 % -- -- None (Room air) -- -- 5    12/12/24 0900 -- 103 19 94/50 69 -- -- -- -- -- -- --    12/12/24 0800 -- 109 30 91/50 58 96 % -- -- -- -- -- --    12/12/24 0602 -- 117 29 92/44 63 96 % -- -- -- -- -- --    12/12/24 0600 -- 114 19 80/48 60 96 % -- -- -- -- -- --    12/12/24 0500 -- 115 17 103/58 78 95 % -- -- -- -- -- --    12/12/24 0400 98.9 °F (37.2 °C) 125 35 100/55 68 96 % 36 4 L/min Nasal cannula Lying -- No Pain    12/12/24 0330 -- -- -- -- -- -- -- -- -- -- 15 No Pain    12/12/24 0300 -- 101 19 104/54 71 96 % -- -- -- -- -- --    12/12/24 0200 -- 113 30 101/54 75 98 % -- -- -- -- -- --    12/12/24 0100 -- 112 22 104/53 76 98 % -- -- -- -- -- --    12/12/24 0052 -- -- -- -- -- -- -- -- -- -- -- 8    12/12/24 0000 98.9 °F (37.2 °C) 136 33 120/58 83 94 % 36 4 L/min Nasal cannula Lying -- No Pain    12/11/24 2330 -- -- -- -- -- -- -- -- -- -- 15 --    12/11/24 2244 98.8 °F (37.1 °C) -- -- -- -- -- -- -- -- -- -- --    12/11/24 1940 -- -- -- -- -- -- -- -- -- -- -- Med Not Given for Pain - for MAR use only    12/11/24 1930 101.3 °F (38.5 °C) 126 49 126/67 90 88 % -- -- None (Room air) Lying 15 No Pain    12/11/24 1909 101.2 °F  (38.4 °C) -- -- -- -- -- -- -- -- -- -- --    12/11/24 1900 -- 128 28 105/55 69 88 % -- -- -- -- -- --    12/11/24 1850 -- 110 24 104/56 74 93 % -- -- -- -- -- --    12/11/24 1830 -- 124 40 86/57 67 92 % -- -- -- -- -- --    12/11/24 1820 -- 117 40 96/51 69 88 % -- -- -- -- -- --    12/11/24 1810 -- 119 30 147/90 103 93 % -- -- -- -- -- --    12/11/24 1745 -- 105 20 97/49 71 94 % -- -- -- -- -- --    12/11/24 1730 -- 108 19 93/50 67 94 % -- -- -- -- -- --    12/11/24 1715 -- 98 18 94/45 65 93 % -- -- -- -- -- --    12/11/24 1644 -- -- -- -- -- -- -- -- -- -- -- 10 - Worst Possible Pain    12/11/24 1640 -- -- -- -- -- -- -- -- -- -- 15 --    12/11/24 1600 -- 110 42 126/53 77 94 % -- -- -- -- -- --    12/11/24 1545 -- 113 37 164/140 148 88 % -- -- -- -- -- --    12/11/24 1530 -- 99 21 141/119 125 96 % -- -- -- -- -- --    12/11/24 1415 -- 108 20 104/50 72 96 % -- -- -- -- -- --    12/11/24 1411 -- 108 33 136/96 112 94 % -- -- -- -- -- 8    12/11/24 1345 -- 112 35 121/76 90 94 % -- -- -- -- -- --    12/11/24 1315 -- 100 41 88/52 63 98 % -- -- -- -- -- --    12/11/24 1300 -- 99 39 93/54 67 98 % -- -- -- -- -- --    12/11/24 1245 -- 90 18 94/53 68 99 % -- -- -- -- -- --    12/11/24 1242 -- 87 23 103/63 78 98 % -- -- -- -- -- 7    12/11/24 1230 -- 82 38 103/63 78 96 % -- -- -- -- -- --    12/11/24 1216 -- -- -- -- -- -- -- -- -- -- 15 --    12/11/24 1215 -- -- -- -- -- -- -- -- None (Room air) -- -- 3    12/11/24 1200 -- 94 18 96/64 74 94 % -- -- -- -- -- --    12/11/24 1145 -- 71 13 92/54 71 96 % -- -- -- -- -- --    12/11/24 1130 -- 74 19 94/51 69 95 % -- -- -- -- -- --    12/11/24 1023 98.7 °F (37.1 °C) -- -- -- -- -- -- -- -- -- -- --    12/11/24 0930 -- 82 16 92/53 68 94 % -- -- -- -- -- --    12/11/24 0915 -- 86 26 84/48 64 96 % -- -- -- -- -- 8    12/11/24 0900 -- 92 24 91/47 65 97 % -- -- -- -- -- --    12/11/24 0845 98.8 °F (37.1 °C) 100 22 109/73 85 96 % -- -- None (Room air) Lying 15 --    12/11/24 0840 -- --  -- -- -- -- -- -- -- -- -- 8    12/11/24 0839 -- -- -- -- -- -- -- -- -- -- -- 8    12/11/24 0700 -- 91 17 91/50 68 91 % -- -- -- -- -- --    12/11/24 0630 -- 87 15 94/55 71 91 % -- -- -- -- -- --    12/11/24 0615 -- 93 15 98/52 69 93 % -- -- -- -- -- --    12/11/24 0545 -- 91 13 90/53 69 91 % -- -- -- -- -- --    12/11/24 0530 -- 94 18 86/48 64 93 % -- -- -- -- -- --    12/11/24 0515 -- 105 29 97/52 72 93 % -- -- -- -- -- --    12/11/24 0500 -- 93 21 97/52 70 -- -- -- -- -- -- --    12/11/24 0445 -- 93 16 93/50 65 -- -- -- -- -- -- --    12/11/24 0430 -- 97 22 94/53 69 95 % -- -- -- -- -- --    12/11/24 0415 -- 93 14 94/52 68 94 % -- -- -- -- -- --    12/11/24 0400 98.7 °F (37.1 °C) 92 25 103/46 66 96 % -- -- None (Room air) Lying -- No Pain    12/11/24 0345 -- 96 14 71/32 47 94 % -- -- -- -- -- --    12/11/24 0330 -- 103 36 84/48 59 93 % -- -- -- -- -- --    12/11/24 0315 -- 99 20 90/55 69 93 % -- -- -- -- -- --    12/11/24 0309 -- 103 -- 87/51 66 -- -- -- -- -- -- --    12/11/24 0300 -- 101 14 87/51 66 92 % -- -- -- -- -- --    12/11/24 0245 -- 100 17 91/47 66 92 % -- -- -- -- -- --    12/11/24 0230 -- 102 17 91/53 68 93 % -- -- -- -- -- --    12/11/24 0215 -- 107 14 79/42 54 93 % -- -- -- -- -- --    12/11/24 0208 -- 109 16 79/42 54 94 % -- -- -- -- -- --    12/11/24 0200 -- 108 20 74/39 50 94 % -- -- -- -- -- --    12/11/24 0145 -- 104 13 90/53 68 93 % -- -- -- -- -- --    12/11/24 0130 99.5 °F (37.5 °C) 108 12 90/53 68 93 % 28 2 L/min Nasal cannula Lying -- No Pain    12/11/24 0100 -- 114 19 92/46 66 91 % -- -- -- -- -- --    12/11/24 0059 -- -- -- -- -- -- -- -- -- -- -- No Pain    12/11/24 0048 100.1 °F (37.8 °C) 117 30 101/52 74 92 % 28 2 L/min Nasal cannula Lying -- --    12/11/24 0030 -- 112 24 89/51 66 92 % 28 2 L/min Nasal cannula Lying -- --    12/11/24 0015 -- 111 24 85/52 64 92 % 28 2 L/min Nasal cannula -- -- --    12/11/24 0000 -- -- -- 85/48  -- 92 % 28 2 L/min Nasal cannula -- -- --    12/10/24  2353 100 °F (37.8 °C) 116 22 80/45 -- 90 % -- -- None (Room air) Lying -- --    12/10/24 2315 -- 128 24 93/52 66 91 % -- -- None (Room air) -- -- --    12/10/24 2310 101 °F (38.3 °C) -- -- 81/45 -- -- -- -- -- -- -- --    12/10/24 2300 -- 126 24 77/40 55 91 % -- -- None (Room air) Lying -- --    12/10/24 2245 -- 121 24 102/52 73 93 % -- -- None (Room air) Lying -- --    12/10/24 2241 102.8 °F (39.3 °C) -- -- -- -- -- -- -- -- -- -- --    12/10/24 2237 -- -- -- -- -- -- -- -- -- -- -- Med Not Given for Pain - for MAR use only    12/10/24 2209 102.8 °F (39.3 °C) -- -- -- -- -- -- -- -- -- -- --    12/10/24 2200 -- 124 24 108/52 75 91 % -- -- None (Room air) -- -- --    12/10/24 2145 -- 124 22 118/59 84 91 % -- -- None (Room air) Lying -- --    12/10/24 2127 -- -- -- -- -- -- -- -- -- -- -- Med Not Given for Pain - for MAR use only    12/10/24 2122 -- -- -- -- -- -- -- -- -- -- -- 10 - Worst Possible Pain    12/10/24 2040 103 °F (39.4 °C) 132 24 118/55 -- 97 % -- -- None (Room air) Sitting -- 10 - Worst Possible Pain              Pertinent Labs/Diagnostic Test Results:   Radiology:  US kidney and bladder   Final Interpretation by Walker Hoffman MD (12/11 1420)      Patient shaking during exam per technologist      No renal calculus, hydronephrosis. Ureteral jets not identified during the study. Pyelonephritis cannot be evaluated on this imaging modality. No pyelonephritis seen on CT of 12/10/2024.      Workstation performed: [id number]         XR chest 2 views   ED Interpretation by Anel Chambers DO (12/10 6124)   Increased vascular markings, concern for vascular congestion    Image independently interpreted by myself       Final Interpretation by Alden Banda MD (12/11 9336)      No focal consolidation, pleural effusion, or pneumothorax.         CT abdomen pelvis with contrast   Final Interpretation by Kaushal Mcintosh MD (12/10 9805)      1.  Findings suspicious for left pyelitis/ureteritis noted as  described above. Recommend clinical correlation with urinalysis. No nephrolithiasis or obstructive uropathy/hydronephrosis bilaterally.   2.  No evidence of bowel obstruction, inflammation, appendicitis, free air, or free fluid.   3.  Patchy airspace opacities in portions of the right middle lobe and lingula are partially seen suspicious for airspace disease, recommend clinical correlation.           Cardiology:  ECG 12 lead   Final Result by Massimo Espino MD (12/11 0752)   Sinus tachycardia   Otherwise normal ECG   When compared with ECG of 08-Mar-2024 11:00,   Vent. rate has increased by  43 bpm   QRS axis Shifted left   Criteria for Lateral infarct are no longer Present   T wave inversion no longer evident in Lateral leads   Confirmed by Massimo Espino ([id number]) on 12/11/2024 7:52:41 AM        GI:  No orders to display           Results from last 7 days   Lab Units 12/12/24  0454 12/11/24  1332 12/11/24  0516 12/11/24  0134 12/10/24  2116   WBC Thousand/uL 10.67* 12.82* 12.44*  --  2.13*   HEMOGLOBIN g/dL 9.6* 10.9* 10.6*  --  12.4   HEMATOCRIT % 29.3* 32.3* 31.2*  --  36.6   PLATELETS Thousands/uL 147* 197 178   < > 196   TOTAL NEUT ABS Thousands/µL  --   --   --   --  1.80*    < > = values in this interval not displayed.         Results from last 7 days   Lab Units 12/12/24  0454 12/11/24  1332 12/11/24  0516 12/10/24  2116   SODIUM mmol/L 136 137 134* 135   POTASSIUM mmol/L 4.0 4.3 4.5 3.6   CHLORIDE mmol/L 105 106 103 101   CO2 mmol/L 25 24 24 26   ANION GAP mmol/L 6 7 7 8   BUN mg/dL 12 14 14 14   CREATININE mg/dL 0.78 0.91 0.96 0.79   EGFR ml/min/1.73sq m 82 68 64 81   CALCIUM mg/dL 8.2* 7.9* 7.8* 9.7   CALCIUM, IONIZED mmol/L 1.08*  --  1.02*  --    MAGNESIUM mg/dL 2.1  --  2.1  --    PHOSPHORUS mg/dL 4.7*  --  2.2*  --      Results from last 7 days   Lab Units 12/12/24  0006 12/11/24  0516 12/10/24  2116   AST U/L  --  157* 115*   ALT U/L  --  118* 67*   ALK PHOS U/L  --  82 131*   TOTAL PROTEIN  "g/dL  --  6.4 7.8   ALBUMIN g/dL  --  3.4* 4.4   TOTAL BILIRUBIN mg/dL  --  0.48 0.58   AMMONIA umol/L 23  --   --          Results from last 7 days   Lab Units 12/12/24  0454 12/11/24  1332 12/11/24  0516 12/10/24  2116   GLUCOSE RANDOM mg/dL 97 118 126 88             No results found for: \"BETA-HYDROXYBUTYRATE\"       Results from last 7 days   Lab Units 12/12/24  0006   PH LUIS  7.367   PCO2 LUIS mm Hg 41.1*   PO2 LUIS mm Hg 35.6   HCO3 LUIS mmol/L 23.1*   BASE EXC LUIS mmol/L -2.1   O2 CONTENT LUIS ml/dL 10.4   O2 HGB, VENOUS % 64.9                     Results from last 7 days   Lab Units 12/10/24  2116   PROTIME seconds 12.6   INR  0.93   PTT seconds 23         Results from last 7 days   Lab Units 12/10/24  2116   PROCALCITONIN ng/ml 0.18     Results from last 7 days   Lab Units 12/11/24  1332 12/10/24  2116   LACTIC ACID mmol/L 1.6 1.6                                                 Results from last 7 days   Lab Units 12/10/24  2235 12/10/24  1509 12/10/24  1505   CLARITY UA  Clear Clear cloudy   COLOR UA  Yellow Light Yellow yellow   SPEC GRAV UA  1.010 1.008  --    PH UA  6.0 6.0  --    GLUCOSE UA mg/dl Negative Negative -   KETONES UA mg/dl Negative Negative -   BLOOD UA  Large* Small* + + +   PROTEIN UA mg/dl 30 (1+)* Negative -   NITRITE UA  Negative Negative -   BILIRUBIN UA  Negative Negative  --    BILIRUBIN UA POC   --   --  -   UROBILINOGEN UA E.U./dl 0.2  --  0.2   UROBILINOGEN UA (BE) mg/dl  --  <2.0  --    LEUKOCYTES UA  Small* Large* 70   WBC UA /hpf Innumerable* 20-30*  --    RBC UA /hpf 30-50* 4-10*  --    BACTERIA UA /hpf Occasional Occasional  --    EPITHELIAL CELLS WET PREP /hpf Occasional None Seen  --    MUCUS THREADS  Occasional*  --   --                                  Results from last 7 days   Lab Units 12/10/24  2235 12/10/24  2138 12/10/24  1510   BLOOD CULTURE   --  No Growth at 24 hrs.  No Growth at 24 hrs.  --    URINE CULTURE  60,000-69,000 cfu/ml  --  50,000-59,000 cfu/ml " Escherichia coli*                   Past Medical History:   Diagnosis Date    Allergic May 2023    Asthma     Breast cancer (HCC) 11/2023    Breast mass 10/31/23    Cancer (HCC)     bilateral    Depression     Disease of thyroid gland     Hearing aid worn     bilateral    Hearing loss     HL (hearing loss)     Osteopenia     Rosacea 02/09/2021    Seasonal allergies     Vitamin D deficiency 07/24/2018     Present on Admission:   Hypothyroidism   Depression   Vitamin D deficiency      Admitting Diagnosis: UTI (urinary tract infection) [N39.0]  Transaminitis [R74.01]  Septic shock (HCC) [A41.9, R65.21]  Known medical problems [Z78.9]  Age/Sex: 59 y.o. female    Network Utilization Review Department  ATTENTION: Please call with any questions or concerns to [phone] and carefully listen to the prompts so that you are directed to the right person. All voicemails are confidential.   For Discharge needs, contact Care Management DC Support Team at [phone] opt. 2  Send all requests for admission clinical reviews, approved or denied determinations and any other requests to dedicated fax number below belonging to the [place] where the patient is receiving treatment. List of dedicated fax numbers for the Facilities:  FACILITY NAME UR FAX NUMBER   ADMISSION DENIALS (Administrative/Medical Necessity) [phone]   DISCHARGE SUPPORT TEAM (NETWORK) [phone]   PARENT CHILD HEALTH (Maternity/NICU/Pediatrics) [phone]   [facility] [phone]   [facility] [phone]   [facility] [phone]   [facility] [phone]   [facility] [phone]   [facility] [phone]   [facility] [phone]   [facility] [phone]   [facility] [phone]   [facility]  [place] [place] [facility] [phone]   [facility] [phone]   [facility] [phone]

## 2024-12-12 NOTE — UTILIZATION REVIEW
NOTIFICATION OF INPATIENT ADMISSION   AUTHORIZATION REQUEST   SERVICING FACILITY:   [place] [address]  Tax ID: [id number]  NPI: [id number] ATTENDING PROVIDER:  Attending Name and NPI#: Joe Suárez Md [[id number]]  Address: [address] [place] [address]  Phone: [phone]     ADMISSION INFORMATION:  Place of Service: Inpatient [facility]  Place of Service Code: 21  Inpatient Admission Date/Time: 12/11/24 12:05 AM  Discharge Date/Time: No discharge date for patient encounter.  Admitting Diagnosis Code/Description:  UTI (urinary tract infection) [N39.0]  Transaminitis [R74.01]  Septic shock (HCC) [A41.9, R65.21]  Known medical problems [Z78.9]     UTILIZATION REVIEW CONTACT:  Marianne Duval, Utilization   Network Utilization Review Department  Phone: [phone]  Fax [phone]  Email: [email or web address]  Contact for approvals/pending authorizations, clinical reviews, and discharge.     PHYSICIAN ADVISORY SERVICES:  Medical Necessity Denial & Xugo-vu-Ndwl Review  Phone: [phone]  Fax: [phone]  Email: [email or web address]     DISCHARGE SUPPORT TEAM:  For Patients Discharge Needs & Updates  Phone: [phone] opt. 2 Fax: [phone]  Email: [email or web address]

## 2024-12-12 NOTE — ASSESSMENT & PLAN NOTE
Ddxi- likely pulmonary edema related to IVF resuscitation vs recent morphine administration vs vs viral URI/bronchitis. Patient has Hx of b/l PNA as well as multiple patchy focal opacities on prior CTs noted on December 5, 2024 with negative EBUS on January 2024   Oxygen wean off this morning       Plan:  Maintain o2 sat >92%

## 2024-12-12 NOTE — PLAN OF CARE

## 2024-12-12 NOTE — TELEPHONE ENCOUNTER
Pt has been schd for a 3m hfu per Dr. Beck      [place]: [place]  Date: 4/3/25  Time: 2:40PM  Provider: Ebony

## 2024-12-12 NOTE — ASSESSMENT & PLAN NOTE
Currently on  Cefepime 1g q8 plan to treat for 2/7 days pending clinical stability,   Vancomycin pending MRSA, Meropenem   Monitor UOP, maintain UOP of 0.5-1cc/kg/hr  Monitor WBC and fever curve   Was febrile overnight with Tmax of 101.3. Urine culture with E. Coli

## 2024-12-12 NOTE — PLAN OF CARE

## 2024-12-12 NOTE — QUICK NOTE
Called , Ryder, for an update and to discuss patient's restlessness/agitation/tachycardia. He confirmed she drinks 1 beer and 1 shot of liquor per month socially. He appreciated the update and all questions were answered.    Prior to this conversation, patient was given PO Ativan with improvement in signs/symptoms/HR.     Will plan to continue to treat patient's anxiety with mindfulness technique, redirection. Will give home paxil since patient is now tolerating PO and will give melatonin for sleep.     Bhakti Dixon PA-C

## 2024-12-12 NOTE — PROGRESS NOTES
Richa Medina is a 59 y.o. female who is currently ordered Vancomycin IV with management by the Pharmacy Consult service.  Relevant clinical data and objective / subjective history reviewed.  Vancomycin Assessment:  Indication and Goal AUC/Trough: Urinary tract infection (goal -600, trough >10)  Micro:     Renal Function:  SCr: 0.91 mg/dL  CrCl: 60.7 mL/min  Renal replacement: Not on dialysis  Days of Therapy: 1  Current Dose: 1500 mg IV loading dose  Vancomycin Plan:  New Dosin mg IV every 12 hours x 3 doses (Ordered for 4 doses)  Estimated AUC: 514 mcg*hr/mL  Estimated Trough: 15.7 mcg/mL  Next Level: 2024 with AM labs  Renal Function Monitoring: Daily BMP and UOP  Pharmacy will continue to follow closely for s/sx of nephrotoxicity, infusion reactions and appropriateness of therapy.  BMP and CBC will be ordered per protocol. We will continue to follow the patient’s culture results and clinical progress daily.    Cherri Donnelly, Pharmacist

## 2024-12-12 NOTE — PLAN OF CARE

## 2024-12-12 NOTE — PROGRESS NOTES
Progress Note - Urology   Name: Richa Medina 59 y.o. female I MRN: [id number]  Unit/Bed#: ICU 04 I Date of Admission: 12/10/2024   Date of Service: 12/12/2024 I Hospital Day: 1     Assessment & Plan  Pyelonephritis  Patient originally reported increased urinary urgency, dysuria, and gross hematuria yesterday. She presented to the ER for complaints of severe bilateral flank pain and chills after taking single dose of PO Bactrim prescribed by her PCP. Found to meet septic shock criteria with fever (tmax 103), tachycardia, leukopenia, tachypnea. Patient later became hypotensive requiring admission to ICU and started on Levophed.  CT A/P with contrast from 12/10 showed grossly unremarkable right kidney. There is mild wall thickening and mucosal hyperenhancement involving the left renal pelvis and left ureter with subtle periureteral hazy inflammatory stranding seen. Findings are suspicious for a left-sided pyelitis/ureteritis. Bladder was moderately distended and grossly unremarkable.   Renal US today shows no hydronephrosis bilaterally. Bilateral ureteral jets not detected although patient shaking during exam.  Patient with noted rigors and complaints of buttocks pain and nausea. No CVA tenderness or abdominal tenderness noted on physical exam.  Urethral art catheter draining clear yellow urine to gravity.   UA through PCP with large leukocytes and small blood. Microscopic analysis with 4-10 RBC, 20-30 WBC, occasional bacteria.  WBC 10.6 today   Creatinine 0.78  Cefepime, switched to meropenem/vancomycin 12/11     Plan:  Maintain art catheter, ensure adequate bladder decompression. Maintain art catheter until patient clinically improving  Monitor fever curve  Continue IV ABX and adjust based on cultures   No plan for operative intervention   Continue medical optimization through primary team.   Urology will continue to follow peripherally   Consider possible need for repeat CT imaging if patient continues to  "decline despite bladder decompression and IV antibiotics.       Subjective:   Today on physical exam, the patient is lying in bed resting.  Pain is overall well controlled, she is fatigued and did not sleep at all overnight. Rigors have resolved.  Harley catheter remains inserted draining clear yellow urine; roughly 1300 mL urine output overnight.  WBC improving.  Blood cultures returned negative x 2.  Repeat urine culture also returned negative.  Max temp overnight was 101.3 degrees Fahrenheit.  Remains on cefepime vancomycin, meropenem.    Review of Systems   Constitutional:  Positive for activity change, chills and fatigue. Negative for fever.   Respiratory:  Negative for apnea, cough and shortness of breath.    Gastrointestinal:  Negative for abdominal distention, abdominal pain, constipation, diarrhea, nausea and vomiting.   Genitourinary:  Negative for difficulty urinating, dysuria, flank pain, frequency, hematuria, pelvic pain, urgency, vaginal bleeding and vaginal discharge.   Musculoskeletal:  Negative for arthralgias and back pain.   Neurological:  Negative for dizziness and headaches.   Psychiatric/Behavioral: Negative.     All other systems reviewed and are negative.      Objective:  Vitals: Blood pressure 113/59, pulse 83, temperature 99.9 °F (37.7 °C), temperature source Oral, resp. rate 17, height 5' 3\" (1.6 m), weight 70.9 kg (156 lb 4.9 oz), SpO2 92%, not currently breastfeeding.,Body mass index is 27.69 kg/m².    Intake/Output Summary (Last 24 hours) at 12/12/2024 1157  Last data filed at 12/12/2024 0930  Gross per 24 hour   Intake 1587.97 ml   Output 3700 ml   Net -2112.03 ml     Invasive Devices       Peripheral Intravenous Line  Duration             Peripheral IV 12/10/24 Dorsal (posterior);Left Forearm 1 day    Peripheral IV 12/11/24 Dorsal (posterior);Left Hand 1 day              Drain  Duration             Urethral Catheter Latex 16 Fr. <1 day                    Physical Exam  Vitals and " nursing note reviewed.   Constitutional:       General: She is not in acute distress.     Appearance: Normal appearance. She is ill-appearing.   HENT:      Head: Normocephalic and atraumatic.      Right Ear: External ear normal.      Left Ear: External ear normal.      Nose: Nose normal.      Mouth/Throat:      Pharynx: Oropharynx is clear.   Eyes:      Extraocular Movements: Extraocular movements intact.      Conjunctiva/sclera: Conjunctivae normal.   Cardiovascular:      Rate and Rhythm: Tachycardia present.   Pulmonary:      Effort: Pulmonary effort is normal.   Abdominal:      General: Abdomen is flat. There is no distension.      Palpations: Abdomen is soft.      Tenderness: There is no abdominal tenderness.   Genitourinary:     Comments: Harley catheter remains inserted draining clear yellow urine  Musculoskeletal:         General: Normal range of motion.      Cervical back: Normal range of motion.   Neurological:      General: No focal deficit present.      Mental Status: She is alert and oriented to person, place, and time.   Psychiatric:         Mood and Affect: Mood normal.         Behavior: Behavior normal.         Labs:  Recent Labs     12/10/24  2116 12/11/24  0516 12/11/24  1332 12/12/24  0454   WBC 2.13* 12.44* 12.82* 10.67*     Recent Labs     12/10/24  2116 12/11/24  0516 12/11/24  1332 12/12/24  0454   HGB 12.4 10.6* 10.9* 9.6*       Recent Labs     12/10/24  2116 12/11/24  0516 12/11/24  1332 12/12/24  0454   CREATININE 0.79 0.96 0.91 0.78       History:    Past Medical History:   Diagnosis Date    Allergic May 2023    Asthma     Breast cancer (HCC) 11/2023    Breast mass 10/31/23    Cancer (HCC)     bilateral    Depression     Disease of thyroid gland     Hearing aid worn     bilateral    Hearing loss     HL (hearing loss)     Osteopenia     Rosacea 02/09/2021    Seasonal allergies     Vitamin D deficiency 07/24/2018     Past Surgical History:   Procedure Laterality Date    BREAST BIOPSY Right  11/01/2023    X 2 sites    BREAST BIOPSY Left 12/08/2023    BREAST LUMPECTOMY  4/2024    IR DRAINAGE TUBE CHECK/CHANGE/REPOSITION/REINSERTION/UPSIZE  06/27/2024    IR DRAINAGE TUBE CHECK/CHANGE/REPOSITION/REINSERTION/UPSIZE  07/09/2024    IR DRAINAGE TUBE PLACEMENT  06/18/2024    LASIK      MASTECTOMY  4/2023    MRI BREAST BIOPSY LEFT (ALL INCLUSIVE) Left 12/08/2023    MA ADJNT TIS TRNSFR/REARGMT ANY AREA 30.1-60 SQ CM Right 04/19/2024    Procedure: RIGHT AESTHETIC FLAT CLOSURE , IBIS FLAP, PREVENA PLACEMENT;  Surgeon: Samara Felix DO;  Location: [facility];  Service: Plastics    MA BX/EXC LYMPH NODE OPEN SUPERFICIAL Bilateral 04/19/2024    Procedure: Bilateral BX LYMPH NODE SENTINEL, lymphoscintigraphies, lymphatic mappings;  Surgeon: Sisi Dominguez MD;  [facility];  Service: Surgical Oncology    MA EXC BREAST LES PREOP PLMT RAD MARKER OPEN 1 LES Left 04/19/2024    Procedure: LEFT BREAST HILARY LOCALIZATION LUMPECTOMY;;  Surgeon: Sisi Dominguez MD;  [facility];  Service: Surgical Oncology    MA INJ RADIOACTIVE TRACER FOR ID OF SENTINEL NODE Bilateral 04/19/2024    Procedure: Bilateral BX LYMPH NODE SENTINEL, lymphoscintigraphies, lymphatic mappings;  Surgeon: Sisi Dominguez MD;  [facility];  Service: Surgical Oncology    MA INTRAOP SENTINEL LYMPH NODE ID W/DYE INJECTION Bilateral 04/19/2024    Procedure: Bilateral BX LYMPH NODE SENTINEL, lymphoscintigraphies, lymphatic mappings;  Surgeon: Sisi Dominguez MD;  [facility];  Service: Surgical Oncology    MA MASTECTOMY PARTIAL Left 04/19/2024    Procedure: LEFT BREAST HILARY LOCALIZATION LUMPECTOMY;;  Surgeon: Sisi Dominguez MD;  [facility];  Service: Surgical Oncology    MA MASTECTOMY SIMPLE COMPLETE Right 04/19/2024    Procedure: RIGHT BREAST MASTECTOMY, PSB AXILLARY DISSECTION;  Surgeon: Sisi Dominguez MD;  [facility];  Service: Surgical Oncology    MA MASTOPEXY Left 04/19/2024    Procedure: MASTOPEXY  BREAST;  Surgeon: Samara Felix DO;  [facility];  Service: Plastics    TN NEUROPLASTY &/TRANSPOS MEDIAN NRV CARPAL TUNNE Right 07/10/2018    Procedure: CARPAL TUNNEL RELEASE;  Surgeon: Maicol Taylor DO;  [facility];  Service: Orthopedics    TN NEUROPLASTY &/TRANSPOS MEDIAN NRV CARPAL TUNNE Left 04/19/2019    Procedure: RELEASE CARPAL TUNNEL;  Surgeon: Peterson Alejandro MD;  [facility];  Service: Orthopedics    SEPTOPLASTY      SINUS SURGERY      US BREAST NEEDLE LOC LEFT Left 02/27/2024    US GUIDANCE BREAST BIOPSY RIGHT EACH ADDITIONAL Right 11/01/2023    US GUIDED BREAST BIOPSY RIGHT COMPLETE Right 11/01/2023     Family History   Problem Relation Age of Onset    Hashimoto's thyroiditis Mother     Thyroid disease Mother     Melanoma Mother         50's    Cancer Mother         melanoma    Hearing loss Mother     Melanoma Father         50's    Crohn's disease Father     Cancer Father         melanoma    Hearing loss Father     Hashimoto's thyroiditis Sister     No Known Problems Sister     No Known Problems Sister     Colon cancer Maternal Uncle         50's    Depression Maternal Grandmother     Diabetes Maternal Grandmother     Mental illness Maternal Grandmother     Dementia Maternal Grandmother     No Known Problems Maternal Grandfather     No Known Problems Paternal Grandmother     No Known Problems Paternal Grandfather      Social History     Socioeconomic History    Marital status: /Civil Union     Spouse name: None    Number of children: None    Years of education: None    Highest education level: None   Occupational History    None   Tobacco Use    Smoking status: Never    Smokeless tobacco: Never   Vaping Use    Vaping status: Never Used   Substance and Sexual Activity    Alcohol use: Yes     Comment: rarely    Drug use: No    Sexual activity: Yes     Partners: Male     Birth control/protection: Male Sterilization   Other Topics Concern    None   Social  History Narrative    Do you have pets? 3 dogs & 1 cat Is pet allowed in bedroom?Yes    Are you a smoker? Never    Does anyone smoke in your home? No       Do you live with smokers? No    [place] frequently? No   How many times a year? N/A      Social Drivers of Health     Financial Resource Strain: Not on file   Food Insecurity: No Food Insecurity (12/11/2024)    Hunger Vital Sign     Worried About Running Out of Food in the Last Year: Never true     Ran Out of Food in the Last Year: Never true   Transportation Needs: No Transportation Needs (12/11/2024)    PRAPARE - Transportation     Lack of Transportation (Medical): No     Lack of Transportation (Non-Medical): No   Physical Activity: Not on file   Stress: Not on file   Social Connections: Not on file   Intimate Partner Violence: Unknown (12/11/2024)    Nursing IPS     Feels Physically and Emotionally Safe: Not on file     Physically Hurt by Someone: Not on file     Humiliated or Emotionally Abused by Someone: Not on file     Physically Hurt by Someone: 2     Hurt or Threatened by Someone: 2   Housing Stability: Low Risk  (12/11/2024)    Housing Stability Vital Sign     Unable to Pay for Housing in the Last Year: No     Number of Times Moved in the Last Year: 0     Homeless in the Last Year: No         Linda Mina PA-C  Date: 12/12/2024 Time: 11:57 AM

## 2024-12-13 ENCOUNTER — APPOINTMENT (INPATIENT)
Dept: NON INVASIVE DIAGNOSTICS | Facility: HOSPITAL | Age: 59
DRG: 871 | End: 2024-12-13
Payer: COMMERCIAL

## 2024-12-13 ENCOUNTER — TELEPHONE (OUTPATIENT)
Dept: OTHER | Facility: HOSPITAL | Age: 59
End: 2024-12-13

## 2024-12-13 LAB
ALBUMIN SERPL BCG-MCNC: 3.3 G/DL (ref 3.5–5)
ALP SERPL-CCNC: 88 U/L (ref 34–104)
ALT SERPL W P-5'-P-CCNC: 73 U/L (ref 7–52)
ANION GAP SERPL CALCULATED.3IONS-SCNC: 5 MMOL/L (ref 4–13)
AST SERPL W P-5'-P-CCNC: 59 U/L (ref 13–39)
BASOPHILS # BLD AUTO: 0.01 THOUSANDS/ÂΜL (ref 0–0.1)
BASOPHILS NFR BLD AUTO: 0 % (ref 0–1)
BILIRUB SERPL-MCNC: 0.56 MG/DL (ref 0.2–1)
BUN SERPL-MCNC: 11 MG/DL (ref 5–25)
CALCIUM ALBUM COR SERPL-MCNC: 9.1 MG/DL (ref 8.3–10.1)
CALCIUM SERPL-MCNC: 8.5 MG/DL (ref 8.4–10.2)
CHLORIDE SERPL-SCNC: 110 MMOL/L (ref 96–108)
CO2 SERPL-SCNC: 25 MMOL/L (ref 21–32)
CREAT SERPL-MCNC: 0.67 MG/DL (ref 0.6–1.3)
EOSINOPHIL # BLD AUTO: 0.26 THOUSAND/ÂΜL (ref 0–0.61)
EOSINOPHIL NFR BLD AUTO: 5 % (ref 0–6)
ERYTHROCYTE [DISTWIDTH] IN BLOOD BY AUTOMATED COUNT: 13.2 % (ref 11.6–15.1)
GFR SERPL CREATININE-BSD FRML MDRD: 95 ML/MIN/1.73SQ M
GLUCOSE SERPL-MCNC: 97 MG/DL (ref 65–140)
HCT VFR BLD AUTO: 31 % (ref 34.8–46.1)
HGB BLD-MCNC: 10.3 G/DL (ref 11.5–15.4)
IMM GRANULOCYTES # BLD AUTO: 0.01 THOUSAND/UL (ref 0–0.2)
IMM GRANULOCYTES NFR BLD AUTO: 0 % (ref 0–2)
LYMPHOCYTES # BLD AUTO: 0.73 THOUSANDS/ÂΜL (ref 0.6–4.47)
LYMPHOCYTES NFR BLD AUTO: 13 % (ref 14–44)
MCH RBC QN AUTO: 29.3 PG (ref 26.8–34.3)
MCHC RBC AUTO-ENTMCNC: 33.2 G/DL (ref 31.4–37.4)
MCV RBC AUTO: 88 FL (ref 82–98)
MONOCYTES # BLD AUTO: 0.26 THOUSAND/ÂΜL (ref 0.17–1.22)
MONOCYTES NFR BLD AUTO: 5 % (ref 4–12)
MRSA NOSE QL CULT: NORMAL
NEUTROPHILS # BLD AUTO: 4.16 THOUSANDS/ÂΜL (ref 1.85–7.62)
NEUTS SEG NFR BLD AUTO: 77 % (ref 43–75)
NRBC BLD AUTO-RTO: 0 /100 WBCS
PLATELET # BLD AUTO: 149 THOUSANDS/UL (ref 149–390)
PMV BLD AUTO: 10.3 FL (ref 8.9–12.7)
POTASSIUM SERPL-SCNC: 3.5 MMOL/L (ref 3.5–5.3)
PROT SERPL-MCNC: 6.1 G/DL (ref 6.4–8.4)
RBC # BLD AUTO: 3.51 MILLION/UL (ref 3.81–5.12)
SODIUM SERPL-SCNC: 140 MMOL/L (ref 135–147)
VANCOMYCIN SERPL-MCNC: 37.4 UG/ML (ref 10–20)
WBC # BLD AUTO: 5.43 THOUSAND/UL (ref 4.31–10.16)

## 2024-12-13 PROCEDURE — 80053 COMPREHEN METABOLIC PANEL: CPT

## 2024-12-13 PROCEDURE — 80202 ASSAY OF VANCOMYCIN: CPT | Performed by: STUDENT IN AN ORGANIZED HEALTH CARE EDUCATION/TRAINING PROGRAM

## 2024-12-13 PROCEDURE — 99232 SBSQ HOSP IP/OBS MODERATE 35: CPT

## 2024-12-13 PROCEDURE — 85025 COMPLETE CBC W/AUTO DIFF WBC: CPT

## 2024-12-13 PROCEDURE — 93970 EXTREMITY STUDY: CPT | Performed by: SURGERY

## 2024-12-13 PROCEDURE — 93970 EXTREMITY STUDY: CPT

## 2024-12-13 RX ORDER — CEFAZOLIN SODIUM 2 G/50ML
2000 SOLUTION INTRAVENOUS EVERY 8 HOURS
Status: DISCONTINUED | OUTPATIENT
Start: 2024-12-13 | End: 2024-12-14 | Stop reason: HOSPADM

## 2024-12-13 RX ORDER — IBUPROFEN 400 MG/1
400 TABLET, FILM COATED ORAL EVERY 8 HOURS PRN
Status: COMPLETED | OUTPATIENT
Start: 2024-12-13 | End: 2024-12-13

## 2024-12-13 RX ORDER — IBUPROFEN 400 MG/1
400 TABLET, FILM COATED ORAL EVERY 8 HOURS PRN
Status: DISCONTINUED | OUTPATIENT
Start: 2024-12-13 | End: 2024-12-13

## 2024-12-13 RX ADMIN — VANCOMYCIN HYDROCHLORIDE 750 MG: 750 INJECTION, SOLUTION INTRAVENOUS at 09:10

## 2024-12-13 RX ADMIN — IBUPROFEN 400 MG: 400 TABLET, FILM COATED ORAL at 09:44

## 2024-12-13 RX ADMIN — HEPARIN SODIUM 5000 UNITS: 5000 INJECTION INTRAVENOUS; SUBCUTANEOUS at 16:10

## 2024-12-13 RX ADMIN — Medication 100 MG: at 09:44

## 2024-12-13 RX ADMIN — IBUPROFEN 400 MG: 400 TABLET, FILM COATED ORAL at 16:10

## 2024-12-13 RX ADMIN — CEFAZOLIN SODIUM 2000 MG: 2 SOLUTION INTRAVENOUS at 22:10

## 2024-12-13 RX ADMIN — Medication 1000 UNITS: at 09:44

## 2024-12-13 RX ADMIN — ANASTROZOLE 1 MG: 1 TABLET ORAL at 09:44

## 2024-12-13 RX ADMIN — MEROPENEM 1000 MG: 1 INJECTION INTRAVENOUS at 07:37

## 2024-12-13 RX ADMIN — ACETAMINOPHEN 975 MG: 325 TABLET, FILM COATED ORAL at 00:28

## 2024-12-13 RX ADMIN — CEFAZOLIN SODIUM 2000 MG: 2 SOLUTION INTRAVENOUS at 13:55

## 2024-12-13 RX ADMIN — HEPARIN SODIUM 5000 UNITS: 5000 INJECTION INTRAVENOUS; SUBCUTANEOUS at 09:44

## 2024-12-13 RX ADMIN — PAROXETINE 50 MG: 25 TABLET, FILM COATED, EXTENDED RELEASE ORAL at 09:57

## 2024-12-13 RX ADMIN — BUDESONIDE AND FORMOTEROL FUMARATE DIHYDRATE 2 PUFF: 80; 4.5 AEROSOL RESPIRATORY (INHALATION) at 17:25

## 2024-12-13 RX ADMIN — BUDESONIDE AND FORMOTEROL FUMARATE DIHYDRATE 2 PUFF: 80; 4.5 AEROSOL RESPIRATORY (INHALATION) at 09:44

## 2024-12-13 RX ADMIN — MELATONIN 3 MG: 3 TAB ORAL at 22:11

## 2024-12-13 RX ADMIN — HEPARIN SODIUM 5000 UNITS: 5000 INJECTION INTRAVENOUS; SUBCUTANEOUS at 00:30

## 2024-12-13 RX ADMIN — POLYETHYLENE GLYCOL 3350 17 G: 17 POWDER, FOR SOLUTION ORAL at 09:45

## 2024-12-13 RX ADMIN — LEVOTHYROXINE SODIUM 25 MCG: 25 TABLET ORAL at 05:24

## 2024-12-13 NOTE — PLAN OF CARE

## 2024-12-13 NOTE — ASSESSMENT & PLAN NOTE
Patient follows with hematology oncology specialist at [facility].  Last office visit recorded in July 2024  Patient will be requiring outpatient follow-up for further management  Continue with prior to admission anastrozole

## 2024-12-13 NOTE — NURSING NOTE
Received responsibility of patient care 12/13/2024 1:33 PM. No changes in patient status since previous assessment. Patient's art removed per order. Patient informed to let RN when she voids.    Toby Ochoa 12/13/2024 1:37 PM

## 2024-12-13 NOTE — ASSESSMENT & PLAN NOTE
Resolved  Likely secondary to pyelonephritis and UTI  Transaminitis trending down likely secondary from sepsis

## 2024-12-13 NOTE — ASSESSMENT & PLAN NOTE
Patient presented to the ER of [facility] with complaints of increased urinary urgency dysuria along with gross hematuria on 12/10/2024.  It was also accompanied by bilateral flank pains chills and patient relates taking her single dose of p.o. Bactrim prescribed by her PCP before the symptoms of chills and rigors occurred.  Upon arrival in the ED patient was seen and examined.  Patient was meeting the criteria for sepsis with fever Tmax of 102, tachycardia, leukopenia and tachypnea.  Patient blood pressure was also low and was started on Levophed and transferred to ICU for further management.    -CAT scan of abdomen and pelvis with contrast on 12/10/2024 showed grossly unremarkable right kidney but mild wall thickening and mucosal hyperenhancement involving the left renal pelvis and left ureter with subtle periureteral his inflammation stranding seen.  Findings suspicion for left-sided pyelitis/ureteritis.  Bladder was moderately distended and grossly unremarkable.  -Renal ultrasound did not endorse any hydronephrosis  -Patient was started on broad-spectrum IV antibiotics meropenem and vancomycin which has now been transitioned to cefazolin given urine cultures sensitivities  -Plan is to transition to orals tomorrow 12/14/2024 and then discharge if patient continues to improve  -Urology had been on board, did not plan for any operative intervention, agreed with antibiotics and voiding trial which the patient will be getting today 12/13/2024  -Patient had been hemodynamically stable,  -Urine cultures endorse growth of E. coli  -Blood cultures negative MRSA negative

## 2024-12-13 NOTE — TREATMENT PLAN
Patient transferred from ICU to medical surgical floor yesterday afternoon.  Harley catheter remains inserted draining clear yellow urine, substantial output overnight.  VSS, she has been afebrile for almost 24 hours.  Blood cultures returned negative for growth.  Creatinine remains stable.  Leukocytosis has resolved.  Repeat urine culture returned negative for infection.      From a urologic perspective, patient's clinical status has overall improved and trial of void can take place at any time.  Recommend TOV is performed prior to discharge to ensure she can urinate independently before going home.  I will reach out to our office to arrange follow-up for the patient to recheck PVR and ensure she is emptying well.  No further urologic intervention is needed, we will sign off.    Urology will sign off but remain available for any further inpatient needs. Please feel free to contact the provider currently covering the Urology Epic Chat role for this campus with questions or concerns.    Linda Mina PA-C

## 2024-12-13 NOTE — TELEPHONE ENCOUNTER
Patient admitted for sepsis and pyelonephritis.  Harley catheter was placed to ensure complete bladder emptying.  Trial of void will take place before she is discharged home.  Recommend follow-up in office to ensure she continues emptying well.  New patient nonurgent appointment.

## 2024-12-13 NOTE — PROGRESS NOTES
Progress Note - Hospitalist   Name: Richa Medina 59 y.o. female I MRN: [id number]  Unit/Bed#: [place] [address] -01 I Date of Admission: 12/10/2024   Date of Service: 12/13/2024 I Hospital Day: 2    Assessment & Plan  Septic shock (HCC)  Resolved  Likely secondary to pyelonephritis and UTI  Transaminitis trending down likely secondary from sepsis  Depression  Continue prior to admission paroxetine  Hypothyroidism  Continue prior to admission levothyroxine  Vitamin D deficiency  Continue prior to admission colecalciferol tablets  Malignant neoplasm of overlapping sites of right breast in female, estrogen receptor positive (HCC)  Patient follows with hematology oncology specialist at [facility].  Last office visit recorded in July 2024  Patient will be requiring outpatient follow-up for further management  Continue with prior to admission anastrozole  Pyelonephritis  Patient presented to the ER of [facility] with complaints of increased urinary urgency dysuria along with gross hematuria on 12/10/2024.  It was also accompanied by bilateral flank pains chills and patient relates taking her single dose of p.o. Bactrim prescribed by her PCP before the symptoms of chills and rigors occurred.  Upon arrival in the ED patient was seen and examined.  Patient was meeting the criteria for sepsis with fever Tmax of 102, tachycardia, leukopenia and tachypnea.  Patient blood pressure was also low and was started on Levophed and transferred to ICU for further management.    -CAT scan of abdomen and pelvis with contrast on 12/10/2024 showed grossly unremarkable right kidney but mild wall thickening and mucosal hyperenhancement involving the left renal pelvis and left ureter with subtle periureteral his inflammation stranding seen.  Findings suspicion for left-sided pyelitis/ureteritis.  Bladder was moderately distended and grossly unremarkable.  -Renal ultrasound did not endorse any hydronephrosis  -Patient was  started on broad-spectrum IV antibiotics meropenem and vancomycin which has now been transitioned to cefazolin given urine cultures sensitivities  -Plan is to transition to orals tomorrow 12/14/2024 and then discharge if patient continues to improve  -Urology had been on board, did not plan for any operative intervention, agreed with antibiotics and voiding trial which the patient will be getting today 12/13/2024  -Patient had been hemodynamically stable,  -Urine cultures endorse growth of E. coli  -Blood cultures negative MRSA negative  Hypoxia  Patient is now saturating well on room air    VTE Pharmacologic Prophylaxis:    Heparin    Mobility:   Basic Mobility Inpatient Raw Score: 24  JH-HLM Goal: 8: Walk 250 feet or more  JH-HLM Achieved: 8: Walk 250 feet ot more      Education and Discussions with Family / Patient: Updated  (sister) at bedside.    Current Length of Stay: 2 day(s)  Current Patient Status: Inpatient     Discharge Plan: Anticipate discharge tomorrow to home.    Code Status: Level 1 - Full Code    Subjective   Patient seen and examined at bedside.  Patient stated that overall she feels much better than before.  Patient denied any acute complaints like abdominal pain nausea vomiting burning micturition.  Patient willing to do a voiding trial    Objective :  Temp:  [98.1 °F (36.7 °C)-99.3 °F (37.4 °C)] 98.6 °F (37 °C)  HR:  [] 83  BP: ()/(48-73) 108/65  Resp:  [8-24] 18  SpO2:  [89 %-95 %] 94 %  O2 Device: None (Room air)    Body mass index is 27.22 kg/m².     Input and Output Summary (last 24 hours):     Intake/Output Summary (Last 24 hours) at 12/13/2024 1443  Last data filed at 12/13/2024 1354  Gross per 24 hour   Intake 2460 ml   Output 5550 ml   Net -3090 ml       Physical Exam  Vitals and nursing note reviewed.   Constitutional:       General: She is not in acute distress.     Appearance: She is well-developed.   HENT:      Head: Normocephalic and atraumatic.   Eyes:       Conjunctiva/sclera: Conjunctivae normal.   Cardiovascular:      Rate and Rhythm: Normal rate and regular rhythm.      Heart sounds: No murmur heard.  Pulmonary:      Effort: Pulmonary effort is normal. No respiratory distress.      Breath sounds: Normal breath sounds.   Abdominal:      General: Bowel sounds are normal.      Palpations: Abdomen is soft.      Tenderness: There is no abdominal tenderness.   Musculoskeletal:         General: No swelling.      Cervical back: Neck supple.   Skin:     General: Skin is warm and dry.      Capillary Refill: Capillary refill takes less than 2 seconds.   Neurological:      Mental Status: She is alert and oriented to person, place, and time.   Psychiatric:         Mood and Affect: Mood normal.           Lines/Drains:              Lab Results: I have reviewed the following results:   Results from last 7 days   Lab Units 12/13/24  0454   WBC Thousand/uL 5.43   HEMOGLOBIN g/dL 10.3*   HEMATOCRIT % 31.0*   PLATELETS Thousands/uL 149   SEGS PCT % 77*   LYMPHO PCT % 13*   MONO PCT % 5   EOS PCT % 5     Results from last 7 days   Lab Units 12/13/24  0454   SODIUM mmol/L 140   POTASSIUM mmol/L 3.5   CHLORIDE mmol/L 110*   CO2 mmol/L 25   BUN mg/dL 11   CREATININE mg/dL 0.67   ANION GAP mmol/L 5   CALCIUM mg/dL 8.5   ALBUMIN g/dL 3.3*   TOTAL BILIRUBIN mg/dL 0.56   ALK PHOS U/L 88   ALT U/L 73*   AST U/L 59*   GLUCOSE RANDOM mg/dL 97     Results from last 7 days   Lab Units 12/10/24  2116   INR  0.93             Results from last 7 days   Lab Units 12/11/24  1332 12/10/24  2116   LACTIC ACID mmol/L 1.6 1.6   PROCALCITONIN ng/ml  --  0.18       Recent Cultures (last 7 days):   Results from last 7 days   Lab Units 12/10/24  2235 12/10/24  2138 12/10/24  1510   BLOOD CULTURE   --  No Growth at 48 hrs.  No Growth at 48 hrs.  --    URINE CULTURE  60,000-69,000 cfu/ml  --  80,000-89,000 cfu/ml Escherichia coli*     US kidney and bladder  Result Date: 12/11/2024  Impression: Patient shaking  during exam per technologist No renal calculus, hydronephrosis. Ureteral jets not identified during the study. Pyelonephritis cannot be evaluated on this imaging modality. No pyelonephritis seen on CT of 12/10/2024. Workstation performed: [id number]     XR chest 2 views  Result Date: 12/11/2024  Impression: No focal consolidation, pleural effusion, or pneumothorax. Workstation performed: [id number]     CT abdomen pelvis with contrast  Result Date: 12/10/2024  Impression: 1.  Findings suspicious for left pyelitis/ureteritis noted as described above. Recommend clinical correlation with urinalysis. No nephrolithiasis or obstructive uropathy/hydronephrosis bilaterally. 2.  No evidence of bowel obstruction, inflammation, appendicitis, free air, or free fluid. 3.  Patchy airspace opacities in portions of the right middle lobe and lingula are partially seen suspicious for airspace disease, recommend clinical correlation. Workstation performed: [id number]         Last 24 Hours Medication List:     Current Facility-Administered Medications:     acetaminophen (TYLENOL) tablet 975 mg, Q6H PRN    anastrozole (ARIMIDEX) tablet 1 mg, Daily    budesonide-formoterol (SYMBICORT) 80-4.5 MCG/ACT inhaler 2 puff, BID    ceFAZolin (ANCEF) IVPB (premix in dextrose) 2,000 mg 50 mL, Q8H, Last Rate: 2,000 mg (12/13/24 1355)    Cholecalciferol (VITAMIN D3) tablet 1,000 Units, Daily    heparin (porcine) subcutaneous injection 5,000 Units, Q8H RAQUEL **AND** [COMPLETED] Platelet count, Once    HYDROmorphone (DILAUDID) injection 0.5 mg, Q4H PRN    ibuprofen (MOTRIN) tablet 400 mg, Q8H PRN    levothyroxine tablet 25 mcg, Early Morning    melatonin tablet 3 mg, HS    niacin tablet 100 mg, Daily With Breakfast    ondansetron (ZOFRAN) injection 4 mg, Q4H PRN    PARoxetine (PAXIL-CR) 24 hr tablet 50 mg, Daily    polyethylene glycol (MIRALAX) packet 17 g, Daily    senna (SENOKOT) tablet 8.6 mg, HS    Administrative Statements   Today, Patient Was Seen  By: Alea Beasley MD  I have spent a total time of >35 minutes in caring for this patient on the day of the visit/encounter including Diagnostic results, Prognosis, Risks and benefits of tx options, Instructions for management, Patient and family education, Importance of tx compliance, Risk factor reductions, Impressions, Counseling / Coordination of care, Documenting in the medical record, Reviewing / ordering tests, medicine, procedures  , Obtaining or reviewing history  , and Communicating with other healthcare professionals .    **Please Note: This note may have been constructed using a voice recognition system.**

## 2024-12-13 NOTE — QUICK NOTE
Patient reports bilateral calf pain. States this pain is new and worse when ambulating    No lower extremity erythema, swelling or warmth.  No tenderness on palpation of both calves. Negative Homans  Currently on DVT prophylaxis with sq heparin   Will check duplex  Educated regarding risks for DVT.  Encouraged to increase activity tolerance

## 2024-12-13 NOTE — PLAN OF CARE

## 2024-12-14 VITALS
SYSTOLIC BLOOD PRESSURE: 114 MMHG | RESPIRATION RATE: 22 BRPM | WEIGHT: 148.81 LBS | HEIGHT: 63 IN | BODY MASS INDEX: 26.37 KG/M2 | OXYGEN SATURATION: 94 % | HEART RATE: 70 BPM | DIASTOLIC BLOOD PRESSURE: 59 MMHG | TEMPERATURE: 97.9 F

## 2024-12-14 LAB
ANION GAP SERPL CALCULATED.3IONS-SCNC: 3 MMOL/L (ref 4–13)
BUN SERPL-MCNC: 8 MG/DL (ref 5–25)
CALCIUM SERPL-MCNC: 8.6 MG/DL (ref 8.4–10.2)
CHLORIDE SERPL-SCNC: 110 MMOL/L (ref 96–108)
CO2 SERPL-SCNC: 28 MMOL/L (ref 21–32)
CREAT SERPL-MCNC: 0.64 MG/DL (ref 0.6–1.3)
ERYTHROCYTE [DISTWIDTH] IN BLOOD BY AUTOMATED COUNT: 13.2 % (ref 11.6–15.1)
GFR SERPL CREATININE-BSD FRML MDRD: 96 ML/MIN/1.73SQ M
GLUCOSE SERPL-MCNC: 90 MG/DL (ref 65–140)
HCT VFR BLD AUTO: 31.9 % (ref 34.8–46.1)
HGB BLD-MCNC: 10.7 G/DL (ref 11.5–15.4)
MCH RBC QN AUTO: 29.6 PG (ref 26.8–34.3)
MCHC RBC AUTO-ENTMCNC: 33.5 G/DL (ref 31.4–37.4)
MCV RBC AUTO: 88 FL (ref 82–98)
PLATELET # BLD AUTO: 162 THOUSANDS/UL (ref 149–390)
PMV BLD AUTO: 10.3 FL (ref 8.9–12.7)
POTASSIUM SERPL-SCNC: 3.6 MMOL/L (ref 3.5–5.3)
RBC # BLD AUTO: 3.62 MILLION/UL (ref 3.81–5.12)
SODIUM SERPL-SCNC: 141 MMOL/L (ref 135–147)
WBC # BLD AUTO: 3.29 THOUSAND/UL (ref 4.31–10.16)

## 2024-12-14 PROCEDURE — 99239 HOSP IP/OBS DSCHRG MGMT >30: CPT

## 2024-12-14 PROCEDURE — 85027 COMPLETE CBC AUTOMATED: CPT

## 2024-12-14 PROCEDURE — 80048 BASIC METABOLIC PNL TOTAL CA: CPT

## 2024-12-14 RX ORDER — LACTOBACILLUS ACIDOPH-L.BULGARICUS 1 MILLION CELL CHEWABLE TABLET 1MM CELL
1 TABLET,CHEWABLE ORAL
Qty: 36 TABLET | Refills: 0 | Status: SHIPPED | OUTPATIENT
Start: 2024-12-14 | End: 2024-12-26

## 2024-12-14 RX ADMIN — ANASTROZOLE 1 MG: 1 TABLET ORAL at 08:36

## 2024-12-14 RX ADMIN — Medication 100 MG: at 08:36

## 2024-12-14 RX ADMIN — LEVOTHYROXINE SODIUM 25 MCG: 25 TABLET ORAL at 05:00

## 2024-12-14 RX ADMIN — BUDESONIDE AND FORMOTEROL FUMARATE DIHYDRATE 2 PUFF: 80; 4.5 AEROSOL RESPIRATORY (INHALATION) at 08:35

## 2024-12-14 RX ADMIN — PAROXETINE 50 MG: 25 TABLET, FILM COATED, EXTENDED RELEASE ORAL at 08:36

## 2024-12-14 RX ADMIN — CEFAZOLIN SODIUM 2000 MG: 2 SOLUTION INTRAVENOUS at 05:00

## 2024-12-14 RX ADMIN — Medication 1000 UNITS: at 08:36

## 2024-12-14 NOTE — DISCHARGE INSTR - AVS FIRST PAGE
Follow-up:    -Follow-up with your PCP within 1 week of discharge    Medications:  -Take Augmentin as prescribed  -Take probiotics as prescribed till the duration you are taking antibiotics    Blood workup:    -Repeat CMP in next week and discuss with your PCP.

## 2024-12-14 NOTE — PLAN OF CARE

## 2024-12-14 NOTE — PLAN OF CARE

## 2024-12-14 NOTE — ASSESSMENT & PLAN NOTE
Patient follows with hematology oncology specialist at [facility].  Last office visit recorded in July 2024  Patient will be requiring outpatient follow-up for further management  Continue with prior to admission anastrozole

## 2024-12-14 NOTE — ASSESSMENT & PLAN NOTE
Patient presented to the ER of [facility] with complaints of increased urinary urgency dysuria along with gross hematuria on 12/10/2024.  It was also accompanied by bilateral flank pains chills and patient relates taking her single dose of p.o. Bactrim prescribed by her PCP before the symptoms of chills and rigors occurred.  Upon arrival in the ED patient was seen and examined.  Patient was meeting the criteria for sepsis with fever Tmax of 102, tachycardia, leukopenia and tachypnea.  Patient blood pressure was also low and was started on Levophed and transferred to ICU for further management.    -CAT scan of abdomen and pelvis with contrast on 12/10/2024 showed grossly unremarkable right kidney but mild wall thickening and mucosal hyperenhancement involving the left renal pelvis and left ureter with subtle periureteral his inflammation stranding seen.  Findings suspicion for left-sided pyelitis/ureteritis.  Bladder was moderately distended and grossly unremarkable.  -Renal ultrasound did not endorse any hydronephrosis  -Patient was started on broad-spectrum IV antibiotics meropenem and vancomycin which has now been transitioned to cefazolin given urine cultures sensitivities  -Plan is to transition to orals 12/14/2024 and then discharge if patient continues to improve  -Urology had been on board, did not plan for any operative intervention, agreed with antibiotics and voiding trial which the patient will be getting today 12/13/2024  -Patient had been hemodynamically stable,  -Urine cultures endorse growth of E. coli  -Blood cultures negative MRSA negative

## 2024-12-14 NOTE — DISCHARGE SUMMARY
Discharge Summary - Hospitalist   Name: Richa Medina 59 y.o. female I MRN: [id number]  Unit/Bed#: [place] [address] -01 I Date of Admission: 12/10/2024   Date of Service: 12/14/2024 I Hospital Day: 3     Assessment & Plan  Septic shock (HCC)  Resolved  Likely secondary to pyelonephritis and UTI  Transaminitis trending down likely secondary from sepsis  Depression  Continue prior to admission paroxetine  Hypothyroidism  Continue prior to admission levothyroxine  Vitamin D deficiency  Continue prior to admission colecalciferol tablets  Malignant neoplasm of overlapping sites of right breast in female, estrogen receptor positive (HCC)  Patient follows with hematology oncology specialist at [facility].  Last office visit recorded in July 2024  Patient will be requiring outpatient follow-up for further management  Continue with prior to admission anastrozole  Pyelonephritis  Patient presented to the ER of [facility] with complaints of increased urinary urgency dysuria along with gross hematuria on 12/10/2024.  It was also accompanied by bilateral flank pains chills and patient relates taking her single dose of p.o. Bactrim prescribed by her PCP before the symptoms of chills and rigors occurred.  Upon arrival in the ED patient was seen and examined.  Patient was meeting the criteria for sepsis with fever Tmax of 102, tachycardia, leukopenia and tachypnea.  Patient blood pressure was also low and was started on Levophed and transferred to ICU for further management.    -CAT scan of abdomen and pelvis with contrast on 12/10/2024 showed grossly unremarkable right kidney but mild wall thickening and mucosal hyperenhancement involving the left renal pelvis and left ureter with subtle periureteral his inflammation stranding seen.  Findings suspicion for left-sided pyelitis/ureteritis.  Bladder was moderately distended and grossly unremarkable.  -Renal ultrasound did not endorse any hydronephrosis  -Patient  was started on broad-spectrum IV antibiotics meropenem and vancomycin which has now been transitioned to cefazolin given urine cultures sensitivities  -Plan is to transition to orals 12/14/2024 and then discharge if patient continues to improve  -Urology had been on board, did not plan for any operative intervention, agreed with antibiotics and voiding trial which the patient will be getting today 12/13/2024  -Patient had been hemodynamically stable,  -Urine cultures endorse growth of E. coli  -Blood cultures negative MRSA negative  Hypoxia  Patient is now saturating well on room air     Medical Problems       Resolved Problems  Date Reviewed: 12/10/2024   None       Discharging Physician / Practitioner: Aela Beasley MD  PCP: Shelby Marte PA-C  Admission Date:   Admission Orders (From admission, onward)       Ordered        12/11/24 0005  Inpatient Admission  Once                          Discharge Date: 12/14/24    Consultations During Hospital Stay:  Critical care  Urology    Procedures Performed:   N/A    Significant Findings / Test Results:   US kidney and bladder  Result Date: 12/11/2024  Impression: Patient shaking during exam per technologist No renal calculus, hydronephrosis. Ureteral jets not identified during the study. Pyelonephritis cannot be evaluated on this imaging modality. No pyelonephritis seen on CT of 12/10/2024. Workstation performed: [id number]     XR chest 2 views  Result Date: 12/11/2024  Impression: No focal consolidation, pleural effusion, or pneumothorax. Workstation performed: [id number]     CT abdomen pelvis with contrast  Result Date: 12/10/2024  Impression: 1.  Findings suspicious for left pyelitis/ureteritis noted as described above. Recommend clinical correlation with urinalysis. No nephrolithiasis or obstructive uropathy/hydronephrosis bilaterally. 2.  No evidence of bowel obstruction, inflammation, appendicitis, free air, or free fluid. 3.  Patchy airspace opacities in  portions of the right middle lobe and lingula are partially seen suspicious for airspace disease, recommend clinical correlation. Workstation performed: [id number]     Lab Results   Component Value Date    WBC 3.29 (L) 12/14/2024    HGB 10.7 (L) 12/14/2024    HCT 31.9 (L) 12/14/2024    MCV 88 12/14/2024     12/14/2024     Lab Results   Component Value Date    SODIUM 141 12/14/2024    K 3.6 12/14/2024     (H) 12/14/2024    CO2 28 12/14/2024    BUN 8 12/14/2024    CREATININE 0.64 12/14/2024    GLUC 90 12/14/2024    CALCIUM 8.6 12/14/2024     Lab Results   Component Value Date    WBC 3.29 (L) 12/14/2024    HGB 10.7 (L) 12/14/2024    HCT 31.9 (L) 12/14/2024    MCV 88 12/14/2024     12/14/2024     Lab Results   Component Value Date    HGBA1C 5.5 01/31/2024     Lab Results   Component Value Date    CHOLESTEROL 200 (H) 09/28/2023    CHOLESTEROL 189 08/14/2023    CHOLESTEROL 205 (H) 08/13/2022     Lab Results   Component Value Date    HDL 64 09/28/2023    HDL 62 08/14/2023    HDL 63 08/13/2022     Lab Results   Component Value Date    TRIG 97 09/28/2023    TRIG 249 (H) 08/14/2023    TRIG 176 (H) 08/13/2022     Lab Results   Component Value Date    NONHDLC 136 09/28/2023    NONHDLC 127 08/14/2023    NONHDLC 142 08/13/2022     Lab Results   Component Value Date    YOY9RUAQFERX 2.913 01/31/2024     Lab Results   Component Value Date    ALT 73 (H) 12/13/2024    AST 59 (H) 12/13/2024    ALKPHOS 88 12/13/2024           Incidental Findings:   N/A    Test Results Pending at Discharge (will require follow up):   N/A     Outpatient Tests Requested:  CMP    Complications: N/A    Reason for Admission: Gross hematuria and dysuria    Hospital Course:       Patient presented to the ER with complaints of dysuria and gross hematuria accompanied by bilateral flank pain with fevers and chills.  Patient reported that she noticed rigors and chills after she took her Bactrim dose that was prescribed to her outpatiently.   "Patient denied any gastrointestinal respiratory symptoms post Bactrim.  Upon arrival patient met the criteria for sepsis because of fever tachycardia leukopenia and tachypnea blood pressures were also on the lower side and patient required Levophed and was transferred to ICU for further management with IV antibiotics.  CAT scan showed mild wall thickening and mucosal hyperenhancement involving the left renal pelvis and left ureter likely suggestive of left-sided pyelitis/ureteritis.  Patient was initially on IV broad-spectrum antibiotics and transition to cefazolin and then cultures and sensitivities came back positive for Augmentin susceptibilities and patient was transitioned to oral Augmentin with probiotics.  Urine cultures grew E. coli  Blood cultures and MRSA negative    Outpatient CMP ordered because patient had transaminitis during this admission likely due to sepsis trending down during hospital course can be reassessed for resolution outpatient when patient is seen for transition of care.    Condition at Discharge: good    Discharge Day Visit / Exam:   Subjective: Patient seen examined at bedside.  Patient no acute concerns.  Patient said that she has been voiding well after Harley's was taken out.  No acute concerns no fevers rigors chills abdominal pain dysuria appreciated no hematuria reported.  Vitals: Blood Pressure: 114/59 (12/13/24 2133)  Pulse: 70 (12/13/24 2133)  Temperature: 97.9 °F (36.6 °C) (12/13/24 2133)  Temp Source: Oral (12/13/24 2133)  Respirations: 22 (12/13/24 2133)  Height: 5' 3\" (160 cm) (12/11/24 0048)  Weight - Scale: 67.5 kg (148 lb 13 oz) (12/14/24 0600)  SpO2: 94 % (12/13/24 2133)  Physical Exam  Vitals and nursing note reviewed.   Constitutional:       General: She is not in acute distress.     Appearance: She is well-developed.   HENT:      Head: Normocephalic and atraumatic.   Eyes:      Conjunctiva/sclera: Conjunctivae normal.   Cardiovascular:      Rate and Rhythm: Normal " rate and regular rhythm.      Heart sounds: No murmur heard.  Pulmonary:      Effort: Pulmonary effort is normal. No respiratory distress.      Breath sounds: Normal breath sounds.   Abdominal:      General: Bowel sounds are normal.      Palpations: Abdomen is soft.      Tenderness: There is no abdominal tenderness.   Musculoskeletal:         General: No swelling.      Cervical back: Neck supple.   Skin:     General: Skin is warm and dry.      Capillary Refill: Capillary refill takes less than 2 seconds.   Neurological:      Mental Status: She is alert and oriented to person, place, and time.   Psychiatric:         Mood and Affect: Mood normal.         Discussion with Family:  Family at bedside.     Discharge instructions/Information to patient and family:   See after visit summary for information provided to patient and family.      Provisions for Follow-Up Care:  See after visit summary for information related to follow-up care and any pertinent home health orders.      Mobility at time of Discharge:   Basic Mobility Inpatient Raw Score: 24  JH-HLM Goal: 8: Walk 250 feet or more  JH-HLM Achieved: 8: Walk 250 feet ot more       Disposition:   Home    Planned Readmission: N/A    Discharge Medications:  See after visit summary for reconciled discharge medications provided to patient and/or family.      Administrative Statements   Discharge Statement:  I have spent a total time of >40 minutes in caring for this patient on the day of the visit/encounter. >30 minutes of time was spent on: Diagnostic results, Prognosis, Risks and benefits of tx options, Instructions for management, Patient and family education, Importance of tx compliance, Risk factor reductions, Impressions, Counseling / Coordination of care, Documenting in the medical record, Reviewing / ordering tests, medicine, procedures  , and Communicating with other healthcare professionals .    **Please Note: This note may have been constructed using a voice  recognition system**

## 2024-12-16 ENCOUNTER — OFFICE VISIT (OUTPATIENT)
Age: 59
End: 2024-12-16
Payer: COMMERCIAL

## 2024-12-16 ENCOUNTER — TRANSITIONAL CARE MANAGEMENT (OUTPATIENT)
Dept: FAMILY MEDICINE CLINIC | Facility: CLINIC | Age: 59
End: 2024-12-16

## 2024-12-16 DIAGNOSIS — Z90.11 ACQUIRED ABSENCE OF RIGHT BREAST: Primary | ICD-10-CM

## 2024-12-16 LAB
BACTERIA BLD CULT: NORMAL
BACTERIA BLD CULT: NORMAL

## 2024-12-16 PROCEDURE — 99214 OFFICE O/P EST MOD 30 MIN: CPT | Performed by: STUDENT IN AN ORGANIZED HEALTH CARE EDUCATION/TRAINING PROGRAM

## 2024-12-16 NOTE — UTILIZATION REVIEW
NOTIFICATION OF ADMISSION DISCHARGE   This is a Notification of Discharge from [facility]. Please be advised that this patient has been discharge from our facility. Below you will find the admission and discharge date and time including the patient’s disposition.   UTILIZATION REVIEW CONTACT:  Mari Ann  Utilization   Network Utilization Review Department  Phone: [phone] x carefully listen to the prompts. All voicemails are confidential.  Email: [email or web address]     ADMISSION INFORMATION  PRESENTATION DATE: 12/10/2024  8:41 PM  OBERVATION ADMISSION DATE: N/A  INPATIENT ADMISSION DATE: 12/11/24 12:05 AM   DISCHARGE DATE: 12/14/2024  2:15 PM   DISPOSITION:Home/Self Care    Network Utilization Review Department  ATTENTION: Please call with any questions or concerns to [phone] and carefully listen to the prompts so that you are directed to the right person. All voicemails are confidential.   For Discharge needs, contact Care Management DC Support Team at [phone] opt. 2  Send all requests for admission clinical reviews, approved or denied determinations and any other requests to dedicated fax number below belonging to the campus where the patient is receiving treatment. List of dedicated fax numbers for the Facilities:  FACILITY NAME UR FAX NUMBER   ADMISSION DENIALS (Administrative/Medical Necessity) [phone]   DISCHARGE SUPPORT TEAM ([facility]) [phone]   PARENT CHILD HEALTH (Maternity/NICU/Pediatrics) [phone]   [facility] [phone]   [facility] [phone]   [facility] [phone]   [facility] [phone]   [facility] [phone]   [facility] [phone]   [facility] [phone]   [facility]  [phone]   [facility] [phone]   [facility] [phone]   [facility] [phone]   [facility] [phone]

## 2024-12-17 ENCOUNTER — TELEPHONE (OUTPATIENT)
Age: 59
End: 2024-12-17

## 2024-12-17 NOTE — TELEPHONE ENCOUNTER
New Patient     What is the reason for the patient’s appointment?:  Pt seen in ED on 12/10/24, discharged on 12/14/24. Findings suspicious for left pyelitis/ureteritis. Decision tree stated to scheduled next available appointment. Scheduled for 12/24/24 at 9:15 AM with Dr. Laws.      What office location does the patient prefer?:  [place]     Does patient have Imaging/Lab Results:  CT     Have patient records been requested?:  If No, are the records showing in Epic:  Yes

## 2024-12-17 NOTE — TELEPHONE ENCOUNTER
Pt called to schedule a TCM appointment. Tried calling office and could not get through. Please call back to schedule at [phone]  thank you.

## 2024-12-19 ENCOUNTER — TELEPHONE (OUTPATIENT)
Age: 59
End: 2024-12-19

## 2024-12-19 NOTE — TELEPHONE ENCOUNTER
Call received from patient. Asking if the labs she had drawn for her urologist in December would be okay for her virtual visit with Dr Yung on 1/17. Informed her that we would want her to have labs done about a week before her appointment to have more updated results. Verbalized understanding.

## 2024-12-22 NOTE — PROGRESS NOTES
"Patient seen and examined.  Recently hospitalized for sepsis/UTI.  No issues with breast reconstruction.  Had very challenging time with this hospitalization.      Well healed incisions, no palpable masses, mild contour irregularities of the right    We discussed the option of fat transfer in the future.  Certainly she needs time to recover from her recent hospitalization.  This \"does not have an expiration date nor is it necessary\".  If she would like to pursue this, it can be at her convenience.  Otherwise will see back for annual visit. She will see me sooner should any issues or concerns arise.    Samara Felix, DO  Plastic and Reconstructive Surgery    "

## 2024-12-23 ENCOUNTER — APPOINTMENT (OUTPATIENT)
Dept: LAB | Facility: CLINIC | Age: 59
End: 2024-12-23
Payer: COMMERCIAL

## 2024-12-23 DIAGNOSIS — N39.0 UTI (URINARY TRACT INFECTION): ICD-10-CM

## 2024-12-23 DIAGNOSIS — R74.01 TRANSAMINITIS: ICD-10-CM

## 2024-12-23 DIAGNOSIS — R65.21 SEPTIC SHOCK (HCC): ICD-10-CM

## 2024-12-23 DIAGNOSIS — A41.9 SEPTIC SHOCK (HCC): ICD-10-CM

## 2024-12-23 DIAGNOSIS — N12 PYELONEPHRITIS: ICD-10-CM

## 2024-12-23 LAB
ALBUMIN SERPL BCG-MCNC: 4.3 G/DL (ref 3.5–5)
ALP SERPL-CCNC: 125 U/L (ref 34–104)
ALT SERPL W P-5'-P-CCNC: 27 U/L (ref 7–52)
ANION GAP SERPL CALCULATED.3IONS-SCNC: 8 MMOL/L (ref 4–13)
AST SERPL W P-5'-P-CCNC: 18 U/L (ref 13–39)
BILIRUB SERPL-MCNC: 0.5 MG/DL (ref 0.2–1)
BUN SERPL-MCNC: 12 MG/DL (ref 5–25)
CALCIUM SERPL-MCNC: 9.6 MG/DL (ref 8.4–10.2)
CHLORIDE SERPL-SCNC: 105 MMOL/L (ref 96–108)
CO2 SERPL-SCNC: 25 MMOL/L (ref 21–32)
CREAT SERPL-MCNC: 0.65 MG/DL (ref 0.6–1.3)
GFR SERPL CREATININE-BSD FRML MDRD: 96 ML/MIN/1.73SQ M
GLUCOSE P FAST SERPL-MCNC: 91 MG/DL (ref 65–99)
POTASSIUM SERPL-SCNC: 4.1 MMOL/L (ref 3.5–5.3)
PROT SERPL-MCNC: 7.4 G/DL (ref 6.4–8.4)
SODIUM SERPL-SCNC: 138 MMOL/L (ref 135–147)

## 2024-12-23 PROCEDURE — 80053 COMPREHEN METABOLIC PANEL: CPT

## 2024-12-23 PROCEDURE — 36415 COLL VENOUS BLD VENIPUNCTURE: CPT

## 2024-12-24 ENCOUNTER — OFFICE VISIT (OUTPATIENT)
Dept: UROLOGY | Facility: MEDICAL CENTER | Age: 59
End: 2024-12-24
Payer: COMMERCIAL

## 2024-12-24 ENCOUNTER — PATIENT MESSAGE (OUTPATIENT)
Dept: UROLOGY | Facility: MEDICAL CENTER | Age: 59
End: 2024-12-24

## 2024-12-24 VITALS
HEART RATE: 88 BPM | BODY MASS INDEX: 26.22 KG/M2 | WEIGHT: 148 LBS | HEIGHT: 63 IN | SYSTOLIC BLOOD PRESSURE: 130 MMHG | OXYGEN SATURATION: 98 % | DIASTOLIC BLOOD PRESSURE: 70 MMHG

## 2024-12-24 DIAGNOSIS — N12 PYELONEPHRITIS OF LEFT KIDNEY: Primary | ICD-10-CM

## 2024-12-24 PROCEDURE — 99213 OFFICE O/P EST LOW 20 MIN: CPT | Performed by: UROLOGY

## 2024-12-24 NOTE — LETTER
2024     Shelby Marte PA-C  [address]   [address]  [facility] [address]    Patient: Richa Medina   YOB: 1965   Date of Visit: 2024       Dear Dr. Marte:    Thank you for referring Richa Medina to me for evaluation. Below are my notes for this consultation.    If you have questions, please do not hesitate to call me. I look forward to following your patient along with you.         Sincerely,        Camron Laws MD        CC: No Recipients    Camron Laws MD  2024  9:51 AM  Sign when Signing Visit  Name: Richa Medina      : 1965      MRN: [id number]  Encounter Provider: Camron Laws MD  Encounter Date: 2024   Encounter department: [facility] [place]  :  Assessment & Plan  Pyelonephritis of left kidney    Orders:  •  Urine culture; Future  •  Cystoscopy; Future  •  POCT Measure PVR; Future        History of Present Illness  Richa Medina is a 59 y.o. female who presents with a recent history of left pyelonephritis.  The patient apparently developed symptoms of fatigue somnolence as well as increase in urinary urgency mild dysuria and on 1 episode some gross blood in the urine.  The patient was treated with antibiotics, initially Bactrim DS, but currently is on Augmentin completing a 2-week course.  Review of patient's CT and renal ultrasound revealed no upper urinary tract abnormalities other than signs of left pyelonephritis.  Patient currently is afebrile but still notes continued fatigue from the pyelonephritic episode.    Review of Systems   Genitourinary:  Positive for flank pain, hematuria and urgency.        Left flank pain improved   All other systems reviewed and are negative.    Pertinent Medical History          Medical History Reviewed by provider this encounter:  Tobacco  Allergies  Meds  Problems  Med Hx  Surg Hx  Fam Hx  Soc   Hx    .  Past Medical History  Past Medical History:    Diagnosis Date   • Allergic May 2023   • Asthma    • Breast cancer (HCC) 11/2023   • Breast mass 10/31/23   • Cancer (HCC)     bilateral   • Depression    • Disease of thyroid gland    • Hearing aid worn     bilateral   • Hearing loss    • HL (hearing loss)    • Osteopenia    • Rosacea 02/09/2021   • Seasonal allergies    • Vitamin D deficiency 07/24/2018     Past Surgical History:   Procedure Laterality Date   • BREAST BIOPSY Right 11/01/2023    X 2 sites   • BREAST BIOPSY Left 12/08/2023   • BREAST LUMPECTOMY  4/2024   • IR DRAINAGE TUBE CHECK/CHANGE/REPOSITION/REINSERTION/UPSIZE  06/27/2024   • IR DRAINAGE TUBE CHECK/CHANGE/REPOSITION/REINSERTION/UPSIZE  07/09/2024   • IR DRAINAGE TUBE PLACEMENT  06/18/2024   • LASIK     • MASTECTOMY  4/2023   • MRI BREAST BIOPSY LEFT (ALL INCLUSIVE) Left 12/08/2023   • MI ADJNT TIS TRNSFR/REARGMT ANY AREA 30.1-60 SQ CM Right 04/19/2024    Procedure: RIGHT AESTHETIC FLAT CLOSURE , IBIS FLAP, PREVENA PLACEMENT;  Surgeon: Samara Felix DO;  [facility];  Service: Plastics   • MI BX/EXC LYMPH NODE OPEN SUPERFICIAL Bilateral 04/19/2024    Procedure: Bilateral BX LYMPH NODE SENTINEL, lymphoscintigraphies, lymphatic mappings;  Surgeon: Sisi Dominguez MD;  [facility];  Service: Surgical Oncology   • MI EXC BREAST LES PREOP PLMT RAD MARKER OPEN 1 LES Left 04/19/2024    Procedure: LEFT BREAST HILARY LOCALIZATION LUMPECTOMY;;  Surgeon: Sisi Dominguez MD;  [facility];  Service: Surgical Oncology   • MI INJ RADIOACTIVE TRACER FOR ID OF SENTINEL NODE Bilateral 04/19/2024    Procedure: Bilateral BX LYMPH NODE SENTINEL, lymphoscintigraphies, lymphatic mappings;  Surgeon: Sisi Dominguez MD;  [facility];  Service: Surgical Oncology   • MI INTRAOP SENTINEL LYMPH NODE ID W/DYE INJECTION Bilateral 04/19/2024    Procedure: Bilateral BX LYMPH NODE SENTINEL, lymphoscintigraphies, lymphatic mappings;  Surgeon: Sisi Dominguez MD;  [facility];   Service: Surgical Oncology   • DE MASTECTOMY PARTIAL Left 04/19/2024    Procedure: LEFT BREAST HILARY LOCALIZATION LUMPECTOMY;;  Surgeon: Sisi Dominguez MD;  [facility];  Service: Surgical Oncology   • DE MASTECTOMY SIMPLE COMPLETE Right 04/19/2024    Procedure: RIGHT BREAST MASTECTOMY, PSB AXILLARY DISSECTION;  Surgeon: Sisi Dominguez MD;  [facility];  Service: Surgical Oncology   • DE MASTOPEXY Left 04/19/2024    Procedure: MASTOPEXY BREAST;  Surgeon: Samara Felix DO;  [facility];  Service: Plastics   • DE NEUROPLASTY &/TRANSPOS MEDIAN NRV CARPAL TUNNE Right 07/10/2018    Procedure: CARPAL TUNNEL RELEASE;  Surgeon: Maicol Taylor DO;  [facility];  Service: Orthopedics   • DE NEUROPLASTY &/TRANSPOS MEDIAN NRV CARPAL TUNNE Left 04/19/2019    Procedure: RELEASE CARPAL TUNNEL;  Surgeon: Peterson Alejandro MD;  [facility];  Service: Orthopedics   • SEPTOPLASTY     • SINUS SURGERY     • US BREAST NEEDLE LOC LEFT Left 02/27/2024   • US GUIDANCE BREAST BIOPSY RIGHT EACH ADDITIONAL Right 11/01/2023   • US GUIDED BREAST BIOPSY RIGHT COMPLETE Right 11/01/2023     Family History   Problem Relation Age of Onset   • Hashimoto's thyroiditis Mother    • Thyroid disease Mother    • Melanoma Mother         50's   • Cancer Mother         melanoma   • Hearing loss Mother    • Melanoma Father         50's   • Crohn's disease Father    • Cancer Father         melanoma   • Hearing loss Father    • Hashimoto's thyroiditis Sister    • No Known Problems Sister    • No Known Problems Sister    • Colon cancer Maternal Uncle         50's   • Depression Maternal Grandmother    • Diabetes Maternal Grandmother    • Mental illness Maternal Grandmother    • Dementia Maternal Grandmother    • No Known Problems Maternal Grandfather    • No Known Problems Paternal Grandmother    • No Known Problems Paternal Grandfather       reports that she has never smoked. She has never used smokeless tobacco.  She reports current alcohol use. She reports that she does not use drugs.  Current Outpatient Medications on File Prior to Visit   Medication Sig Dispense Refill   • amoxicillin-clavulanate (AUGMENTIN) 875-125 mg per tablet Take 1 tablet by mouth every 12 (twelve) hours for 12 days 24 tablet 0   • anastrozole (ARIMIDEX) 1 mg tablet Take 1 tablet (1 mg total) by mouth daily 90 tablet 2   • budesonide-formoterol (Symbicort) 80-4.5 MCG/ACT inhaler Inhale 2 puffs 2 (two) times a day Rinse mouth after use. 30 g 4   • Calcium 250 MG CAPS Take by mouth daily with dinner       • Cholecalciferol (VITAMIN D3 PO) Take 2 capsules by mouth in the morning     • lactobacillus acidophilus-bulgaricus (LACTINEX) chewable tablet Chew 1 tablet 3 (three) times a day with meals for 12 days 36 tablet 0   • levocetirizine (XYZAL) 5 MG tablet Take 1 tablet (5 mg total) by mouth every evening 90 tablet 1   • levothyroxine 25 mcg tablet TAKE ONE TABLET BY MOUTH EVERY DAY MONDAY THRU FRIDAY AND TAKE 2 TABLETS EVERY DAY ON SAT & SUN. 114 tablet 1   • MAGNESIUM PO Take 200 mg by mouth daily with dinner       • Medium Chain Triglycerides (MCT OIL PO) Take 1 Application by mouth in the morning     • mometasone (NASONEX) 50 mcg/act nasal spray 2 sprays into each nostril daily 17 g 0   • MULTIPLE VITAMINS-CALCIUM PO Take 1 capsule by mouth daily in the early morning       • niacin 100 mg tablet Take 100 mg by mouth daily with breakfast     • PARoxetine (PAXIL-CR) 25 mg 24 hr tablet TAKE TWO TABLETS BY MOUTH EVERY  tablet 1     No current facility-administered medications on file prior to visit.     Allergies   Allergen Reactions   • Pollen Extract       Current Outpatient Medications on File Prior to Visit   Medication Sig Dispense Refill   • amoxicillin-clavulanate (AUGMENTIN) 875-125 mg per tablet Take 1 tablet by mouth every 12 (twelve) hours for 12 days 24 tablet 0   • anastrozole (ARIMIDEX) 1 mg tablet Take 1 tablet (1 mg total) by mouth  "daily 90 tablet 2   • budesonide-formoterol (Symbicort) 80-4.5 MCG/ACT inhaler Inhale 2 puffs 2 (two) times a day Rinse mouth after use. 30 g 4   • Calcium 250 MG CAPS Take by mouth daily with dinner       • Cholecalciferol (VITAMIN D3 PO) Take 2 capsules by mouth in the morning     • lactobacillus acidophilus-bulgaricus (LACTINEX) chewable tablet Chew 1 tablet 3 (three) times a day with meals for 12 days 36 tablet 0   • levocetirizine (XYZAL) 5 MG tablet Take 1 tablet (5 mg total) by mouth every evening 90 tablet 1   • levothyroxine 25 mcg tablet TAKE ONE TABLET BY MOUTH EVERY DAY MONDAY THRU FRIDAY AND TAKE 2 TABLETS EVERY DAY ON SAT & SUN. 114 tablet 1   • MAGNESIUM PO Take 200 mg by mouth daily with dinner       • Medium Chain Triglycerides (MCT OIL PO) Take 1 Application by mouth in the morning     • mometasone (NASONEX) 50 mcg/act nasal spray 2 sprays into each nostril daily 17 g 0   • MULTIPLE VITAMINS-CALCIUM PO Take 1 capsule by mouth daily in the early morning       • niacin 100 mg tablet Take 100 mg by mouth daily with breakfast     • PARoxetine (PAXIL-CR) 25 mg 24 hr tablet TAKE TWO TABLETS BY MOUTH EVERY  tablet 1     No current facility-administered medications on file prior to visit.      Social History     Tobacco Use   • Smoking status: Never   • Smokeless tobacco: Never   Vaping Use   • Vaping status: Never Used   Substance and Sexual Activity   • Alcohol use: Yes     Comment: rarely   • Drug use: No   • Sexual activity: Yes     Partners: Male     Birth control/protection: Male Sterilization        Objective  /70 (BP Location: Left arm, Patient Position: Sitting, Cuff Size: Standard)   Pulse 88   Ht 5' 3\" (1.6 m)   Wt 67.1 kg (148 lb)   SpO2 98%   BMI 26.22 kg/m²     Physical Exam  Vitals reviewed.   Constitutional:       General: She is not in acute distress.     Appearance: Normal appearance. She is not ill-appearing, toxic-appearing or diaphoretic.   HENT:      Head: " "Normocephalic and atraumatic.      Nose: Nose normal.      Mouth/Throat:      Mouth: Mucous membranes are moist.   Eyes:      Extraocular Movements: Extraocular movements intact.   Pulmonary:      Effort: Pulmonary effort is normal. No respiratory distress.   Abdominal:      General: There is no distension.      Palpations: Abdomen is soft.   Musculoskeletal:         General: Normal range of motion.      Cervical back: Neck supple.   Skin:     General: Skin is dry.   Neurological:      Mental Status: She is alert and oriented to person, place, and time.   Psychiatric:         Mood and Affect: Mood normal.         Behavior: Behavior normal.         Thought Content: Thought content normal.         Judgment: Judgment normal.           Results  No results found for: \"PSA\"  Lab Results   Component Value Date    CALCIUM 9.6 12/23/2024    K 4.1 12/23/2024    CO2 25 12/23/2024     12/23/2024    BUN 12 12/23/2024    CREATININE 0.65 12/23/2024     Lab Results   Component Value Date    WBC 3.29 (L) 12/14/2024    HGB 10.7 (L) 12/14/2024    HCT 31.9 (L) 12/14/2024    MCV 88 12/14/2024     12/14/2024       Office Urine Dip  No results found for this or any previous visit (from the past hour).]      "

## 2024-12-24 NOTE — PROGRESS NOTES
Name: Richa Medina      : 1965      MRN: [id number]  Encounter Provider: Camron Laws MD  Encounter Date: 2024   Encounter department: [facility] [place]  :  Assessment & Plan  Pyelonephritis of left kidney    Orders:    Urine culture; Future    Cystoscopy; Future    POCT Measure PVR; Future        History of Present Illness   Richa Medina is a 59 y.o. female who presents with a recent history of left pyelonephritis.  The patient apparently developed symptoms of fatigue somnolence as well as increase in urinary urgency mild dysuria and on 1 episode some gross blood in the urine.  The patient was treated with antibiotics, initially Bactrim DS, but currently is on Augmentin completing a 2-week course.  Review of patient's CT and renal ultrasound revealed no upper urinary tract abnormalities other than signs of left pyelonephritis.  Patient currently is afebrile but still notes continued fatigue from the pyelonephritic episode.    Review of Systems   Genitourinary:  Positive for flank pain, hematuria and urgency.        Left flank pain improved   All other systems reviewed and are negative.    Pertinent Medical History           Medical History Reviewed by provider this encounter:  Tobacco  Allergies  Meds  Problems  Med Hx  Surg Hx  Fam Hx  Soc   Hx    .  Past Medical History   Past Medical History:   Diagnosis Date    Allergic May 2023    Asthma     Breast cancer (HCC) 2023    Breast mass 10/31/23    Cancer (HCC)     bilateral    Depression     Disease of thyroid gland     Hearing aid worn     bilateral    Hearing loss     HL (hearing loss)     Osteopenia     Rosacea 2021    Seasonal allergies     Vitamin D deficiency 2018     Past Surgical History:   Procedure Laterality Date    BREAST BIOPSY Right 11/01/2023    X 2 sites    BREAST BIOPSY Left 2023    BREAST LUMPECTOMY  2024    IR DRAINAGE TUBE CHECK/CHANGE/REPOSITION/REINSERTION/UPSIZE   06/27/2024    IR DRAINAGE TUBE CHECK/CHANGE/REPOSITION/REINSERTION/UPSIZE  07/09/2024    IR DRAINAGE TUBE PLACEMENT  06/18/2024    LASIK      MASTECTOMY  4/2023    MRI BREAST BIOPSY LEFT (ALL INCLUSIVE) Left 12/08/2023    CT ADJNT TIS TRNSFR/REARGMT ANY AREA 30.1-60 SQ CM Right 04/19/2024    Procedure: RIGHT AESTHETIC FLAT CLOSURE , IBIS FLAP, PREVENA PLACEMENT;  Surgeon: Samara Felix DO;  [facility];  Service: Plastics    CT BX/EXC LYMPH NODE OPEN SUPERFICIAL Bilateral 04/19/2024    Procedure: Bilateral BX LYMPH NODE SENTINEL, lymphoscintigraphies, lymphatic mappings;  Surgeon: Sisi Dominguez MD;  [facility];  Service: Surgical Oncology    CT EXC BREAST LES PREOP PLMT RAD MARKER OPEN 1 LES Left 04/19/2024    Procedure: LEFT BREAST HILARY LOCALIZATION LUMPECTOMY;;  Surgeon: Sisi Dominguez MD;  [facility];  Service: Surgical Oncology    CT INJ RADIOACTIVE TRACER FOR ID OF SENTINEL NODE Bilateral 04/19/2024    Procedure: Bilateral BX LYMPH NODE SENTINEL, lymphoscintigraphies, lymphatic mappings;  Surgeon: Sisi Dominguez MD;  [facility];  Service: Surgical Oncology    CT INTRAOP SENTINEL LYMPH NODE ID W/DYE INJECTION Bilateral 04/19/2024    Procedure: Bilateral BX LYMPH NODE SENTINEL, lymphoscintigraphies, lymphatic mappings;  Surgeon: Sisi Dominguez MD;  [facility];  Service: Surgical Oncology    CT MASTECTOMY PARTIAL Left 04/19/2024    Procedure: LEFT BREAST HILARY LOCALIZATION LUMPECTOMY;;  Surgeon: Sisi Dominguez MD;  [facility];  Service: Surgical Oncology    CT MASTECTOMY SIMPLE COMPLETE Right 04/19/2024    Procedure: RIGHT BREAST MASTECTOMY, PSB AXILLARY DISSECTION;  Surgeon: Sisi Dominguez MD;  [facility];  Service: Surgical Oncology    CT MASTOPEXY Left 04/19/2024    Procedure: MASTOPEXY BREAST;  Surgeon: Samara Felix DO;  [facility];  Service: Plastics    CT NEUROPLASTY &/TRANSPOS MEDIAN NRV CARPAL TUNNE Right 07/10/2018     Procedure: CARPAL TUNNEL RELEASE;  Surgeon: Maicol Taylor DO;  [facility];  Service: Orthopedics    CA NEUROPLASTY &/TRANSPOS MEDIAN NRV CARPAL TUNNE Left 04/19/2019    Procedure: RELEASE CARPAL TUNNEL;  Surgeon: Peterson Alejandro MD;  [facility];  Service: Orthopedics    SEPTOPLASTY      SINUS SURGERY      US BREAST NEEDLE LOC LEFT Left 02/27/2024    US GUIDANCE BREAST BIOPSY RIGHT EACH ADDITIONAL Right 11/01/2023    US GUIDED BREAST BIOPSY RIGHT COMPLETE Right 11/01/2023     Family History   Problem Relation Age of Onset    Hashimoto's thyroiditis Mother     Thyroid disease Mother     Melanoma Mother         50's    Cancer Mother         melanoma    Hearing loss Mother     Melanoma Father         50's    Crohn's disease Father     Cancer Father         melanoma    Hearing loss Father     Hashimoto's thyroiditis Sister     No Known Problems Sister     No Known Problems Sister     Colon cancer Maternal Uncle         50's    Depression Maternal Grandmother     Diabetes Maternal Grandmother     Mental illness Maternal Grandmother     Dementia Maternal Grandmother     No Known Problems Maternal Grandfather     No Known Problems Paternal Grandmother     No Known Problems Paternal Grandfather       reports that she has never smoked. She has never used smokeless tobacco. She reports current alcohol use. She reports that she does not use drugs.  Current Outpatient Medications on File Prior to Visit   Medication Sig Dispense Refill    amoxicillin-clavulanate (AUGMENTIN) 875-125 mg per tablet Take 1 tablet by mouth every 12 (twelve) hours for 12 days 24 tablet 0    anastrozole (ARIMIDEX) 1 mg tablet Take 1 tablet (1 mg total) by mouth daily 90 tablet 2    budesonide-formoterol (Symbicort) 80-4.5 MCG/ACT inhaler Inhale 2 puffs 2 (two) times a day Rinse mouth after use. 30 g 4    Calcium 250 MG CAPS Take by mouth daily with dinner        Cholecalciferol (VITAMIN D3 PO) Take 2 capsules by mouth in the  morning      lactobacillus acidophilus-bulgaricus (LACTINEX) chewable tablet Chew 1 tablet 3 (three) times a day with meals for 12 days 36 tablet 0    levocetirizine (XYZAL) 5 MG tablet Take 1 tablet (5 mg total) by mouth every evening 90 tablet 1    levothyroxine 25 mcg tablet TAKE ONE TABLET BY MOUTH EVERY DAY MONDAY THRU FRIDAY AND TAKE 2 TABLETS EVERY DAY ON SAT & SUN. 114 tablet 1    MAGNESIUM PO Take 200 mg by mouth daily with dinner        Medium Chain Triglycerides (MCT OIL PO) Take 1 Application by mouth in the morning      mometasone (NASONEX) 50 mcg/act nasal spray 2 sprays into each nostril daily 17 g 0    MULTIPLE VITAMINS-CALCIUM PO Take 1 capsule by mouth daily in the early morning        niacin 100 mg tablet Take 100 mg by mouth daily with breakfast      PARoxetine (PAXIL-CR) 25 mg 24 hr tablet TAKE TWO TABLETS BY MOUTH EVERY  tablet 1     No current facility-administered medications on file prior to visit.     Allergies   Allergen Reactions    Pollen Extract       Current Outpatient Medications on File Prior to Visit   Medication Sig Dispense Refill    amoxicillin-clavulanate (AUGMENTIN) 875-125 mg per tablet Take 1 tablet by mouth every 12 (twelve) hours for 12 days 24 tablet 0    anastrozole (ARIMIDEX) 1 mg tablet Take 1 tablet (1 mg total) by mouth daily 90 tablet 2    budesonide-formoterol (Symbicort) 80-4.5 MCG/ACT inhaler Inhale 2 puffs 2 (two) times a day Rinse mouth after use. 30 g 4    Calcium 250 MG CAPS Take by mouth daily with dinner        Cholecalciferol (VITAMIN D3 PO) Take 2 capsules by mouth in the morning      lactobacillus acidophilus-bulgaricus (LACTINEX) chewable tablet Chew 1 tablet 3 (three) times a day with meals for 12 days 36 tablet 0    levocetirizine (XYZAL) 5 MG tablet Take 1 tablet (5 mg total) by mouth every evening 90 tablet 1    levothyroxine 25 mcg tablet TAKE ONE TABLET BY MOUTH EVERY DAY MONDAY THRU FRIDAY AND TAKE 2 TABLETS EVERY DAY ON SAT & SUN. 114  "tablet 1    MAGNESIUM PO Take 200 mg by mouth daily with dinner        Medium Chain Triglycerides (MCT OIL PO) Take 1 Application by mouth in the morning      mometasone (NASONEX) 50 mcg/act nasal spray 2 sprays into each nostril daily 17 g 0    MULTIPLE VITAMINS-CALCIUM PO Take 1 capsule by mouth daily in the early morning        niacin 100 mg tablet Take 100 mg by mouth daily with breakfast      PARoxetine (PAXIL-CR) 25 mg 24 hr tablet TAKE TWO TABLETS BY MOUTH EVERY  tablet 1     No current facility-administered medications on file prior to visit.      Social History     Tobacco Use    Smoking status: Never    Smokeless tobacco: Never   Vaping Use    Vaping status: Never Used   Substance and Sexual Activity    Alcohol use: Yes     Comment: rarely    Drug use: No    Sexual activity: Yes     Partners: Male     Birth control/protection: Male Sterilization        Objective   /70 (BP Location: Left arm, Patient Position: Sitting, Cuff Size: Standard)   Pulse 88   Ht 5' 3\" (1.6 m)   Wt 67.1 kg (148 lb)   SpO2 98%   BMI 26.22 kg/m²     Physical Exam  Vitals reviewed.   Constitutional:       General: She is not in acute distress.     Appearance: Normal appearance. She is not ill-appearing, toxic-appearing or diaphoretic.   HENT:      Head: Normocephalic and atraumatic.      Nose: Nose normal.      Mouth/Throat:      Mouth: Mucous membranes are moist.   Eyes:      Extraocular Movements: Extraocular movements intact.   Pulmonary:      Effort: Pulmonary effort is normal. No respiratory distress.   Abdominal:      General: There is no distension.      Palpations: Abdomen is soft.   Musculoskeletal:         General: Normal range of motion.      Cervical back: Neck supple.   Skin:     General: Skin is dry.   Neurological:      Mental Status: She is alert and oriented to person, place, and time.   Psychiatric:         Mood and Affect: Mood normal.         Behavior: Behavior normal.         Thought Content: " "Thought content normal.         Judgment: Judgment normal.           Results  No results found for: \"PSA\"  Lab Results   Component Value Date    CALCIUM 9.6 12/23/2024    K 4.1 12/23/2024    CO2 25 12/23/2024     12/23/2024    BUN 12 12/23/2024    CREATININE 0.65 12/23/2024     Lab Results   Component Value Date    WBC 3.29 (L) 12/14/2024    HGB 10.7 (L) 12/14/2024    HCT 31.9 (L) 12/14/2024    MCV 88 12/14/2024     12/14/2024       Office Urine Dip  No results found for this or any previous visit (from the past hour).]      "

## 2024-12-26 ENCOUNTER — TELEPHONE (OUTPATIENT)
Dept: HEMATOLOGY ONCOLOGY | Facility: CLINIC | Age: 59
End: 2024-12-26

## 2024-12-26 NOTE — TELEPHONE ENCOUNTER
A call was placed to Richa, reached her voice mail,a   voice message was left. Due to a change in the providers schedule, the 1/17/25 Virtual follow up needs to be rescheduled.  
persistent diarrhea/persistent nausea/vomiting

## 2024-12-27 ENCOUNTER — OFFICE VISIT (OUTPATIENT)
Dept: FAMILY MEDICINE CLINIC | Facility: CLINIC | Age: 59
End: 2024-12-27
Payer: COMMERCIAL

## 2024-12-27 VITALS
RESPIRATION RATE: 20 BRPM | TEMPERATURE: 98 F | HEIGHT: 63 IN | WEIGHT: 143.6 LBS | DIASTOLIC BLOOD PRESSURE: 72 MMHG | HEART RATE: 97 BPM | BODY MASS INDEX: 25.45 KG/M2 | SYSTOLIC BLOOD PRESSURE: 130 MMHG | OXYGEN SATURATION: 99 %

## 2024-12-27 DIAGNOSIS — M25.511 ACUTE PAIN OF RIGHT SHOULDER: ICD-10-CM

## 2024-12-27 DIAGNOSIS — N12 PYELONEPHRITIS: Primary | ICD-10-CM

## 2024-12-27 DIAGNOSIS — R74.8 ELEVATED ALKALINE PHOSPHATASE LEVEL: ICD-10-CM

## 2024-12-27 PROCEDURE — 99495 TRANSJ CARE MGMT MOD F2F 14D: CPT | Performed by: FAMILY MEDICINE

## 2024-12-27 NOTE — ASSESSMENT & PLAN NOTE
Recovering well, had CT scan, and plan for cystoscopy with urology due to hematuria  Orders:    CBC and differential; Future

## 2024-12-27 NOTE — PROGRESS NOTES
Transition of Care Visit  Name: Richa Medina      : 1965      MRN: [id number]  Encounter Provider: Matthew Thomson MD  Encounter Date: 2024   Encounter department: [facility] PRIMARY CARE    Assessment & Plan  Pyelonephritis    Recovering well, had CT scan, and plan for cystoscopy with urology due to hematuria  Orders:    CBC and differential; Future    Elevated alkaline phosphatase level    Likely due to recent illness recheck one month  Orders:    Comprehensive metabolic panel; Future    Acute pain of right shoulder    Noted muscle pain since hospitilization, check xray if not resolving            History of Present Illness     Transitional Care Management Review:   Richa Medina is a 59 y.o. female here for TCM follow up.     During the TCM phone call patient stated:  TCM Call       Date and time call was made  2024 10:52 AM    Hospital care reviewed  Records reviewed    Patient was hospitialized at  [facility]    Date of Admission  12/10/24    Date of discharge  24    Diagnosis  UTI    Disposition  Home    Were the patients medications reviewed and updated  No    Current Symptoms  None          TCM Call       Post hospital issues  None    Scheduled for follow up?  No    Did you obtain your prescribed medications  Yes    Do you need help managing your prescriptions or medications  No    Is transportation to your appointment needed  No    I have advised the patient to call PCP with any new or worsening symptoms  [place]          HPI    59 year old female presenting in hospital follow up    Patient presented to the ER with complaints of dysuria and gross hematuria accompanied by bilateral flank pain with fevers and chills.  Patient reported that she noticed rigors and chills after she took her Bactrim dose that was prescribed to her outpatiently for UTI.  Patient denied any gastrointestinal respiratory symptoms post Bactrim.      Upon arrival  "patient met the criteria for sepsis because of fever tachycardia leukopenia and tachypnea blood pressures were also on the lower side and patient required Levophed and was transferred to ICU for further management with IV antibiotics.  CAT scan showed mild wall thickening and mucosal hyperenhancement involving the left renal pelvis and left ureter likely suggestive of left-sided pyelitis/ureteritis.      Patient was initially on IV broad-spectrum antibiotics and transition to cefazolin and then cultures and sensitivities came back positive for Augmentin susceptibilities and patient was transitioned to oral Augmentin with probiotics.    Urine cultures grew E. coli  Blood cultures and MRSA negative     Outpatient CMP ordered because patient had transaminitis during this admission likely due to sepsis trending down during hospital course can be reassessed for resolution outpatient when patient is seen for transition of care.    Since discharge she has noted fatigue and left shoulder pain, the fatigue is improving slowly.    She has seen urologist and is scheduled for cystoscopy.      Review of Systems   Constitutional:  Negative for activity change.   Respiratory:  Negative for apnea and chest tightness.    Cardiovascular:  Negative for chest pain and palpitations.   Gastrointestinal:  Negative for abdominal distention and abdominal pain.   Musculoskeletal:  Negative for arthralgias and back pain.     Objective   /72 (BP Location: Left arm, Patient Position: Sitting, Cuff Size: Standard)   Pulse 97   Temp 98 °F (36.7 °C) (Tympanic)   Resp 20   Ht 5' 3\" (1.6 m)   Wt 65.1 kg (143 lb 9.6 oz)   SpO2 99%   BMI 25.44 kg/m²     Physical Exam  Constitutional:       Appearance: Normal appearance.   Cardiovascular:      Rate and Rhythm: Normal rate and regular rhythm.      Pulses: Normal pulses.      Heart sounds: Normal heart sounds.   Pulmonary:      Effort: Pulmonary effort is normal.      Breath sounds: Normal " breath sounds.   Musculoskeletal:      Right shoulder: Normal.      Left shoulder: No tenderness. Normal range of motion.      Comments: Positive empty can test   Neurological:      General: No focal deficit present.      Mental Status: She is alert.       Medications have been reviewed by provider in current encounter

## 2024-12-27 NOTE — LETTER
December 27, 2024     Patient: Richa Medina  YOB: 1965  Date of Visit: 12/27/2024      To Whom it May Concern:    Richa Medina is under my professional care. Richa was seen in my office on 12/27/2024. Richa may return to work on January 13th .    Please excuse January 6-13th.    If you have any questions or concerns, please don't hesitate to call.         Sincerely,          Matthew Thomson MD        CC: No Recipients

## 2025-01-10 PROBLEM — N12 PYELONEPHRITIS: Status: RESOLVED | Noted: 2024-12-11 | Resolved: 2025-01-10

## 2025-01-27 ENCOUNTER — OFFICE VISIT (OUTPATIENT)
Dept: GASTROENTEROLOGY | Facility: MEDICAL CENTER | Age: 60
End: 2025-01-27
Payer: COMMERCIAL

## 2025-01-27 ENCOUNTER — TELEPHONE (OUTPATIENT)
Dept: GASTROENTEROLOGY | Facility: MEDICAL CENTER | Age: 60
End: 2025-01-27

## 2025-01-27 VITALS
DIASTOLIC BLOOD PRESSURE: 74 MMHG | HEART RATE: 85 BPM | SYSTOLIC BLOOD PRESSURE: 121 MMHG | TEMPERATURE: 98.6 F | HEIGHT: 63 IN | WEIGHT: 143 LBS | BODY MASS INDEX: 25.34 KG/M2 | OXYGEN SATURATION: 98 %

## 2025-01-27 DIAGNOSIS — Z80.0 FAMILY HISTORY OF COLON CANCER: ICD-10-CM

## 2025-01-27 DIAGNOSIS — Z85.3 HX OF BREAST CANCER: ICD-10-CM

## 2025-01-27 DIAGNOSIS — Z12.11 SCREENING FOR COLON CANCER: Primary | ICD-10-CM

## 2025-01-27 PROCEDURE — 99203 OFFICE O/P NEW LOW 30 MIN: CPT | Performed by: NURSE PRACTITIONER

## 2025-01-27 RX ORDER — POLYETHYLENE GLYCOL 3350 17 G/17G
238 POWDER, FOR SOLUTION ORAL ONCE
Qty: 238 G | Refills: 0 | Status: SHIPPED | OUTPATIENT
Start: 2025-01-27 | End: 2025-01-27

## 2025-01-27 NOTE — TELEPHONE ENCOUNTER
Procedure: Colonoscopy  Date: 2/20/25  Physician performing: Dr. Harley  Location of procedure:  [facility]  Instructions given to patient: Miralax  Diabetic: No  Clearances: N/A

## 2025-01-27 NOTE — PROGRESS NOTES
Name: Richa Medina      : 1965      MRN: [id number]  Encounter Provider: ELIDIA Montiel  Encounter Date: 2025   Enc[facility]  :  Assessment & Plan  Screening for colon cancer  Paternal uncle had colon cancer.  She is not sure of his age.  She never had a colonoscopy.  She does have a history of breast cancer that was diagnosed in .  BMs are brown and formed daily.  Denies any melena hematochezia.  No abdominal pain or weight loss.  Orders:    Colonoscopy; Future    polyethylene glycol (MiraLax) 17 GM/SCOOP powder; Take 238 g by mouth once for 1 dose Take 238 g my mouth. Use as directed    Hx of breast cancer  Refer above  Orders:    Colonoscopy; Future    polyethylene glycol (MiraLax) 17 GM/SCOOP powder; Take 238 g by mouth once for 1 dose Take 238 g my mouth. Use as directed    Family history of colon cancer  Maternal uncle had colon cancer.  Patient reports no other family history of any colon cancers or polyps.       I reviewed with patient/family potential risks of endoscopic evaluation including possible infection, bleeding or perforation.  Patient/family verbalized understanding of potential risks and agreed to procedure(s).    History of Present Illness   HPI  Richa Medina is a 59 y.o. female who presents for colon cancer screening.  She has past medical history of reactive airway disease, seasonal allergies, hypothyroidism, breast cancer, depression.    Was recently admitted to the hospital  to 2024 for sepsis secondary to pyelonephritis.  Urine cultures grew E. coli.  Feeling well overall.    Paternal uncle had colon cancer.  She is not sure of his age.  She never had a colonoscopy.  She does have a history of breast cancer that was diagnosed in .  BMs are brown and formed daily.  Denies any melena hematochezia.  No abdominal pain or weight loss.          Ultrasound kidneys bladder -no renal  calculus hydronephrosis.  Ureteral jets not identified during the study.  Pyelonephritis cannot be evaluated on this imaging modality.    CT abdomen pelvis 12/24-findings suspicious of for left pyelitis/ureteritis.  No evidence of bowel obstruction, inflammation, appendicitis, free air or free fluid.  Patchy airspace opacities in portion of the right middle lobe and lingula are partially seen suspicious for airspace disease.  Recommend clinical coordination.    Labs 12/24-CMP normal other than alk phos 125, hemoglobin 10.7, RBCs 3.62, WBC 3.29, normal indices.      Prior EGD/colonoscopy      History obtained from: patient    Review of Systems   Gastrointestinal: Negative.    All other systems reviewed and are negative.    Medical History Reviewed by provider this encounter:  Tobacco  Allergies  Meds  Problems  Med Hx  Surg Hx  Fam Hx     .  Current Outpatient Medications on File Prior to Visit   Medication Sig Dispense Refill    anastrozole (ARIMIDEX) 1 mg tablet Take 1 tablet (1 mg total) by mouth daily 90 tablet 2    budesonide-formoterol (Symbicort) 80-4.5 MCG/ACT inhaler Inhale 2 puffs 2 (two) times a day Rinse mouth after use. 30 g 2    Calcium 250 MG CAPS Take by mouth daily with dinner        cetirizine (ZyrTEC) 10 mg tablet Take 1 tablet (10 mg total) by mouth daily 90 tablet 3    Cholecalciferol (VITAMIN D3 PO) Take 2 capsules by mouth in the morning      levocetirizine (XYZAL) 5 MG tablet Take 1 tablet (5 mg total) by mouth every evening 90 tablet 1    levothyroxine 25 mcg tablet TAKE ONE TABLET BY MOUTH EVERY DAY MONDAY THRU FRIDAY AND TAKE 2 TABLETS EVERY DAY ON SAT & SUN. 114 tablet 1    MAGNESIUM PO Take 200 mg by mouth daily with dinner        Medium Chain Triglycerides (MCT OIL PO) Take 1 Application by mouth in the morning      mometasone (NASONEX) 50 mcg/act nasal spray 2 sprays into each nostril daily 51 g 3    MULTIPLE VITAMINS-CALCIUM PO Take 1 capsule by mouth daily in the early morning         niacin 100 mg tablet Take 100 mg by mouth daily with breakfast      PARoxetine (PAXIL-CR) 25 mg 24 hr tablet TAKE TWO TABLETS BY MOUTH EVERY  tablet 1     No current facility-administered medications on file prior to visit.      Social History     Tobacco Use    Smoking status: Never    Smokeless tobacco: Never   Vaping Use    Vaping status: Never Used   Substance and Sexual Activity    Alcohol use: Yes     Comment: rarely    Drug use: No    Sexual activity: Yes     Partners: Male     Birth control/protection: Male Sterilization        Objective   There were no vitals taken for this visit.     Physical Exam  HENT:      Head: Normocephalic.   Cardiovascular:      Rate and Rhythm: Normal rate and regular rhythm.      Heart sounds: Normal heart sounds.   Pulmonary:      Effort: Pulmonary effort is normal.      Breath sounds: Normal breath sounds.   Abdominal:      General: Bowel sounds are normal.      Palpations: Abdomen is soft.      Tenderness: There is no abdominal tenderness.   Skin:     General: Skin is warm and dry.   Neurological:      Mental Status: She is alert and oriented to person, place, and time.

## 2025-02-04 ENCOUNTER — APPOINTMENT (OUTPATIENT)
Dept: LAB | Facility: MEDICAL CENTER | Age: 60
End: 2025-02-04
Payer: COMMERCIAL

## 2025-02-04 DIAGNOSIS — E03.9 HYPOTHYROIDISM, UNSPECIFIED TYPE: ICD-10-CM

## 2025-02-04 DIAGNOSIS — R73.03 PREDIABETES: ICD-10-CM

## 2025-02-04 DIAGNOSIS — Z13.220 LIPID SCREENING: ICD-10-CM

## 2025-02-04 DIAGNOSIS — C50.811 MALIGNANT NEOPLASM OF OVERLAPPING SITES OF RIGHT BREAST IN FEMALE, ESTROGEN RECEPTOR POSITIVE (HCC): ICD-10-CM

## 2025-02-04 DIAGNOSIS — N12 PYELONEPHRITIS: ICD-10-CM

## 2025-02-04 DIAGNOSIS — E55.9 VITAMIN D DEFICIENCY: ICD-10-CM

## 2025-02-04 DIAGNOSIS — Z17.0 MALIGNANT NEOPLASM OF OVERLAPPING SITES OF RIGHT BREAST IN FEMALE, ESTROGEN RECEPTOR POSITIVE (HCC): ICD-10-CM

## 2025-02-04 DIAGNOSIS — R74.8 ELEVATED ALKALINE PHOSPHATASE LEVEL: ICD-10-CM

## 2025-02-04 LAB
25(OH)D3 SERPL-MCNC: 69.6 NG/ML (ref 30–100)
ALBUMIN SERPL BCG-MCNC: 4.5 G/DL (ref 3.5–5)
ALP SERPL-CCNC: 109 U/L (ref 34–104)
ALT SERPL W P-5'-P-CCNC: 42 U/L (ref 7–52)
ANION GAP SERPL CALCULATED.3IONS-SCNC: 9 MMOL/L (ref 4–13)
AST SERPL W P-5'-P-CCNC: 29 U/L (ref 13–39)
BASOPHILS # BLD AUTO: 0.02 THOUSANDS/ΜL (ref 0–0.1)
BASOPHILS NFR BLD AUTO: 0 % (ref 0–1)
BILIRUB SERPL-MCNC: 0.44 MG/DL (ref 0.2–1)
BUN SERPL-MCNC: 9 MG/DL (ref 5–25)
CALCIUM SERPL-MCNC: 9.9 MG/DL (ref 8.4–10.2)
CHLORIDE SERPL-SCNC: 100 MMOL/L (ref 96–108)
CHOLEST SERPL-MCNC: 215 MG/DL (ref ?–200)
CO2 SERPL-SCNC: 27 MMOL/L (ref 21–32)
CREAT SERPL-MCNC: 0.7 MG/DL (ref 0.6–1.3)
EOSINOPHIL # BLD AUTO: 0.09 THOUSAND/ΜL (ref 0–0.61)
EOSINOPHIL NFR BLD AUTO: 2 % (ref 0–6)
ERYTHROCYTE [DISTWIDTH] IN BLOOD BY AUTOMATED COUNT: 13.2 % (ref 11.6–15.1)
GFR SERPL CREATININE-BSD FRML MDRD: 93 ML/MIN/1.73SQ M
GLUCOSE P FAST SERPL-MCNC: 90 MG/DL (ref 65–99)
HCT VFR BLD AUTO: 36.7 % (ref 34.8–46.1)
HDLC SERPL-MCNC: 65 MG/DL
HGB BLD-MCNC: 12.1 G/DL (ref 11.5–15.4)
IMM GRANULOCYTES # BLD AUTO: 0.01 THOUSAND/UL (ref 0–0.2)
IMM GRANULOCYTES NFR BLD AUTO: 0 % (ref 0–2)
LDLC SERPL CALC-MCNC: 127 MG/DL (ref 0–100)
LYMPHOCYTES # BLD AUTO: 1.53 THOUSANDS/ΜL (ref 0.6–4.47)
LYMPHOCYTES NFR BLD AUTO: 32 % (ref 14–44)
MCH RBC QN AUTO: 28.9 PG (ref 26.8–34.3)
MCHC RBC AUTO-ENTMCNC: 33 G/DL (ref 31.4–37.4)
MCV RBC AUTO: 88 FL (ref 82–98)
MONOCYTES # BLD AUTO: 0.42 THOUSAND/ΜL (ref 0.17–1.22)
MONOCYTES NFR BLD AUTO: 9 % (ref 4–12)
NEUTROPHILS # BLD AUTO: 2.76 THOUSANDS/ΜL (ref 1.85–7.62)
NEUTS SEG NFR BLD AUTO: 57 % (ref 43–75)
NRBC BLD AUTO-RTO: 0 /100 WBCS
PLATELET # BLD AUTO: 262 THOUSANDS/UL (ref 149–390)
PMV BLD AUTO: 9.9 FL (ref 8.9–12.7)
POTASSIUM SERPL-SCNC: 4.4 MMOL/L (ref 3.5–5.3)
PROT SERPL-MCNC: 7.9 G/DL (ref 6.4–8.4)
RBC # BLD AUTO: 4.18 MILLION/UL (ref 3.81–5.12)
SODIUM SERPL-SCNC: 136 MMOL/L (ref 135–147)
TRIGL SERPL-MCNC: 117 MG/DL (ref ?–150)
TSH SERPL DL<=0.05 MIU/L-ACNC: 2.5 UIU/ML (ref 0.45–4.5)
WBC # BLD AUTO: 4.83 THOUSAND/UL (ref 4.31–10.16)

## 2025-02-04 PROCEDURE — 36415 COLL VENOUS BLD VENIPUNCTURE: CPT

## 2025-02-04 PROCEDURE — 80061 LIPID PANEL: CPT

## 2025-02-04 PROCEDURE — 85025 COMPLETE CBC W/AUTO DIFF WBC: CPT

## 2025-02-04 PROCEDURE — 82306 VITAMIN D 25 HYDROXY: CPT

## 2025-02-04 PROCEDURE — 84443 ASSAY THYROID STIM HORMONE: CPT

## 2025-02-04 PROCEDURE — 80053 COMPREHEN METABOLIC PANEL: CPT

## 2025-02-05 DIAGNOSIS — C50.811 MALIGNANT NEOPLASM OF OVERLAPPING SITES OF RIGHT BREAST IN FEMALE, ESTROGEN RECEPTOR POSITIVE (HCC): ICD-10-CM

## 2025-02-05 DIAGNOSIS — Z17.0 MALIGNANT NEOPLASM OF OVERLAPPING SITES OF RIGHT BREAST IN FEMALE, ESTROGEN RECEPTOR POSITIVE (HCC): ICD-10-CM

## 2025-02-06 ENCOUNTER — ANESTHESIA EVENT (OUTPATIENT)
Dept: ANESTHESIOLOGY | Facility: HOSPITAL | Age: 60
End: 2025-02-06

## 2025-02-06 ENCOUNTER — RESULTS FOLLOW-UP (OUTPATIENT)
Dept: FAMILY MEDICINE CLINIC | Facility: CLINIC | Age: 60
End: 2025-02-06

## 2025-02-06 ENCOUNTER — ANESTHESIA (OUTPATIENT)
Dept: ANESTHESIOLOGY | Facility: HOSPITAL | Age: 60
End: 2025-02-06

## 2025-02-06 RX ORDER — ANASTROZOLE 1 MG/1
1 TABLET ORAL DAILY
Qty: 90 TABLET | Refills: 2 | Status: SHIPPED | OUTPATIENT
Start: 2025-02-06

## 2025-02-14 ENCOUNTER — TELEPHONE (OUTPATIENT)
Dept: GASTROENTEROLOGY | Facility: MEDICAL CENTER | Age: 60
End: 2025-02-14

## 2025-02-14 NOTE — TELEPHONE ENCOUNTER
Confirming Upcoming Procedure: colonoscopy   on 02/20/2025  Physician performing: Dr. Harley  Location of procedure:  [facility]  Prep: Miralax

## 2025-02-18 ENCOUNTER — TELEMEDICINE (OUTPATIENT)
Dept: HEMATOLOGY ONCOLOGY | Facility: CLINIC | Age: 60
End: 2025-02-18
Payer: COMMERCIAL

## 2025-02-18 DIAGNOSIS — C50.811 MALIGNANT NEOPLASM OF OVERLAPPING SITES OF RIGHT BREAST IN FEMALE, ESTROGEN RECEPTOR POSITIVE (HCC): Primary | ICD-10-CM

## 2025-02-18 DIAGNOSIS — Z17.0 MALIGNANT NEOPLASM OF OVERLAPPING SITES OF RIGHT BREAST IN FEMALE, ESTROGEN RECEPTOR POSITIVE (HCC): Primary | ICD-10-CM

## 2025-02-18 PROCEDURE — 98012 SYNCH AUDIO-ONLY EST SF 10: CPT | Performed by: INTERNAL MEDICINE

## 2025-02-18 NOTE — PROGRESS NOTES
Virtual Brief Visit  Name: Richa Medina      : 1965      MRN: [id number]  Encounter Provider: Matilde Yung DO  Encounter Date: 2025   Encounter department: [facility] [place]  :  Assessment & Plan  Malignant neoplasm of overlapping sites of right breast in female, estrogen receptor positive (HCC)    Orders:    CBC and differential; Future    Comprehensive metabolic panel; Future    59-year-old female with bilateral ER positive breast cancer on adjuvant anastrozole.  She is tolerating it well.  She is due for her mammogram and that is ordered by her surgical oncologist.  She is edinson call central scheduling to schedule it.  She is up-to-date on her DEXA scan.  She will see my nurse practitioner in 6 months with a CBC and CMP prior and I will see her 6 months after that.  There are no clinical signs of disease relapse at this time.  She also has another CT scan scheduled under the direction of pulmonology to follow the findings on her chest that were on prior imaging.  She knows to call in the interim with any questions or concerns.      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  On the right it is a pT1c N1a ER positive with Oncotype of 9 and on the left it is a pT1a N0 ER positive invasive tubular carcinoma.  She has been on anastrozole no adjuvant setting due to some delays in surgery due to pulmonary infections.  She remains on it in the adjuvant setting and tolerates it well.  She has occasional hot flashes.  She was having some right shoulder pain that comes and goes and is relieved with Tylenol.  She denies any headaches or back pain.  Labs prior to this visit are normal.  Also since her last visit she was admitted with sepsis from pyelonephritis.    Administrative Statements   Encounter provider Matilde Yung, DO    The Patient is located at Home and in the following state in which I hold an active license [place].    The patient was identified by name and date of birth. Richa Medina was informed that this is a telemedicine visit and that the visit is being conducted through the Epic Embedded platform. She agrees to proceed..  My office door was closed. No one else was in the room.  She acknowledged consent and understanding of privacy and security of the video platform. The patient has agreed to participate and understands they can discontinue the visit at any time.    I have spent a total time of 8 min minutes in caring for this patient on the day of the visit/encounter including Diagnostic results, Instructions for management, Impressions, Counseling / Coordination of care, Documenting in the medical record, Reviewing/placing orders in the medical record (including tests, medications, and/or procedures), and Obtaining or reviewing history  .

## 2025-02-20 ENCOUNTER — ANESTHESIA (OUTPATIENT)
Dept: GASTROENTEROLOGY | Facility: MEDICAL CENTER | Age: 60
End: 2025-02-20
Payer: COMMERCIAL

## 2025-02-20 ENCOUNTER — HOSPITAL ENCOUNTER (OUTPATIENT)
Dept: GASTROENTEROLOGY | Facility: MEDICAL CENTER | Age: 60
Setting detail: OUTPATIENT SURGERY
Discharge: HOME | End: 2025-02-20
Payer: COMMERCIAL

## 2025-02-20 ENCOUNTER — ANESTHESIA EVENT (OUTPATIENT)
Dept: GASTROENTEROLOGY | Facility: MEDICAL CENTER | Age: 60
End: 2025-02-20
Payer: COMMERCIAL

## 2025-02-20 VITALS
DIASTOLIC BLOOD PRESSURE: 63 MMHG | SYSTOLIC BLOOD PRESSURE: 136 MMHG | OXYGEN SATURATION: 100 % | WEIGHT: 143 LBS | RESPIRATION RATE: 16 BRPM | HEART RATE: 67 BPM | BODY MASS INDEX: 25.34 KG/M2 | HEIGHT: 63 IN

## 2025-02-20 DIAGNOSIS — Z12.11 SCREENING FOR COLON CANCER: ICD-10-CM

## 2025-02-20 DIAGNOSIS — Z85.3 HX OF BREAST CANCER: ICD-10-CM

## 2025-02-20 PROCEDURE — G0121 COLON CA SCRN NOT HI RSK IND: HCPCS | Performed by: STUDENT IN AN ORGANIZED HEALTH CARE EDUCATION/TRAINING PROGRAM

## 2025-02-20 RX ORDER — PROPOFOL 10 MG/ML
INJECTION, EMULSION INTRAVENOUS AS NEEDED
Status: DISCONTINUED | OUTPATIENT
Start: 2025-02-20 | End: 2025-02-20

## 2025-02-20 RX ORDER — ONDANSETRON 2 MG/ML
4 INJECTION INTRAMUSCULAR; INTRAVENOUS ONCE AS NEEDED
Status: CANCELLED | OUTPATIENT
Start: 2025-02-20

## 2025-02-20 RX ORDER — SODIUM CHLORIDE, SODIUM LACTATE, POTASSIUM CHLORIDE, CALCIUM CHLORIDE 600; 310; 30; 20 MG/100ML; MG/100ML; MG/100ML; MG/100ML
125 INJECTION, SOLUTION INTRAVENOUS CONTINUOUS
Status: DISCONTINUED | OUTPATIENT
Start: 2025-02-20 | End: 2025-02-24 | Stop reason: HOSPADM

## 2025-02-20 RX ADMIN — PROPOFOL 50 MG: 10 INJECTION, EMULSION INTRAVENOUS at 08:54

## 2025-02-20 RX ADMIN — SODIUM CHLORIDE, SODIUM LACTATE, POTASSIUM CHLORIDE, AND CALCIUM CHLORIDE 125 ML/HR: .6; .31; .03; .02 INJECTION, SOLUTION INTRAVENOUS at 08:12

## 2025-02-20 RX ADMIN — PROPOFOL 80 MG: 10 INJECTION, EMULSION INTRAVENOUS at 08:47

## 2025-02-20 RX ADMIN — PROPOFOL 50 MG: 10 INJECTION, EMULSION INTRAVENOUS at 08:58

## 2025-02-20 RX ADMIN — PROPOFOL 30 MG: 10 INJECTION, EMULSION INTRAVENOUS at 09:08

## 2025-02-20 RX ADMIN — PROPOFOL 40 MG: 10 INJECTION, EMULSION INTRAVENOUS at 08:49

## 2025-02-20 RX ADMIN — PROPOFOL 60 MG: 10 INJECTION, EMULSION INTRAVENOUS at 08:52

## 2025-02-20 RX ADMIN — PROPOFOL 50 MG: 10 INJECTION, EMULSION INTRAVENOUS at 09:06

## 2025-02-20 NOTE — ANESTHESIA POSTPROCEDURE EVALUATION
Post-Op Assessment Note    CV Status:  Stable  Pain Score: 0    Pain management: adequate       Mental Status:  Sleepy   Hydration Status:  Euvolemic   PONV Controlled:  Controlled   Airway Patency:  Patent     Post Op Vitals Reviewed: Yes    No anethesia notable event occurred.    Staff: CRNA, Anesthesiologist           Last Filed PACU Vitals:  Vitals Value Taken Time   Temp     Pulse 75 02/20/25 0914   /59 02/20/25 0914   Resp 18 02/20/25 0914   SpO2 100 % 02/20/25 0914

## 2025-02-20 NOTE — ANESTHESIA PREPROCEDURE EVALUATION
Procedure:  COLONOSCOPY    Relevant Problems   ENDO   (+) Hypothyroidism      GYN   (+) Malignant neoplasm of overlapping sites of right breast in female, estrogen receptor positive (HCC)   (+) Malignant neoplasm of upper-outer quadrant of left breast in female, estrogen receptor positive (HCC)      NEURO/PSYCH   (+) Depression        Physical Exam    Airway    Mallampati score: II  TM Distance: <3 FB  Neck ROM: full     Dental   No notable dental hx     Cardiovascular  Rhythm: regular, No weak pulses    Pulmonary   No stridor    Other Findings  post-pubertal.      Anesthesia Plan  ASA Score- 2     Anesthesia Type- IV sedation with anesthesia with ASA Monitors.         Additional Monitors:     Airway Plan:            Plan Factors-    Chart reviewed.   Existing labs reviewed. Patient summary reviewed.                  Induction- intravenous.    Postoperative Plan-         Informed Consent- Anesthetic plan and risks discussed with patient.  I personally reviewed this patient with the CRNA. Discussed and agreed on the Anesthesia Plan with the CRNA..      NPO Status:  No vitals data found for the desired time range.

## 2025-02-20 NOTE — H&P
History and Physical - [facility]  Richa Medina 59 y.o. female MRN: [id number]                  HPI: Richa Medina is a 59 y.o. year old female who presents for colon cancer screening      REVIEW OF SYSTEMS: Per the HPI, and otherwise unremarkable.    Historical Information   Past Medical History:   Diagnosis Date    Allergic May 2023    Asthma     Breast cancer (HCC) 11/2023    Breast mass 10/31/23    Cancer (HCC)     bilateral    Depression     Disease of thyroid gland     Hearing aid worn     bilateral    Hearing loss     HL (hearing loss)     Osteopenia     Rosacea 02/09/2021    Seasonal allergies     Vitamin D deficiency 07/24/2018     Past Surgical History:   Procedure Laterality Date    BREAST BIOPSY Right 11/01/2023    X 2 sites    BREAST BIOPSY Left 12/08/2023    BREAST LUMPECTOMY  4/2024    IR DRAINAGE TUBE CHECK/CHANGE/REPOSITION/REINSERTION/UPSIZE  06/27/2024    IR DRAINAGE TUBE CHECK/CHANGE/REPOSITION/REINSERTION/UPSIZE  07/09/2024    IR DRAINAGE TUBE PLACEMENT  06/18/2024    LASIK      MASTECTOMY  4/2023    MRI BREAST BIOPSY LEFT (ALL INCLUSIVE) Left 12/08/2023    AR ADJNT TIS TRNSFR/REARGMT ANY AREA 30.1-60 SQ CM Right 04/19/2024    Procedure: RIGHT AESTHETIC FLAT CLOSURE , IBIS FLAP, PREVENA PLACEMENT;  Surgeon: Samara Felix DO;  Location: [facility];  Service: Plastics    AR BX/EXC LYMPH NODE OPEN SUPERFICIAL Bilateral 04/19/2024    Procedure: Bilateral BX LYMPH NODE SENTINEL, lymphoscintigraphies, lymphatic mappings;  Surgeon: Sisi Dominguez MD;  Location: [facility];  Service: Surgical Oncology    AR EXC BREAST LES PREOP PLMT RAD MARKER OPEN 1 LES Left 04/19/2024    Procedure: LEFT BREAST HILARY LOCALIZATION LUMPECTOMY;;  Surgeon: Sisi Dominguez MD;  [facility];  Service: Surgical Oncology    AR INJ RADIOACTIVE TRACER FOR ID OF SENTINEL NODE Bilateral 04/19/2024    Procedure: Bilateral BX LYMPH NODE SENTINEL, lymphoscintigraphies, lymphatic mappings;   Surgeon: Sisi Dominguez MD;  [facility];  Service: Surgical Oncology    AK INTRAOP SENTINEL LYMPH NODE ID W/DYE INJECTION Bilateral 04/19/2024    Procedure: Bilateral BX LYMPH NODE SENTINEL, lymphoscintigraphies, lymphatic mappings;  Surgeon: Sisi Dominguez MD;  [facility];  Service: Surgical Oncology    AK MASTECTOMY PARTIAL Left 04/19/2024    Procedure: LEFT BREAST HILARY LOCALIZATION LUMPECTOMY;;  Surgeon: Sisi Dominguez MD;  [facility];  Service: Surgical Oncology    AK MASTECTOMY SIMPLE COMPLETE Right 04/19/2024    Procedure: RIGHT BREAST MASTECTOMY, PSB AXILLARY DISSECTION;  Surgeon: Sisi Dominguez MD;  [facility];  Service: Surgical Oncology    AK MASTOPEXY Left 04/19/2024    Procedure: MASTOPEXY BREAST;  Surgeon: Samara Felix DO;  [facility];  Service: Plastics    AK NEUROPLASTY &/TRANSPOS MEDIAN NRV CARPAL TUNNE Right 07/10/2018    Procedure: CARPAL TUNNEL RELEASE;  Surgeon: Maicol Taylor DO;  [facility];  Service: Orthopedics    AK NEUROPLASTY &/TRANSPOS MEDIAN NRV CARPAL TUNNE Left 04/19/2019    Procedure: RELEASE CARPAL TUNNEL;  Surgeon: Peterson Alejandro MD;  [facility];  Service: Orthopedics    SEPTOPLASTY      SINUS SURGERY      US BREAST NEEDLE LOC LEFT Left 02/27/2024    US GUIDANCE BREAST BIOPSY RIGHT EACH ADDITIONAL Right 11/01/2023    US GUIDED BREAST BIOPSY RIGHT COMPLETE Right 11/01/2023     Social History   Social History     Substance and Sexual Activity   Alcohol Use Yes    Comment: rarely     Social History     Substance and Sexual Activity   Drug Use No     Social History     Tobacco Use   Smoking Status Never   Smokeless Tobacco Never     Family History   Problem Relation Age of Onset    Hashimoto's thyroiditis Mother     Thyroid disease Mother     Melanoma Mother         50's    Cancer Mother         melanoma    Hearing loss Mother     Melanoma Father         50's    Crohn's disease Father     Cancer Father         " melanoma    Hearing loss Father     Hashimoto's thyroiditis Sister     No Known Problems Sister     No Known Problems Sister     Colon cancer Maternal Uncle         50's    Depression Maternal Grandmother     Diabetes Maternal Grandmother     Mental illness Maternal Grandmother     Dementia Maternal Grandmother     No Known Problems Maternal Grandfather     No Known Problems Paternal Grandmother     No Known Problems Paternal Grandfather        Meds/Allergies       Current Outpatient Medications:     anastrozole (ARIMIDEX) 1 mg tablet    budesonide-formoterol (Symbicort) 80-4.5 MCG/ACT inhaler    Calcium 250 MG CAPS    cetirizine (ZyrTEC) 10 mg tablet    Cholecalciferol (VITAMIN D3 PO)    levocetirizine (XYZAL) 5 MG tablet    levothyroxine 25 mcg tablet    MAGNESIUM PO    Medium Chain Triglycerides (MCT OIL PO)    mometasone (NASONEX) 50 mcg/act nasal spray    niacin 100 mg tablet    PARoxetine (PAXIL-CR) 25 mg 24 hr tablet    MULTIPLE VITAMINS-CALCIUM PO    polyethylene glycol (MiraLax) 17 GM/SCOOP powder    Current Facility-Administered Medications:     lactated ringers infusion, 125 mL/hr, Intravenous, Continuous, Continue from Pre-op at 02/20/25 0826    Allergies   Allergen Reactions    Pollen Extract        Objective     /66   Pulse 68   Resp 18   Ht 5' 3\" (1.6 m)   Wt 64.9 kg (143 lb)   SpO2 100%   BMI 25.33 kg/m²       PHYSICAL EXAM    Gen: NAD  Head: NCAT  CV: RRR  CHEST: Clear  ABD: soft, NT/ND  EXT: no edema      ASSESSMENT/PLAN:  This is a 59 y.o. year old female here for colonoscopy, and she is stable and optimized for her procedure.        "

## 2025-04-04 DIAGNOSIS — E03.9 HYPOTHYROIDISM, UNSPECIFIED TYPE: ICD-10-CM

## 2025-04-04 RX ORDER — LEVOTHYROXINE SODIUM 25 UG/1
TABLET ORAL
Qty: 114 TABLET | Refills: 1 | Status: ON HOLD | OUTPATIENT
Start: 2025-04-04

## 2025-04-07 DIAGNOSIS — E03.9 HYPOTHYROIDISM, UNSPECIFIED TYPE: ICD-10-CM

## 2025-04-07 RX ORDER — LEVOTHYROXINE SODIUM 25 UG/1
TABLET ORAL
Qty: 114 TABLET | Refills: 0 | OUTPATIENT
Start: 2025-04-07

## 2025-04-08 DIAGNOSIS — E03.9 HYPOTHYROIDISM, UNSPECIFIED TYPE: ICD-10-CM

## 2025-04-08 RX ORDER — LEVOTHYROXINE SODIUM 25 UG/1
TABLET ORAL
Qty: 114 TABLET | Refills: 0 | OUTPATIENT
Start: 2025-04-08

## 2025-04-10 ENCOUNTER — APPOINTMENT (EMERGENCY)
Dept: RADIOLOGY | Facility: HOSPITAL | Age: 60
DRG: 864 | End: 2025-04-10
Payer: COMMERCIAL

## 2025-04-10 ENCOUNTER — NURSE TRIAGE (OUTPATIENT)
Dept: OTHER | Facility: OTHER | Age: 60
End: 2025-04-10

## 2025-04-10 ENCOUNTER — PROCEDURE VISIT (OUTPATIENT)
Dept: UROLOGY | Facility: MEDICAL CENTER | Age: 60
End: 2025-04-10
Payer: COMMERCIAL

## 2025-04-10 ENCOUNTER — HOSPITAL ENCOUNTER (INPATIENT)
Facility: HOSPITAL | Age: 60
LOS: 4 days | Discharge: HOME | DRG: 864 | End: 2025-04-14
Attending: EMERGENCY MEDICINE | Admitting: INTERNAL MEDICINE
Payer: COMMERCIAL

## 2025-04-10 ENCOUNTER — APPOINTMENT (EMERGENCY)
Dept: CT IMAGING | Facility: HOSPITAL | Age: 60
DRG: 864 | End: 2025-04-10
Payer: COMMERCIAL

## 2025-04-10 VITALS
HEIGHT: 63 IN | BODY MASS INDEX: 25.52 KG/M2 | SYSTOLIC BLOOD PRESSURE: 130 MMHG | HEART RATE: 78 BPM | OXYGEN SATURATION: 99 % | DIASTOLIC BLOOD PRESSURE: 74 MMHG | WEIGHT: 144 LBS

## 2025-04-10 DIAGNOSIS — N12 PYELONEPHRITIS OF LEFT KIDNEY: ICD-10-CM

## 2025-04-10 DIAGNOSIS — R65.20 SEVERE SEPSIS (HCC): ICD-10-CM

## 2025-04-10 DIAGNOSIS — R50.9 FEVER: ICD-10-CM

## 2025-04-10 DIAGNOSIS — R68.89 RIGORS: ICD-10-CM

## 2025-04-10 DIAGNOSIS — A41.9 SEPSIS (HCC): Primary | ICD-10-CM

## 2025-04-10 DIAGNOSIS — R31.0 GROSS HEMATURIA: Primary | ICD-10-CM

## 2025-04-10 DIAGNOSIS — A41.9 SEVERE SEPSIS (HCC): ICD-10-CM

## 2025-04-10 LAB
ALBUMIN SERPL BCG-MCNC: 4.2 G/DL (ref 3.5–5)
ALP SERPL-CCNC: 95 U/L (ref 34–104)
ALT SERPL W P-5'-P-CCNC: 42 U/L (ref 7–52)
ANION GAP SERPL CALCULATED.3IONS-SCNC: 8 MMOL/L (ref 4–13)
APTT PPP: 20 SECONDS (ref 23–34)
AST SERPL W P-5'-P-CCNC: 50 U/L (ref 13–39)
BASOPHILS # BLD AUTO: 0 THOUSANDS/ÂΜL (ref 0–0.1)
BASOPHILS NFR BLD AUTO: 0 % (ref 0–1)
BILIRUB SERPL-MCNC: 0.4 MG/DL (ref 0.2–1)
BILIRUB UR QL STRIP: NEGATIVE
BUN SERPL-MCNC: 16 MG/DL (ref 5–25)
CALCIUM SERPL-MCNC: 9.4 MG/DL (ref 8.4–10.2)
CHLORIDE SERPL-SCNC: 106 MMOL/L (ref 96–108)
CLARITY UR: CLEAR
CO2 SERPL-SCNC: 25 MMOL/L (ref 21–32)
COLOR UR: ABNORMAL
CREAT SERPL-MCNC: 0.76 MG/DL (ref 0.6–1.3)
EOSINOPHIL # BLD AUTO: 0.01 THOUSAND/ÂΜL (ref 0–0.61)
EOSINOPHIL NFR BLD AUTO: 0 % (ref 0–6)
ERYTHROCYTE [DISTWIDTH] IN BLOOD BY AUTOMATED COUNT: 12.4 % (ref 11.6–15.1)
FLUAV AG UPPER RESP QL IA.RAPID: NEGATIVE
FLUBV AG UPPER RESP QL IA.RAPID: NEGATIVE
GFR SERPL CREATININE-BSD FRML MDRD: 84 ML/MIN/1.73SQ M
GLUCOSE SERPL-MCNC: 100 MG/DL (ref 65–140)
GLUCOSE UR STRIP-MCNC: NEGATIVE MG/DL
HCT VFR BLD AUTO: 34.4 % (ref 34.8–46.1)
HGB BLD-MCNC: 11.6 G/DL (ref 11.5–15.4)
HGB UR QL STRIP.AUTO: NEGATIVE
IMM GRANULOCYTES # BLD AUTO: 0.04 THOUSAND/UL (ref 0–0.2)
IMM GRANULOCYTES NFR BLD AUTO: 2 % (ref 0–2)
INR PPP: 1 (ref 0.85–1.19)
KETONES UR STRIP-MCNC: NEGATIVE MG/DL
LACTATE SERPL-SCNC: 2 MMOL/L (ref 0.5–2)
LACTATE SERPL-SCNC: 2.9 MMOL/L (ref 0.5–2)
LEUKOCYTE ESTERASE UR QL STRIP: NEGATIVE
LYMPHOCYTES # BLD AUTO: 0.46 THOUSANDS/ÂΜL (ref 0.6–4.47)
LYMPHOCYTES NFR BLD AUTO: 21 % (ref 14–44)
MAGNESIUM SERPL-MCNC: 1.7 MG/DL (ref 1.9–2.7)
MCH RBC QN AUTO: 29.3 PG (ref 26.8–34.3)
MCHC RBC AUTO-ENTMCNC: 33.7 G/DL (ref 31.4–37.4)
MCV RBC AUTO: 87 FL (ref 82–98)
MONOCYTES # BLD AUTO: 0.01 THOUSAND/ÂΜL (ref 0.17–1.22)
MONOCYTES NFR BLD AUTO: 0 % (ref 4–12)
NEUTROPHILS # BLD AUTO: 1.71 THOUSANDS/ÂΜL (ref 1.85–7.62)
NEUTS SEG NFR BLD AUTO: 77 % (ref 43–75)
NITRITE UR QL STRIP: NEGATIVE
NRBC BLD AUTO-RTO: 0 /100 WBCS
PH UR STRIP.AUTO: 6 [PH]
PLATELET # BLD AUTO: 194 THOUSANDS/UL (ref 149–390)
PMV BLD AUTO: 9.6 FL (ref 8.9–12.7)
POTASSIUM SERPL-SCNC: 3.6 MMOL/L (ref 3.5–5.3)
PROCALCITONIN SERPL-MCNC: <0.05 NG/ML
PROT SERPL-MCNC: 7.1 G/DL (ref 6.4–8.4)
PROT UR STRIP-MCNC: NEGATIVE MG/DL
PROTHROMBIN TIME: 13.4 SECONDS (ref 12.3–15)
RBC # BLD AUTO: 3.96 MILLION/UL (ref 3.81–5.12)
SARS-COV+SARS-COV-2 AG RESP QL IA.RAPID: NEGATIVE
SODIUM SERPL-SCNC: 139 MMOL/L (ref 135–147)
SP GR UR STRIP.AUTO: >=1.05 (ref 1–1.03)
UROBILINOGEN UR STRIP-ACNC: <2 MG/DL
WBC # BLD AUTO: 2.23 THOUSAND/UL (ref 4.31–10.16)

## 2025-04-10 PROCEDURE — 83735 ASSAY OF MAGNESIUM: CPT | Performed by: EMERGENCY MEDICINE

## 2025-04-10 PROCEDURE — 80053 COMPREHEN METABOLIC PANEL: CPT | Performed by: EMERGENCY MEDICINE

## 2025-04-10 PROCEDURE — 74177 CT ABD & PELVIS W/CONTRAST: CPT

## 2025-04-10 PROCEDURE — 87040 BLOOD CULTURE FOR BACTERIA: CPT | Performed by: EMERGENCY MEDICINE

## 2025-04-10 PROCEDURE — 83605 ASSAY OF LACTIC ACID: CPT | Performed by: EMERGENCY MEDICINE

## 2025-04-10 PROCEDURE — 99285 EMERGENCY DEPT VISIT HI MDM: CPT

## 2025-04-10 PROCEDURE — 85025 COMPLETE CBC W/AUTO DIFF WBC: CPT | Performed by: EMERGENCY MEDICINE

## 2025-04-10 PROCEDURE — 87811 SARS-COV-2 COVID19 W/OPTIC: CPT | Performed by: EMERGENCY MEDICINE

## 2025-04-10 PROCEDURE — 93005 ELECTROCARDIOGRAM TRACING: CPT

## 2025-04-10 PROCEDURE — 71045 X-RAY EXAM CHEST 1 VIEW: CPT

## 2025-04-10 PROCEDURE — 87804 INFLUENZA ASSAY W/OPTIC: CPT | Performed by: EMERGENCY MEDICINE

## 2025-04-10 PROCEDURE — 96375 TX/PRO/DX INJ NEW DRUG ADDON: CPT

## 2025-04-10 PROCEDURE — 81003 URINALYSIS AUTO W/O SCOPE: CPT | Performed by: EMERGENCY MEDICINE

## 2025-04-10 PROCEDURE — 84145 PROCALCITONIN (PCT): CPT | Performed by: EMERGENCY MEDICINE

## 2025-04-10 PROCEDURE — 52000 CYSTOURETHROSCOPY: CPT | Performed by: UROLOGY

## 2025-04-10 PROCEDURE — 96365 THER/PROPH/DIAG IV INF INIT: CPT

## 2025-04-10 PROCEDURE — 36415 COLL VENOUS BLD VENIPUNCTURE: CPT | Performed by: EMERGENCY MEDICINE

## 2025-04-10 PROCEDURE — 96367 TX/PROPH/DG ADDL SEQ IV INF: CPT

## 2025-04-10 PROCEDURE — 85730 THROMBOPLASTIN TIME PARTIAL: CPT | Performed by: EMERGENCY MEDICINE

## 2025-04-10 PROCEDURE — 85610 PROTHROMBIN TIME: CPT | Performed by: EMERGENCY MEDICINE

## 2025-04-10 RX ORDER — SODIUM CHLORIDE, SODIUM GLUCONATE, SODIUM ACETATE, POTASSIUM CHLORIDE, MAGNESIUM CHLORIDE, SODIUM PHOSPHATE, DIBASIC, AND POTASSIUM PHOSPHATE .53; .5; .37; .037; .03; .012; .00082 G/100ML; G/100ML; G/100ML; G/100ML; G/100ML; G/100ML; G/100ML
100 INJECTION, SOLUTION INTRAVENOUS CONTINUOUS
Status: DISPENSED | OUTPATIENT
Start: 2025-04-11 | End: 2025-04-13

## 2025-04-10 RX ORDER — ACETAMINOPHEN 10 MG/ML
1000 INJECTION, SOLUTION INTRAVENOUS ONCE
Status: COMPLETED | OUTPATIENT
Start: 2025-04-10 | End: 2025-04-10

## 2025-04-10 RX ORDER — METOCLOPRAMIDE HYDROCHLORIDE 5 MG/ML
10 INJECTION INTRAMUSCULAR; INTRAVENOUS ONCE
Status: COMPLETED | OUTPATIENT
Start: 2025-04-10 | End: 2025-04-10

## 2025-04-10 RX ORDER — L. RHAMNOSUS GG/INULIN 20B-200 MG
CAPSULE ORAL
COMMUNITY
Start: 2025-01-01

## 2025-04-10 RX ORDER — SODIUM CHLORIDE, SODIUM GLUCONATE, SODIUM ACETATE, POTASSIUM CHLORIDE, MAGNESIUM CHLORIDE, SODIUM PHOSPHATE, DIBASIC, AND POTASSIUM PHOSPHATE .53; .5; .37; .037; .03; .012; .00082 G/100ML; G/100ML; G/100ML; G/100ML; G/100ML; G/100ML; G/100ML
1000 INJECTION, SOLUTION INTRAVENOUS ONCE
Status: COMPLETED | OUTPATIENT
Start: 2025-04-10 | End: 2025-04-10

## 2025-04-10 RX ORDER — CHLORAL HYDRATE 500 MG
CAPSULE ORAL
COMMUNITY
Start: 2025-01-01

## 2025-04-10 RX ORDER — KETOROLAC TROMETHAMINE 30 MG/ML
30 INJECTION, SOLUTION INTRAMUSCULAR; INTRAVENOUS ONCE
Status: COMPLETED | OUTPATIENT
Start: 2025-04-10 | End: 2025-04-10

## 2025-04-10 RX ORDER — SULFAMETHOXAZOLE AND TRIMETHOPRIM 800; 160 MG/1; MG/1
1 TABLET ORAL EVERY 12 HOURS SCHEDULED
Qty: 4 TABLET | Refills: 0 | Status: ON HOLD | OUTPATIENT
Start: 2025-04-10 | End: 2025-04-11

## 2025-04-10 RX ORDER — ACETAMINOPHEN 160 MG
TABLET,DISINTEGRATING ORAL
COMMUNITY
Start: 2025-01-01

## 2025-04-10 RX ORDER — SODIUM CHLORIDE, SODIUM GLUCONATE, SODIUM ACETATE, POTASSIUM CHLORIDE, MAGNESIUM CHLORIDE, SODIUM PHOSPHATE, DIBASIC, AND POTASSIUM PHOSPHATE .53; .5; .37; .037; .03; .012; .00082 G/100ML; G/100ML; G/100ML; G/100ML; G/100ML; G/100ML; G/100ML
1000 INJECTION, SOLUTION INTRAVENOUS ONCE
Status: COMPLETED | OUTPATIENT
Start: 2025-04-10 | End: 2025-04-11

## 2025-04-10 RX ORDER — DIPHENHYDRAMINE HYDROCHLORIDE 50 MG/ML
25 INJECTION, SOLUTION INTRAMUSCULAR; INTRAVENOUS ONCE
Status: COMPLETED | OUTPATIENT
Start: 2025-04-10 | End: 2025-04-10

## 2025-04-10 RX ADMIN — DIPHENHYDRAMINE HYDROCHLORIDE 25 MG: 50 INJECTION INTRAMUSCULAR; INTRAVENOUS at 21:49

## 2025-04-10 RX ADMIN — ACETAMINOPHEN 1000 MG: 10 INJECTION INTRAVENOUS at 21:06

## 2025-04-10 RX ADMIN — IOHEXOL 100 ML: 350 INJECTION, SOLUTION INTRAVENOUS at 21:43

## 2025-04-10 RX ADMIN — SODIUM CHLORIDE, SODIUM GLUCONATE, SODIUM ACETATE, POTASSIUM CHLORIDE, MAGNESIUM CHLORIDE, SODIUM PHOSPHATE, DIBASIC, AND POTASSIUM PHOSPHATE 1000 ML: .53; .5; .37; .037; .03; .012; .00082 INJECTION, SOLUTION INTRAVENOUS at 21:50

## 2025-04-10 RX ADMIN — KETOROLAC TROMETHAMINE 30 MG: 30 INJECTION, SOLUTION INTRAMUSCULAR; INTRAVENOUS at 21:03

## 2025-04-10 RX ADMIN — METOCLOPRAMIDE 10 MG: 5 INJECTION, SOLUTION INTRAMUSCULAR; INTRAVENOUS at 21:05

## 2025-04-10 RX ADMIN — CEFTRIAXONE 2000 MG: 10 INJECTION, POWDER, FOR SOLUTION INTRAVENOUS at 21:50

## 2025-04-10 RX ADMIN — SODIUM CHLORIDE, SODIUM GLUCONATE, SODIUM ACETATE, POTASSIUM CHLORIDE, MAGNESIUM CHLORIDE, SODIUM PHOSPHATE, DIBASIC, AND POTASSIUM PHOSPHATE 1000 ML: .53; .5; .37; .037; .03; .012; .00082 INJECTION, SOLUTION INTRAVENOUS at 22:28

## 2025-04-10 NOTE — TELEPHONE ENCOUNTER
ESC to on call provider Mercedez: Spouse is calling with complaint that after todays procedure and start of Bactrim pt  feels horrible and is having chills, dizziness, shakes, body and muscle aches and  full body pain.

## 2025-04-10 NOTE — TELEPHONE ENCOUNTER
"FOLLOW UP: Pt will be seen in [facility] as advised by on call  for evaluation of symptoms.    REASON FOR CONVERSATION: Medication Management    SYMPTOMS: Dizzy, full body pain , chills, unable to get full temp check     OTHER:     DISPOSITION: Call PCP Now  Reason for Disposition   [1] Caller has URGENT medicine question about med that PCP or specialist prescribed AND [2] triager unable to answer question    Answer Assessment - Initial Assessment Questions  1. NAME of MEDICINE: \"What medicine(s) are you calling about?\"      Bactrim   2. QUESTION: \"What is your question?\" (e.g., double dose of medicine, side effect)      I think my wife is allergic to this medication?  3. PRESCRIBER: \"Who prescribed the medicine?\" Reason: if prescribed by specialist, call should be referred to that group.      [facility]   4. SYMPTOMS: \"Do you have any symptoms?\" If Yes, ask: \"What symptoms are you having?\"  \"How bad are the symptoms (e.g., mild, moderate, severe)      She did not have these symptoms before starting the Bactrim.  Currently having chills, dizziness, shakes, body and muscle aches. Full body pain.    Protocols used: Medication Question Call-Adult-AH    "

## 2025-04-10 NOTE — LETTER
April 10, 2025     Shelby Marte PA-C  [address]   [address]  [facility] [address]    Patient: Richa Medina   YOB: 1965   Date of Visit: 4/10/2025       Dear Dr. Shelby Marte PA-C:    Thank you for referring Richa Medina to me for evaluation. Below are my notes for this consultation.    If you have questions, please do not hesitate to call me. I look forward to following your patient along with you.         Sincerely,        Camron Laws MD        CC: No Recipients    Camron Laws MD  4/10/2025  2:46 PM  Sign when Signing Visit     Cystoscopy     Date/Time  4/10/2025 2:00 PM     Performed by  Camron Laws MD   Authorized by  Camron Laws MD     Universal Protocol:  Consent: Verbal consent obtained. Written consent obtained.  Risks and benefits: risks, benefits and alternatives were discussed  Consent given by: patient  Patient understanding: patient states understanding of the procedure being performed  Patient consent: the patient's understanding of the procedure matches consent given  Procedure consent: procedure consent matches procedure scheduled  Required items: required blood products, implants, devices, and special equipment available  Patient identity confirmed: verbally with patient      Procedure Details:  Procedure type: cystoscopy    Patient tolerance: Patient tolerated the procedure well with no immediate complications    Additional Procedure Details: With the patient in dorsolithotomy position the urethral meatus was prepped with Betadine and 2% lidocaine lubricant lubricating a video cystoscope was inserted per urethra.  Cystourethroscopy was completely normal with a normal urethra without stricture or lesion normal bladder without mucosal abnormality or extrinsic mass compression and without intrinsic lesions.  The right ureteral orifice was in normal position and configuration with clear reflux.  The left ureteral orifice had clear E flux but was  widely patent and gaping consistent with a probable refluxing left ureteral orifice.  Retroflexion revealed a normal bladder outlet.  The cystoscope was removed and the patient recovered uneventfully.      More than likely this patient had a lower urinary tract infection i.e. acute bacterial cystitis which prior to treatment advanced to left pyelonephritis more than likely due to vesicoureteral reflux.  The patient has been instructed that should she develop symptoms of acute cystitis that she should call our office and be placed on antibiotics with the choice of antibiotic being intuitive until urine culture result can be obtained to further direct antibiotic therapy.  If the patient has recurrent bouts of infection then she will be placed on chronic suppressive doses of antibiotics and further testing with voiding cystoscourethrography will take place.  If significant reflux is proven consideration towards ureteroneocystotomy will be made.  Inasmuch as the patient is 60 years of age and does not have episodes of sepsis and pyelonephritis prior will hopefully avoid the need to treat aggressively.

## 2025-04-10 NOTE — PROGRESS NOTES
Cystoscopy     Date/Time  4/10/2025 2:00 PM     Performed by  Camron Laws MD   Authorized by  Camron Laws MD     Universal Protocol:  Consent: Verbal consent obtained. Written consent obtained.  Risks and benefits: risks, benefits and alternatives were discussed  Consent given by: patient  Patient understanding: patient states understanding of the procedure being performed  Patient consent: the patient's understanding of the procedure matches consent given  Procedure consent: procedure consent matches procedure scheduled  Required items: required blood products, implants, devices, and special equipment available  Patient identity confirmed: verbally with patient      Procedure Details:  Procedure type: cystoscopy    Patient tolerance: Patient tolerated the procedure well with no immediate complications    Additional Procedure Details: With the patient in dorsolithotomy position the urethral meatus was prepped with Betadine and 2% lidocaine lubricant lubricating a video cystoscope was inserted per urethra.  Cystourethroscopy was completely normal with a normal urethra without stricture or lesion normal bladder without mucosal abnormality or extrinsic mass compression and without intrinsic lesions.  The right ureteral orifice was in normal position and configuration with clear reflux.  The left ureteral orifice had clear E flux but was widely patent and gaping consistent with a probable refluxing left ureteral orifice.  Retroflexion revealed a normal bladder outlet.  The cystoscope was removed and the patient recovered uneventfully.      More than likely this patient had a lower urinary tract infection i.e. acute bacterial cystitis which prior to treatment advanced to left pyelonephritis more than likely due to vesicoureteral reflux.  The patient has been instructed that should she develop symptoms of acute cystitis that she should call our office and be placed on antibiotics with the choice of  antibiotic being intuitive until urine culture result can be obtained to further direct antibiotic therapy.  If the patient has recurrent bouts of infection then she will be placed on chronic suppressive doses of antibiotics and further testing with voiding cystoscourethrography will take place.  If significant reflux is proven consideration towards ureteroneocystotomy will be made.  Inasmuch as the patient is 60 years of age and does not have episodes of sepsis and pyelonephritis prior will hopefully avoid the need to treat aggressively.

## 2025-04-10 NOTE — TELEPHONE ENCOUNTER
"Regarding: Possible allergic reaction / Chills / Dizziness / Body aches  ----- Message from Phan KIM sent at 4/10/2025  7:37 PM EDT -----  \"My spouse is having an allergic reaction to the Bactrim. She is experiencing chills, dizziness, shakes, and body and muscle aches.\"    "

## 2025-04-11 PROBLEM — R65.20 SEVERE SEPSIS (HCC): Status: ACTIVE | Noted: 2024-12-11

## 2025-04-11 PROBLEM — T37.0X5A: Status: ACTIVE | Noted: 2025-04-11

## 2025-04-11 LAB
ANION GAP SERPL CALCULATED.3IONS-SCNC: 7 MMOL/L (ref 4–13)
ATRIAL RATE: 133 BPM
BASOPHILS # BLD AUTO: 0.01 THOUSANDS/ÂΜL (ref 0–0.1)
BASOPHILS NFR BLD AUTO: 0 % (ref 0–1)
BUN SERPL-MCNC: 15 MG/DL (ref 5–25)
CALCIUM SERPL-MCNC: 8.2 MG/DL (ref 8.4–10.2)
CHLORIDE SERPL-SCNC: 109 MMOL/L (ref 96–108)
CO2 SERPL-SCNC: 23 MMOL/L (ref 21–32)
CREAT SERPL-MCNC: 0.85 MG/DL (ref 0.6–1.3)
EOSINOPHIL # BLD AUTO: 0.03 THOUSAND/ÂΜL (ref 0–0.61)
EOSINOPHIL NFR BLD AUTO: 0 % (ref 0–6)
ERYTHROCYTE [DISTWIDTH] IN BLOOD BY AUTOMATED COUNT: 12.7 % (ref 11.6–15.1)
GFR SERPL CREATININE-BSD FRML MDRD: 73 ML/MIN/1.73SQ M
GLUCOSE SERPL-MCNC: 130 MG/DL (ref 65–140)
HCT VFR BLD AUTO: 31.3 % (ref 34.8–46.1)
HGB BLD-MCNC: 10.4 G/DL (ref 11.5–15.4)
IMM GRANULOCYTES # BLD AUTO: 0.04 THOUSAND/UL (ref 0–0.2)
IMM GRANULOCYTES NFR BLD AUTO: 0 % (ref 0–2)
LYMPHOCYTES # BLD AUTO: 0.09 THOUSANDS/ÂΜL (ref 0.6–4.47)
LYMPHOCYTES NFR BLD AUTO: 1 % (ref 14–44)
MAGNESIUM SERPL-MCNC: 2.6 MG/DL (ref 1.9–2.7)
MCH RBC QN AUTO: 28.7 PG (ref 26.8–34.3)
MCHC RBC AUTO-ENTMCNC: 33.2 G/DL (ref 31.4–37.4)
MCV RBC AUTO: 87 FL (ref 82–98)
MONOCYTES # BLD AUTO: 0.33 THOUSAND/ÂΜL (ref 0.17–1.22)
MONOCYTES NFR BLD AUTO: 4 % (ref 4–12)
NEUTROPHILS # BLD AUTO: 8.65 THOUSANDS/ÂΜL (ref 1.85–7.62)
NEUTS SEG NFR BLD AUTO: 95 % (ref 43–75)
NRBC BLD AUTO-RTO: 0 /100 WBCS
P AXIS: 81 DEGREES
PLATELET # BLD AUTO: 192 THOUSANDS/UL (ref 149–390)
PMV BLD AUTO: 10.1 FL (ref 8.9–12.7)
POTASSIUM SERPL-SCNC: 4 MMOL/L (ref 3.5–5.3)
PR INTERVAL: 132 MS
QRS AXIS: 58 DEGREES
QRSD INTERVAL: 82 MS
QT INTERVAL: 278 MS
QTC INTERVAL: 413 MS
RBC # BLD AUTO: 3.62 MILLION/UL (ref 3.81–5.12)
SODIUM SERPL-SCNC: 139 MMOL/L (ref 135–147)
T WAVE AXIS: 46 DEGREES
VENTRICULAR RATE: 133 BPM
WBC # BLD AUTO: 9.15 THOUSAND/UL (ref 4.31–10.16)

## 2025-04-11 PROCEDURE — 93010 ELECTROCARDIOGRAM REPORT: CPT | Performed by: INTERNAL MEDICINE

## 2025-04-11 PROCEDURE — 87086 URINE CULTURE/COLONY COUNT: CPT

## 2025-04-11 PROCEDURE — 99222 1ST HOSP IP/OBS MODERATE 55: CPT

## 2025-04-11 PROCEDURE — 80048 BASIC METABOLIC PNL TOTAL CA: CPT

## 2025-04-11 PROCEDURE — 83735 ASSAY OF MAGNESIUM: CPT

## 2025-04-11 PROCEDURE — 99255 IP/OBS CONSLTJ NEW/EST HI 80: CPT | Performed by: UROLOGY

## 2025-04-11 PROCEDURE — 85025 COMPLETE CBC W/AUTO DIFF WBC: CPT

## 2025-04-11 RX ORDER — ANASTROZOLE 1 MG/1
1 TABLET ORAL DAILY
Status: DISCONTINUED | OUTPATIENT
Start: 2025-04-11 | End: 2025-04-14 | Stop reason: HOSPADM

## 2025-04-11 RX ORDER — OXYCODONE HYDROCHLORIDE 5 MG/1
5 TABLET ORAL EVERY 6 HOURS PRN
Refills: 0 | Status: DISCONTINUED | OUTPATIENT
Start: 2025-04-11 | End: 2025-04-11

## 2025-04-11 RX ORDER — LEVOTHYROXINE SODIUM 50 UG/1
50 TABLET ORAL
Status: DISCONTINUED | OUTPATIENT
Start: 2025-04-12 | End: 2025-04-14 | Stop reason: HOSPADM

## 2025-04-11 RX ORDER — PROCHLORPERAZINE 25 MG
25 SUPPOSITORY, RECTAL RECTAL EVERY 12 HOURS PRN
Status: DISCONTINUED | OUTPATIENT
Start: 2025-04-11 | End: 2025-04-12

## 2025-04-11 RX ORDER — METHOCARBAMOL 100 MG/ML
750 INJECTION, SOLUTION INTRAMUSCULAR; INTRAVENOUS EVERY 8 HOURS
Status: COMPLETED | OUTPATIENT
Start: 2025-04-11 | End: 2025-04-12

## 2025-04-11 RX ORDER — OXYCODONE HYDROCHLORIDE 10 MG/1
10 TABLET ORAL EVERY 4 HOURS PRN
Refills: 0 | Status: DISCONTINUED | OUTPATIENT
Start: 2025-04-11 | End: 2025-04-14 | Stop reason: HOSPADM

## 2025-04-11 RX ORDER — POTASSIUM CHLORIDE 1500 MG/1
20 TABLET, EXTENDED RELEASE ORAL ONCE
Status: COMPLETED | OUTPATIENT
Start: 2025-04-11 | End: 2025-04-11

## 2025-04-11 RX ORDER — MAGNESIUM SULFATE HEPTAHYDRATE 40 MG/ML
2 INJECTION, SOLUTION INTRAVENOUS ONCE
Status: COMPLETED | OUTPATIENT
Start: 2025-04-11 | End: 2025-04-11

## 2025-04-11 RX ORDER — ONDANSETRON 2 MG/ML
4 INJECTION INTRAMUSCULAR; INTRAVENOUS ONCE
Status: COMPLETED | OUTPATIENT
Start: 2025-04-11 | End: 2025-04-11

## 2025-04-11 RX ORDER — ENOXAPARIN SODIUM 100 MG/ML
40 INJECTION SUBCUTANEOUS DAILY
Status: DISCONTINUED | OUTPATIENT
Start: 2025-04-11 | End: 2025-04-14 | Stop reason: HOSPADM

## 2025-04-11 RX ORDER — HYDROMORPHONE HCL/PF 1 MG/ML
0.5 SYRINGE (ML) INJECTION ONCE
Status: COMPLETED | OUTPATIENT
Start: 2025-04-11 | End: 2025-04-11

## 2025-04-11 RX ORDER — NALOXONE HYDROCHLORIDE 0.4 MG/ML
0.1 INJECTION, SOLUTION INTRAMUSCULAR; INTRAVENOUS; SUBCUTANEOUS
Status: DISCONTINUED | OUTPATIENT
Start: 2025-04-11 | End: 2025-04-14 | Stop reason: HOSPADM

## 2025-04-11 RX ORDER — LEVOTHYROXINE SODIUM 25 UG/1
25 TABLET ORAL
Status: DISCONTINUED | OUTPATIENT
Start: 2025-04-11 | End: 2025-04-14 | Stop reason: HOSPADM

## 2025-04-11 RX ORDER — ONDANSETRON 2 MG/ML
4 INJECTION INTRAMUSCULAR; INTRAVENOUS EVERY 6 HOURS PRN
Status: DISCONTINUED | OUTPATIENT
Start: 2025-04-11 | End: 2025-04-14 | Stop reason: HOSPADM

## 2025-04-11 RX ORDER — PAROXETINE HYDROCHLORIDE HEMIHYDRATE 25 MG/1
50 TABLET, FILM COATED, EXTENDED RELEASE ORAL DAILY
Status: DISCONTINUED | OUTPATIENT
Start: 2025-04-11 | End: 2025-04-14 | Stop reason: HOSPADM

## 2025-04-11 RX ORDER — FAMOTIDINE 10 MG/ML
20 INJECTION, SOLUTION INTRAVENOUS ONCE
Status: COMPLETED | OUTPATIENT
Start: 2025-04-11 | End: 2025-04-11

## 2025-04-11 RX ORDER — HYDROMORPHONE HCL/PF 1 MG/ML
0.5 SYRINGE (ML) INJECTION EVERY 4 HOURS PRN
Status: DISCONTINUED | OUTPATIENT
Start: 2025-04-11 | End: 2025-04-14 | Stop reason: HOSPADM

## 2025-04-11 RX ORDER — BUDESONIDE AND FORMOTEROL FUMARATE DIHYDRATE 80; 4.5 UG/1; UG/1
2 AEROSOL RESPIRATORY (INHALATION) 2 TIMES DAILY
Status: DISCONTINUED | OUTPATIENT
Start: 2025-04-11 | End: 2025-04-14 | Stop reason: HOSPADM

## 2025-04-11 RX ORDER — HYDROMORPHONE HCL/PF 1 MG/ML
1 SYRINGE (ML) INJECTION ONCE
Status: COMPLETED | OUTPATIENT
Start: 2025-04-11 | End: 2025-04-11

## 2025-04-11 RX ORDER — ACETAMINOPHEN 325 MG/1
650 TABLET ORAL EVERY 6 HOURS PRN
Status: DISCONTINUED | OUTPATIENT
Start: 2025-04-11 | End: 2025-04-14 | Stop reason: HOSPADM

## 2025-04-11 RX ORDER — METOCLOPRAMIDE HYDROCHLORIDE 5 MG/ML
10 INJECTION INTRAMUSCULAR; INTRAVENOUS EVERY 6 HOURS PRN
Status: DISCONTINUED | OUTPATIENT
Start: 2025-04-11 | End: 2025-04-14 | Stop reason: HOSPADM

## 2025-04-11 RX ORDER — OXYCODONE HYDROCHLORIDE 5 MG/1
5 TABLET ORAL EVERY 4 HOURS PRN
Refills: 0 | Status: DISCONTINUED | OUTPATIENT
Start: 2025-04-11 | End: 2025-04-14 | Stop reason: HOSPADM

## 2025-04-11 RX ADMIN — OXYCODONE HYDROCHLORIDE 10 MG: 10 TABLET ORAL at 16:47

## 2025-04-11 RX ADMIN — HYDROMORPHONE HYDROCHLORIDE 1 MG: 1 INJECTION, SOLUTION INTRAMUSCULAR; INTRAVENOUS; SUBCUTANEOUS at 12:25

## 2025-04-11 RX ADMIN — OXYCODONE 5 MG: 5 TABLET ORAL at 01:44

## 2025-04-11 RX ADMIN — PAROXETINE 50 MG: 25 TABLET, FILM COATED, EXTENDED RELEASE ORAL at 08:24

## 2025-04-11 RX ADMIN — ACETAMINOPHEN 650 MG: 325 TABLET, FILM COATED ORAL at 06:41

## 2025-04-11 RX ADMIN — ONDANSETRON 4 MG: 2 INJECTION INTRAMUSCULAR; INTRAVENOUS at 16:47

## 2025-04-11 RX ADMIN — CEFEPIME 1000 MG: 1 INJECTION, POWDER, FOR SOLUTION INTRAMUSCULAR; INTRAVENOUS at 12:11

## 2025-04-11 RX ADMIN — SODIUM CHLORIDE, SODIUM GLUCONATE, SODIUM ACETATE, POTASSIUM CHLORIDE, MAGNESIUM CHLORIDE, SODIUM PHOSPHATE, DIBASIC, AND POTASSIUM PHOSPHATE 1000 ML: .53; .5; .37; .037; .03; .012; .00082 INJECTION, SOLUTION INTRAVENOUS at 00:37

## 2025-04-11 RX ADMIN — ONDANSETRON 4 MG: 2 INJECTION INTRAMUSCULAR; INTRAVENOUS at 09:31

## 2025-04-11 RX ADMIN — MAGNESIUM SULFATE HEPTAHYDRATE 2 G: 40 INJECTION, SOLUTION INTRAVENOUS at 03:39

## 2025-04-11 RX ADMIN — METOCLOPRAMIDE 10 MG: 5 INJECTION, SOLUTION INTRAMUSCULAR; INTRAVENOUS at 12:04

## 2025-04-11 RX ADMIN — LEVOTHYROXINE SODIUM 25 MCG: 0.03 TABLET ORAL at 05:26

## 2025-04-11 RX ADMIN — OXYCODONE 5 MG: 5 TABLET ORAL at 08:24

## 2025-04-11 RX ADMIN — ACETAMINOPHEN 650 MG: 325 TABLET, FILM COATED ORAL at 16:47

## 2025-04-11 RX ADMIN — ANASTROZOLE 1 MG: 1 TABLET, COATED ORAL at 08:25

## 2025-04-11 RX ADMIN — HYDROMORPHONE HYDROCHLORIDE 0.5 MG: 1 INJECTION, SOLUTION INTRAMUSCULAR; INTRAVENOUS; SUBCUTANEOUS at 09:24

## 2025-04-11 RX ADMIN — METHOCARBAMOL 750 MG: 100 INJECTION INTRAMUSCULAR; INTRAVENOUS at 16:49

## 2025-04-11 RX ADMIN — DEXTROSE 2000 MG: 50 INJECTION, SOLUTION INTRAVENOUS at 23:45

## 2025-04-11 RX ADMIN — POTASSIUM CHLORIDE 20 MEQ: 1500 TABLET, EXTENDED RELEASE ORAL at 01:44

## 2025-04-11 RX ADMIN — BUDESONIDE AND FORMOTEROL FUMARATE DIHYDRATE 2 PUFF: 80; 4.5 AEROSOL RESPIRATORY (INHALATION) at 08:26

## 2025-04-11 RX ADMIN — HYDROMORPHONE HYDROCHLORIDE 0.5 MG: 1 INJECTION, SOLUTION INTRAMUSCULAR; INTRAVENOUS; SUBCUTANEOUS at 20:26

## 2025-04-11 RX ADMIN — FAMOTIDINE 20 MG: 10 INJECTION, SOLUTION INTRAVENOUS at 01:56

## 2025-04-11 RX ADMIN — OXYCODONE HYDROCHLORIDE 10 MG: 10 TABLET ORAL at 21:09

## 2025-04-11 RX ADMIN — CEFEPIME 1000 MG: 1 INJECTION, POWDER, FOR SOLUTION INTRAMUSCULAR; INTRAVENOUS at 01:19

## 2025-04-11 RX ADMIN — SODIUM CHLORIDE, SODIUM GLUCONATE, SODIUM ACETATE, POTASSIUM CHLORIDE, MAGNESIUM CHLORIDE, SODIUM PHOSPHATE, DIBASIC, AND POTASSIUM PHOSPHATE 125 ML/HR: .53; .5; .37; .037; .03; .012; .00082 INJECTION, SOLUTION INTRAVENOUS at 01:55

## 2025-04-11 NOTE — H&P
"H&P - Hospitalist   Name: Richa Medina 60 y.o. female I MRN: [id number]  Unit/Bed#: E4 -01 I Date of Admission: 4/10/2025   Date of Service: 4/11/2025 I Hospital Day: 1     Assessment & Plan  Severe sepsis (HCC)  Pt presented to the ED secondary to new onset of fever, chills, rigors, myalgias, generalized weakness, and N/V shortly after cystoscopy and taking bactrim  Pt reports that she was seen in the urology office earlier in the day with cystoscopy performed in the office. She was initiated on bactrim following procedure for prophylaxis.  Of note, pt hospitalized 12/10-14/2024 secondary to septic shock due to left pyelonephritis. Urine culture from this hospitalization growing pansensitive E. Coli and patient transition to IV ancef.  Pt fulfilling SIRS criteria with fever, tachycardia, tachypnea, and leukopenia of 2.23  Lactic 2.9->2,0  Procalc  <0.05  Septic source unclear at this time, however do suspect likely urinary given recent instrumentation and history of similar presentation  CXR without acute abnormalities, similar to previous, official read pending  Flu/COVID negative  CT a/p demonstrating \"Small amount of air within the bladder lumen, probably related to recent intervention; correlation with urinalysis recommended to exclude superimposed cystitis.\"  UA currently without evidence of infection  Urine and blood cultures pending   Pt received weight-based IV fluids while in the ED, continue IV fluids  IV ceftriaxone given in the ED, will transition to IV cefepime for now pending culture results given recent instrumentation    Adverse reaction to sulfamethoxazole  Pt reports immediately after taking bactrim earlier today, she developed \"shaking\" and intractable N/V  She endorses that she has taken bactrim once in the past and had a similar reaction w/ N/V at that time as well  Adverse reaction v symptoms related to severe sepsis  Continue supportive care, IV fluids  Depression  Mood " "stable  Continue paxil  Hypothyroidism  Continue levothyroxine   Malignant neoplasm of overlapping sites of right breast in female, estrogen receptor positive (HCC)  Followed outpatient by hem/onc, last seen 2/18/2025  Continue arimidex      VTE Pharmacologic Prophylaxis: VTE Score: 5 High Risk (Score >/= 5) - Pharmacological DVT Prophylaxis Ordered: enoxaparin (Lovenox). Sequential Compression Devices Ordered.  Code Status: Level 1 - Full Code   Discussion with family: Patient declined call to .     Anticipated Length of Stay: Patient will be admitted on an inpatient basis with an anticipated length of stay of greater than 2 midnights secondary to severe sepsis.    History of Present Illness   Chief Complaint: \"I started feeling horrible after I took my bactrim\"    Richa Medina is a 60 y.o. female who presents with severe sepsis.  Patient reports that she had a cystoscopy in the urology office earlier today.  She reports that following she was prescribed Bactrim for prophylaxis.  Patient reports that in December she had similar admission where she required ICU stay for septic shock.  She reports that this time and that time she was prescribed Bactrim and after taking it she developed \"shaking \"and intractable nausea and vomiting.  Patient also endorses this evening she developed fever, chills, rigors, generalized weakness, and myalgias.    Review of Systems   Constitutional:  Positive for chills, fatigue and fever. Negative for appetite change and diaphoresis.   HENT:  Negative for congestion, rhinorrhea and sore throat.    Eyes:  Negative for photophobia and visual disturbance.   Respiratory:  Negative for cough, shortness of breath and wheezing.    Cardiovascular:  Negative for chest pain, palpitations and leg swelling.   Gastrointestinal:  Positive for nausea and vomiting. Negative for abdominal distention, abdominal pain, blood in stool, constipation and diarrhea.   Genitourinary:  Negative for " dysuria and hematuria.   Musculoskeletal:  Positive for myalgias. Negative for arthralgias, back pain and neck stiffness.   Skin:  Negative for color change and rash.   Neurological:  Positive for weakness (generalized). Negative for dizziness, seizures, syncope, light-headedness and headaches.   Psychiatric/Behavioral:  Negative for agitation, behavioral problems and confusion. The patient is not nervous/anxious.    All other systems reviewed and are negative.      Historical Information   Past Medical History:   Diagnosis Date    Allergic May 2023    Asthma     Breast cancer (HCC) 11/2023    Breast mass 10/31/23    Cancer (HCC)     bilateral    Depression     Disease of thyroid gland     Hearing aid worn     bilateral    Hearing loss     HL (hearing loss)     Osteopenia     Rosacea 02/09/2021    Seasonal allergies     Vitamin D deficiency 07/24/2018     Past Surgical History:   Procedure Laterality Date    BREAST BIOPSY Right 11/01/2023    X 2 sites    BREAST BIOPSY Left 12/08/2023    BREAST LUMPECTOMY  4/2024    IR DRAINAGE TUBE CHECK/CHANGE/REPOSITION/REINSERTION/UPSIZE  06/27/2024    IR DRAINAGE TUBE CHECK/CHANGE/REPOSITION/REINSERTION/UPSIZE  07/09/2024    IR DRAINAGE TUBE PLACEMENT  06/18/2024    LASIK      MASTECTOMY  4/2023    MRI BREAST BIOPSY LEFT (ALL INCLUSIVE) Left 12/08/2023    RI ADJNT TIS TRNSFR/REARGMT ANY AREA 30.1-60 SQ CM Right 04/19/2024    Procedure: RIGHT AESTHETIC FLAT CLOSURE , IBIS FLAP, PREVENA PLACEMENT;  Surgeon: Samara Felix DO;  [facility];  Service: Plastics    RI BX/EXC LYMPH NODE OPEN SUPERFICIAL Bilateral 04/19/2024    Procedure: Bilateral BX LYMPH NODE SENTINEL, lymphoscintigraphies, lymphatic mappings;  Surgeon: Sisi Dominguez MD;  [facility];  Service: Surgical Oncology    RI EXC BREAST LES PREOP PLMT RAD MARKER OPEN 1 LES Left 04/19/2024    Procedure: LEFT BREAST HILARY LOCALIZATION LUMPECTOMY;;  Surgeon: Sisi Dominguez MD;  [facility];   Service: Surgical Oncology    KS INJ RADIOACTIVE TRACER FOR ID OF SENTINEL NODE Bilateral 04/19/2024    Procedure: Bilateral BX LYMPH NODE SENTINEL, lymphoscintigraphies, lymphatic mappings;  Surgeon: Sisi Dominguez MD;  [facility];  Service: Surgical Oncology    KS INTRAOP SENTINEL LYMPH NODE ID W/DYE INJECTION Bilateral 04/19/2024    Procedure: Bilateral BX LYMPH NODE SENTINEL, lymphoscintigraphies, lymphatic mappings;  Surgeon: Sisi Dominguez MD;  [facility];  Service: Surgical Oncology    KS MASTECTOMY PARTIAL Left 04/19/2024    Procedure: LEFT BREAST HILARY LOCALIZATION LUMPECTOMY;;  Surgeon: Sisi Dominguez MD;  [facility];  Service: Surgical Oncology    KS MASTECTOMY SIMPLE COMPLETE Right 04/19/2024    Procedure: RIGHT BREAST MASTECTOMY, PSB AXILLARY DISSECTION;  Surgeon: Sisi Dominguez MD;  [facility];  Service: Surgical Oncology    KS MASTOPEXY Left 04/19/2024    Procedure: MASTOPEXY BREAST;  Surgeon: Samara Felix DO;  [facility];  Service: Plastics    KS NEUROPLASTY &/TRANSPOS MEDIAN NRV CARPAL TUNNE Right 07/10/2018    Procedure: CARPAL TUNNEL RELEASE;  Surgeon: Maicol Taylor DO;  [facility];  Service: Orthopedics    KS NEUROPLASTY &/TRANSPOS MEDIAN NRV CARPAL TUNNE Left 04/19/2019    Procedure: RELEASE CARPAL TUNNEL;  Surgeon: Peterson Alejandro MD;  [facility];  Service: Orthopedics    SEPTOPLASTY      SINUS SURGERY      US BREAST NEEDLE LOC LEFT Left 02/27/2024    US GUIDANCE BREAST BIOPSY RIGHT EACH ADDITIONAL Right 11/01/2023    US GUIDED BREAST BIOPSY RIGHT COMPLETE Right 11/01/2023     Social History     Tobacco Use    Smoking status: Never    Smokeless tobacco: Never   Vaping Use    Vaping status: Never Used   Substance and Sexual Activity    Alcohol use: Yes     Comment: rarely    Drug use: No    Sexual activity: Yes     Partners: Male     Birth control/protection: Male Sterilization     E-Cigarette/Vaping    E-Cigarette Use  Never User      E-Cigarette/Vaping Substances    Nicotine No     THC No     CBD No     Flavoring No     Other No     Unknown No      Family history non-contributory  Social History:  Marital Status: /Civil Union   Patient Pre-hospital Living Situation: Home  Patient Pre-hospital Level of Mobility: walks  Patient Pre-hospital Diet Restrictions: vegetarian     Meds/Allergies   I have reviewed home medications with patient personally.  Prior to Admission medications    Medication Sig Start Date End Date Taking? Authorizing Provider   anastrozole (ARIMIDEX) 1 mg tablet Take 1 tablet (1 mg total) by mouth daily 2/6/25   Matilde Yung DO   budesonide-formoterol (Symbicort) 80-4.5 MCG/ACT inhaler Inhale 2 puffs 2 (two) times a day Rinse mouth after use. 1/2/25   Ward Fernández MD   Calcium 250 MG CAPS Take by mouth daily with dinner      Historical Provider, MD   cetirizine (ZyrTEC) 10 mg tablet Take 1 tablet (10 mg total) by mouth daily 1/2/25   Ward Fernández MD   Cholecalciferol (VITAMIN D3 PO) Take 2 capsules by mouth in the morning    Historical Provider, MD   Iodine 2 % TINC  1/1/25   Historical Provider, MD   IRON, FERROUS SULFATE, PO Take 12.5 mg by mouth in the morning 1/1/25   Historical Provider, MD   Lactobacillus-Inulin (Probiotic Digestive Support) CAPS  1/1/25   Historical Provider, MD   levocetirizine (XYZAL) 5 MG tablet Take 1 tablet (5 mg total) by mouth every evening 9/26/24   Ward Fernández MD   levothyroxine 25 mcg tablet TAKE ONE TABLET BY MOUTH EVERY DAY MONDAY THRU FRIDAY AND TAKE 2 TABLETS EVERY DAY ON SAT & SUN. 4/4/25   Shelby Marte PA-C   MAGNESIUM PO Take 200 mg by mouth daily with dinner   4/11/13   Historical Provider, MD   Medium Chain Triglycerides (MCT OIL PO) Take 1 Application by mouth in the morning    Historical Provider, MD   mometasone (NASONEX) 50 mcg/act nasal spray 2 sprays into each nostril daily 1/2/25   Ward Fernández MD   MULTIPLE VITAMINS-CALCIUM PO Take 1 capsule by mouth daily in the  early morning      Historical Provider, MD   niacin 100 mg tablet Take 100 mg by mouth daily with breakfast    Historical Provider, MD   [place]-3 1000 MG CAPS  1/1/25   Historical Provider, MD   PARoxetine (PAXIL-CR) 25 mg 24 hr tablet TAKE TWO TABLETS BY MOUTH EVERY DAY 10/24/24   Shelby Marte PA-C   sulfamethoxazole-trimethoprim (BACTRIM DS) 800-160 mg per tablet Take 1 tablet by mouth every 12 (twelve) hours for 2 days 4/10/25 4/12/25  Camron aLws MD   Zinc 22.5 MG TABS  1/1/25   Historical Provider, MD     Allergies   Allergen Reactions    Bactrim [Sulfamethoxazole-Trimethoprim] GI Intolerance     Nausea, shaking, body aches     Pollen Extract        Objective :  Temp:  [97.7 °F (36.5 °C)-101.2 °F (38.4 °C)] 97.7 °F (36.5 °C)  HR:  [] 108  BP: ()/(49-76) 98/54  Resp:  [16-28] 18  SpO2:  [94 %-99 %] 94 %  O2 Device: None (Room air)    Physical Exam  Vitals and nursing note reviewed.   Constitutional:       General: She is not in acute distress.     Appearance: She is well-developed. She is ill-appearing.   HENT:      Head: Normocephalic and atraumatic.      Nose: Nose normal. No congestion.      Mouth/Throat:      Mouth: Mucous membranes are dry.      Pharynx: Oropharynx is clear.   Eyes:      Conjunctiva/sclera: Conjunctivae normal.   Cardiovascular:      Rate and Rhythm: Regular rhythm. Tachycardia present.      Heart sounds: Normal heart sounds. No murmur heard.     No friction rub. No gallop.   Pulmonary:      Effort: Pulmonary effort is normal. No respiratory distress.      Breath sounds: Normal breath sounds. No wheezing, rhonchi or rales.   Abdominal:      General: Bowel sounds are normal. There is no distension.      Palpations: Abdomen is soft.      Tenderness: There is no abdominal tenderness. There is no guarding.   Musculoskeletal:      Cervical back: Neck supple.      Right lower leg: No edema.      Left lower leg: No edema.   Skin:     General: Skin is warm and dry.       Capillary Refill: Capillary refill takes less than 2 seconds.   Neurological:      General: No focal deficit present.      Mental Status: She is alert and oriented to person, place, and time. Mental status is at baseline.   Psychiatric:         Mood and Affect: Mood normal.         Behavior: Behavior normal.          Lines/Drains:            Lab Results: I have reviewed the following results:  Results from last 7 days   Lab Units 04/10/25  2119   WBC Thousand/uL 2.23*   HEMOGLOBIN g/dL 11.6   HEMATOCRIT % 34.4*   PLATELETS Thousands/uL 194   SEGS PCT % 77*   LYMPHO PCT % 21   MONO PCT % 0*   EOS PCT % 0     Results from last 7 days   Lab Units 04/10/25  2120   SODIUM mmol/L 139   POTASSIUM mmol/L 3.6   CHLORIDE mmol/L 106   CO2 mmol/L 25   BUN mg/dL 16   CREATININE mg/dL 0.76   ANION GAP mmol/L 8   CALCIUM mg/dL 9.4   ALBUMIN g/dL 4.2   TOTAL BILIRUBIN mg/dL 0.40   ALK PHOS U/L 95   ALT U/L 42   AST U/L 50*   GLUCOSE RANDOM mg/dL 100     Results from last 7 days   Lab Units 04/10/25  2120   INR  1.00         Lab Results   Component Value Date    HGBA1C 5.5 01/31/2024    HGBA1C 6.0 (H) 08/14/2023    HGBA1C 5.6 08/13/2022     Results from last 7 days   Lab Units 04/10/25  2310 04/10/25  2120   LACTIC ACID mmol/L 2.0 2.9*   PROCALCITONIN ng/ml  --  <0.05       Imaging Results Review: I reviewed radiology reports from this admission including: chest xray and CT abdomen/pelvis.  Other Study Results Review: EKG was reviewed.     Administrative Statements   I have spent a total time of 65 minutes in caring for this patient on the day of the visit/encounter including Diagnostic results, Impressions, Counseling / Coordination of care, Documenting in the medical record, Reviewing/placing orders in the medical record (including tests, medications, and/or procedures), Obtaining or reviewing history  , and Communicating with other healthcare professionals .    ** Please Note: This note has been constructed using a voice  recognition system. **

## 2025-04-11 NOTE — PLAN OF CARE
Problem: Potential for Falls  Goal: Patient will remain free of falls  Description: INTERVENTIONS:- Educate patient/family on patient safety including physical limitations- Instruct patient to call for assistance with activity - Consult OT/PT to assist with strengthening/mobility - Keep Call bell within reach- Keep bed low and locked with side rails adjusted as appropriate- Keep care items and personal belongings within reach- Initiate and maintain comfort rounds- Make Fall Risk Sign visible to staff- Offer Toileting every 2 Hours, in advance of need- Initiate/Maintain bed alarm- Obtain necessary fall risk management equipment: - Apply yellow socks and bracelet for high fall risk patients- Consider moving patient to room near nurses station  Outcome: Progressing     Problem: DISCHARGE PLANNING  Goal: Discharge to home or other facility with appropriate resources  Description: INTERVENTIONS:- Identify barriers to discharge w/patient and caregiver- Arrange for needed discharge resources and transportation as appropriate- Identify discharge learning needs (meds, wound care, etc.)- Arrange for interpretive services to assist at discharge as needed- Refer to Case Management Department for coordinating discharge planning if the patient needs post-hospital services based on physician/advanced practitioner order or complex needs related to functional status, cognitive ability, or social support system  Outcome: Progressing     Problem: Knowledge Deficit  Goal: Patient/family/caregiver demonstrates understanding of disease process, treatment plan, medications, and discharge instructions  Description: Complete learning assessment and assess knowledge base.Interventions:- Provide teaching at level of understanding- Provide teaching via preferred learning methods  Outcome: Progressing     Problem: Nutrition/Hydration-ADULT  Goal: Nutrient/Hydration intake appropriate for improving, restoring or maintaining nutritional  needs  Description: Monitor and assess patient's nutrition/hydration status for malnutrition. Collaborate with interdisciplinary team and initiate plan and interventions as ordered.  Monitor patient's weight and dietary intake as ordered or per policy. Utilize nutrition screening tool and intervene as necessary. Determine patient's food preferences and provide high-protein, high-caloric foods as appropriate. INTERVENTIONS:- Monitor oral intake, urinary output, labs, and treatment plans- Assess nutrition and hydration status and recommend course of action- Evaluate amount of meals eaten- Assist patient with eating if necessary - Allow adequate time for meals- Recommend/ encourage appropriate diets, oral nutritional supplements, and vitamin/mineral supplements- Order, calculate, and assess calorie counts as needed- Recommend, monitor, and adjust tube feedings and TPN/PPN based on assessed needs- Assess need for intravenous fluids- Provide specific nutrition/hydration education as appropriate- Include patient/family/caregiver in decisions related to nutrition  Outcome: Progressing     Problem: GASTROINTESTINAL - ADULT  Goal: Minimal or absence of nausea and/or vomiting  Description: INTERVENTIONS:- Administer IV fluids if ordered to ensure adequate hydration- Maintain NPO status until nausea and vomiting are resolved- Nasogastric tube if ordered- Administer ordered antiemetic medications as needed- Provide nonpharmacologic comfort measures as appropriate- Advance diet as tolerated, if ordered- Consider nutrition services referral to assist patient with adequate nutrition and appropriate food choices  Outcome: Progressing     Problem: METABOLIC, FLUID AND ELECTROLYTES - ADULT  Goal: Electrolytes maintained within normal limits  Description: INTERVENTIONS:- Monitor labs and assess patient for signs and symptoms of electrolyte imbalances- Administer electrolyte replacement as ordered- Monitor response to electrolyte  replacements, including repeat lab results as appropriate- Instruct patient on fluid and nutrition as appropriate  Outcome: Progressing  Goal: Fluid balance maintained  Description: INTERVENTIONS:- Monitor labs - Monitor I/O and WT- Instruct patient on fluid and nutrition as appropriate- Assess for signs & symptoms of volume excess or deficit  Outcome: Progressing

## 2025-04-11 NOTE — ASSESSMENT & PLAN NOTE
Patient underwent routine office cystoscopy by Dr. Laws 4/10/25 for gross hematuria  Initiated on Bactrim post procedure as a prophylactic measure  Patient reports she took 1 dose of the medication  Presented to ED 4/11 with new onset fevers, nausea/vomiting, rigors, and generalized weakness directly after taking Bactrim  SIRS criteria for failure to presentation--tachycardia, febrile, tachypnea  Septic source unclear, likely urinary due to recent interpretation  UA without evidence of infection  Urine cultures and blood cultures sent, results pending  Initiated on IV ceftriaxone in ED  Today on exam, ongoing rigors--labs and vitals have overall stabilized    Plan:  Await official urine culture and blood culture  Continue empiric ceftriaxone pending updated cultures  Continue close monitoring of vitals and labs  No further urologic management needed at this time, we will sign off

## 2025-04-11 NOTE — ASSESSMENT & PLAN NOTE
"Pt reports immediately after taking bactrim earlier today, she developed \"shaking\" and intractable N/V  She endorses that she has taken bactrim once in the past and had a similar reaction w/ N/V at that time as well  Adverse reaction v symptoms related to severe sepsis  Continue supportive care, IV fluids  "

## 2025-04-11 NOTE — CASE MANAGEMENT
Case Management Assessment & Discharge Planning Note    Patient name Richa Medina  [facility] /[facility] -* MRN [id number]  : 1965 Date 2025       Current Admission Date: 4/10/2025  Current Admission Diagnosis:Severe sepsis (HCC)   Patient Active Problem List    Diagnosis Date Noted Date Diagnosed    Adverse reaction to sulfamethoxazole 2025     Severe sepsis (HCC) 2024     Hypoxia 2024     Seroma of breast 2024     Abnormal EKG 2024     Use of anastrozole (Arimidex) 2024     Malignant neoplasm of upper-outer quadrant of left breast in female, estrogen receptor positive (HCC) 2023     Abnormal CT of the chest 2023     Malignant neoplasm of overlapping sites of right breast in female, estrogen receptor positive (HCC) 2023     Easy bruising 2020     Prediabetes 2018     Vitamin D deficiency 2018     Hypothyroidism 2016     Depression 2015     Bunion 2014       LOS (days): 1  Geometric Mean LOS (GMLOS) (days):   Days to GMLOS:     OBJECTIVE:    Risk of Unplanned Readmission Score: 13.53         Current admission status: Inpatient       Preferred Pharmacy:   [facility] [place] [address]  [address]  [facility] [address]  Phone: [phone] Fax: [phone]    [facility] [place] - [address] [place] [id number]  Phone: [phone] Fax: [phone]    Primary Care Provider: Shelby Marte PA-C    Primary Insurance: CAPITAL  Secondary Insurance:     ASSESSMENT:  Active Health Care Proxies       KelseyjessicaKelvin Health Care Representative - Spouse   Primary Phone: [phone] (Mobile)  Home Phone: [phone]                 Advance Directives  Does patient have a Health Care POA?: No  Was patient offered paperwork?: Yes (They have some to review at home.)  Does patient have Advance Directives?:  No  Was patient offered paperwork?: Yes  Primary Contact: Kelvin Medina (Spouse)  [phone] (Mobile)         Readmission Root Cause  30 Day Readmission: No    Patient Information  Admitted from:: Home  Mental Status: Alert  During Assessment patient was accompanied by: Not accompanied during assessment  Assessment information provided by:: Patient  Primary Caregiver: Self  Support Systems: Spouse/significant other  County of Residence: [place]  What [facility] do you live in?: [place]  Living Arrangements: Lives w/ Spouse/significant other  Is patient a ?: No    Activities of Daily Living Prior to Admission  Functional Status: Independent  Completes ADLs independently?: Yes  Ambulates independently?: Yes  Does patient use assisted devices?: No  Does patient currently own DME?: No         Patient Information Continued  Income Source: Employed  Does patient have prescription coverage?: Yes  Can the patient afford their medications and any related supplies (such as glucometers or test strips)?: Yes  Does patient receive dialysis treatments?: No  Does patient have a history of substance abuse?: No  Does patient have a history of Mental Health Diagnosis?: Yes (depression)  Has patient received inpatient treatment related to mental health in the last 2 years?: No         Means of Transportation  Means of Transport to Appts:: Drives Self          DISCHARGE DETAILS:    Discharge planning discussed with:: pt     Comments - Freedom of Choice: Home vs home with services  CM contacted family/caregiver?: No- see comments (alert and oriented and can answer for herself)  Were Treatment Team discharge recommendations reviewed with patient/caregiver?: Yes  Did patient/caregiver verbalize understanding of patient care needs?: Yes  Were patient/caregiver advised of the risks associated with not following Treatment Team discharge recommendations?: Yes    Contacts  Patient Contacts: pt  Relationship to Patient:: Other  (Comment) (self)  Contact Method: In Person  Reason/Outcome: Continuity of Care, Discharge Planning    Requested Home Health Care         Is the patient interested in HHC at discharge?: No    DME Referral Provided  Referral made for DME?: No         Would you like to participate in our [facility] service program?  : No - Declined       Discharge Destination Plan:: Home  Transport at Discharge : Family                                      Additional Comments: CM met wit  ept and made her aware of the CM role.  Assessment was done.  Pt lives at home, she is working and has been independnet with all IADLs.  plans to return home no needs/barriers identified.  Pt expreiencing increasing pain to lower back..  Stated that the pain meds have helped but short duration.   Dr. Barriga was made aware.  CM to follow as needed.

## 2025-04-11 NOTE — ASSESSMENT & PLAN NOTE
"Pt presented to the ED secondary to new onset of fever, chills, rigors, myalgias, generalized weakness, and N/V shortly after cystoscopy and taking bactrim  Pt reports that she was seen in the urology office earlier in the day with cystoscopy performed in the office. She was initiated on bactrim following procedure for prophylaxis.  Of note, pt hospitalized 12/10-14/2024 secondary to septic shock due to left pyelonephritis. Urine culture from this hospitalization growing pansensitive E. Coli and patient transition to IV ancef.  Pt fulfilling SIRS criteria with fever, tachycardia, tachypnea, and leukopenia of 2.23  Lactic 2.9->2,0  Procalc  <0.05  Septic source unclear at this time, however do suspect likely urinary given recent instrumentation and history of similar presentation  CXR without acute abnormalities, similar to previous, official read pending  Flu/COVID negative  CT a/p demonstrating \"Small amount of air within the bladder lumen, probably related to recent intervention; correlation with urinalysis recommended to exclude superimposed cystitis.\"  UA currently without evidence of infection  Urine and blood cultures pending   Pt received weight-based IV fluids while in the ED, continue IV fluids  IV ceftriaxone given in the ED, will transition to IV cefepime for now pending culture results given recent instrumentation    "

## 2025-04-11 NOTE — ASSESSMENT & PLAN NOTE
Patient reports immediately after taking Bactrim/10 she developed rigors and intractable nausea/vomiting  Reports taking Bactrim 1 time in the past and had similar reaction  Adverse reaction reported in patient's chart as an allergy  Continue supportive care and IV fluids

## 2025-04-11 NOTE — ED PROVIDER NOTES
Time reflects when diagnosis was documented in both MDM as applicable and the Disposition within this note       Time User Action Codes Description Comment    4/10/2025  9:12 PM Bendchristiano, Matilde L Add [A41.9] Sepsis (HCC)     4/10/2025  9:12 PM Bendock, Matilde L Add [R68.89] Rigors     4/10/2025  9:12 PM Bendock, Matilde L Add [R50.9] Fever           ED Disposition       None          Assessment & Plan       Medical Decision Making  DDx including but not limited to: febrile seizure, OM, pharyngitis, URI, viral syndrome (examples includes but not limited to HIV, influenza, COVID, Lyme, mono), pneumonia, UTI, cellulitis, influenza, meningitis, other intracranial process, vaccine reaction, drug fever, sinusitis, osteomyelitis, abscess, postop fever, malignancy, vasculitis, inflammatory bowel disease, endocarditis, SLE, tuberculosis, thromboembolic disease, auto immune conditions      Will obtain EKG to rule out and assess for STEMI versus arrhythmia versuso interval abnormality versus ischemic changes; troponin to evaluate for ischemia; CBC to assess for leukocytosis or anemia; CMP to assess for RA versus electrolyte abnormalities versus elevated LFTs.  Will also obtain chest x-ray to rule out pneumothorax versus pneumonia versus effusion versus CHF  - will start sepsis work up however will refrain from antibiotics as patient is concerned of possible allergic reaction      Amount and/or Complexity of Data Reviewed  Independent Historian: spouse     Details:    at bedside  External Data Reviewed: notes.     Details:     Chart review does show that the patient was hospitalized in December 2024 where patient had septic shock likely due to pyelonephritis and UTI.    prior to coming to the emergency department patient documentation shows that she took her single dose of Bactrim before the symptoms of chills and rigors occurred.  At that time patient also required Levophed and ICU management    Ecoli + 12/24 -noted  "susceptible to all except intermediate for ampicillin.  While in the emergency department at that time she was given Rocephin      Labs: ordered.     Details:     Labs ordered   Radiology: ordered.    Risk  Prescription drug management.          ED Course as of 04/10/25 2126   Thu Apr 10, 2025   2043 Patient is a 60-year-old female who is here with her .  On exam patient is febrile tachycardic and tachypneic.  Will start septic workup.  She is alert and oriented x 3.  She is maintaining airway and secretions and I do not visualize any a diffuse rash.  Patient is concerned that this could be allergic reaction.  Will refrain from antibiotics initially until further workup was initiated.  There is concerned for other etiology of symptoms such as sepsis, uremia after cystoscopy, pneumonia.    Disclosure: Voice to text software was used in the preparation of this document and could have resulted in translational errors.      Occasional wrong word or \"sound a like\" substitutions may have occurred due to the inherent limitations of voice recognition software.  Read the chart carefully and recognize, using context, where substitutions have occurred.       I have independently reviewed external records are available to me to the level of detail possible within the time constraints of my patient care responsibilities in the ED.       2111 Further chart review shows that patient was in septic shock.  She had similar symptoms after dose of Bactrim.  She had E. coli septicemia and will give first dose of Rocephin in the emergency department while septic workup       2124 Signed out to Dr Millan for follow up and dispo       Medications   multi-electrolyte (Plasmalyte-A/Isolyte-S PH 7.4/Normosol-R) IV bolus 1,000 mL (has no administration in time range)     Followed by   multi-electrolyte (Plasmalyte-A/Isolyte-S PH 7.4/Normosol-R) IV bolus 1,000 mL (has no administration in time range)   diphenhydrAMINE (BENADRYL) " injection 25 mg (has no administration in time range)   ceftriaxone (ROCEPHIN) 2 g/50 mL in dextrose IVPB (has no administration in time range)   acetaminophen (Ofirmev) injection 1,000 mg (1,000 mg Intravenous New Bag 4/10/25 2106)   ketorolac (TORADOL) injection 30 mg (30 mg Intravenous Given 4/10/25 2103)   metoclopramide (REGLAN) injection 10 mg (10 mg Intravenous Given 4/10/25 2105)       ED Risk Strat Scores                    No data recorded        SBIRT 22yo+      Flowsheet Row Most Recent Value   Initial Alcohol Screen: US AUDIT-C     1. How often do you have a drink containing alcohol? 2 Filed at: 04/10/2025 2024   2. How many drinks containing alcohol do you have on a typical day you are drinking?  0 Filed at: 04/10/2025 2024   3a. Male UNDER 65: How often do you have five or more drinks on one occasion? 0 Filed at: 04/10/2025 2024   3b. FEMALE Any Age, or MALE 65+: How often do you have 4 or more drinks on one occassion? 0 Filed at: 04/10/2025 2024   Audit-C Score 2 Filed at: 04/10/2025 2024   FATOU: How many times in the past year have you...    Used an illegal drug or used a prescription medication for non-medical reasons? Never Filed at: 04/10/2025 2024                            History of Present Illness       Chief Complaint   Patient presents with    Pain     Pt reports going to Urologist today and being given an antibiotic. Took the pill about an hour ago and has since began have generalized muscle pain and full body shaking. Denies any allergies to any other antibiotics. Denies CP or difficulty breathing. Pt inconsolable in triage, complaining of cramping. Reports having the same reaction to this medication the last time she took it.        Past Medical History:   Diagnosis Date    Allergic May 2023    Asthma     Breast cancer (HCC) 11/2023    Breast mass 10/31/23    Cancer (HCC)     bilateral    Depression     Disease of thyroid gland     Hearing aid worn     bilateral    Hearing loss     HL  (hearing loss)     Osteopenia     Rosacea 02/09/2021    Seasonal allergies     Vitamin D deficiency 07/24/2018      Past Surgical History:   Procedure Laterality Date    BREAST BIOPSY Right 11/01/2023    X 2 sites    BREAST BIOPSY Left 12/08/2023    BREAST LUMPECTOMY  4/2024    IR DRAINAGE TUBE CHECK/CHANGE/REPOSITION/REINSERTION/UPSIZE  06/27/2024    IR DRAINAGE TUBE CHECK/CHANGE/REPOSITION/REINSERTION/UPSIZE  07/09/2024    IR DRAINAGE TUBE PLACEMENT  06/18/2024    LASIK      MASTECTOMY  4/2023    MRI BREAST BIOPSY LEFT (ALL INCLUSIVE) Left 12/08/2023    OK ADJNT TIS TRNSFR/REARGMT ANY AREA 30.1-60 SQ CM Right 04/19/2024    Procedure: RIGHT AESTHETIC FLAT CLOSURE , IBIS FLAP, PREVENA PLACEMENT;  Surgeon: Samara Felix DO;  Location: [facility];  Service: Plastics    OK BX/EXC LYMPH NODE OPEN SUPERFICIAL Bilateral 04/19/2024    Procedure: Bilateral BX LYMPH NODE SENTINEL, lymphoscintigraphies, lymphatic mappings;  Surgeon: Sisi Dominguez MD;  Location[facility];  Service: Surgical Oncology    OK EXC BREAST LES PREOP PLMT RAD MARKER OPEN 1 LES Left 04/19/2024    Procedure: LEFT BREAST HILARY LOCALIZATION LUMPECTOMY;;  Surgeon: Sisi Dominguez MD;  [facility];  Service: Surgical Oncology    OK INJ RADIOACTIVE TRACER FOR ID OF SENTINEL NODE Bilateral 04/19/2024    Procedure: Bilateral BX LYMPH NODE SENTINEL, lymphoscintigraphies, lymphatic mappings;  Surgeon: Sisi Dominguez MD;  [facility];  Service: Surgical Oncology    OK INTRAOP SENTINEL LYMPH NODE ID W/DYE INJECTION Bilateral 04/19/2024    Procedure: Bilateral BX LYMPH NODE SENTINEL, lymphoscintigraphies, lymphatic mappings;  Surgeon: Sisi Dominguez MD;  [facility];  Service: Surgical Oncology    OK MASTECTOMY PARTIAL Left 04/19/2024    Procedure: LEFT BREAST HILARY LOCALIZATION LUMPECTOMY;;  Surgeon: Sisi Dominguez MD;  [facility];  Service: Surgical Oncology    OK MASTECTOMY SIMPLE COMPLETE Right 04/19/2024    Procedure:  RIGHT BREAST MASTECTOMY, PSB AXILLARY DISSECTION;  Surgeon: Sisi Dominguez MD;  [facility];  Service: Surgical Oncology    NY MASTOPEXY Left 04/19/2024    Procedure: MASTOPEXY BREAST;  Surgeon: Samara Felix DO;  [facility];  Service: Plastics    NY NEUROPLASTY &/TRANSPOS MEDIAN NRV CARPAL TUNNE Right 07/10/2018    Procedure: CARPAL TUNNEL RELEASE;  Surgeon: Maicol Taylor DO;  [facility];  Service: Orthopedics    NY NEUROPLASTY &/TRANSPOS MEDIAN NRV CARPAL TUNNE Left 04/19/2019    Procedure: RELEASE CARPAL TUNNEL;  Surgeon: Peterson Alejandro MD;  [facility]: [facility];  Service: Orthopedics    SEPTOPLASTY      SINUS SURGERY      US BREAST NEEDLE LOC LEFT Left 02/27/2024    US GUIDANCE BREAST BIOPSY RIGHT EACH ADDITIONAL Right 11/01/2023    US GUIDED BREAST BIOPSY RIGHT COMPLETE Right 11/01/2023      Family History   Problem Relation Age of Onset    Hashimoto's thyroiditis Mother     Thyroid disease Mother     Melanoma Mother         50's    Cancer Mother         melanoma    Hearing loss Mother     Melanoma Father         50's    Crohn's disease Father     Cancer Father         melanoma    Hearing loss Father     Hashimoto's thyroiditis Sister     No Known Problems Sister     No Known Problems Sister     Colon cancer Maternal Uncle         50's    Depression Maternal Grandmother     Diabetes Maternal Grandmother     Mental illness Maternal Grandmother     Dementia Maternal Grandmother     No Known Problems Maternal Grandfather     No Known Problems Paternal Grandmother     No Known Problems Paternal Grandfather       Social History     Tobacco Use    Smoking status: Never    Smokeless tobacco: Never   Vaping Use    Vaping status: Never Used   Substance Use Topics    Alcohol use: Yes     Comment: rarely    Drug use: No      E-Cigarette/Vaping    E-Cigarette Use Never User       E-Cigarette/Vaping Substances    Nicotine No     THC No     CBD No     Flavoring No     Other No      Unknown No       I have reviewed and agree with the history as documented.     Patient is a very pleasant 60-year-old female here with her  coming in today for sudden onset rigors, shaking, body aches, muscle pain and generalized not feeling well.  Patient states that she was at the urologist today where she had a procedure completed.  She states that she was told to start antibiotics.  She went home ate dinner and took her first pill around 6 PM.  She said shortly after she started having the symptoms.  She has no difficulty swallowing, rashes.  She has never experienced this before.  I was asked to see patient as she met sepsis criteria.  Sepsis workup initiated.  Patient at this time just complains of diffuse bodyaches, shaking and generalized not feeling well.      History provided by:  Medical records, patient and spouse   used: No    Generalized Body Aches  Severity:  Moderate  Onset quality:  Sudden  Timing:  Constant  Progression:  Unchanged  Chronicity:  New  Associated symptoms: fever (subjective), headaches, myalgias and nausea    Associated symptoms: no abdominal pain, no chest pain, no congestion, no cough, no diarrhea, no ear pain, no rash, no rhinorrhea, no shortness of breath, no sore throat, no vomiting and no wheezing        Review of Systems   Constitutional:  Positive for fever (subjective). Negative for chills.   HENT: Negative.  Negative for congestion, ear pain, rhinorrhea and sore throat.    Eyes: Negative.  Negative for pain and visual disturbance.   Respiratory: Negative.  Negative for cough, shortness of breath and wheezing.    Cardiovascular: Negative.  Negative for chest pain and palpitations.   Gastrointestinal:  Positive for nausea. Negative for abdominal pain, diarrhea and vomiting.   Genitourinary: Negative.  Negative for dysuria and hematuria.   Musculoskeletal:  Positive for myalgias. Negative for arthralgias and back pain.   Skin:  Negative for color  change and rash.   Neurological:  Positive for headaches. Negative for seizures and syncope.   Hematological: Negative.    Psychiatric/Behavioral: Negative.     All other systems reviewed and are negative.          Objective       ED Triage Vitals   Temperature Pulse Blood Pressure Respirations SpO2 Patient Position - Orthostatic VS   04/10/25 2038 04/10/25 2023 04/10/25 2023 04/10/25 2023 04/10/25 2023 04/10/25 2023   (!) 101.2 °F (38.4 °C) (!) 123 169/76 (!) 28 96 % Lying      Temp Source Heart Rate Source BP Location FiO2 (%) Pain Score    04/10/25 2038 04/10/25 2023 04/10/25 2023 -- 04/10/25 2103    Oral Monitor Left arm  10 - Worst Possible Pain      Vitals      Date and Time Temp Pulse SpO2 Resp BP Pain Score FACES Pain Rating User   04/10/25 2103 -- -- -- -- -- 10 - Worst Possible Pain -- MA   04/10/25 2038 101.2 °F (38.4 °C) -- -- -- -- -- -- MA   04/10/25 2023 -- 123 96 % 28 169/76 -- -- AS            Physical Exam  Vitals and nursing note reviewed.   Constitutional:       General: She is awake. She is not in acute distress.     Appearance: Normal appearance. She is well-developed.      Comments: Trembling on exam     HENT:      Head: Normocephalic and atraumatic.      Mouth/Throat:      Lips: Pink.      Mouth: Mucous membranes are dry.      Comments: Patient maintaining airway and secretions. No stridor . No brawniness under tongue.       Eyes:      General: Lids are normal. Gaze aligned appropriately.      Extraocular Movements: Extraocular movements intact.      Conjunctiva/sclera: Conjunctivae normal.      Pupils: Pupils are equal, round, and reactive to light.   Neck:      Trachea: Trachea normal.   Cardiovascular:      Rate and Rhythm: Regular rhythm. Tachycardia present.      Pulses:           Radial pulses are 2+ on the right side and 2+ on the left side.        Dorsalis pedis pulses are 2+ on the right side and 2+ on the left side.      Heart sounds: Normal heart sounds, S1 normal and S2 normal. No  murmur heard.  Pulmonary:      Effort: Pulmonary effort is normal. Tachypnea present. No respiratory distress.      Breath sounds: Normal breath sounds.      Comments: No conversational dyspnea but noted tachypnea  Abdominal:      Palpations: Abdomen is soft.      Tenderness: There is no abdominal tenderness.   Musculoskeletal:         General: No swelling.      Cervical back: Normal range of motion and neck supple. No rigidity.      Right lower leg: No edema.      Left lower leg: No edema.   Lymphadenopathy:      Cervical: No cervical adenopathy.   Skin:     General: Skin is warm and dry.      Capillary Refill: Capillary refill takes less than 2 seconds.   Neurological:      General: No focal deficit present.      Mental Status: She is alert and oriented to person, place, and time.      GCS: GCS eye subscore is 4. GCS verbal subscore is 5. GCS motor subscore is 6.      Cranial Nerves: Cranial nerves 2-12 are intact.      Sensory: Sensation is intact.      Motor: Motor function is intact.      Coordination: Coordination is intact.   Psychiatric:         Mood and Affect: Mood normal.         Behavior: Behavior is cooperative.         Results Reviewed       Procedure Component Value Units Date/Time    Comprehensive metabolic panel [[id number]] Collected: 04/10/25 2120    Lab Status: In process Specimen: Blood from Hand, Left Updated: 04/10/25 2126    Magnesium [[id number]] Collected: 04/10/25 2120    Lab Status: In process Specimen: Blood from Hand, Left Updated: 04/10/25 2126    Procalcitonin [[id number]] Collected: 04/10/25 2120    Lab Status: In process Specimen: Blood from Arm, Left Updated: 04/10/25 2126    Protime-INR [[id number]] Collected: 04/10/25 2120    Lab Status: In process Specimen: Blood from Arm, Left Updated: 04/10/25 2126    APTT [[id number]] Collected: 04/10/25 2120    Lab Status: In process Specimen: Blood from Arm, Left Updated: 04/10/25 2126    CBC and differential [[id number]] Collected: 04/10/25 2119     Lab Status: In process Specimen: Blood Updated: 04/10/25 2126    Blood culture #2 [[id number]] Collected: 04/10/25 2120    Lab Status: In process Specimen: Blood from Hand, Left Updated: 04/10/25 2126    Lactic acid [[id number]] Collected: 04/10/25 2120    Lab Status: In process Specimen: Blood from Hand, Left Updated: 04/10/25 2126    Blood culture #1 [[id number]]     Lab Status: No result Specimen: Blood     UA w Reflex to Microscopic w Reflex to Culture [[id number]]     Lab Status: No result Specimen: Urine, Clean Catch     FLU/COVID Rapid Antigen (30 min. TAT) - Preferred screening test in ED [[id number]]     Lab Status: No result Specimen: Nares from Nose             XR chest portable    (Results Pending)   CT abdomen pelvis with contrast    (Results Pending)       Procedures    ED Medication and Procedure Management   Prior to Admission Medications   Prescriptions Last Dose Informant Patient Reported? Taking?   Calcium 250 MG CAPS  Self Yes No   Sig: Take by mouth daily with dinner     Cholecalciferol (VITAMIN D3 PO)  Self Yes No   Sig: Take 2 capsules by mouth in the morning   IRON, FERROUS SULFATE, PO   Yes No   Sig: Take 12.5 mg by mouth in the morning   Iodine 2 % TINC   Yes No   Lactobacillus-Inulin (Probiotic Digestive Support) CAPS   Yes No   MAGNESIUM PO  Self Yes No   Sig: Take 200 mg by mouth daily with dinner     MULTIPLE VITAMINS-CALCIUM PO  Self Yes No   Sig: Take 1 capsule by mouth daily in the early morning     Medium Chain Triglycerides (MCT OIL PO)  Self Yes No   Sig: Take 1 Application by mouth in the morning   Omega-3 1000 MG CAPS   Yes No   PARoxetine (PAXIL-CR) 25 mg 24 hr tablet  Self No No   Sig: TAKE TWO TABLETS BY MOUTH EVERY DAY   Zinc 22.5 MG TABS   Yes No   anastrozole (ARIMIDEX) 1 mg tablet   No No   Sig: Take 1 tablet (1 mg total) by mouth daily   budesonide-formoterol (Symbicort) 80-4.5 MCG/ACT inhaler   No No   Sig: Inhale 2 puffs 2 (two) times a day Rinse mouth after use.    cetirizine (ZyrTEC) 10 mg tablet   No No   Sig: Take 1 tablet (10 mg total) by mouth daily   levocetirizine (XYZAL) 5 MG tablet  Self No No   Sig: Take 1 tablet (5 mg total) by mouth every evening   levothyroxine 25 mcg tablet   No No   Sig: TAKE ONE TABLET BY MOUTH EVERY DAY MONDAY THRU FRIDAY AND TAKE 2 TABLETS EVERY DAY ON SAT & SUN.   mometasone (NASONEX) 50 mcg/act nasal spray   No No   Si sprays into each nostril daily   niacin 100 mg tablet  Self Yes No   Sig: Take 100 mg by mouth daily with breakfast   sulfamethoxazole-trimethoprim (BACTRIM DS) 800-160 mg per tablet   No No   Sig: Take 1 tablet by mouth every 12 (twelve) hours for 2 days      Facility-Administered Medications: None     Patient's Medications   Discharge Prescriptions    No medications on file     No discharge procedures on file.  ED SEPSIS DOCUMENTATION   Time reflects when diagnosis was documented in both MDM as applicable and the Disposition within this note       Time User Action Codes Description Comment    4/10/2025  9:12 PM Matilde Jennings Add [A41.9] Sepsis (HCC)     4/10/2025  9:12 PM Matilde Jennings Add [R68.89] Rigors     4/10/2025  9:12 PM Matilde Jennings Add [R50.9] Fever            Initial Sepsis Screening       Row Name 04/10/25 2112                Is the patient's history suggestive of a new or worsening infection? Yes (Proceed)  -NB        Suspected source of infection suspect infection, source unknown  -NB        Indicate SIRS criteria Hyperthemia > 38.3C (100.9F) OR Hypothermia <36C (96.8F);Tachycardia > 90 bpm;Tachypnea > 20 resp per min  -NB        Are two or more of the above signs & symptoms of infection both present and new to the patient? Yes (Proceed)  -NB        Assess for evidence of organ dysfunction: Are any of the below criteria present within 6 hours of suspected infection and SIRS criteria that are NOT considered to be chronic conditions? --                  User Key  (r) = Recorded By, (t) = Taken  By, (c) = Cosigned By      Initials Name Provider Type    RONIT Jennings DO Physician                       Matilde Jennings,   04/10/25 2128

## 2025-04-11 NOTE — UTILIZATION REVIEW
"Initial Clinical Review    Admission: Date/Time/Statement:   Admission Orders (From admission, onward)       Ordered        04/10/25 2351  INPATIENT ADMISSION  Once                          Orders Placed This Encounter   Procedures    INPATIENT ADMISSION     Standing Status:   Standing     Number of Occurrences:   1     Level of Care:   Med Surg [16]     Estimated length of stay:   More than 2 Midnights     Certification:   I certify that inpatient services are medically necessary for this patient for a duration of greater than two midnights. See H&P and MD Progress Notes for additional information about the patient's course of treatment.     ED Arrival Information       Expected   -    Arrival   4/10/2025 20:16    Acuity   Urgent              Means of arrival   Walk-In    Escorted by   Self    Service   Hospitalist    Admission type   Emergency              Arrival complaint   Allergic reaction             Chief Complaint   Patient presents with    Pain     Pt reports going to Urologist today and being given an antibiotic. Took the pill about an hour ago and has since began have generalized muscle pain and full body shaking. Denies any allergies to any other antibiotics. Denies CP or difficulty breathing. Pt inconsolable in triage, complaining of cramping. Reports having the same reaction to this medication the last time she took it.        Initial Presentation: 60 y.o. female w/ PMH of Hypothyroidism and R breast CA who presented from home to [facility] ED. Admitted as Inpatient for evaluation and treatment of Severe Sepsis.      Presented w/ severe sepsis,having  fever, chills, rigors, generalized weakness, and myalgias which started this evening.  Patient reports that she had a cystoscopy in the urology office earlier today and afterward she was prescribed Bactrim for prophylaxis.  Pt reports after taking Bactrim, she developed \"shaking \"and intractable nausea and vomiting, had same episode in December " "when she was prescribed Bactrim at that time too.  On exam, Pt tachycardic, mucous membranes are dry. Labs Mg 1.7, Lactic acid 2.9. UA currently without evidence of infection. CT a/p demonstrating \"Small amount of air within the bladder lumen, probably related to recent intervention; correlation with urinalysis recommended to exclude superimposed cystitis.\" Please see below med list for meds given in the ED.     Plan: IV Cefepime, F/U urine and blood cultures. IV fluids,Replete Mg.  F/U labs in am. Urology consulted    Anticipated Length of Stay/Certification Statement: Patient will be admitted on an inpatient basis with an anticipated length of stay of greater than 2 midnights secondary to severe sepsis.     Date: 4/11/25   Day 2:   Today on exam, pt w/ ongoing rigors. Labs: Lactic acid 2.0. H/H 10.4/31.3. Plan; See below urology plans. Continue Cefepime, IV fluids. F/U labs in am.     Urology: Severe Sepsis. On exam, pt w/ rigors. T today 100.8. Pt tachycardic. Continue broad spectrum abx. F/U cultures. Continue close monitoring of vitals and labs     ED Treatment-Medication Administration from 04/10/2025 2016 to 04/11/2025 0051         Date/Time Order Dose Route Action     04/10/2025 2150 multi-electrolyte (Plasmalyte-A/Isolyte-S PH 7.4/Normosol-R) IV bolus 1,000 mL 1,000 mL Intravenous New Bag     04/10/2025 2228 multi-electrolyte (Plasmalyte-A/Isolyte-S PH 7.4/Normosol-R) IV bolus 1,000 mL 1,000 mL Intravenous New Bag     04/10/2025 2106 acetaminophen (Ofirmev) injection 1,000 mg 1,000 mg Intravenous New Bag     04/10/2025 2103 ketorolac (TORADOL) injection 30 mg 30 mg Intravenous Given     04/10/2025 2105 metoclopramide (REGLAN) injection 10 mg 10 mg Intravenous Given     04/10/2025 2149 diphenhydrAMINE (BENADRYL) injection 25 mg 25 mg Intravenous Given     04/10/2025 2150 ceftriaxone (ROCEPHIN) 2 g/50 mL in dextrose IVPB 2,000 mg Intravenous New Bag     04/10/2025 8686 iohexol (OMNIPAQUE) 350 MG/ML injection " (MULTI-DOSE) 100 mL 100 mL Intravenous Given     04/11/2025 0037 multi-electrolyte (Plasmalyte-A/Isolyte-S PH 7.4/Normosol-R) IV bolus 1,000 mL 1,000 mL Intravenous New Bag            Scheduled Medications:  anastrozole, 1 mg, Oral, Daily  budesonide-formoterol, 2 puff, Inhalation, BID  cefepime, 1,000 mg, Intravenous, Q12H  enoxaparin, 40 mg, Subcutaneous, Daily  levothyroxine, 25 mcg, Oral, Once per day on Monday Tuesday Wednesday Thursday Friday  [START ON 4/12/2025] levothyroxine, 50 mcg, Oral, Once per day on Sunday Saturday  PARoxetine, 50 mg, Oral, Daily  Famotidine (PF) (PEPCID) injection 20 mg  Dose: 20 mg  Freq: Once Route: IV  Start: 04/11/25 0115 End: 04/11/25 0158  HYDROmorphone (DILAUDID) injection 0.5 mg  Dose: 0.5 mg  Freq: Once Route: IV  Start: 04/11/25 0845 End: 04/11/25 0924  magnesium sulfate 2 g/50 mL IVPB (premix) 2 g  Dose: 2 g  Freq: Once Route: IV  Last Dose: 2 g (04/11/25 0339)  Start: 04/11/25 0245 End: 04/11/25 0539  potassium chloride (Klor-Con M20) CR tablet 20 mEq  Dose: 20 mEq  Freq: Once Route: PO  Start: 04/11/25 0115 End: 04/11/25 0144  methocarbamol (ROBAXIN) injection 750 mg  Dose: 750 mg  Freq: Every 8 hours Route: IV  Indications of Use: MUSCULOSKELETAL PAIN,POSTOPERATIVE PAIN  Start: 04/11/25 1600 End: 04/12/25 1559    Continuous IV Infusions:  multi-electrolyte, 125 mL/hr, Intravenous, Continuous      PRN Meds:  acetaminophen, 650 mg, Oral, Q6H PRN - given x 1 4/11  metoclopramide, 10 mg, Intravenous, Q6H PRN - given x1 4/11  ondansetron, 4 mg, Intravenous, Q6H PRN - given x1 4/11  oxyCODONE, 10 mg, Oral, Q4H PRN - given x1 4/11  oxyCODONE (ROXICODONE) IR tablet 5 mg  Dose: 5 mg  Freq: Every 6 hours PRN Route: PO  PRN Reasons: moderate pain,severe pain  Start: 04/11/25 0113 End: 04/11/25 0843 -  given x2 4/11   oxyCODONE (ROXICODONE) IR tablet 5 mg  Dose: 5 mg  Freq: Every 4 hours PRN Route: PO  PRN Reason: moderate pain  Start: 04/11/25 0842    ED Triage Vitals    Temperature Pulse Respirations Blood Pressure SpO2 Pain Score   04/10/25 2038 04/10/25 2023 04/10/25 2023 04/10/25 2023 04/10/25 2023 04/10/25 2103   (!) 101.2 °F (38.4 °C) (!) 123 (!) 28 169/76 96 % 10 - Worst Possible Pain     Weight (last 2 days)       None            Vital Signs (last 3 days)       Date/Time Temp Pulse Resp BP MAP (mmHg) SpO2 O2 Device Patient Position - Orthostatic VS Pain    04/11/25 0924 -- -- -- -- -- -- -- -- 10 - Worst Possible Pain    04/11/25 0824 -- -- -- -- -- -- -- -- 9    04/11/25 0809 99.2 °F (37.3 °C) 96 18 109/54 78 96 % None (Room air) Lying --    04/11/25 0800 -- -- -- -- -- -- -- -- 9    04/11/25 0641 -- -- -- -- -- -- -- -- 3    04/11/25 0144 -- -- -- -- -- -- -- -- 8    04/11/25 0100 97.7 °F (36.5 °C) 108 18 98/54 72 94 % None (Room air) Lying --    04/11/25 0053 -- -- -- -- -- -- -- -- 6    04/11/25 0036 99.5 °F (37.5 °C) 114 18 104/53 74 96 % None (Room air) Lying --    04/11/25 0000 -- 120 18 94/53 71 97 % None (Room air) Lying --    04/10/25 2330 -- 116 18 91/49 68 95 % None (Room air) Lying --    04/10/25 2304 -- 120 16 96/50 70 96 % None (Room air) -- --    04/10/25 2254 -- 112 18 101/52 75 96 % None (Room air) -- --    04/10/25 2246 -- -- -- -- -- -- None (Room air) -- --    04/10/25 2245 -- 112 18 102/53 75 97 % None (Room air) Lying --    04/10/25 2230 -- 114 18 102/52 74 96 % None (Room air) Lying --    04/10/25 2215 -- 110 18 109/53 77 96 % None (Room air) Lying --    04/10/25 2149 100.2 °F (37.9 °C) 124 20 117/56 81 97 % None (Room air) Lying --    04/10/25 2103 -- -- -- -- -- -- -- -- 10 - Worst Possible Pain    04/10/25 2038 101.2 °F (38.4 °C) -- -- -- -- -- -- -- --    04/10/25 2023 -- 123 28 169/76 -- 96 % None (Room air) Lying --              Pertinent Labs/Diagnostic Test Results:   Radiology:  XR chest portable   Final Interpretation by Leland Corey MD (04/11 0827)      No acute cardiopulmonary disease.            Workstation performed: [id number]          CT abdomen pelvis with contrast   Final Interpretation by Maicol Calderon DO (04/10 2309)      Small amount of air within the bladder lumen, probably related to recent intervention; correlation with urinalysis recommended to exclude superimposed cystitis.      Colonic diverticulosis without evidence of acute diverticulitis, and other findings as above.         Workstation performed: [id number]           Cardiology:  ECG 12 lead   Final Result by Ladarius Stover DO (04/11 0753)    Age and gender specific ECG analysis    Sinus tachycardia   Nonspecific ST abnormality   Abnormal ECG   When compared with ECG of 10-Dec-2024 21:22,   No significant change was found   Confirmed by Ladarius Stover ([id number]) on 4/11/2025 7:53:15 AM        Results from last 7 days   Lab Units 04/11/25  0509 04/10/25  2119   WBC Thousand/uL 9.15 2.23*   HEMOGLOBIN g/dL 10.4* 11.6   HEMATOCRIT % 31.3* 34.4*   PLATELETS Thousands/uL 192 194   TOTAL NEUT ABS Thousands/µL 8.65* 1.71*         Results from last 7 days   Lab Units 04/11/25  0509 04/10/25  2120   SODIUM mmol/L 139 139   POTASSIUM mmol/L 4.0 3.6   CHLORIDE mmol/L 109* 106   CO2 mmol/L 23 25   ANION GAP mmol/L 7 8   BUN mg/dL 15 16   CREATININE mg/dL 0.85 0.76   EGFR ml/min/1.73sq m 73 84   CALCIUM mg/dL 8.2* 9.4   MAGNESIUM mg/dL 2.6 1.7*     Results from last 7 days   Lab Units 04/10/25  2120   AST U/L 50*   ALT U/L 42   ALK PHOS U/L 95   TOTAL PROTEIN g/dL 7.1   ALBUMIN g/dL 4.2   TOTAL BILIRUBIN mg/dL 0.40         Results from last 7 days   Lab Units 04/11/25  0509 04/10/25  2120   GLUCOSE RANDOM mg/dL 130 100     Results from last 7 days   Lab Units 04/10/25  2120   PROTIME seconds 13.4   INR  1.00   PTT seconds 20*         Results from last 7 days   Lab Units 04/10/25  2120   PROCALCITONIN ng/ml <0.05     Results from last 7 days   Lab Units 04/10/25  2310 04/10/25  2120   LACTIC ACID mmol/L 2.0 2.9*     Results from last 7 days   Lab Units 04/10/25  2300   CLARITY  UA  Clear   COLOR UA  Light Yellow   SPEC GRAV UA  >=1.050*   PH UA  6.0   GLUCOSE UA mg/dl Negative   KETONES UA mg/dl Negative   BLOOD UA  Negative   PROTEIN UA mg/dl Negative   NITRITE UA  Negative   BILIRUBIN UA  Negative   UROBILINOGEN UA (BE) mg/dl <2.0   LEUKOCYTES UA  Negative     Results from last 7 days   Lab Units 04/10/25  2129 04/10/25  2120   BLOOD CULTURE  Received in Microbiology Lab. Culture in Progress. Received in Microbiology Lab. Culture in Progress.         Past Medical History:   Diagnosis Date    Allergic May 2023    Asthma     Breast cancer (HCC) 11/2023    Breast mass 10/31/23    Cancer (HCC)     bilateral    Depression     Disease of thyroid gland     Hearing aid worn     bilateral    Hearing loss     HL (hearing loss)     Osteopenia     Rosacea 02/09/2021    Seasonal allergies     Vitamin D deficiency 07/24/2018     Present on Admission:   Severe sepsis (HCC)   Hypothyroidism   Depression      Admitting Diagnosis: Rigors [R68.89]  Pain [R52]  Fever [R50.9]  Sepsis (HCC) [A41.9]  Age/Sex: 60 y.o. female    Network Utilization Review Department  ATTENTION: Please call with any questions or concerns to [phone] and carefully listen to the prompts so that you are directed to the right person. All voicemails are confidential.   For Discharge needs, contact Care Management DC Support Team at [phone] opt. 2  Send all requests for admission clinical reviews, approved or denied determinations and any other requests to dedicated fax number below belonging to the campus where the patient is receiving treatment. List of dedicated fax numbers for the Facilities:  FACILITY NAME UR FAX NUMBER   ADMISSION DENIALS (Administrative/Medical Necessity) [phone]   DISCHARGE SUPPORT TEAM (NETWORK) [phone]   PARENT CHILD HEALTH (Maternity/NICU/Pediatrics) [phone]   [facility] [phone]   [facility] [phone]   [facility]  [facility] [phone]   [facility] [phone]   [facility] [phone]   [facility] [phone]   [facility] [phone]   [facility] [phone]   [facility] [phone]   [facility] [phone]   [facility] [phone]   [facility] [phone]

## 2025-04-12 LAB
ALBUMIN SERPL BCG-MCNC: 2.9 G/DL (ref 3.5–5)
ALP SERPL-CCNC: 55 U/L (ref 34–104)
ALT SERPL W P-5'-P-CCNC: 120 U/L (ref 7–52)
ANION GAP SERPL CALCULATED.3IONS-SCNC: 13 MMOL/L (ref 4–13)
AST SERPL W P-5'-P-CCNC: 76 U/L (ref 13–39)
BACTERIA UR CULT: NORMAL
BASOPHILS # BLD AUTO: 0.01 THOUSANDS/ÂΜL (ref 0–0.1)
BASOPHILS NFR BLD AUTO: 0 % (ref 0–1)
BILIRUB SERPL-MCNC: 0.36 MG/DL (ref 0.2–1)
BILIRUB UR QL STRIP: NEGATIVE
BUN SERPL-MCNC: 9 MG/DL (ref 5–25)
CALCIUM ALBUM COR SERPL-MCNC: 7.3 MG/DL (ref 8.3–10.1)
CALCIUM SERPL-MCNC: 6.4 MG/DL (ref 8.4–10.2)
CHLORIDE SERPL-SCNC: 102 MMOL/L (ref 96–108)
CK SERPL-CCNC: 490 U/L (ref 26–192)
CLARITY UR: CLEAR
CO2 SERPL-SCNC: 19 MMOL/L (ref 21–32)
COLOR UR: COLORLESS
CREAT SERPL-MCNC: 0.52 MG/DL (ref 0.6–1.3)
EOSINOPHIL # BLD AUTO: 0.11 THOUSAND/ÂΜL (ref 0–0.61)
EOSINOPHIL NFR BLD AUTO: 1 % (ref 0–6)
ERYTHROCYTE [DISTWIDTH] IN BLOOD BY AUTOMATED COUNT: 13 % (ref 11.6–15.1)
ERYTHROCYTE [DISTWIDTH] IN BLOOD BY AUTOMATED COUNT: 13.1 % (ref 11.6–15.1)
GFR SERPL CREATININE-BSD FRML MDRD: 102 ML/MIN/1.73SQ M
GLUCOSE SERPL-MCNC: 69 MG/DL (ref 65–140)
GLUCOSE UR STRIP-MCNC: NEGATIVE MG/DL
HCT VFR BLD AUTO: 27.6 % (ref 34.8–46.1)
HCT VFR BLD AUTO: 30.8 % (ref 34.8–46.1)
HGB BLD-MCNC: 10.4 G/DL (ref 11.5–15.4)
HGB BLD-MCNC: 9.1 G/DL (ref 11.5–15.4)
HGB UR QL STRIP.AUTO: NEGATIVE
IMM GRANULOCYTES # BLD AUTO: 0.03 THOUSAND/UL (ref 0–0.2)
IMM GRANULOCYTES NFR BLD AUTO: 0 % (ref 0–2)
KETONES UR STRIP-MCNC: ABNORMAL MG/DL
LEUKOCYTE ESTERASE UR QL STRIP: NEGATIVE
LYMPHOCYTES # BLD AUTO: 0.93 THOUSANDS/ÂΜL (ref 0.6–4.47)
LYMPHOCYTES NFR BLD AUTO: 10 % (ref 14–44)
MAGNESIUM SERPL-MCNC: 2.6 MG/DL (ref 1.9–2.7)
MCH RBC QN AUTO: 29.6 PG (ref 26.8–34.3)
MCH RBC QN AUTO: 29.8 PG (ref 26.8–34.3)
MCHC RBC AUTO-ENTMCNC: 33 G/DL (ref 31.4–37.4)
MCHC RBC AUTO-ENTMCNC: 33.8 G/DL (ref 31.4–37.4)
MCV RBC AUTO: 88 FL (ref 82–98)
MCV RBC AUTO: 90 FL (ref 82–98)
MONOCYTES # BLD AUTO: 0.49 THOUSAND/ÂΜL (ref 0.17–1.22)
MONOCYTES NFR BLD AUTO: 5 % (ref 4–12)
NEUTROPHILS # BLD AUTO: 8.24 THOUSANDS/ÂΜL (ref 1.85–7.62)
NEUTS SEG NFR BLD AUTO: 84 % (ref 43–75)
NITRITE UR QL STRIP: NEGATIVE
NRBC BLD AUTO-RTO: 0 /100 WBCS
PH UR STRIP.AUTO: 6 [PH]
PHOSPHATE SERPL-MCNC: 2.3 MG/DL (ref 2.3–4.1)
PLATELET # BLD AUTO: 152 THOUSANDS/UL (ref 149–390)
PLATELET # BLD AUTO: 171 THOUSANDS/UL (ref 149–390)
PMV BLD AUTO: 10 FL (ref 8.9–12.7)
PMV BLD AUTO: 10 FL (ref 8.9–12.7)
POTASSIUM SERPL-SCNC: 4.3 MMOL/L (ref 3.5–5.3)
PROCALCITONIN SERPL-MCNC: 10.71 NG/ML
PROT SERPL-MCNC: 5 G/DL (ref 6.4–8.4)
PROT UR STRIP-MCNC: NEGATIVE MG/DL
RBC # BLD AUTO: 3.07 MILLION/UL (ref 3.81–5.12)
RBC # BLD AUTO: 3.49 MILLION/UL (ref 3.81–5.12)
SODIUM SERPL-SCNC: 134 MMOL/L (ref 135–147)
SP GR UR STRIP.AUTO: 1 (ref 1–1.03)
UROBILINOGEN UR STRIP-ACNC: <2 MG/DL
WBC # BLD AUTO: 8.38 THOUSAND/UL (ref 4.31–10.16)
WBC # BLD AUTO: 9.81 THOUSAND/UL (ref 4.31–10.16)

## 2025-04-12 PROCEDURE — 80053 COMPREHEN METABOLIC PANEL: CPT | Performed by: INTERNAL MEDICINE

## 2025-04-12 PROCEDURE — 81003 URINALYSIS AUTO W/O SCOPE: CPT | Performed by: INTERNAL MEDICINE

## 2025-04-12 PROCEDURE — 99232 SBSQ HOSP IP/OBS MODERATE 35: CPT | Performed by: INTERNAL MEDICINE

## 2025-04-12 PROCEDURE — 83735 ASSAY OF MAGNESIUM: CPT | Performed by: INTERNAL MEDICINE

## 2025-04-12 PROCEDURE — 82550 ASSAY OF CK (CPK): CPT | Performed by: INTERNAL MEDICINE

## 2025-04-12 PROCEDURE — 85025 COMPLETE CBC W/AUTO DIFF WBC: CPT | Performed by: INTERNAL MEDICINE

## 2025-04-12 PROCEDURE — 85027 COMPLETE CBC AUTOMATED: CPT | Performed by: INTERNAL MEDICINE

## 2025-04-12 PROCEDURE — 84100 ASSAY OF PHOSPHORUS: CPT | Performed by: INTERNAL MEDICINE

## 2025-04-12 PROCEDURE — 84145 PROCALCITONIN (PCT): CPT

## 2025-04-12 RX ORDER — PROCHLORPERAZINE MALEATE 10 MG
10 TABLET ORAL ONCE
Status: COMPLETED | OUTPATIENT
Start: 2025-04-12 | End: 2025-04-12

## 2025-04-12 RX ORDER — METHOCARBAMOL 100 MG/ML
750 INJECTION, SOLUTION INTRAMUSCULAR; INTRAVENOUS EVERY 8 HOURS
Status: DISPENSED | OUTPATIENT
Start: 2025-04-12 | End: 2025-04-13

## 2025-04-12 RX ADMIN — ENOXAPARIN SODIUM 40 MG: 40 INJECTION SUBCUTANEOUS at 08:39

## 2025-04-12 RX ADMIN — SODIUM CHLORIDE, SODIUM GLUCONATE, SODIUM ACETATE, POTASSIUM CHLORIDE, MAGNESIUM CHLORIDE, SODIUM PHOSPHATE, DIBASIC, AND POTASSIUM PHOSPHATE 100 ML/HR: .53; .5; .37; .037; .03; .012; .00082 INJECTION, SOLUTION INTRAVENOUS at 05:53

## 2025-04-12 RX ADMIN — OXYCODONE HYDROCHLORIDE 10 MG: 10 TABLET ORAL at 00:40

## 2025-04-12 RX ADMIN — PROCHLORPERAZINE MALEATE 10 MG: 10 TABLET ORAL at 16:41

## 2025-04-12 RX ADMIN — DEXTROSE 2000 MG: 50 INJECTION, SOLUTION INTRAVENOUS at 23:12

## 2025-04-12 RX ADMIN — LEVOTHYROXINE SODIUM 50 MCG: 0.05 TABLET ORAL at 05:50

## 2025-04-12 RX ADMIN — METHOCARBAMOL 750 MG: 100 INJECTION INTRAMUSCULAR; INTRAVENOUS at 16:41

## 2025-04-12 RX ADMIN — METHOCARBAMOL 750 MG: 100 INJECTION INTRAMUSCULAR; INTRAVENOUS at 08:41

## 2025-04-12 RX ADMIN — BUDESONIDE AND FORMOTEROL FUMARATE DIHYDRATE 2 PUFF: 80; 4.5 AEROSOL RESPIRATORY (INHALATION) at 08:39

## 2025-04-12 RX ADMIN — ANASTROZOLE 1 MG: 1 TABLET, COATED ORAL at 08:40

## 2025-04-12 RX ADMIN — OXYCODONE 5 MG: 5 TABLET ORAL at 05:50

## 2025-04-12 RX ADMIN — SODIUM CHLORIDE, SODIUM GLUCONATE, SODIUM ACETATE, POTASSIUM CHLORIDE, MAGNESIUM CHLORIDE, SODIUM PHOSPHATE, DIBASIC, AND POTASSIUM PHOSPHATE 100 ML/HR: .53; .5; .37; .037; .03; .012; .00082 INJECTION, SOLUTION INTRAVENOUS at 16:44

## 2025-04-12 RX ADMIN — PAROXETINE 50 MG: 25 TABLET, FILM COATED, EXTENDED RELEASE ORAL at 08:39

## 2025-04-12 RX ADMIN — HYDROMORPHONE HYDROCHLORIDE 0.5 MG: 1 INJECTION, SOLUTION INTRAMUSCULAR; INTRAVENOUS; SUBCUTANEOUS at 01:57

## 2025-04-12 RX ADMIN — METHOCARBAMOL 750 MG: 100 INJECTION INTRAMUSCULAR; INTRAVENOUS at 00:37

## 2025-04-12 RX ADMIN — ONDANSETRON 4 MG: 2 INJECTION INTRAMUSCULAR; INTRAVENOUS at 00:03

## 2025-04-12 NOTE — ASSESSMENT & PLAN NOTE
"Pt presented to the ED secondary to new onset of fever, chills, rigors, myalgias, generalized weakness, and N/V shortly after cystoscopy and taking bactrim  Pt reports that she was seen in the urology office earlier in the day with cystoscopy performed in the office. She was initiated on bactrim following procedure for prophylaxis.  Of note, pt hospitalized 12/10-14/2024 secondary to septic shock due to left pyelonephritis. Urine culture from this hospitalization growing pansensitive E. Coli and patient transition to IV ancef.  Pt fulfilling SIRS criteria with fever, tachycardia, tachypnea, and leukopenia of 2.23  Lactic 2.9->2,0  Procalc  <0.05---> 10.71  Septic source unclear at this time, however do suspect likely urinary given recent instrumentation and history of similar presentation  CXR without acute abnormalities, similar to previous, official read - no acute cardiopulmonary abnormalities  Flu/COVID negative  CT a/p demonstrating \"Small amount of air within the bladder lumen, probably related to recent intervention; correlation with urinalysis recommended to exclude superimposed cystitis.\"  UA currently without evidence of infection  Urine and blood cultures are negative  Pt received weight-based IV fluids while in the ED, continue IV fluids  Continue IV Rocephin    "

## 2025-04-12 NOTE — PROGRESS NOTES
"Progress Note - Hospitalist   Name: Richa Medina 60 y.o. female I MRN: [id number]  Unit/Bed#: E4 [facility] I Date of Admission: 4/10/2025   Date of Service: 4/12/2025 I Hospital Day: 2    Assessment & Plan  Severe sepsis (HCC)  Pt presented to the ED secondary to new onset of fever, chills, rigors, myalgias, generalized weakness, and N/V shortly after cystoscopy and taking bactrim  Pt reports that she was seen in the urology office earlier in the day with cystoscopy performed in the office. She was initiated on bactrim following procedure for prophylaxis.  Of note, pt hospitalized 12/10-14/2024 secondary to septic shock due to left pyelonephritis. Urine culture from this hospitalization growing pansensitive E. Coli and patient transition to IV ancef.  Pt fulfilling SIRS criteria with fever, tachycardia, tachypnea, and leukopenia of 2.23  Lactic 2.9->2,0  Procalc  <0.05---> 10.71  Septic source unclear at this time, however do suspect likely urinary given recent instrumentation and history of similar presentation  CXR without acute abnormalities, similar to previous, official read - no acute cardiopulmonary abnormalities  Flu/COVID negative  CT a/p demonstrating \"Small amount of air within the bladder lumen, probably related to recent intervention; correlation with urinalysis recommended to exclude superimposed cystitis.\"  UA currently without evidence of infection  Urine and blood cultures are negative  Pt received weight-based IV fluids while in the ED, continue IV fluids  Continue IV Rocephin    Adverse reaction to sulfamethoxazole  Pt reports immediately after taking bactrim earlier today, she developed \"shaking\" and intractable N/V  She endorses that she has taken bactrim once in the past and had a similar reaction w/ N/V at that time as well  Adverse reaction v symptoms related to severe sepsis  Continue supportive care, IV fluids  Depression  Mood stable  Continue paxil  Hypothyroidism  Continue " levothyroxine   Malignant neoplasm of overlapping sites of right breast in female, estrogen receptor positive (HCC)  Followed outpatient by hem/onc, last seen 2/18/2025  Continue arimidex    VTE Pharmacologic Prophylaxis: VTE Score: 5 High Risk (Score >/= 5) - Pharmacological DVT Prophylaxis Ordered: enoxaparin (Lovenox). Sequential Compression Devices Ordered.    Mobility:   Basic Mobility Inpatient Raw Score: 24  JH-HLM Goal: 8: Walk 250 feet or more  JH-HLM Achieved: 6: Walk 10 steps or more  JH-HLM Goal NOT achieved. Continue with multidisciplinary rounding and encourage appropriate mobility to improve upon JH-HLM goals.    Patient Centered Rounds: I performed bedside rounds with nursing staff today.   Discussions with Specialists or Other Care Team Provider:     Education and Discussions with Family / Patient: Discussed treatment plan with family and patient who agree with current plan; encouraged to ask questions and participate.      Current Length of Stay: 2 day(s)  Current Patient Status: Inpatient   Certification Statement: The patient will continue to require additional inpatient hospital stay due to ongoing acute illness  Discharge Plan: Anticipate discharge in 24-48 hrs to discharge location to be determined pending rehab evaluations.    Code Status: Level 1 - Full Code    Subjective   Patient seen and examined bedside and although appears somewhat better, still complaining of profound back, buttock, and thigh pain.  Confounding labs has now clearly acidotic but white count remains the same.  Patient had fever overnight and procalcitonin shot up to 10.71; but thus far, no confirmed infectious source.  UA and urine culture negative; chest x-ray with no obvious findings, and blood cultures negative at 24 hours.  LFTs did rise overnight, I believe that to be secondary to IV antibiotics and probably incidental.  Flu negative, but given widespread back buttock and thigh pain, some concern for rhabdo although  creatinine appears to be stable.  Will obtain CK and continue Robaxin for supportive care.  Will also continue fluids and await further labs.  Patient on Rocephin which previous admission for pyelonephritis had susceptibilities to.  Will trial Compazine for supportive care and continue antiemetics.  Patient reports nausea and vomiting resolved this morning; monitor on morning labs and obtaining repeat CBC.    Low threshold to involve infectious disease tomorrow if patient does not improve; likely her toxic symptoms multifactorial with possible Pyelo combined with GI symptoms from Bactrim.    Objective :  Temp:  [97.5 °F (36.4 °C)-98 °F (36.7 °C)] 97.5 °F (36.4 °C)  HR:  [] 97  BP: (106-134)/(58-67) 128/60  Resp:  [20] 20  SpO2:  [96 %] 96 %  O2 Device: None (Room air)    There is no height or weight on file to calculate BMI.     Input and Output Summary (last 24 hours):   No intake or output data in the 24 hours ending 04/12/25 1643    Physical Exam  Vitals and nursing note reviewed.   Constitutional:       General: She is not in acute distress.     Appearance: She is well-developed.   HENT:      Head: Normocephalic and atraumatic.   Eyes:      Conjunctiva/sclera: Conjunctivae normal.   Cardiovascular:      Rate and Rhythm: Normal rate.   Pulmonary:      Effort: Pulmonary effort is normal. No respiratory distress.   Abdominal:      Palpations: Abdomen is soft.      Tenderness: There is no abdominal tenderness.   Musculoskeletal:         General: No swelling.      Cervical back: Neck supple.   Skin:     General: Skin is warm and dry.   Neurological:      Mental Status: She is alert and oriented to person, place, and time.   Psychiatric:      Comments: Anxious           Lines/Drains:              Lab Results: I have reviewed the following results:   Results from last 7 days   Lab Units 04/12/25  0548   WBC Thousand/uL 9.81   HEMOGLOBIN g/dL 9.1*   HEMATOCRIT % 27.6*   PLATELETS Thousands/uL 152   SEGS PCT % 84*    LYMPHO PCT % 10*   MONO PCT % 5   EOS PCT % 1     Results from last 7 days   Lab Units 04/12/25  0548   SODIUM mmol/L 134*   POTASSIUM mmol/L 4.3   CHLORIDE mmol/L 102   CO2 mmol/L 19*   BUN mg/dL 9   CREATININE mg/dL 0.52*   ANION GAP mmol/L 13   CALCIUM mg/dL 6.4*   ALBUMIN g/dL 2.9*   TOTAL BILIRUBIN mg/dL 0.36   ALK PHOS U/L 55   ALT U/L 120*   AST U/L 76*   GLUCOSE RANDOM mg/dL 69     Results from last 7 days   Lab Units 04/10/25  2120   INR  1.00             Results from last 7 days   Lab Units 04/12/25  0548 04/10/25  2310 04/10/25  2120   LACTIC ACID mmol/L  --  2.0 2.9*   PROCALCITONIN ng/ml 10.71*  --  <0.05       Recent Cultures (last 7 days):   Results from last 7 days   Lab Units 04/11/25  0208 04/10/25  2129 04/10/25  2120   BLOOD CULTURE   --  No Growth at 24 hrs. No Growth at 24 hrs.   URINE CULTURE  No Growth <1000 cfu/mL  --   --        Imaging Results Review: I reviewed radiology reports from this admission including: chest xray and CT abdomen/pelvis.      Last 24 Hours Medication List:     Current Facility-Administered Medications:     acetaminophen (TYLENOL) tablet 650 mg, Q6H PRN    anastrozole (ARIMIDEX) tablet 1 mg, Daily    budesonide-formoterol (SYMBICORT) 80-4.5 MCG/ACT inhaler 2 puff, BID    cefTRIAXone (ROCEPHIN) 2,000 mg in dextrose 5 % 50 mL IVPB, Q24H, Last Rate: 2,000 mg (04/11/25 2345)    enoxaparin (LOVENOX) subcutaneous injection 40 mg, Daily    HYDROmorphone (DILAUDID) injection 0.5 mg, Q4H PRN    levothyroxine tablet 25 mcg, Once per day on Monday Tuesday Wednesday Thursday Friday    levothyroxine tablet 50 mcg, Once per day on Sunday Saturday    methocarbamol (ROBAXIN) injection 750 mg, Q8H    metoclopramide (REGLAN) injection 10 mg, Q6H PRN    multi-electrolyte (Plasmalyte-A/Isolyte-S PH 7.4/Normosol-R) IV solution, Continuous, Last Rate: 100 mL/hr (04/12/25 0562)    naloxone (NARCAN) injection 0.1 mg, Q1MIN PRN    ondansetron (ZOFRAN) injection 4 mg, Q6H PRN    oxyCODONE  (ROXICODONE) immediate release tablet 10 mg, Q4H PRN    oxyCODONE (ROXICODONE) IR tablet 5 mg, Q4H PRN    PARoxetine (PAXIL-CR) 24 hr tablet 50 mg, Daily    Administrative Statements   Today, Patient Was Seen By: Navin Barriga MD      **Please Note: This note may have been constructed using a voice recognition system.**

## 2025-04-13 LAB
ALBUMIN SERPL BCG-MCNC: 3.6 G/DL (ref 3.5–5)
ALP SERPL-CCNC: 67 U/L (ref 34–104)
ALT SERPL W P-5'-P-CCNC: 116 U/L (ref 7–52)
ANION GAP SERPL CALCULATED.3IONS-SCNC: 7 MMOL/L (ref 4–13)
AST SERPL W P-5'-P-CCNC: 45 U/L (ref 13–39)
BASOPHILS # BLD AUTO: 0.01 THOUSANDS/ÂΜL (ref 0–0.1)
BASOPHILS NFR BLD AUTO: 0 % (ref 0–1)
BILIRUB SERPL-MCNC: 0.45 MG/DL (ref 0.2–1)
BUN SERPL-MCNC: 9 MG/DL (ref 5–25)
CALCIUM SERPL-MCNC: 8.9 MG/DL (ref 8.4–10.2)
CHLORIDE SERPL-SCNC: 109 MMOL/L (ref 96–108)
CO2 SERPL-SCNC: 23 MMOL/L (ref 21–32)
CREAT SERPL-MCNC: 0.58 MG/DL (ref 0.6–1.3)
EOSINOPHIL # BLD AUTO: 0.19 THOUSAND/ÂΜL (ref 0–0.61)
EOSINOPHIL NFR BLD AUTO: 4 % (ref 0–6)
ERYTHROCYTE [DISTWIDTH] IN BLOOD BY AUTOMATED COUNT: 12.7 % (ref 11.6–15.1)
GFR SERPL CREATININE-BSD FRML MDRD: 99 ML/MIN/1.73SQ M
GLUCOSE SERPL-MCNC: 83 MG/DL (ref 65–140)
HCT VFR BLD AUTO: 33.4 % (ref 34.8–46.1)
HGB BLD-MCNC: 11 G/DL (ref 11.5–15.4)
IMM GRANULOCYTES # BLD AUTO: 0.02 THOUSAND/UL (ref 0–0.2)
IMM GRANULOCYTES NFR BLD AUTO: 0 % (ref 0–2)
LYMPHOCYTES # BLD AUTO: 0.81 THOUSANDS/ÂΜL (ref 0.6–4.47)
LYMPHOCYTES NFR BLD AUTO: 17 % (ref 14–44)
MAGNESIUM SERPL-MCNC: 2 MG/DL (ref 1.9–2.7)
MCH RBC QN AUTO: 29.1 PG (ref 26.8–34.3)
MCHC RBC AUTO-ENTMCNC: 32.9 G/DL (ref 31.4–37.4)
MCV RBC AUTO: 88 FL (ref 82–98)
MONOCYTES # BLD AUTO: 0.29 THOUSAND/ÂΜL (ref 0.17–1.22)
MONOCYTES NFR BLD AUTO: 6 % (ref 4–12)
NEUTROPHILS # BLD AUTO: 3.54 THOUSANDS/ÂΜL (ref 1.85–7.62)
NEUTS SEG NFR BLD AUTO: 73 % (ref 43–75)
NRBC BLD AUTO-RTO: 0 /100 WBCS
PHOSPHATE SERPL-MCNC: 2.6 MG/DL (ref 2.3–4.1)
PLATELET # BLD AUTO: 173 THOUSANDS/UL (ref 149–390)
PMV BLD AUTO: 9.4 FL (ref 8.9–12.7)
POTASSIUM SERPL-SCNC: 3.7 MMOL/L (ref 3.5–5.3)
PROT SERPL-MCNC: 6.5 G/DL (ref 6.4–8.4)
RBC # BLD AUTO: 3.78 MILLION/UL (ref 3.81–5.12)
SODIUM SERPL-SCNC: 139 MMOL/L (ref 135–147)
WBC # BLD AUTO: 4.86 THOUSAND/UL (ref 4.31–10.16)

## 2025-04-13 PROCEDURE — 80053 COMPREHEN METABOLIC PANEL: CPT | Performed by: INTERNAL MEDICINE

## 2025-04-13 PROCEDURE — 99232 SBSQ HOSP IP/OBS MODERATE 35: CPT | Performed by: INTERNAL MEDICINE

## 2025-04-13 PROCEDURE — 83735 ASSAY OF MAGNESIUM: CPT | Performed by: INTERNAL MEDICINE

## 2025-04-13 PROCEDURE — 85025 COMPLETE CBC W/AUTO DIFF WBC: CPT | Performed by: INTERNAL MEDICINE

## 2025-04-13 PROCEDURE — 84100 ASSAY OF PHOSPHORUS: CPT | Performed by: INTERNAL MEDICINE

## 2025-04-13 RX ADMIN — BUDESONIDE AND FORMOTEROL FUMARATE DIHYDRATE 2 PUFF: 80; 4.5 AEROSOL RESPIRATORY (INHALATION) at 08:30

## 2025-04-13 RX ADMIN — DEXTROSE 2000 MG: 50 INJECTION, SOLUTION INTRAVENOUS at 23:05

## 2025-04-13 RX ADMIN — PAROXETINE 50 MG: 25 TABLET, FILM COATED, EXTENDED RELEASE ORAL at 08:30

## 2025-04-13 RX ADMIN — LEVOTHYROXINE SODIUM 50 MCG: 0.05 TABLET ORAL at 05:14

## 2025-04-13 RX ADMIN — ANASTROZOLE 1 MG: 1 TABLET, COATED ORAL at 08:30

## 2025-04-13 NOTE — PLAN OF CARE
Problem: Potential for Falls  Goal: Patient will remain free of falls  Description: INTERVENTIONS:- Educate patient/family on patient safety including physical limitations- Instruct patient to call for assistance with activity - Consult OT/PT to assist with strengthening/mobility - Keep Call bell within reach- Keep bed low and locked with side rails adjusted as appropriate- Keep care items and personal belongings within reach- Initiate and maintain comfort rounds- Make Fall Risk Sign visible to staff- Offer Toileting every 2 Hours, in advance of need- Initiate/Maintain bed alarm- Obtain necessary fall risk management equipment: - Apply yellow socks and bracelet for high fall risk patients- Consider moving patient to room near nurses station  Outcome: Progressing     Problem: DISCHARGE PLANNING  Goal: Discharge to home or other facility with appropriate resources  Description: INTERVENTIONS:- Identify barriers to discharge w/patient and caregiver- Arrange for needed discharge resources and transportation as appropriate- Identify discharge learning needs (meds, wound care, etc.)- Arrange for interpretive services to assist at discharge as needed- Refer to Case Management Department for coordinating discharge planning if the patient needs post-hospital services based on physician/advanced practitioner order or complex needs related to functional status, cognitive ability, or social support system  Outcome: Progressing     Problem: Knowledge Deficit  Goal: Patient/family/caregiver demonstrates understanding of disease process, treatment plan, medications, and discharge instructions  Description: Complete learning assessment and assess knowledge base.Interventions:- Provide teaching at level of understanding- Provide teaching via preferred learning methods  Outcome: Progressing     Problem: Nutrition/Hydration-ADULT  Goal: Nutrient/Hydration intake appropriate for improving, restoring or maintaining nutritional  needs  Description: Monitor and assess patient's nutrition/hydration status for malnutrition. Collaborate with interdisciplinary team and initiate plan and interventions as ordered.  Monitor patient's weight and dietary intake as ordered or per policy. Utilize nutrition screening tool and intervene as necessary. Determine patient's food preferences and provide high-protein, high-caloric foods as appropriate. INTERVENTIONS:- Monitor oral intake, urinary output, labs, and treatment plans- Assess nutrition and hydration status and recommend course of action- Evaluate amount of meals eaten- Assist patient with eating if necessary - Allow adequate time for meals- Recommend/ encourage appropriate diets, oral nutritional supplements, and vitamin/mineral supplements- Order, calculate, and assess calorie counts as needed- Recommend, monitor, and adjust tube feedings and TPN/PPN based on assessed needs- Assess need for intravenous fluids- Provide specific nutrition/hydration education as appropriate- Include patient/family/caregiver in decisions related to nutrition  Monitor and assess patient's nutrition/hydration status for malnutrition. Collaborate with interdisciplinary team and initiate plan and interventions as ordered.  Monitor patient's weight and dietary intake as ordered or per policy. Utilize nutrition screening tool and intervene as necessary. Determine patient's food preferences and provide high-protein, high-caloric foods as appropriate. INTERVENTIONS:- Monitor oral intake, urinary output, labs, and treatment plans- Assess nutrition and hydration status and recommend course of action- Evaluate amount of meals eaten- Assist patient with eating if necessary - Allow adequate time for meals- Recommend/ encourage appropriate diets, oral nutritional supplements, and vitamin/mineral supplements- Order, calculate, and assess calorie counts as needed- Recommend, monitor, and adjust tube feedings and TPN/PPN based on  assessed needs- Assess need for intravenous fluids- Provide specific nutrition/hydration education as appropriate- Include patient/family/caregiver in decisions related to nutrition  Outcome: Progressing     Problem: GASTROINTESTINAL - ADULT  Goal: Minimal or absence of nausea and/or vomiting  Description: INTERVENTIONS:- Administer IV fluids if ordered to ensure adequate hydration- Maintain NPO status until nausea and vomiting are resolved- Nasogastric tube if ordered- Administer ordered antiemetic medications as needed- Provide nonpharmacologic comfort measures as appropriate- Advance diet as tolerated, if ordered- Consider nutrition services referral to assist patient with adequate nutrition and appropriate food choices  Outcome: Progressing     Problem: METABOLIC, FLUID AND ELECTROLYTES - ADULT  Goal: Electrolytes maintained within normal limits  Description: INTERVENTIONS:- Monitor labs and assess patient for signs and symptoms of electrolyte imbalances- Administer electrolyte replacement as ordered- Monitor response to electrolyte replacements, including repeat lab results as appropriate- Instruct patient on fluid and nutrition as appropriate  Outcome: Progressing  Goal: Fluid balance maintained  Description: INTERVENTIONS:- Monitor labs - Monitor I/O and WT- Instruct patient on fluid and nutrition as appropriate- Assess for signs & symptoms of volume excess or deficit  Outcome: Progressing

## 2025-04-13 NOTE — PROGRESS NOTES
"Progress Note - Hospitalist   Name: Richa Medina 60 y.o. female I MRN: [id number]  Unit/Bed#: E4 MS [facility]01 I Date of Admission: 4/10/2025   Date of Service: 4/13/2025 I Hospital Day: 3    Assessment & Plan  Severe sepsis (HCC)  Pt presented to the ED secondary to new onset of fever, chills, rigors, myalgias, generalized weakness, and N/V shortly after cystoscopy and taking bactrim  Pt reports that she was seen in the urology office earlier in the day with cystoscopy performed in the office. She was initiated on bactrim following procedure for prophylaxis.  Of note, pt hospitalized 12/10-14/2024 secondary to septic shock due to left pyelonephritis. Urine culture from this hospitalization growing pansensitive E. Coli and patient transition to IV ancef.  Pt fulfilling SIRS criteria with fever, tachycardia, tachypnea, and leukopenia of 2.23  Lactic 2.9->2,0  Procalc  <0.05---> 10.71  Septic source unclear at this time, however do suspect likely urinary given recent instrumentation and history of similar presentation  CXR without acute abnormalities, similar to previous, official read - no acute cardiopulmonary abnormalities  Flu/COVID negative  CT a/p demonstrating \"Small amount of air within the bladder lumen, probably related to recent intervention; correlation with urinalysis recommended to exclude superimposed cystitis.\"  UA currently without evidence of infection  Urine and blood cultures are negative  Pt received weight-based IV fluids while in the ED, continue IV fluids  Continue IV Rocephin    Adverse reaction to sulfamethoxazole  Pt reports immediately after taking bactrim earlier today, she developed \"shaking\" and intractable N/V  She endorses that she has taken bactrim once in the past and had a similar reaction w/ N/V at that time as well  Adverse reaction v symptoms related to severe sepsis  Continue supportive care, IV fluids  Depression  Mood stable  Continue paxil  Hypothyroidism  Continue " levothyroxine   Malignant neoplasm of overlapping sites of right breast in female, estrogen receptor positive (HCC)  Followed outpatient by hem/onc, last seen 2/18/2025  Continue arimidex    VTE Pharmacologic Prophylaxis: VTE Score: 5 High Risk (Score >/= 5) - Pharmacological DVT Prophylaxis Ordered: enoxaparin (Lovenox). Sequential Compression Devices Ordered.    Mobility:   Basic Mobility Inpatient Raw Score: 23  JH-HLM Goal: 7: Walk 25 feet or more  JH-HLM Achieved: 7: Walk 25 feet or more  JH-HLM Goal achieved. Continue to encourage appropriate mobility.    Patient Centered Rounds: I performed bedside rounds with nursing staff today.   Discussions with Specialists or Other Care Team Provider:     Education and Discussions with Family / Patient: Discussed treatment plan with family and patient who agree with current plan; encouraged to ask questions and participate.      Current Length of Stay: 3 day(s)  Current Patient Status: Inpatient   Certification Statement: The patient will continue to require additional inpatient hospital stay due to treatment of sepsis and allergic reaction to Bactrim  Discharge Plan: Anticipate discharge in 24-48 hrs to home.    Code Status: Level 1 - Full Code    Subjective   Overall, patient appears improved today.  Nausea and vomiting have resolved and patient at is requesting advancement of diet.  Still reports some fatigue, but generally feels better than on presentation.  White count trending down 4K; no fevers overnight.  Heart rate within normal limits.  No further SIRS criteria and will continue antibiotics for presumed UTI.  Likely stable for discharge tomorrow.    Objective :  Temp:  [97.8 °F (36.6 °C)-97.9 °F (36.6 °C)] 97.8 °F (36.6 °C)  HR:  [86-92] 92  BP: (119-146)/(60-70) 119/70  Resp:  [18] 18  SpO2:  [96 %-97 %] 96 %  O2 Device: None (Room air)    Body mass index is 25.51 kg/m².     Input and Output Summary (last 24 hours):     Intake/Output Summary (Last 24 hours) at  4/13/2025 1659  Last data filed at 4/12/2025 2030  Gross per 24 hour   Intake 30 ml   Output --   Net 30 ml       Physical Exam  Vitals and nursing note reviewed.   Constitutional:       General: She is not in acute distress.     Appearance: She is well-developed.   HENT:      Head: Normocephalic and atraumatic.   Eyes:      Conjunctiva/sclera: Conjunctivae normal.   Cardiovascular:      Rate and Rhythm: Normal rate.   Pulmonary:      Effort: Pulmonary effort is normal. No respiratory distress.   Abdominal:      Palpations: Abdomen is soft.      Tenderness: There is no abdominal tenderness.   Musculoskeletal:         General: No swelling.      Cervical back: Neck supple.   Skin:     General: Skin is warm and dry.   Neurological:      Mental Status: She is alert and oriented to person, place, and time.   Psychiatric:         Mood and Affect: Mood normal.         Behavior: Behavior normal.           Lines/Drains:              Lab Results: I have reviewed the following results:   Results from last 7 days   Lab Units 04/13/25  0518   WBC Thousand/uL 4.86   HEMOGLOBIN g/dL 11.0*   HEMATOCRIT % 33.4*   PLATELETS Thousands/uL 173   SEGS PCT % 73   LYMPHO PCT % 17   MONO PCT % 6   EOS PCT % 4     Results from last 7 days   Lab Units 04/13/25  0518   SODIUM mmol/L 139   POTASSIUM mmol/L 3.7   CHLORIDE mmol/L 109*   CO2 mmol/L 23   BUN mg/dL 9   CREATININE mg/dL 0.58*   ANION GAP mmol/L 7   CALCIUM mg/dL 8.9   ALBUMIN g/dL 3.6   TOTAL BILIRUBIN mg/dL 0.45   ALK PHOS U/L 67   ALT U/L 116*   AST U/L 45*   GLUCOSE RANDOM mg/dL 83     Results from last 7 days   Lab Units 04/10/25  2120   INR  1.00             Results from last 7 days   Lab Units 04/12/25  0548 04/10/25  2310 04/10/25  2120   LACTIC ACID mmol/L  --  2.0 2.9*   PROCALCITONIN ng/ml 10.71*  --  <0.05       Recent Cultures (last 7 days):   Results from last 7 days   Lab Units 04/11/25  0208 04/10/25  2129 04/10/25  2120   BLOOD CULTURE   --  No Growth at 48 hrs. No  Growth at 48 hrs.   URINE CULTURE  No Growth <1000 cfu/mL  --   --        Imaging Results Review: I reviewed radiology reports from this admission including: chest xray.      Last 24 Hours Medication List:     Current Facility-Administered Medications:     acetaminophen (TYLENOL) tablet 650 mg, Q6H PRN    anastrozole (ARIMIDEX) tablet 1 mg, Daily    budesonide-formoterol (SYMBICORT) 80-4.5 MCG/ACT inhaler 2 puff, BID    cefTRIAXone (ROCEPHIN) 2,000 mg in dextrose 5 % 50 mL IVPB, Q24H, Last Rate: 2,000 mg (04/12/25 5972)    enoxaparin (LOVENOX) subcutaneous injection 40 mg, Daily    HYDROmorphone (DILAUDID) injection 0.5 mg, Q4H PRN    levothyroxine tablet 25 mcg, Once per day on Monday Tuesday Wednesday Thursday Friday    levothyroxine tablet 50 mcg, Once per day on Sunday Saturday    metoclopramide (REGLAN) injection 10 mg, Q6H PRN    naloxone (NARCAN) injection 0.1 mg, Q1MIN PRN    ondansetron (ZOFRAN) injection 4 mg, Q6H PRN    oxyCODONE (ROXICODONE) immediate release tablet 10 mg, Q4H PRN    oxyCODONE (ROXICODONE) IR tablet 5 mg, Q4H PRN    PARoxetine (PAXIL-CR) 24 hr tablet 50 mg, Daily    Administrative Statements   Today, Patient Was Seen By: Navin Barriga MD      **Please Note: This note may have been constructed using a voice recognition system.**

## 2025-04-14 ENCOUNTER — TRANSITIONAL CARE MANAGEMENT (OUTPATIENT)
Dept: FAMILY MEDICINE CLINIC | Facility: CLINIC | Age: 60
End: 2025-04-14

## 2025-04-14 VITALS
SYSTOLIC BLOOD PRESSURE: 124 MMHG | HEART RATE: 84 BPM | DIASTOLIC BLOOD PRESSURE: 63 MMHG | RESPIRATION RATE: 20 BRPM | OXYGEN SATURATION: 96 % | TEMPERATURE: 97.6 F | BODY MASS INDEX: 25.51 KG/M2 | HEIGHT: 63 IN

## 2025-04-14 LAB
ANION GAP SERPL CALCULATED.3IONS-SCNC: 5 MMOL/L (ref 4–13)
BASOPHILS # BLD AUTO: 0.01 THOUSANDS/ÂΜL (ref 0–0.1)
BASOPHILS NFR BLD AUTO: 0 % (ref 0–1)
BUN SERPL-MCNC: 10 MG/DL (ref 5–25)
CALCIUM SERPL-MCNC: 9.1 MG/DL (ref 8.4–10.2)
CHLORIDE SERPL-SCNC: 109 MMOL/L (ref 96–108)
CO2 SERPL-SCNC: 27 MMOL/L (ref 21–32)
CREAT SERPL-MCNC: 0.58 MG/DL (ref 0.6–1.3)
EOSINOPHIL # BLD AUTO: 0.17 THOUSAND/ÂΜL (ref 0–0.61)
EOSINOPHIL NFR BLD AUTO: 4 % (ref 0–6)
ERYTHROCYTE [DISTWIDTH] IN BLOOD BY AUTOMATED COUNT: 12.5 % (ref 11.6–15.1)
GFR SERPL CREATININE-BSD FRML MDRD: 99 ML/MIN/1.73SQ M
GLUCOSE SERPL-MCNC: 95 MG/DL (ref 65–140)
HCT VFR BLD AUTO: 34 % (ref 34.8–46.1)
HGB BLD-MCNC: 11.1 G/DL (ref 11.5–15.4)
IMM GRANULOCYTES # BLD AUTO: 0.02 THOUSAND/UL (ref 0–0.2)
IMM GRANULOCYTES NFR BLD AUTO: 1 % (ref 0–2)
LYMPHOCYTES # BLD AUTO: 1.26 THOUSANDS/ÂΜL (ref 0.6–4.47)
LYMPHOCYTES NFR BLD AUTO: 33 % (ref 14–44)
MCH RBC QN AUTO: 29 PG (ref 26.8–34.3)
MCHC RBC AUTO-ENTMCNC: 32.6 G/DL (ref 31.4–37.4)
MCV RBC AUTO: 89 FL (ref 82–98)
MONOCYTES # BLD AUTO: 0.41 THOUSAND/ÂΜL (ref 0.17–1.22)
MONOCYTES NFR BLD AUTO: 11 % (ref 4–12)
NEUTROPHILS # BLD AUTO: 1.97 THOUSANDS/ÂΜL (ref 1.85–7.62)
NEUTS SEG NFR BLD AUTO: 51 % (ref 43–75)
NRBC BLD AUTO-RTO: 0 /100 WBCS
PLATELET # BLD AUTO: 204 THOUSANDS/UL (ref 149–390)
PMV BLD AUTO: 10.2 FL (ref 8.9–12.7)
POTASSIUM SERPL-SCNC: 3.6 MMOL/L (ref 3.5–5.3)
RBC # BLD AUTO: 3.83 MILLION/UL (ref 3.81–5.12)
SODIUM SERPL-SCNC: 141 MMOL/L (ref 135–147)
WBC # BLD AUTO: 3.84 THOUSAND/UL (ref 4.31–10.16)

## 2025-04-14 PROCEDURE — 80048 BASIC METABOLIC PNL TOTAL CA: CPT | Performed by: INTERNAL MEDICINE

## 2025-04-14 PROCEDURE — 99239 HOSP IP/OBS DSCHRG MGMT >30: CPT | Performed by: STUDENT IN AN ORGANIZED HEALTH CARE EDUCATION/TRAINING PROGRAM

## 2025-04-14 PROCEDURE — 85025 COMPLETE CBC W/AUTO DIFF WBC: CPT | Performed by: INTERNAL MEDICINE

## 2025-04-14 RX ADMIN — ANASTROZOLE 1 MG: 1 TABLET, COATED ORAL at 08:39

## 2025-04-14 RX ADMIN — BUDESONIDE AND FORMOTEROL FUMARATE DIHYDRATE 2 PUFF: 80; 4.5 AEROSOL RESPIRATORY (INHALATION) at 08:39

## 2025-04-14 RX ADMIN — LEVOTHYROXINE SODIUM 25 MCG: 0.03 TABLET ORAL at 05:06

## 2025-04-14 RX ADMIN — PAROXETINE 50 MG: 25 TABLET, FILM COATED, EXTENDED RELEASE ORAL at 08:39

## 2025-04-14 NOTE — ASSESSMENT & PLAN NOTE
60-year-old female presented to ED due to fever, chills, rigors, myalgias after she took Bactrim for cystoscopy postop prophylaxis.  Severe sepsis seem to have been ruled out given negative urine culture and blood culture.  Etiology may possibly be due to an allergic reaction secondary to her Bactrim use.  She has received 4 days of IV antibiotics so far and has no indication to continue this for the time being, patient is in agreement with this plan and will closely monitor herself at home.  Patient was recommended to follow-up with her primary care provider and monitor herself for red flags.

## 2025-04-14 NOTE — DISCHARGE SUMMARY
Discharge Summary - Hospitalist   Name: Richa Medina 60 y.o. female I MRN: [id number]  Unit/Bed#: E4 -01 I Date of Admission: 4/10/2025   Date of Service: 4/14/2025 I Hospital Day: 4     Assessment & Plan  Severe sepsis (HCC)  60-year-old female presented to ED due to fever, chills, rigors, myalgias after she took Bactrim for cystoscopy postop prophylaxis.  Severe sepsis seem to have been ruled out given negative urine culture and blood culture.  Etiology may possibly be due to an allergic reaction secondary to her Bactrim use.  She has received 4 days of IV antibiotics so far and has no indication to continue this for the time being, patient is in agreement with this plan and will closely monitor herself at home.  Patient was recommended to follow-up with her primary care provider and monitor herself for red flags.  Adverse reaction to sulfamethoxazole  Secondary to Bactrim.  Resolved with supportive care.  Depression  Mood stable  Continue paxil  Hypothyroidism  Continue levothyroxine   Malignant neoplasm of overlapping sites of right breast in female, estrogen receptor positive (HCC)  Followed outpatient by hem/onc, last seen 2/18/2025  Continue arimidex    Discharging Physician / Practitioner: Jovany Caruso MD  PCP: Shelby Marte PA-C  Admission Date:   Admission Orders (From admission, onward)       Ordered        04/10/25 2351  INPATIENT ADMISSION  Once                          Discharge Date: 04/14/25    Medical Problems       Resolved Problems  Date Reviewed: 4/10/2025   None         Consultations During Hospital Stay:  urology    Significant Findings / Test Results:   Imaging  XR Chest:  No acute cardiopulmonary disease.     Ct abdomen pelvis:    Small amount of air within the bladder lumen, probably related to recent intervention; correlation with urinalysis recommended to exclude superimposed cystitis.     Colonic diverticulosis without evidence of acute diverticulitis, and other findings as  above.    Test Results Pending at Discharge (will require follow up):   cultures     Outpatient Tests Requested:  Pcp follow-up  Cbc, bmp    Complications:  none    Reason for Admission: allergic reaction.    Hospital Course:     Richa Medina is a 60 y.o. female patient who originally presented to the hospital on 4/10/2025 due to an allergic reaction.    Patient underwent cystoscopy last 4/10/2025 for gross hematuria.  She was given Bactrim postprocedure for prophylaxis, but appeared to have developed new onset fever, nausea, vomiting, rigors, generalized weakness right after she took Bactrim.  She presented to the ED for further evaluation.  Initially, there was concern for sepsis.  She was started on IV antibiotic therapy.  Urine and blood cultures all came back negative for any infection.  Likely etiology of her presentation seems to be likely due to Bactrim allergy.  She states that she had a similar reaction when she took this medication recently.  Given clinical improvement and negative cultures, patient opted to closely monitor herself at home and follow-up with her primary care or seek immediate medical attention should she develop any concerning symptoms that may be suggestive of an infection.  At this point in time however, we have no source that would warrant continued antibiotic treatment.      Patient agrees to follow-up with her providers as scheduled and to take her medications as prescribed. All questions were addressed.    she understood the need to seek immediate medical attention should she develop any chest pain, shortness of breath, severe pain, fever, chills, or any other concerning symptoms.      Please see above list of diagnoses and related plan for additional information.     Condition at Discharge: good     Discharge Day Visit / Exam:     Subjective:  patient seen and examined at bedside. Comfortable, No new issues overnight.   Vitals: Blood Pressure: 124/63 (04/14/25 0717)  Pulse: 84  "(04/14/25 0717)  Temperature: 97.6 °F (36.4 °C) (04/14/25 0717)  Temp Source: Temporal (04/14/25 0717)  Respirations: 20 (04/14/25 0717)  Height: 5' 3\" (160 cm) (04/12/25 1746)  SpO2: 96 % (04/14/25 0717)  Exam:   Physical Exam  Vitals reviewed.   Constitutional:       General: She is not in acute distress.  HENT:      Head: Normocephalic.      Nose: Nose normal.      Mouth/Throat:      Mouth: Mucous membranes are moist.   Eyes:      General: No scleral icterus.  Cardiovascular:      Rate and Rhythm: Normal rate and regular rhythm.   Pulmonary:      Effort: Pulmonary effort is normal. No respiratory distress.      Breath sounds: No wheezing.   Abdominal:      General: There is no distension.      Palpations: Abdomen is soft.      Tenderness: There is no abdominal tenderness.   Skin:     General: Skin is warm.   Neurological:      Mental Status: She is alert and oriented to person, place, and time.   Psychiatric:         Mood and Affect: Mood normal.         Behavior: Behavior normal.         Discharge instructions/Information to patient and family:   See after visit summary for information provided to patient and family.      Provisions for Follow-Up Care:  See after visit summary for information related to follow-up care and any pertinent home health orders.      Disposition:     Home    Planned Readmission: No     Discharge Statement:  I spent 40 minutes discharging the patient. This time was spent on the day of discharge. I had direct contact with the patient on the day of discharge. Greater than 50% of the total time was spent examining patient, answering all patient questions, arranging and discussing plan of care with patient as well as directly providing post-discharge instructions.  Additional time then spent on discharge activities.    Discharge Medications:  See after visit summary for reconciled discharge medications provided to patient and family.      ** Please Note: This note has been constructed using " a voice recognition system **

## 2025-04-14 NOTE — NURSING NOTE
Patient left with all belongings, IV was removed and AVS was discussed. No concerns at time of discharge.

## 2025-04-15 LAB — BACTERIA BLD CULT: NORMAL

## 2025-04-15 NOTE — RESTORATIVE TECHNICIAN NOTE
Restorative Technician Note      Patient Name: Richa Medina     Restorative Tech Visit Date: 04/15/25  Note Type: Mobility  Patient Position Upon Consult: Supine  Activity Performed: Ambulated  Patient Position at End of Consult: All needs within reach; Bedside chair

## 2025-04-15 NOTE — UTILIZATION REVIEW
NOTIFICATION OF ADMISSION DISCHARGE   This is a Notification of Discharge from [facility]. Please be advised that this patient has been discharge from our facility. Below you will find the admission and discharge date and time including the patient’s disposition.   UTILIZATION REVIEW CONTACT:  Utilization Review Assistants  Network Utilization Review Department  Phone: [phone] x carefully listen to the prompts. All voicemails are confidential.  Email: [email or web address]     ADMISSION INFORMATION  PRESENTATION DATE: 4/10/2025  8:18 PM  OBERVATION ADMISSION DATE: N/A  INPATIENT ADMISSION DATE: 4/10/25 11:51 PM   DISCHARGE DATE: 4/14/2025  1:04 PM   DISPOSITION:Home/Self Care    Network Utilization Review Department  ATTENTION: Please call with any questions or concerns to [phone] and carefully listen to the prompts so that you are directed to the right person. All voicemails are confidential.   For Discharge needs, contact Care Management DC Support Team at [phone] opt. 2  Send all requests for admission clinical reviews, approved or denied determinations and any other requests to dedicated fax number below belonging to the campus where the patient is receiving treatment. List of dedicated fax numbers for the Facilities:  FACILITY NAME UR FAX NUMBER   ADMISSION DENIALS (Administrative/Medical Necessity) [phone]   DISCHARGE SUPPORT TEAM ([facility]) [phone]   PARENT CHILD HEALTH (Maternity/NICU/Pediatrics) [phone]   [facility] [phone]   [facility] [phone]   [facility] [phone]   [facility] [phone]   formerly [facility] [phone]   General acute hospital [phone]   [facility] [phone]   [facility] [phone]   [facility]  [facility] [phone]   [facility] [phone]   [facility] [phone]   [facility] [phone]

## 2025-04-16 LAB — BACTERIA BLD CULT: NORMAL

## 2025-04-16 NOTE — UTILIZATION REVIEW
Continued Stay Review    Date: 4/13/25                    Current Patient Class: Inpatient  Current Level of Care: Med Surg    HPI:60 y.o. female initially admitted on 4/10/25     Current Diagnosis: Severe sepsis, adverse reaction to sulfamethoxazole.     4/13 Internal Medicine:  Overall, patient appears improved today. Nausea and vomiting have resolved and patient is requesting advancement of diet. Still reports some fatigue, but generally feels better than on presentation. White count trending down 4K; no fevers overnight. Heart rate within normal limits. No further SIRS criteria and will continue antibiotics for presumed UTI. Likely stable for discharge tomorrow.     4/14 Discharged to home/self care.    Medications:     Scheduled Medications:        anastrozole (ARIMIDEX) tablet 1 mg, Daily    budesonide-formoterol (SYMBICORT) 80-4.5 MCG/ACT inhaler 2 puff, BID    cefTRIAXone (ROCEPHIN) 2,000 mg in dextrose 5 % 50 mL IVPB, Q24H, Last Rate: 2,000 mg (04/12/25 2312)    enoxaparin (LOVENOX) subcutaneous injection 40 mg, Daily    levothyroxine tablet 25 mcg, Once per day on Monday Tuesday Wednesday Thursday Friday    levothyroxine tablet 50 mcg, Once per day on Sunday Saturday    PARoxetine (PAXIL-CR) 24 hr tablet 50 mg, Daily        Continuous IV Infusions:    multi-electrolyte (Plasmalyte-A/Isolyte-S PH 7.4/Normosol-R) IV solution  Rate: 100 mL/hr Dose: 100 mL/hr  Freq: Continuous Route: IV  Last Dose: Stopped (04/13/25 0244)  Start: 04/11/25 0000 End: 04/13/25 0204      PRN Meds:      acetaminophen (TYLENOL) tablet 650 mg, Q6H PRN    HYDROmorphone (DILAUDID) injection 0.5 mg, Q4H PRN    metoclopramide (REGLAN) injection 10 mg, Q6H PRN    naloxone (NARCAN) injection 0.1 mg, Q1MIN PRN    ondansetron (ZOFRAN) injection 4 mg, Q6H PRN    oxyCODONE (ROXICODONE) immediate release tablet 10 mg, Q4H PRN    oxyCODONE (ROXICODONE) IR tablet 5 mg, Q4H PRN        Vital Signs (last 3 days) before discharge       Date/Time Temp  Pulse Resp BP MAP (mmHg) SpO2 O2 Device Patient Position - Orthostatic VS Pain    04/13/25 2300 97.2 °F (36.2 °C) 75 20 123/74 -- 97 % None (Room air) Lying --    04/13/25 2015 -- -- -- -- -- -- -- -- No Pain    04/13/25 1505 97.8 °F (36.6 °C) 92 18 119/70 90 96 % None (Room air) Sitting --    04/13/25 0730 -- -- -- -- -- -- None (Room air) -- No Pain    04/13/25 0659 97.8 °F (36.6 °C) 86 18 146/68 98 96 % None (Room air) Lying --    04/12/25 2214 97.9 °F (36.6 °C) 92 18 128/60 87 97 % None (Room air) Lying --    04/12/25 2030 -- -- -- -- -- -- -- -- No Pain    04/12/25 1459 97.5 °F (36.4 °C) 97 20 128/60 86 96 % None (Room air) Sitting --    04/12/25 0830 -- -- -- -- -- -- -- -- No Pain    04/12/25 0718 97.6 °F (36.4 °C) 106 20 106/58 75 96 % None (Room air) Lying --    04/12/25 0550 -- -- -- -- -- -- -- -- 5    04/12/25 0157 -- -- -- -- -- -- -- -- 7 04/12/25 0040 -- -- -- -- -- -- -- -- 7    04/11/25 2311 98 °F (36.7 °C) 114 20 134/67 80 96 % None (Room air) Sitting --    04/11/25 2109 -- -- -- -- -- -- -- -- 10 - Worst Possible Pain    04/11/25 2026 -- -- -- -- -- -- -- -- 10 - Worst Possible Pain    04/11/25 1647 -- -- -- -- -- -- -- -- 6    04/11/25 1559 100.8 °F (38.2 °C) 107 18 123/55 72 97 % None (Room air) Sitting --    04/11/25 1227 -- -- -- 114/60 -- -- -- -- --    04/11/25 1225 -- -- -- -- -- -- -- -- 7    04/11/25 0924 -- -- -- -- -- -- -- -- 4       Pertinent Labs/Diagnostic Results:       Radiology:      XR chest portable   Final Interpretation by Leland Corey MD (04/11 0827)      No acute cardiopulmonary disease.            Workstation performed: [id number]         CT abdomen pelvis with contrast   Final Interpretation by Maicol Calderon DO (04/10 1579)      Small amount of air within the bladder lumen, probably related to recent intervention; correlation with urinalysis recommended to exclude superimposed cystitis.      Colonic diverticulosis without evidence of acute  diverticulitis, and other findings as above.         Workstation performed: [id number]           Cardiology:      ECG 12 lead   Final Result by Ladarius Stover DO (04/11 0753)    Age and gender specific ECG analysis   Sinus tachycardia   Nonspecific ST abnormality   Abnormal ECG   When compared with ECG of 10-Dec-2024 21:22,   No significant change was found   Confirmed by Ladarius Stover ([id number]) on 4/11/2025 7:53:15 AM              Results from last 7 days   Lab Units 04/14/25  0400 04/13/25  0518 04/12/25  1653 04/12/25  0548 04/11/25  0509   WBC Thousand/uL 3.84* 4.86 8.38 9.81 9.15   HEMOGLOBIN g/dL 11.1* 11.0* 10.4* 9.1* 10.4*   HEMATOCRIT % 34.0* 33.4* 30.8* 27.6* 31.3*   PLATELETS Thousands/uL 204 173 171 152 192   TOTAL NEUT ABS Thousands/µL 1.97 3.54  --  8.24* 8.65*         Results from last 7 days   Lab Units 04/14/25  0400 04/13/25  0518 04/12/25  0548 04/11/25  0509 04/10/25  2120   SODIUM mmol/L 141 139 134* 139 139   POTASSIUM mmol/L 3.6 3.7 4.3 4.0 3.6   CHLORIDE mmol/L 109* 109* 102 109* 106   CO2 mmol/L 27 23 19* 23 25   ANION GAP mmol/L 5 7 13 7 8   BUN mg/dL 10 9 9 15 16   CREATININE mg/dL 0.58* 0.58* 0.52* 0.85 0.76   EGFR ml/min/1.73sq m 99 99 102 73 84   CALCIUM mg/dL 9.1 8.9 6.4* 8.2* 9.4   MAGNESIUM mg/dL  --  2.0 2.6 2.6 1.7*   PHOSPHORUS mg/dL  --  2.6 2.3  --   --      Results from last 7 days   Lab Units 04/13/25  0518 04/12/25  0548 04/10/25  2120   AST U/L 45* 76* 50*   ALT U/L 116* 120* 42   ALK PHOS U/L 67 55 95   TOTAL PROTEIN g/dL 6.5 5.0* 7.1   ALBUMIN g/dL 3.6 2.9* 4.2   TOTAL BILIRUBIN mg/dL 0.45 0.36 0.40         Results from last 7 days   Lab Units 04/14/25  0400 04/13/25  0518 04/12/25  0548 04/11/25  0509 04/10/25  2120   GLUCOSE RANDOM mg/dL 95 83 69 130 100         Results from last 7 days   Lab Units 04/12/25  1653   CK TOTAL U/L 490*             Results from last 7 days   Lab Units 04/10/25  2120   PROTIME seconds 13.4   INR  1.00   PTT seconds 20*         Results  from last 7 days   Lab Units 04/12/25  0548 04/10/25  2120   PROCALCITONIN ng/ml 10.71* <0.05     Results from last 7 days   Lab Units 04/10/25  2310 04/10/25  2120   LACTIC ACID mmol/L 2.0 2.9*               Results from last 7 days   Lab Units 04/12/25  1718 04/10/25  2306   CLARITY UA  Clear Clear   COLOR UA  Colorless Light Yellow   SPEC GRAV UA  1.005 >=1.050*   PH UA  6.0 6.0   GLUCOSE UA mg/dl Negative Negative   KETONES UA mg/dl Trace* Negative   BLOOD UA  Negative Negative   PROTEIN UA mg/dl Negative Negative   NITRITE UA  Negative Negative   BILIRUBIN UA  Negative Negative   UROBILINOGEN UA (BE) mg/dl <2.0 <2.0   LEUKOCYTES UA  Negative Negative               Results from last 7 days   Lab Units 04/11/25  0208 04/10/25  2129 04/10/25  2120   BLOOD CULTURE   --  No Growth After 5 Days. No Growth After 5 Days.   URINE CULTURE  No Growth <1000 cfu/mL  --   --                    Network Utilization Review Department  ATTENTION: Please call with any questions or concerns to [phone] and carefully listen to the prompts so that you are directed to the right person. All voicemails are confidential.   For Discharge needs, contact Care Management DC Support Team at [phone] opt. 2  Send all requests for admission clinical reviews, approved or denied determinations and any other requests to dedicated fax number below belonging to the campus where the patient is receiving treatment. List of dedicated fax numbers for the Facilities:  FACILITY NAME UR FAX NUMBER   ADMISSION DENIALS (Administrative/Medical Necessity) [phone]   DISCHARGE SUPPORT TEAM (NETWORK) [phone]   PARENT CHILD HEALTH (Maternity/NICU/Pediatrics) [phone]   [facility] [phone]   [facility] [phone]   [facility] [phone]   [facility] [phone]   [facility] [phone]   [facility]  [facility] [phone]   [facility] [phone]   [facility] [phone]   [facility] [phone]   [facility] [phone]   [facility] [phone]   [facility] [phone]

## 2025-04-23 ENCOUNTER — OFFICE VISIT (OUTPATIENT)
Dept: FAMILY MEDICINE CLINIC | Facility: CLINIC | Age: 60
End: 2025-04-23
Payer: COMMERCIAL

## 2025-04-23 VITALS
DIASTOLIC BLOOD PRESSURE: 80 MMHG | WEIGHT: 138.2 LBS | HEART RATE: 73 BPM | OXYGEN SATURATION: 96 % | SYSTOLIC BLOOD PRESSURE: 124 MMHG | BODY MASS INDEX: 24.48 KG/M2 | TEMPERATURE: 97 F

## 2025-04-23 DIAGNOSIS — T37.0X5D: Primary | ICD-10-CM

## 2025-04-23 PROCEDURE — 99495 TRANSJ CARE MGMT MOD F2F 14D: CPT | Performed by: FAMILY MEDICINE

## 2025-04-23 NOTE — PROGRESS NOTES
Transition of Care Visit:  Name: Richa Medina      : 1965      MRN: [id number]  Encounter Provider: Matthew Thomson MD  Encounter Date: 2025   Encounter department: [facility]    Assessment & Plan  Adverse effect of sulfamethoxazole, subsequent encounter    Recent reaction to bactrim, in hindsight likely cause of fevers on 12/10 hosptial stay as well, now added to allergy list and making full recovery.            History of Present Illness     Transitional Care Management Review:   Richa Medina is a 60 y.o. female here for TCM follow up.     During the TCM phone call patient stated:  TCM Call (since 2025)       Date and time call was made  2025  3:21 PM    Hospital care reviewed  Records reviewed    Patient was hospitialized at  [facility]    Date of Admission  04/10/25    Date of discharge  25    Diagnosis  Severe sepsis    Disposition  Home    Were the patients medications reviewed and updated  No    Current Symptoms  None          TCM Call (since 2025)       Post hospital issues  None    Scheduled for follow up?  Yes  Dr. Thomson 25    I have advised the patient to call PCP with any new or worsening symptoms  Janna W, RMA          HPI    Richa Medina is a 60 y.o. female patient who originally presented to the hospital on 4/10/2025 due to an allergic reaction.     Patient underwent cystoscopy last 4/10/2025 for gross hematuria.  She was given Bactrim postprocedure for prophylaxis, but appeared to have developed new onset fever, nausea, vomiting, rigors, generalized weakness right after she took Bactrim.  She presented to the ED for further evaluation.  Initially, there was concern for sepsis.  She was started on IV antibiotic therapy.  Urine and blood cultures all came back negative for any infection.  Likely etiology of her presentation seems to be likely due to Bactrim allergy.  She states that she had a similar reaction  when she took this medication recently.  Given clinical improvement and negative cultures, patient opted to closely monitor herself at home and follow-up with her primary care or seek immediate medical attention should she develop any concerning symptoms that may be suggestive of an infection.  At this point in time however, we have no source that would warrant continued antibiotic treatment.      Since discharge she has continued to improve, she is still fatigued but recovering well    Review of Systems   Constitutional:  Negative for chills and fever.   HENT:  Negative for ear pain and sore throat.    Eyes:  Negative for pain and visual disturbance.   Respiratory:  Negative for cough and shortness of breath.    Cardiovascular:  Negative for chest pain and palpitations.   Gastrointestinal:  Negative for abdominal pain and vomiting.   Genitourinary:  Negative for dysuria and hematuria.   Musculoskeletal:  Negative for arthralgias and back pain.   Skin:  Negative for color change and rash.   Neurological:  Negative for seizures and syncope.   All other systems reviewed and are negative.    Objective   /80 (BP Location: Left arm, Patient Position: Sitting, Cuff Size: Standard)   Pulse 73   Temp (!) 97 °F (36.1 °C) (Tympanic)   Wt 62.7 kg (138 lb 3.2 oz)   SpO2 96%   BMI 24.48 kg/m²     Physical Exam  Constitutional:       Appearance: Normal appearance.   Cardiovascular:      Rate and Rhythm: Normal rate and regular rhythm.      Pulses: Normal pulses.      Heart sounds: Normal heart sounds.   Abdominal:      General: Abdomen is flat.   Musculoskeletal:         General: Normal range of motion.   Neurological:      Mental Status: She is alert.       Medications have been reviewed by provider in current encounter

## 2025-04-23 NOTE — ASSESSMENT & PLAN NOTE
Recent reaction to bactrim, in hindsight likely cause of fevers on 12/10 hosptial stay as well, now added to allergy list and making full recovery.

## 2025-05-06 ENCOUNTER — HOSPITAL ENCOUNTER (OUTPATIENT)
Dept: MAMMOGRAPHY | Facility: CLINIC | Age: 60
Discharge: HOME | End: 2025-05-06
Payer: COMMERCIAL

## 2025-05-06 VITALS — BODY MASS INDEX: 24.45 KG/M2 | HEIGHT: 63 IN | WEIGHT: 138 LBS

## 2025-05-06 DIAGNOSIS — C50.412 MALIGNANT NEOPLASM OF UPPER-OUTER QUADRANT OF LEFT BREAST IN FEMALE, ESTROGEN RECEPTOR POSITIVE (HCC): ICD-10-CM

## 2025-05-06 DIAGNOSIS — Z17.0 MALIGNANT NEOPLASM OF UPPER-OUTER QUADRANT OF LEFT BREAST IN FEMALE, ESTROGEN RECEPTOR POSITIVE (HCC): ICD-10-CM

## 2025-05-06 PROCEDURE — 77065 DX MAMMO INCL CAD UNI: CPT

## 2025-05-06 PROCEDURE — G0279 TOMOSYNTHESIS, MAMMO: HCPCS

## 2025-05-07 ENCOUNTER — ONCOLOGY SURVIVORSHIP (OUTPATIENT)
Dept: SURGICAL ONCOLOGY | Facility: CLINIC | Age: 60
End: 2025-05-07
Payer: COMMERCIAL

## 2025-05-07 VITALS
HEIGHT: 63 IN | DIASTOLIC BLOOD PRESSURE: 90 MMHG | TEMPERATURE: 97.8 F | BODY MASS INDEX: 24.88 KG/M2 | SYSTOLIC BLOOD PRESSURE: 144 MMHG | OXYGEN SATURATION: 98 % | WEIGHT: 140.4 LBS | HEART RATE: 89 BPM | RESPIRATION RATE: 16 BRPM

## 2025-05-07 DIAGNOSIS — C50.811 MALIGNANT NEOPLASM OF OVERLAPPING SITES OF RIGHT BREAST IN FEMALE, ESTROGEN RECEPTOR POSITIVE (HCC): Primary | ICD-10-CM

## 2025-05-07 DIAGNOSIS — Z17.0 MALIGNANT NEOPLASM OF OVERLAPPING SITES OF RIGHT BREAST IN FEMALE, ESTROGEN RECEPTOR POSITIVE (HCC): Primary | ICD-10-CM

## 2025-05-07 DIAGNOSIS — Z79.811 USE OF ANASTROZOLE (ARIMIDEX): ICD-10-CM

## 2025-05-07 DIAGNOSIS — Z17.0 MALIGNANT NEOPLASM OF UPPER-OUTER QUADRANT OF LEFT BREAST IN FEMALE, ESTROGEN RECEPTOR POSITIVE (HCC): ICD-10-CM

## 2025-05-07 DIAGNOSIS — C50.412 MALIGNANT NEOPLASM OF UPPER-OUTER QUADRANT OF LEFT BREAST IN FEMALE, ESTROGEN RECEPTOR POSITIVE (HCC): ICD-10-CM

## 2025-05-07 PROCEDURE — 99213 OFFICE O/P EST LOW 20 MIN: CPT | Performed by: NURSE PRACTITIONER

## 2025-05-07 NOTE — PROGRESS NOTES
Name: Richa Medina      : 1965      MRN: [id number]  Encounter Provider: ELIDIA Escamilla  Encounter Date: 2025   Encounter department: [facility] [place]  :  Assessment & Plan  Malignant neoplasm of overlapping sites of right breast in female, estrogen receptor positive (HCC)         Malignant neoplasm of upper-outer quadrant of left breast in female, estrogen receptor positive (HCC)         Use of anastrozole (Arimidex)         Patient is a 60-year-old female that was diagnosed with a right-sided breast cancer in 2023.  Her pathology revealed multifocal invasive ductal carcinoma.  One site was %, OH 90%, HER2 FISH negative.  A second site was %, OH 35%, HER2 negative.  She then underwent a left-sided biopsy which revealed an invasive ductal carcinoma with tubular features, %, OH negative, HER2 negative. She declined genetic testing.  She received neoadjuvant anastrozole secondary to needing further pulmonary workup for pneumonia prior to surgery. She underwent a right mastectomy and sentinel node biopsy and a left lumpectomy and sentinel node biopsy with Dr. Dominguez.  She had a aesthetic closure on the right and had a mastopexy on the left by Dr. Felix.  She completed adjuvant left breast radiation therapy and is maintained on anastrozole. Follow up breast cancer survivorship visit performed today.    She had a left 3D diagnostic mammogram in May 2025 which was BI-RADS 2, category 4 density.  No worrisome findings on today's clinical exam.    She is up-to-date on colorectal and cervical cancer screenings as well as osteoporosis screening.  We will plan to see her back in 7 months for a routine follow-up visit or sooner should the need arise.  She was instructed to contact us with any concerns in the interim.  All of her questions were answered today.       Survivorship  Discussed symptoms related to disease recurrence, Yes      Evaluated for late effects related to cancer treatment, Yes     Screening current for cervical cancer, Yes     Screening current for colon cancer, Yes     Cancer rehabilitation services addressed, No     Screening current for osteoporosis, Yes     History of Present Illness   Richa Medina is a 60 y.o. year old female who presents today for follow-up visit.  She has not appreciated any changes on self breast exam.  She had a left mammogram performed yesterday which was benign.  She denies persistent headaches, back pain or bone pain, cough or shortness of breath, abdominal pain.    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 breast in female, estrogen receptor positive (HCC)  Staging form: Breast, AJCC 8th Edition  - Clinical stage from 11/1/2023: Stage IA (cT1c, cN0, cM0, G1, ER+, SC+, HER2-) - Signed by Sisi Dominguez MD on 11/13/2023  Stage prefix: Initial diagnosis  Method of lymph node assessment: Clinical  Histologic grading system: 3 grade system  - Pathologic stage from 4/19/2024: No stage assigned - Unsigned  Stage prefix: Initial diagnosis  - Pathologic stage from 4/19/2024: Stage IA (pT1c, pN1a(sn), cM0, G1, ER+, SC+, HER2-) - Signed by Sisi Dominguez MD on 5/6/2024  Stage prefix: Initial diagnosis  Method of lymph node assessment: [place] lymph node biopsy  Histologic grading system: 3 grade system    Malignant neoplasm of upper-outer quadrant of left breast in female, estrogen receptor positive (HCC)  Staging form: Breast, AJCC 8th Edition  - Clinical stage from 12/8/2023: Stage IA (cT1b, cN0, cM0, G1, ER+, SC-, HER2-) - Signed by Sisi Dominguez MD on 12/27/2023  Stage prefix: Initial diagnosis  Method of lymph node assessment: Clinical  Histologic grading system: 3 grade system  - Pathologic stage from 4/19/2024: Stage IA (pT1a, pN0(sn), cM0, G1, ER+, SC-, HER2-) - Signed by Sisi Dominguez MD on 5/6/2024  Stage prefix: Initial diagnosis  Method of lymph node  assessment: [place] lymph node biopsy  Histologic grading system: 3 grade system  Oncology History   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 7 o'clock, 5 cm from nipple  Invasive breast carcinoma of no special type (ductal NST)  Grade 1  % NE 90% HER2 2+, FISH negative  Lymphovascular invasion not identified    B. 12 o'clock, 8 cm from nipple  Invasive breast carcinoma of no special type (ductal NST)  Grade 1  % NE 35% HER2 1+  Lymphovascular invasion not identified    Concordant. Malignancy appears multicentric; there were additional concerning areas within the right breast seen on recent imaging. There were also some areas of enhancement in the upper outer quadrant of the left breast on the contrast-enhanced mammogram which did not have a definite correlate on US;. Pretreatment MRI is recommended.     11/1/2023 -  Cancer Staged    Staging form: Breast, AJCC 8th Edition  - Clinical stage from 11/1/2023: Stage IA (cT1c, cN0, cM0, G1, ER+, NE+, HER2-) - Signed by Sisi Dominguez MD on 11/13/2023  Stage prefix: Initial diagnosis  Method of lymph node assessment: Clinical  Histologic grading system: 3 grade system       11/13/2023 Genetic Testing    Consult with Dr. Dominguez - patient declined genetic testing     11/21/2023 Observation    Bilateral breast MRI IMPRESSION:  1.  There is non-mass enhancement in the left breast at 1:00 which is suspicious.  This corresponds with the enhancement seen on the contrast-enhanced mammogram.  MRI guided core needle biopsy is recommended.  2.  Multifocal malignancy in the right breast.  There is no evidence of pectoralis muscle, skin or nipple invasion.  The most posterior areas of enhancement are 24 mm inferior/posterior to the superior Thais  reflector.  3.  There is abnormal opacification and enhancement in the right lower lung.  This is  incompletely characterized on MRI.  Correlation with known medical history and CT chest is recommended.     1/23/2024 -  Hormone Therapy    Anastrozole 1 mg daily   Started in neoadjuvant setting to allow for resolution of pulmonary issues prior to surgery  Dr. Yung     2/2/2024 Genomic Testing    Oncotype DX Recurrence Score 9  Done on right breast biopsy specimen B     4/19/2024 Surgery    Right mastectomy with axillary dissection and sentinel lymph node biopsy  Invasive carcinoma of no special type (ductal)  Grade 1  1.2 cm (2 foci)  Margins negative  1/8 Lymph nodes with macro-metastases (6 mm, extranodal extension not identified)    Aesthetic flat closure (Dr. Felix)     4/19/2024 -  Cancer Staged    Staging form: Breast, AJCC 8th Edition  - Pathologic stage from 4/19/2024: Stage IA (pT1c, pN1a(sn), cM0, G1, ER+, OK+, HER2-) - Signed by Sisi Dominguez MD on 5/6/2024  Stage prefix: Initial diagnosis  Method of lymph node assessment: [place] lymph node biopsy  Histologic grading system: 3 grade system       Malignant neoplasm of upper-outer quadrant of left breast in female, estrogen receptor positive (HCC)   11/13/2023 Genetic Testing    Consult with Dr. Dominguez - patient declined genetic testing     11/21/2023 Observation    Bilateral breast MRI IMPRESSION:  1.  There is non-mass enhancement in the left breast at 1:00 which is suspicious.  This corresponds with the enhancement seen on the contrast-enhanced mammogram.  MRI guided core needle biopsy is recommended.  2.  Multifocal malignancy in the right breast.  There is no evidence of pectoralis muscle, skin or nipple invasion.  The most posterior areas of enhancement are 24 mm inferior/posterior to the superior Thais  reflector.  3.  There is abnormal opacification and enhancement in the right lower lung.  This is incompletely characterized on MRI.  Correlation with known medical history and CT chest is recommended.     12/8/2023 Biopsy    Left breast  MRI-guided biopsy  1 o'clock  Invasive breast carcinoma of no special type (ductal) with features of tubular carcinoma  Grade 1  ER 90% IN 0%, HER2 1+  Lymphovascular invasion not identified    Concordant. Left malignancy appears unifocal. The non-mass enhancement measured up to 8 mm on MRI. US left axilla negative.     12/8/2023 -  Cancer Staged    Staging form: Breast, AJCC 8th Edition  - Clinical stage from 12/8/2023: Stage IA (cT1b, cN0, cM0, G1, ER+, IN-, HER2-) - Signed by Sisi Dominguez MD on 12/27/2023  Stage prefix: Initial diagnosis  Method of lymph node assessment: Clinical  Histologic grading system: 3 grade system       1/23/2024 -  Hormone Therapy    Anastrozole 1 mg daily   Started in neoadjuvant setting to allow for resolution of pulmonary issues prior to surgery  Dr. Yung     4/19/2024 Surgery    Left breast HILARY localized lumpectomy with sentinel lymph node biopsy  Tubular carcinoma  Grade 1  5 mm  Margins negative  0/4 Lymph nodes    Mastopexy (Dr. Felix)     4/19/2024 -  Cancer Staged    Staging form: Breast, AJCC 8th Edition  - Pathologic stage from 4/19/2024: Stage IA (pT1a, pN0(sn), cM0, G1, ER+, IN-, HER2-) - Signed by Sisi Dominguez MD on 5/6/2024  Stage prefix: Initial diagnosis  Method of lymph node assessment: [place] lymph node biopsy  Histologic grading system: 3 grade system       7/30/2024 - 8/21/2024 Radiation      Plan ID Energy Fractions Dose per Fraction (cGy) Dose Correction (cGy) Total Dose Delivered (cGy) Elapsed Days   BH L Breast 6X 16 / 16 266 0 4,256 22      Dr. Coker        Review of Systems   Constitutional:  Negative for activity change, appetite change, chills, fatigue, fever and unexpected weight change.   Respiratory:  Negative for cough and shortness of breath.    Cardiovascular:  Negative for chest pain.   Gastrointestinal:  Negative for abdominal pain, constipation, diarrhea, nausea and vomiting.   Musculoskeletal:  Negative for arthralgias, back pain, gait  "problem and myalgias.   Skin:  Negative for color change and rash.   Neurological:  Negative for dizziness and headaches.   Hematological:  Negative for adenopathy.   Psychiatric/Behavioral:  Negative for agitation and confusion.    All other systems reviewed and are negative.   A complete review of systems is negative other than that noted above in the HPI.           Objective   /90 (BP Location: Left arm, Patient Position: Sitting, Cuff Size: Standard)   Pulse 89   Temp 97.8 °F (36.6 °C) (Temporal)   Resp 16   Ht 5' 3\" (1.6 m)   Wt 63.7 kg (140 lb 6.4 oz)   SpO2 98%   BMI 24.87 kg/m²     Pain Screening:  Pain Score: 0-No pain  ECOG    Physical Exam  Vitals reviewed.   Constitutional:       General: She is not in acute distress.     Appearance: Normal appearance. She is well-developed. She is not diaphoretic.   HENT:      Head: Normocephalic and atraumatic.   Cardiovascular:      Rate and Rhythm: Normal rate and regular rhythm.      Heart sounds: Normal heart sounds.   Pulmonary:      Effort: Pulmonary effort is normal.      Breath sounds: Normal breath sounds.   Chest:   Breasts:     Left: No swelling, bleeding, inverted nipple, mass, nipple discharge, skin change or tenderness.      Comments: Right mastectomy site free of masses, skin changes, nodules. No right arm lymphedema  Abdominal:      Palpations: Abdomen is soft. There is no mass.      Tenderness: There is no abdominal tenderness.   Musculoskeletal:         General: Normal range of motion.      Cervical back: Normal range of motion.   Lymphadenopathy:      Upper Body:      Right upper body: No supraclavicular or axillary adenopathy.      Left upper body: No supraclavicular or axillary adenopathy.   Skin:     General: Skin is warm and dry.      Findings: No rash.   Neurological:      Mental Status: She is alert and oriented to person, place, and time.   Psychiatric:         Speech: Speech normal.          "

## 2025-05-11 PROBLEM — R65.20 SEVERE SEPSIS (HCC): Status: RESOLVED | Noted: 2024-12-11 | Resolved: 2025-05-11

## 2025-05-11 PROBLEM — A41.9 SEVERE SEPSIS (HCC): Status: RESOLVED | Noted: 2024-12-11 | Resolved: 2025-05-11

## 2025-05-21 ENCOUNTER — APPOINTMENT (OUTPATIENT)
Dept: RADIOLOGY | Facility: CLINIC | Age: 60
End: 2025-05-21
Payer: COMMERCIAL

## 2025-05-21 ENCOUNTER — OFFICE VISIT (OUTPATIENT)
Dept: PODIATRY | Facility: CLINIC | Age: 60
End: 2025-05-21

## 2025-05-21 VITALS — RESPIRATION RATE: 18 BRPM | HEIGHT: 63 IN | WEIGHT: 136 LBS | BODY MASS INDEX: 24.1 KG/M2

## 2025-05-21 DIAGNOSIS — M20.22 HALLUX RIGIDUS OF LEFT FOOT: ICD-10-CM

## 2025-05-21 DIAGNOSIS — M20.11 ACQUIRED HALLUX VALGUS OF RIGHT FOOT: ICD-10-CM

## 2025-05-21 DIAGNOSIS — M79.672 LEFT FOOT PAIN: ICD-10-CM

## 2025-05-21 DIAGNOSIS — M20.22 HALLUX RIGIDUS OF LEFT FOOT: Primary | ICD-10-CM

## 2025-05-21 PROCEDURE — 73630 X-RAY EXAM OF FOOT: CPT

## 2025-05-21 RX ORDER — SODIUM CHLORIDE, SODIUM LACTATE, POTASSIUM CHLORIDE, CALCIUM CHLORIDE 600; 310; 30; 20 MG/100ML; MG/100ML; MG/100ML; MG/100ML
20 INJECTION, SOLUTION INTRAVENOUS CONTINUOUS
OUTPATIENT
Start: 2025-05-21

## 2025-05-21 NOTE — PROGRESS NOTES
Name: Richa Medina      : 1965      MRN: [id number]  Encounter Provider: Jonnathan Chow DPM  Encounter Date: 2025   Encounter department: [facility]    Explained to patient that the left foot pain is secondary to a hallux rigidus deformity which is an arthritic deformity of the great toe joint.    Discussed treatment options recommending surgical intervention due to the severity of the disorder.  Contrasted fusion of the joint with implant arthroplasty with patient preferring the implant.  This implant is a Silastic hinge great toe implant.  The procedure was explained including pre and postoperative course, risk and complications.  The number of our  was given.  Patient will be rescheduled for consent signing.    I personally viewed x-rays of the left foot taken today.  They reveal severe degenerative changes at the first metatarsal phalangeal joint but the absence of joint space.  Joint appears bone-on-bone..    No treatment needed for the asymptomatic right foot.  :  Assessment & Plan  Hallux rigidus of left foot    Orders:    XR foot 3+ vw left; Future    Case request operating room: CHEILECTOMY W IMPLANT; Standing  Surgery  Left foot pain         Acquired hallux valgus of right foot         Shoe therapy    History of Present Illness   HPI  Richa Medina is a 60 y.o. female who presents with severe pain in her left great toe joint.  Patient states that she has had pain for years.  She believes that she has a bunion.  She has pain whenever she walks and tries to bend her big toe up.  Patient also has tolerable pain noted in the right foot.  She has a small bunion present on this foot but it is relatively asymptomatic.  Patient wears orthotics on a regular basis.    Past medical history is positive for mastectomy.  She relates no scar issues.    She is working in a job that requires significant amount of walking.      Review of Systems   Musculoskeletal:  Positive for  "gait problem.   Psychiatric/Behavioral: Negative.                Objective   Resp 18   Ht 5' 3\" (1.6 m)   Wt 61.7 kg (136 lb)   BMI 24.09 kg/m²      Physical Exam  Constitutional:       Appearance: Normal appearance.     Cardiovascular:      Pulses: Normal pulses.     Musculoskeletal:         General: Tenderness and deformity present.      Comments: Range of motion left first MPJ restricted to 5 degrees of dorsiflexion.  There is a dorsal exostosis noted clinically at the first metatarsal head.  Sharp pain with attempted motion.    Range of motion right first MPJ is within normal limits.  There is a mild hallux valgus deformity present.     Skin:     General: Skin is warm.     Neurological:      General: No focal deficit present.      Mental Status: She is oriented to person, place, and time.               "

## 2025-05-21 NOTE — ASSESSMENT & PLAN NOTE
Orders:    XR foot 3+ vw left; Future    Case request operating room: CHEILECTOMY W IMPLANT; Standing  Surgery

## 2025-05-24 DIAGNOSIS — F32.A DEPRESSION, UNSPECIFIED DEPRESSION TYPE: ICD-10-CM

## 2025-05-25 RX ORDER — PAROXETINE HYDROCHLORIDE HEMIHYDRATE 25 MG/1
50 TABLET, FILM COATED, EXTENDED RELEASE ORAL DAILY
Qty: 180 TABLET | Refills: 0 | Status: SHIPPED | OUTPATIENT
Start: 2025-05-25

## 2025-05-27 DIAGNOSIS — F32.A DEPRESSION, UNSPECIFIED DEPRESSION TYPE: ICD-10-CM

## 2025-05-28 RX ORDER — PAROXETINE HYDROCHLORIDE HEMIHYDRATE 25 MG/1
50 TABLET, FILM COATED, EXTENDED RELEASE ORAL DAILY
Qty: 180 TABLET | Refills: 0 | OUTPATIENT
Start: 2025-05-28

## 2025-06-04 ENCOUNTER — TELEPHONE (OUTPATIENT)
Dept: PODIATRY | Facility: CLINIC | Age: 60
End: 2025-06-04

## 2025-07-03 ENCOUNTER — TELEPHONE (OUTPATIENT)
Dept: PODIATRY | Facility: CLINIC | Age: 60
End: 2025-07-03

## 2025-07-03 NOTE — TELEPHONE ENCOUNTER
Called and lvm for patient. We have a surgical case in for the patient and would like to know if she would like to proceed. Please forward surgical questions and scheduling to Dinorah Brown, Yola Hinojosa, and Missy Pagan ( current surgery schedulers for podiatry)

## 2025-07-08 ENCOUNTER — TELEPHONE (OUTPATIENT)
Age: 60
End: 2025-07-08

## 2025-07-08 NOTE — TELEPHONE ENCOUNTER
Caller: Richa Medina    Doctor: Dr. Chow    Reason for call: We LM about SX-she wants to CX for now but will discuss again with Dr. Chow at her next scheduled appt in Sept. She said it was too hard to get in touch w/ Sx person to be scheduled when she wanted the Sx.  No need for call back. Thanks    Call back#: NA

## 2025-07-08 NOTE — TELEPHONE ENCOUNTER
Caller: Richa Medina    Doctor: Dr. Chow    Reason for call: We Lm about Sx-I attached to that message so had to do this to close my message    Call back#: NA

## 2025-08-05 ENCOUNTER — APPOINTMENT (OUTPATIENT)
Dept: LAB | Facility: CLINIC | Age: 60
End: 2025-08-05
Payer: COMMERCIAL

## 2025-08-05 DIAGNOSIS — Z00.8 HEALTH EXAMINATION IN POPULATION SURVEYS: ICD-10-CM

## 2025-08-05 LAB
ALBUMIN SERPL BCG-MCNC: 4.4 G/DL (ref 3.5–5)
ALP SERPL-CCNC: 74 U/L (ref 34–104)
ALT SERPL W P-5'-P-CCNC: 16 U/L (ref 7–52)
ANION GAP SERPL CALCULATED.3IONS-SCNC: 5 MMOL/L (ref 4–13)
AST SERPL W P-5'-P-CCNC: 20 U/L (ref 13–39)
BASOPHILS # BLD AUTO: 0.02 THOUSANDS/ÂΜL (ref 0–0.1)
BASOPHILS NFR BLD AUTO: 1 % (ref 0–1)
BILIRUB SERPL-MCNC: 0.53 MG/DL (ref 0.2–1)
BUN SERPL-MCNC: 9 MG/DL (ref 5–25)
CALCIUM SERPL-MCNC: 9.5 MG/DL (ref 8.4–10.2)
CHLORIDE SERPL-SCNC: 103 MMOL/L (ref 96–108)
CHOLEST SERPL-MCNC: 220 MG/DL (ref ?–200)
CO2 SERPL-SCNC: 29 MMOL/L (ref 21–32)
CREAT SERPL-MCNC: 0.72 MG/DL (ref 0.6–1.3)
EOSINOPHIL # BLD AUTO: 0.23 THOUSAND/ÂΜL (ref 0–0.61)
EOSINOPHIL NFR BLD AUTO: 7 % (ref 0–6)
ERYTHROCYTE [DISTWIDTH] IN BLOOD BY AUTOMATED COUNT: 13.1 % (ref 11.6–15.1)
EST. AVERAGE GLUCOSE BLD GHB EST-MCNC: 120 MG/DL
GFR SERPL CREATININE-BSD FRML MDRD: 90 ML/MIN/1.73SQ M
GLUCOSE SERPL-MCNC: 99 MG/DL (ref 65–140)
HBA1C MFR BLD: 5.8 %
HCT VFR BLD AUTO: 38.3 % (ref 34.8–46.1)
HDLC SERPL-MCNC: 52 MG/DL
HGB BLD-MCNC: 12.7 G/DL (ref 11.5–15.4)
IMM GRANULOCYTES # BLD AUTO: 0.01 THOUSAND/UL (ref 0–0.2)
IMM GRANULOCYTES NFR BLD AUTO: 0 % (ref 0–2)
LDLC SERPL CALC-MCNC: 128 MG/DL (ref 0–100)
LYMPHOCYTES # BLD AUTO: 1.03 THOUSANDS/ÂΜL (ref 0.6–4.47)
LYMPHOCYTES NFR BLD AUTO: 33 % (ref 14–44)
MCH RBC QN AUTO: 29.2 PG (ref 26.8–34.3)
MCHC RBC AUTO-ENTMCNC: 33.2 G/DL (ref 31.4–37.4)
MCV RBC AUTO: 88 FL (ref 82–98)
MONOCYTES # BLD AUTO: 0.41 THOUSAND/ÂΜL (ref 0.17–1.22)
MONOCYTES NFR BLD AUTO: 13 % (ref 4–12)
NEUTROPHILS # BLD AUTO: 1.43 THOUSANDS/ÂΜL (ref 1.85–7.62)
NEUTS SEG NFR BLD AUTO: 46 % (ref 43–75)
NONHDLC SERPL-MCNC: 168 MG/DL
NRBC BLD AUTO-RTO: 0 /100 WBCS
PLATELET # BLD AUTO: 246 THOUSANDS/UL (ref 149–390)
PMV BLD AUTO: 10.8 FL (ref 8.9–12.7)
POTASSIUM SERPL-SCNC: 4.1 MMOL/L (ref 3.5–5.3)
PROT SERPL-MCNC: 7.6 G/DL (ref 6.4–8.4)
RBC # BLD AUTO: 4.35 MILLION/UL (ref 3.81–5.12)
SODIUM SERPL-SCNC: 137 MMOL/L (ref 135–147)
TRIGL SERPL-MCNC: 198 MG/DL (ref ?–150)
WBC # BLD AUTO: 3.13 THOUSAND/UL (ref 4.31–10.16)

## 2025-08-05 PROCEDURE — 80061 LIPID PANEL: CPT

## 2025-08-11 ENCOUNTER — OFFICE VISIT (OUTPATIENT)
Age: 60
End: 2025-08-11
Payer: COMMERCIAL

## 2025-08-13 ENCOUNTER — PREP FOR PROCEDURE (OUTPATIENT)
Age: 60
End: 2025-08-13

## 2025-08-15 ENCOUNTER — APPOINTMENT (OUTPATIENT)
Dept: LAB | Facility: CLINIC | Age: 60
End: 2025-08-15
Payer: COMMERCIAL

## 2025-08-19 ENCOUNTER — OFFICE VISIT (OUTPATIENT)
Dept: HEMATOLOGY ONCOLOGY | Facility: CLINIC | Age: 60
End: 2025-08-19
Payer: COMMERCIAL

## 2025-08-19 VITALS
HEART RATE: 74 BPM | RESPIRATION RATE: 17 BRPM | DIASTOLIC BLOOD PRESSURE: 84 MMHG | HEIGHT: 63 IN | SYSTOLIC BLOOD PRESSURE: 128 MMHG | TEMPERATURE: 97.9 F | BODY MASS INDEX: 24.84 KG/M2 | OXYGEN SATURATION: 98 % | WEIGHT: 140.2 LBS

## 2025-08-19 DIAGNOSIS — E53.8 FOLATE DEFICIENCY: ICD-10-CM

## 2025-08-19 DIAGNOSIS — E61.1 IRON DEFICIENCY: ICD-10-CM

## 2025-08-19 DIAGNOSIS — C50.811 MALIGNANT NEOPLASM OF OVERLAPPING SITES OF RIGHT BREAST IN FEMALE, ESTROGEN RECEPTOR POSITIVE (HCC): Primary | ICD-10-CM

## 2025-08-19 DIAGNOSIS — Z17.0 MALIGNANT NEOPLASM OF OVERLAPPING SITES OF RIGHT BREAST IN FEMALE, ESTROGEN RECEPTOR POSITIVE (HCC): Primary | ICD-10-CM

## 2025-08-19 DIAGNOSIS — E53.8 VITAMIN B12 DEFICIENCY: ICD-10-CM

## 2025-08-19 PROCEDURE — 99214 OFFICE O/P EST MOD 30 MIN: CPT

## 2025-08-20 LAB
FERRITIN SERPL-MCNC: 50 NG/ML (ref 30–307)
FOLATE SERPL-MCNC: >22.3 NG/ML
IRON SATN MFR SERPL: 33 % (ref 15–50)
IRON SERPL-MCNC: 106 UG/DL (ref 50–212)
TIBC SERPL-MCNC: 323.4 UG/DL (ref 250–450)
TRANSFERRIN SERPL-MCNC: 231 MG/DL (ref 203–362)
UIBC SERPL-MCNC: 217 UG/DL (ref 155–355)
VIT B12 SERPL-MCNC: 544 PG/ML (ref 180–914)

## 2025-08-20 PROCEDURE — 83540 ASSAY OF IRON: CPT

## 2025-08-20 PROCEDURE — 82728 ASSAY OF FERRITIN: CPT

## 2025-08-20 PROCEDURE — 82746 ASSAY OF FOLIC ACID SERUM: CPT

## 2025-08-20 PROCEDURE — 83550 IRON BINDING TEST: CPT

## 2025-08-20 PROCEDURE — 82607 VITAMIN B-12: CPT

## 2025-08-20 PROCEDURE — 36415 COLL VENOUS BLD VENIPUNCTURE: CPT

## 2025-08-21 ENCOUNTER — TELEPHONE (OUTPATIENT)
Dept: HEMATOLOGY ONCOLOGY | Facility: CLINIC | Age: 60
End: 2025-08-21

## (undated) DEVICE — OCCLUSIVE GAUZE STRIP,3% BISMUTH TRIBROMOPHENATE IN PETROLATUM BLEND: Brand: XEROFORM

## (undated) DEVICE — JACKSON-PRATT 100CC BULB RESERVOIR: Brand: CARDINAL HEALTH

## (undated) DEVICE — TUBING SUCTION 5MM X 12 FT

## (undated) DEVICE — LIGACLIP MCA MULTIPLE CLIP APPLIERS, 20 SMALL CLIPS: Brand: LIGACLIP

## (undated) DEVICE — KNIFE LIGHT 10,PK: Brand: KNIFELIGHT

## (undated) DEVICE — CHLORAPREP HI-LITE 26ML ORANGE

## (undated) DEVICE — SUT SILK 2-0 SH 30 IN K833H

## (undated) DEVICE — SUT MONOCRYL 5-0 P-3 18 IN Y493G

## (undated) DEVICE — GLOVE SRG BIOGEL 7

## (undated) DEVICE — ELECTRODE BLADE MOD E-Z CLEAN  2.75IN 7CM -0012AM

## (undated) DEVICE — SPONGE PVP SCRUB WING STERILE

## (undated) DEVICE — SPONGE 4 X 4 XRAY 16 PLY STRL LF RFD

## (undated) DEVICE — DRAPE SHEET THREE QUARTER

## (undated) DEVICE — GLOVE SRG BIOGEL 6

## (undated) DEVICE — MINI BLADE ROUND TIP SHARP ON ONE SIDE

## (undated) DEVICE — NEEDLE 25G X 1 1/2

## (undated) DEVICE — JP CHAN DRN SIL HUBLESS 15FR W/TRO: Brand: CARDINAL HEALTH

## (undated) DEVICE — ADHESIVE SKIN HIGH VISCOSITY EXOFIN 1ML

## (undated) DEVICE — GLOVE SRG BIOGEL ECLIPSE 8

## (undated) DEVICE — STERILE BETHLEHEM PLASTIC HAND: Brand: CARDINAL HEALTH

## (undated) DEVICE — SUT MONOCRYL 4-0 PS-2 27 IN Y426H

## (undated) DEVICE — CUFF TOURNIQUET 18 X 4 IN QUICK CONNECT DISP 1 BLADDER

## (undated) DEVICE — LIGHT HANDLE COVER SLEEVE DISP BLUE STELLAR

## (undated) DEVICE — 3M™ STERI-STRIP™ REINFORCED ADHESIVE SKIN CLOSURES, R1547, 1/2 IN X 4 IN (12 MM X 100 MM), 6 STRIPS/ENVELOPE: Brand: 3M™ STERI-STRIP™

## (undated) DEVICE — 3M™ TEGADERM™ TRANSPARENT FILM DRESSING FRAME STYLE, 1626W, 4 IN X 4-3/4 IN (10 CM X 12 CM), 50/CT 4CT/CASE: Brand: 3M™ TEGADERM™

## (undated) DEVICE — INTENDED FOR TISSUE SEPARATION, AND OTHER PROCEDURES THAT REQUIRE A SHARP SURGICAL BLADE TO PUNCTURE OR CUT.: Brand: BARD-PARKER SAFETY BLADES SIZE 10, STERILE

## (undated) DEVICE — HEMOSTAT POWDER ADSORB SURGICEL 3GM

## (undated) DEVICE — GLOVE INDICATOR PI UNDERGLOVE SZ 7 BLUE

## (undated) DEVICE — NEEDLE BLUNT 18 G X 1 1/2IN

## (undated) DEVICE — SUPER SPONGES,MEDIUM: Brand: KERLIX

## (undated) DEVICE — ELECTRODE EZ CLEAN BLADE -0012

## (undated) DEVICE — ELECTRODE BLADE MOD E-Z CLEAN 2.5IN 6.4CM -0012M

## (undated) DEVICE — SUT MONOCRYL 3-0 SH 27 IN Y416H

## (undated) DEVICE — BRA SURGICAL SZ SMALL (30-33)

## (undated) DEVICE — PAD GROUNDING DUAL ADULT

## (undated) DEVICE — TRAY FOLEY 16FR URIMETER SURESTEP

## (undated) DEVICE — INTENDED FOR TISSUE SEPARATION, AND OTHER PROCEDURES THAT REQUIRE A SHARP SURGICAL BLADE TO PUNCTURE OR CUT.: Brand: BARD-PARKER SAFETY BLADES SIZE 15, STERILE

## (undated) DEVICE — BULB SYRINGE,IRRIGATION WITH PROTECTIVE CAP: Brand: DOVER

## (undated) DEVICE — MEDI-VAC YANKAUER SUCTION HANDLE W/BULBOUS AND CONTROL VENT: Brand: CARDINAL HEALTH

## (undated) DEVICE — PROXIMATE SKIN STAPLERS (35 WIDE) CONTAINS 35 STAINLESS STEEL STAPLES (FIXED HEAD): Brand: PROXIMATE

## (undated) DEVICE — GAUZE SPONGES,16 PLY: Brand: CURITY

## (undated) DEVICE — POV-IOD SOLUTION 4OZ BT

## (undated) DEVICE — INTENDED FOR TISSUE SEPARATION, AND OTHER PROCEDURES THAT REQUIRE A SHARP SURGICAL BLADE TO PUNCTURE OR CUT.: Brand: BARD-PARKER ® CARBON RIB-BACK BLADES

## (undated) DEVICE — DISPOSABLE EQUIPMENT COVER: Brand: SMALL TOWEL DRAPE

## (undated) DEVICE — SUT ETHILON 3-0 PS-1 18 IN 1663G

## (undated) DEVICE — SUT NYLON 5-0 P-3 18 IN 690G

## (undated) DEVICE — CUFF TOURNIQUET 18 X 5.5 IN QUICK CONNECT 2BLA

## (undated) DEVICE — DRAPE EQUIPMENT RF WAND

## (undated) DEVICE — DECANTER: Brand: UNBRANDED

## (undated) DEVICE — STANDARD SURGICAL GOWN, L: Brand: CONVERTORS

## (undated) DEVICE — SUT PDS II 4-0 PS-2 18 IN Z496G

## (undated) DEVICE — CHEST/BREAST DRAPE: Brand: CONVERTORS

## (undated) DEVICE — PLUMEPEN PRO 10FT

## (undated) DEVICE — FOAM WOUND DRESSING: Brand: DUAL-DRESS 50 11X12

## (undated) DEVICE — SUT ETHILON 2-0 FS 18 IN 664H

## (undated) DEVICE — LIGHT GLOVE GREEN

## (undated) DEVICE — DRESSING BIOPATCH ANTIMICROBIAL 1 IN DISC

## (undated) DEVICE — 450 ML BOTTLE OF 0.05% CHLORHEXIDINE GLUCONATE IN 99.95% STERILE WATER FOR IRRIGATION, USP AND APPLICATOR.: Brand: IRRISEPT ANTIMICROBIAL WOUND LAVAGE

## (undated) DEVICE — NEEDLE 21 G X 1 1/2 SAFETY

## (undated) DEVICE — GLOVE INDICATOR PI UNDERGLOVE SZ 8 BLUE

## (undated) DEVICE — PROVE COVER: Brand: UNBRANDED

## (undated) DEVICE — GLOVE INDICATOR PI UNDERGLOVE SZ 6.5 BLUE

## (undated) DEVICE — SCD SEQUENTIAL COMPRESSION COMFORT SLEEVE MEDIUM KNEE LENGTH: Brand: KENDALL SCD

## (undated) DEVICE — SUT VICRYL 3-0 SH 27 IN J416H

## (undated) DEVICE — PADDING CAST 4 IN  COTTON STRL

## (undated) DEVICE — BETHLEHEM UNIVERSAL MINOR GEN: Brand: CARDINAL HEALTH

## (undated) DEVICE — 4-PORT MANIFOLD: Brand: NEPTUNE 2

## (undated) DEVICE — ACE WRAP 3 IN STERILE

## (undated) DEVICE — SURGICEL 2 X 14

## (undated) DEVICE — SKIN MARKER DUAL TIP WITH RULER CAP, FLEXIBLE RULER AND LABELS: Brand: DEVON

## (undated) DEVICE — SUT PROLENE 2-0 SH 36 IN 8523H

## (undated) DEVICE — STAPLER INSORB SUBCUTICULAR 30 SINGLE USE

## (undated) DEVICE — SPECIMEN CONTAINER STERILE PEEL PACK

## (undated) DEVICE — SPONGE LAP 18 X 18 IN STRL RFD

## (undated) DEVICE — 3M™ TEGADERM™ TRANSPARENT FILM DRESSING FRAME STYLE, 1624W, 2-3/8 IN X 2-3/4 IN (6 CM X 7 CM), 100/CT 4CT/CASE: Brand: 3M™ TEGADERM™